# Patient Record
Sex: FEMALE | Race: WHITE | NOT HISPANIC OR LATINO | Employment: OTHER | URBAN - METROPOLITAN AREA
[De-identification: names, ages, dates, MRNs, and addresses within clinical notes are randomized per-mention and may not be internally consistent; named-entity substitution may affect disease eponyms.]

---

## 2017-06-13 RX ORDER — AMLODIPINE BESYLATE 10 MG/1
10 TABLET ORAL DAILY
Qty: 10 TABLET | Refills: 0 | Status: SHIPPED | OUTPATIENT
Start: 2017-06-13 | End: 2017-06-19 | Stop reason: SDUPTHER

## 2017-06-13 NOTE — TELEPHONE ENCOUNTER
----- Message from Evie Gibbons sent at 6/13/2017 10:54 AM CDT -----  Contact: self [phone] ext [phone]  States that she needs refill on amlodipine. Pt uses     [facility] [address]  [address] [place] [address]  Phone: [phone] Fax: [phone]    Please call back at [phone] ext [phone]//thank you acc

## 2017-06-15 ENCOUNTER — TELEPHONE (OUTPATIENT)
Dept: INTERNAL MEDICINE | Facility: CLINIC | Age: 67
End: 2017-06-15

## 2017-06-15 NOTE — TELEPHONE ENCOUNTER
----- Message from Marlena Freeman sent at 6/15/2017  2:05 PM CDT -----  Contact: pt  States she needs to schedule lab work on tomorrow. State she has an appt on Monday with Dr Han. Please call pt at [phone]. Thank you

## 2017-06-15 NOTE — TELEPHONE ENCOUNTER
----- Message from Shweta Saleem sent at 6/15/2017  8:51 AM CDT -----  Contact: nieves Leon - pt is returning your missed call - please call again at [phone] ext [phone]

## 2017-06-19 ENCOUNTER — OFFICE VISIT (OUTPATIENT)
Dept: INTERNAL MEDICINE | Facility: CLINIC | Age: 67
End: 2017-06-19
Payer: MEDICARE

## 2017-06-19 VITALS
SYSTOLIC BLOOD PRESSURE: 110 MMHG | DIASTOLIC BLOOD PRESSURE: 67 MMHG | WEIGHT: 194.75 LBS | TEMPERATURE: 99 F | BODY MASS INDEX: 33.43 KG/M2 | OXYGEN SATURATION: 97 % | HEART RATE: 79 BPM

## 2017-06-19 DIAGNOSIS — L60.3 DYSTROPHIC NAIL: ICD-10-CM

## 2017-06-19 DIAGNOSIS — E11.9 CONTROLLED TYPE 2 DIABETES MELLITUS WITHOUT COMPLICATION, WITHOUT LONG-TERM CURRENT USE OF INSULIN: Primary | ICD-10-CM

## 2017-06-19 DIAGNOSIS — Z78.0 ASYMPTOMATIC MENOPAUSAL STATE: ICD-10-CM

## 2017-06-19 DIAGNOSIS — Z12.31 ENCOUNTER FOR SCREENING MAMMOGRAM FOR BREAST CANCER: ICD-10-CM

## 2017-06-19 DIAGNOSIS — I10 HYPERTENSION, ESSENTIAL: ICD-10-CM

## 2017-06-19 PROCEDURE — 99999 PR PBB SHADOW E&M-EST. PATIENT-LVL III: CPT | Mod: PBBFAC,,, | Performed by: FAMILY MEDICINE

## 2017-06-19 PROCEDURE — 4010F ACE/ARB THERAPY RXD/TAKEN: CPT | Mod: S$GLB,,, | Performed by: FAMILY MEDICINE

## 2017-06-19 PROCEDURE — 99214 OFFICE O/P EST MOD 30 MIN: CPT | Mod: S$GLB,,, | Performed by: FAMILY MEDICINE

## 2017-06-19 RX ORDER — NAPROXEN 500 MG/1
500 TABLET ORAL
COMMUNITY
End: 2020-08-05 | Stop reason: SDUPTHER

## 2017-06-19 RX ORDER — BENAZEPRIL HYDROCHLORIDE 40 MG/1
40 TABLET ORAL DAILY
Qty: 90 TABLET | Refills: 1 | Status: SHIPPED | OUTPATIENT
Start: 2017-06-19 | End: 2017-12-29 | Stop reason: SDUPTHER

## 2017-06-19 RX ORDER — AMLODIPINE BESYLATE 10 MG/1
10 TABLET ORAL DAILY
Qty: 10 TABLET | Refills: 1 | Status: SHIPPED | OUTPATIENT
Start: 2017-06-19 | End: 2017-06-27 | Stop reason: SDUPTHER

## 2017-06-19 RX ORDER — CYCLOBENZAPRINE HCL 10 MG
10 TABLET ORAL
COMMUNITY
Start: 2016-06-08 | End: 2020-08-05 | Stop reason: DRUGHIGH

## 2017-06-19 RX ORDER — DEXTROSE 4 G
TABLET,CHEWABLE ORAL
COMMUNITY
Start: 2015-01-19 | End: 2019-03-12 | Stop reason: SDUPTHER

## 2017-06-19 NOTE — PROGRESS NOTES
Subjective:       Patient ID: Mita Max is a 67 y.o. female.    Chief Complaint: b/p medication refill and Medicare AWV    Last seen in 2014.  She has been seeing Dr. Victor M Aragon since then and labs performed at [facility] are reviewed.  She has a history of diet-controlled diabetes, essential hypertension, osteoarthritis.  Saw Willis for right great toenail earlier this year.  It was removed.  There is a  Claude - bone and JOint - splint not help right wrist pain norm with lifting items.  Knee pain left one time pain       Review of Systems   Constitutional: Positive for fatigue.   HENT: Positive for congestion, dental problem and postnasal drip.    Eyes: Positive for itching.   Respiratory: Positive for cough and shortness of breath.    Cardiovascular: Positive for leg swelling.   Gastrointestinal: Negative.    Endocrine: Negative.    Genitourinary: Negative.    Musculoskeletal: Positive for arthralgias and back pain.   Skin: Negative.    Allergic/Immunologic: Positive for environmental allergies.   Neurological: Negative.    Hematological: Negative.    Psychiatric/Behavioral: Positive for dysphoric mood.       Objective:      Physical Exam   Constitutional: She is oriented to person, place, and time. She appears well-developed and well-nourished.   HENT:   Head: Normocephalic and atraumatic.   Right Ear: External ear normal.   Left Ear: External ear normal.   Mouth/Throat: Oropharynx is clear and moist. No oropharyngeal exudate.   Neck: Normal range of motion. Neck supple.   Cardiovascular: Normal rate, regular rhythm and normal heart sounds.    Pulses:       Dorsalis pedis pulses are 2+ on the right side, and 2+ on the left side.        Posterior tibial pulses are 1+ on the right side, and 1+ on the left side.   Pulmonary/Chest: Effort normal and breath sounds normal.   Abdominal: Soft. Bowel sounds are normal. She exhibits no distension. There is no tenderness.   Musculoskeletal:         Right foot: There is normal range of motion and no deformity.        Left foot: There is normal range of motion and no deformity.   Feet:   Right Foot:   Protective Sensation: 10 sites tested. 10 sites sensed.   Skin Integrity: Negative for ulcer or skin breakdown.   Left Foot:   Protective Sensation: 10 sites tested. 10 sites sensed.   Skin Integrity: Negative for ulcer or skin breakdown.   Neurological: She is alert and oriented to person, place, and time.   Skin: Skin is warm and dry.   Disfigured right great toenail.   Psychiatric: She has a normal mood and affect. Her behavior is normal.         Assessment/Plan:     1. Controlled type 2 diabetes mellitus without complication, without long-term current use of insulin  Hemoglobin A1c    Comprehensive metabolic panel    CBC auto differential    Lipid panel    Microalbumin/creatinine urine ratio   2. Encounter for screening mammogram for breast cancer  Mammo Digital Screening Bilat with CAD   3. Asymptomatic menopausal state  DXA Bone Density Spine And Hip   4. Hypertension, essential  benazepril (LOTENSIN) 40 MG tablet    amlodipine (NORVASC) 10 MG tablet   5. Dystrophic nail       Recheck 6 months if labs all good.

## 2017-06-20 ENCOUNTER — TELEPHONE (OUTPATIENT)
Dept: INTERNAL MEDICINE | Facility: CLINIC | Age: 67
End: 2017-06-20

## 2017-06-20 NOTE — TELEPHONE ENCOUNTER
----- Message from Cassy Amaro sent at 6/20/2017  9:11 AM CDT -----  Contact: Pt  Pt called and stated she needed to speak to the nurse. She stated she needs to reschedule her nurse visit. She can be reached at [phone] ext [phone].  Thanks,  TF

## 2017-06-20 NOTE — TELEPHONE ENCOUNTER
Pt was calling in regards to rescheduling her lab appt. Rescheduled pt lab appt. Pt verbalized understanding of information given.

## 2017-06-22 ENCOUNTER — TELEPHONE (OUTPATIENT)
Dept: INTERNAL MEDICINE | Facility: CLINIC | Age: 67
End: 2017-06-22

## 2017-06-22 NOTE — TELEPHONE ENCOUNTER
Tried calling pt to inform that medication was sent into her pharmacy on yesterday. Pt phone just rang, voicemail was left for pt too return the call.

## 2017-06-22 NOTE — TELEPHONE ENCOUNTER
----- Message from Tamara Vallecillo sent at 6/22/2017 11:42 AM CDT -----  Contact: [phone]  1. What is the name of the medication you are requesting? amlodipine  2. What is the dose? n/a  3. How do you take the medication? Orally, topically, etc? orally  4. How often do you take this medication? daily  5. Do you need a 30 day or 90 day supply? 90 day supply for mail order  6. How many refills are you requesting? n/a  7. What is your preferred pharmacy and location of the pharmacy? [facility]  8. Who can we contact with further questions? [phone]    Patient would like to consult with nurse regarding RX medication sent to the pharmacy. Please call back at [phone]    Thanks,  Tamara Vallecillo

## 2017-06-23 ENCOUNTER — TELEPHONE (OUTPATIENT)
Dept: INTERNAL MEDICINE | Facility: CLINIC | Age: 67
End: 2017-06-23

## 2017-06-23 NOTE — TELEPHONE ENCOUNTER
----- Message from Tanna Mata sent at 6/23/2017  8:40 AM CDT -----  Pt is returning a call to nurse.        Please call pt back at [phone] ext [phone]

## 2017-06-23 NOTE — TELEPHONE ENCOUNTER
Pt was returning a call from yesterday. A voice message was left for the pt that her medication was called in to her pharmacy.

## 2017-06-27 ENCOUNTER — CLINICAL SUPPORT (OUTPATIENT)
Dept: INTERNAL MEDICINE | Facility: CLINIC | Age: 67
End: 2017-06-27
Payer: MEDICARE

## 2017-06-27 DIAGNOSIS — E11.21 TYPE 2 DIABETES MELLITUS WITH DIABETIC NEPHROPATHY, WITH LONG-TERM CURRENT USE OF INSULIN: ICD-10-CM

## 2017-06-27 DIAGNOSIS — Z79.4 TYPE 2 DIABETES MELLITUS WITH DIABETIC NEPHROPATHY, WITH LONG-TERM CURRENT USE OF INSULIN: ICD-10-CM

## 2017-06-27 DIAGNOSIS — E11.9 CONTROLLED TYPE 2 DIABETES MELLITUS WITHOUT COMPLICATION, WITHOUT LONG-TERM CURRENT USE OF INSULIN: ICD-10-CM

## 2017-06-27 DIAGNOSIS — I10 HYPERTENSION, ESSENTIAL: ICD-10-CM

## 2017-06-27 LAB
ALBUMIN SERPL BCP-MCNC: 3.9 G/DL
ALP SERPL-CCNC: 52 U/L
ALT SERPL W/O P-5'-P-CCNC: 21 U/L
ANION GAP SERPL CALC-SCNC: 8 MMOL/L
AST SERPL-CCNC: 22 U/L
BASOPHILS # BLD AUTO: 0.06 K/UL
BASOPHILS NFR BLD: 1.3 %
BILIRUB SERPL-MCNC: 0.8 MG/DL
BUN SERPL-MCNC: 13 MG/DL
CALCIUM SERPL-MCNC: 9.9 MG/DL
CHLORIDE SERPL-SCNC: 107 MMOL/L
CHOLEST/HDLC SERPL: 2.9 {RATIO}
CO2 SERPL-SCNC: 27 MMOL/L
CREAT SERPL-MCNC: 0.8 MG/DL
CREAT UR-MCNC: 98 MG/DL
DIFFERENTIAL METHOD: ABNORMAL
EOSINOPHIL # BLD AUTO: 0.4 K/UL
EOSINOPHIL NFR BLD: 7.4 %
ERYTHROCYTE [DISTWIDTH] IN BLOOD BY AUTOMATED COUNT: 12.8 %
EST. GFR  (AFRICAN AMERICAN): >60 ML/MIN/1.73 M^2
EST. GFR  (NON AFRICAN AMERICAN): >60 ML/MIN/1.73 M^2
GLUCOSE SERPL-MCNC: 152 MG/DL
HCT VFR BLD AUTO: 41.5 %
HDL/CHOLESTEROL RATIO: 34.3 %
HDLC SERPL-MCNC: 178 MG/DL
HDLC SERPL-MCNC: 61 MG/DL
HGB BLD-MCNC: 14 G/DL
LDLC SERPL CALC-MCNC: 105.6 MG/DL
LYMPHOCYTES # BLD AUTO: 1.9 K/UL
LYMPHOCYTES NFR BLD: 40.8 %
MCH RBC QN AUTO: 31.5 PG
MCHC RBC AUTO-ENTMCNC: 33.7 %
MCV RBC AUTO: 93 FL
MICROALBUMIN UR DL<=1MG/L-MCNC: 6 UG/ML
MICROALBUMIN/CREATININE RATIO: 6.1 UG/MG
MONOCYTES # BLD AUTO: 0.5 K/UL
MONOCYTES NFR BLD: 9.9 %
NEUTROPHILS # BLD AUTO: 1.9 K/UL
NEUTROPHILS NFR BLD: 40.4 %
NONHDLC SERPL-MCNC: 117 MG/DL
PLATELET # BLD AUTO: 299 K/UL
PMV BLD AUTO: 9.1 FL
POTASSIUM SERPL-SCNC: 5.1 MMOL/L
PROT SERPL-MCNC: 7.1 G/DL
RBC # BLD AUTO: 4.45 M/UL
SODIUM SERPL-SCNC: 142 MMOL/L
TRIGL SERPL-MCNC: 57 MG/DL
WBC # BLD AUTO: 4.76 K/UL

## 2017-06-27 PROCEDURE — 36415 COLL VENOUS BLD VENIPUNCTURE: CPT | Mod: S$GLB,,, | Performed by: FAMILY MEDICINE

## 2017-06-27 PROCEDURE — 83036 HEMOGLOBIN GLYCOSYLATED A1C: CPT

## 2017-06-27 PROCEDURE — 85025 COMPLETE CBC W/AUTO DIFF WBC: CPT

## 2017-06-27 PROCEDURE — 82570 ASSAY OF URINE CREATININE: CPT

## 2017-06-27 PROCEDURE — 80053 COMPREHEN METABOLIC PANEL: CPT

## 2017-06-27 PROCEDURE — 80061 LIPID PANEL: CPT

## 2017-06-27 PROCEDURE — 99999 PR PBB SHADOW E&M-EST. PATIENT-LVL II: CPT | Mod: PBBFAC,,,

## 2017-06-27 RX ORDER — AMLODIPINE BESYLATE 10 MG/1
10 TABLET ORAL DAILY
Qty: 90 TABLET | Refills: 1 | Status: SHIPPED | OUTPATIENT
Start: 2017-06-27 | End: 2017-07-13 | Stop reason: SDUPTHER

## 2017-06-28 LAB
ESTIMATED AVG GLUCOSE: 126 MG/DL
HBA1C MFR BLD HPLC: 6 %

## 2017-06-29 DIAGNOSIS — E11.9 CONTROLLED TYPE 2 DIABETES MELLITUS WITHOUT COMPLICATION, WITHOUT LONG-TERM CURRENT USE OF INSULIN: Primary | ICD-10-CM

## 2017-06-29 DIAGNOSIS — I10 HYPERTENSION, ESSENTIAL: ICD-10-CM

## 2017-07-05 ENCOUNTER — PATIENT OUTREACH (OUTPATIENT)
Dept: ADMINISTRATIVE | Facility: HOSPITAL | Age: 67
End: 2017-07-05

## 2017-07-13 DIAGNOSIS — I10 HYPERTENSION, ESSENTIAL: ICD-10-CM

## 2017-07-13 RX ORDER — AMLODIPINE BESYLATE 10 MG/1
10 TABLET ORAL DAILY
Qty: 90 TABLET | Refills: 1 | Status: SHIPPED | OUTPATIENT
Start: 2017-07-13 | End: 2018-02-15 | Stop reason: SDUPTHER

## 2017-07-13 NOTE — TELEPHONE ENCOUNTER
Pt called and states that she would like her script for Amlodipine to go to mail pharmacy instead of local pharmacy because it is cheaper, please advise.

## 2017-09-21 ENCOUNTER — OFFICE VISIT (OUTPATIENT)
Dept: INTERNAL MEDICINE | Facility: CLINIC | Age: 67
End: 2017-09-21
Payer: MEDICARE

## 2017-09-21 VITALS
HEART RATE: 74 BPM | OXYGEN SATURATION: 95 % | DIASTOLIC BLOOD PRESSURE: 62 MMHG | SYSTOLIC BLOOD PRESSURE: 117 MMHG | HEIGHT: 63 IN | TEMPERATURE: 98 F | WEIGHT: 196.44 LBS | BODY MASS INDEX: 34.8 KG/M2

## 2017-09-21 DIAGNOSIS — Z79.4 TYPE 2 DIABETES MELLITUS WITH DIABETIC NEPHROPATHY, WITH LONG-TERM CURRENT USE OF INSULIN: ICD-10-CM

## 2017-09-21 DIAGNOSIS — N39.3 STRESS INCONTINENCE, FEMALE: ICD-10-CM

## 2017-09-21 DIAGNOSIS — E11.21 TYPE 2 DIABETES MELLITUS WITH DIABETIC NEPHROPATHY, WITH LONG-TERM CURRENT USE OF INSULIN: ICD-10-CM

## 2017-09-21 DIAGNOSIS — Z23 NEED FOR IMMUNIZATION AGAINST INFLUENZA: ICD-10-CM

## 2017-09-21 DIAGNOSIS — Z00.00 ENCOUNTER FOR PREVENTATIVE ADULT HEALTH CARE EXAMINATION: Primary | ICD-10-CM

## 2017-09-21 DIAGNOSIS — F10.21 ALCOHOL DEPENDENCE IN REMISSION: ICD-10-CM

## 2017-09-21 DIAGNOSIS — I10 ESSENTIAL HYPERTENSION: ICD-10-CM

## 2017-09-21 DIAGNOSIS — F17.210 CIGARETTE NICOTINE DEPENDENCE WITHOUT COMPLICATION: ICD-10-CM

## 2017-09-21 PROCEDURE — G0438 PPPS, INITIAL VISIT: HCPCS | Mod: S$GLB,,, | Performed by: NURSE PRACTITIONER

## 2017-09-21 PROCEDURE — 90662 IIV NO PRSV INCREASED AG IM: CPT | Mod: S$GLB,,, | Performed by: NURSE PRACTITIONER

## 2017-09-21 PROCEDURE — 99999 PR PBB SHADOW E&M-EST. PATIENT-LVL IV: CPT | Mod: PBBFAC,,, | Performed by: NURSE PRACTITIONER

## 2017-09-21 PROCEDURE — G0008 ADMIN INFLUENZA VIRUS VAC: HCPCS | Mod: S$GLB,,, | Performed by: NURSE PRACTITIONER

## 2017-09-21 NOTE — PATIENT INSTRUCTIONS
Counseling and Referral of Other Preventative  (Italic type indicates deductible and co-insurance are waived)    Patient Name: Mita Max  Today's Date: 9/21/2017      SERVICE LIMITATIONS RECOMMENDATION    Vaccines    · Pneumococcal (once after 65)    · Influenza (annually)    · Hepatitis B (if medium/high risk)    · Prevnar 13      Hepatitis B medium/high risk factors:       - End-stage renal disease       - Hemophiliacs who received Factor VII or         IX concentrates       - Clients of institutions for the mentally             retarded       - Persons who live in the same house as          a HepB carrier       - Homosexual men       - Illicit injectable drug abusers     Pneumococcal: Done, no repeat necessary     Influenza: Done, repeat in one year     Hepatitis B: N/A     Prevnar 13: unsure of status, will update when records from Dr Mahesh retana    Mammogram (biennial age 50-74)  Annually (age 40 or over)  Last done 6/23/2016, recommend to repeat every 1  years    Pap (up to age 70 and after 70 if unknown history or abnormal study last 10 years)    Last done 6/23/2016, recommend to repeat every 5  years     The USPSTF recommends against screening for cervical cancer in women older than age 65 years who have had adequate prior screening and are not otherwise at high risk for cervical cancer.      Colorectal cancer screening (to age 75)    · Fecal occult blood test (annual)  · Flexible sigmoidoscopy (5y)  · Screening colonoscopy (10y)  · Barium enema   Last done 8/15/2012, recommend to repeat every 5  years    Diabetes self-management training (no USPSTF recommendations)  Requires referral by treating physician for patient with diabetes or renal disease. 10 hours of initial DSMT sessions of no less than 30 minutes each in a continuous 12-month period. 2 hours of follow-up DSMT in subsequent years.  Recommended to patient, declined    Bone mass measurements (age 65 & older, biennial)  Requires diagnosis  related to osteoporosis or estrogen deficiency. Biennial benefit unless patient has history of long-term glucocorticoid Ordered, call to schedule     Glaucoma screening (no USPSTF recommendation)  Diabetes mellitus, family history   , age 50 or over    American, age 65 or over  Recommend follow up with eye care professional regularly    Medical nutrition therapy for diabetes or renal disease (no recommended schedule)  Requires referral by treating physician for patient with diabetes or renal disease or kidney transplant within the past 3 years.  Can be provided in same year as diabetes self-management training (DSMT), and CMS recommends medical nutrition therapy take place after DSMT. Up to 3 hours for initial year and 2 hours in subsequent years.  N/A    Cardiovascular screening blood tests (every 5 years)  · Fasting lipid panel  Order as a panel if possible  Done this year, repeat every year    Diabetes screening tests (at least every 3 years, Medicare covers annually or at 6-month intervals for prediabetic patients)  · Fasting blood sugar (FBS) or glucose tolerance test (GTT)  Patient must be diagnosed with one of the following:       - Hypertension       - Dyslipidemia       - Obesity (BMI 30kg/m2)       - Previous elevated impaired FBS or GTT       ... or any two of the following:       - Overweight (BMI 25 but <30)       - Family history of diabetes       - Age 65 or older       - History of gestational diabetes or birth of baby weighing more than 9 pounds  Done this year, repeat every year    HIV screening (annually for increased risk patients)  · HIV-1 and HIV-2 by EIA, or ROM, rapid antibody test or oral mucosa transudate  Patients must be at increased risk for HIV infection per USPSTF guidelines or pregnant. Tests covered annually for patient at increased risk or as requested by the patient. Pregnant patients may receive up to 3 tests during pregnancy.  Risks discussed, screening  is not recommended    Smoking cessation counseling (up to 8 sessions per year)  Patients must be asymptomatic of tobacco-related conditions to receive as a preventative service.  Counseled for 3-10 minutes.    Subsequent annual wellness visit  At least 12 months since last AWV  Return in one year     The following information is provided to all patients.  This information is to help you find resources for any of the problems found today that may be affecting your health:                Living healthy guide: www.[facility].[place].[facility]      Understanding Diabetes: www.diabetes.org      Eating healthy: www.cdc.gov/healthyweight      CDC home safety checklist: www.cdc.gov/steadi/patient.html      Agency on Aging: www.goea.[place].[facility]      Alcoholics anonymous (AA): www.aa.org      Physical Activity: www.allan.nih.gov/cp5uijr      Tobacco use: www.quitwithusla.org

## 2017-09-22 NOTE — PROGRESS NOTES
"Mita Max presented for a  Medicare AWV and comprehensive Health Risk Assessment today. The following components were reviewed and updated:    · Medical history  · Family History  · Social history  · Allergies and Current Medications  · Health Risk Assessment  · Health Maintenance  · Care Team     ** See Completed Assessments for Annual Wellness Visit within the encounter summary.**       The following assessments were completed:  · Living Situation  · CAGE  · Depression Screening  · Timed Get Up and Go  · Whisper Test  · Cognitive Function Screening  · Nutrition Screening  · ADL Screening  · PAQ Screening    Vitals:    09/21/17 1508   BP: 117/62   BP Location: Right arm   Patient Position: Sitting   BP Method: Medium (Automatic)   Pulse: 74   Temp: 97.7 °F (36.5 °C)   TempSrc: Oral   SpO2: 95%   Weight: 89.1 kg (196 lb 6.9 oz)   Height: 5' 3" (1.6 m)     Body mass index is 34.8 kg/m².  Physical Exam   Constitutional: She is oriented to person, place, and time. Vital signs are normal. She appears well-developed. No distress.   obese   HENT:   Head: Normocephalic and atraumatic.   Right Ear: Hearing, external ear and ear canal normal. No middle ear effusion.   Left Ear: Hearing, external ear and ear canal normal.  No middle ear effusion.   Nose: Mucosal edema and rhinorrhea present. Right sinus exhibits no maxillary sinus tenderness and no frontal sinus tenderness. Left sinus exhibits no maxillary sinus tenderness and no frontal sinus tenderness.   Mouth/Throat: Oropharynx is clear and moist. No oropharyngeal exudate. No tonsillar exudate.   Eyes: Conjunctivae are normal. Pupils are equal, round, and reactive to light. Right eye exhibits no discharge. Left eye exhibits no discharge.   Neck: Normal range of motion. Neck supple.   Cardiovascular: Normal rate, regular rhythm and normal heart sounds.    Pulses:       Dorsalis pedis pulses are 2+ on the right side, and 2+ on the left side.        Posterior tibial " pulses are 1+ on the right side, and 1+ on the left side.   Pulmonary/Chest: Effort normal and breath sounds normal. No respiratory distress. She has no wheezes.   Abdominal: Soft. Bowel sounds are normal. She exhibits no distension. There is no tenderness.   Musculoskeletal:        Right foot: There is normal range of motion and no deformity.        Left foot: There is normal range of motion and no deformity.   Feet:   Right Foot:   Protective Sensation: 10 sites tested. 10 sites sensed.   Skin Integrity: Negative for ulcer or skin breakdown.   Left Foot:   Protective Sensation: 10 sites tested. 10 sites sensed.   Skin Integrity: Negative for ulcer or skin breakdown.   Lymphadenopathy:     She has no cervical adenopathy.   Neurological: She is alert and oriented to person, place, and time.   Skin: Skin is warm and dry. No rash noted. She is not diaphoretic.   Disfigured right great toenail.   Psychiatric: She has a normal mood and affect. Her behavior is normal.         Diagnoses and health risks identified today and associated recommendations/orders:    1. Encounter for preventative adult health care examination  Completed today    2. Essential hypertension  Controlled on amlodipine and benazepril. No CP, palpitations, SOB and HA. Follow with PCP as scheduled.     3. Type 2 diabetes mellitus with diabetic nephropathy, with long-term current use of insulin  Diet controlled. Denies need for further education. Continue current plan and follow with PCP    4. Alcohol dependence in remission  Sober since 1986. Still attending AA regularly. No desire to drink. Continue to abstain.     5. Stress incontinence, female  Does not wear and pad or brief. Does not wish to see urology or any other intervention as this is a minor issue. Follow with PCP for any worsening.     6. Cigarette nicotine dependence without complication  Has tried multiple things to try to quit. Unable to quit at this time. Desires to quit. Defers referral  to cessation program. Follow with PCP.     7. Need for immunization against influenza  Completed today    Pt has mammo and dexa ordered. Will schedule on her own as well as eye exam.   Requesting records from Dr Aragon to determine need to pneumovax.     Provided Mita with a 5-10 year written screening schedule and personal prevention plan. Recommendations were developed using the USPSTF age appropriate recommendations. Education, counseling, and referrals were provided as needed. After Visit Summary printed and given to patient which includes a list of additional screenings\tests needed.    Return in about 3 months (around 12/27/2017).    PRINCE Reese,FNP-C

## 2017-12-29 DIAGNOSIS — I10 HYPERTENSION, ESSENTIAL: ICD-10-CM

## 2017-12-29 RX ORDER — BENAZEPRIL HYDROCHLORIDE 40 MG/1
TABLET ORAL
Qty: 90 TABLET | Refills: 0 | Status: SHIPPED | OUTPATIENT
Start: 2017-12-29 | End: 2018-03-06 | Stop reason: SDUPTHER

## 2018-01-29 ENCOUNTER — TELEPHONE (OUTPATIENT)
Dept: INTERNAL MEDICINE | Facility: CLINIC | Age: 68
End: 2018-01-29

## 2018-01-31 ENCOUNTER — TELEPHONE (OUTPATIENT)
Dept: INTERNAL MEDICINE | Facility: CLINIC | Age: 68
End: 2018-01-31

## 2018-01-31 NOTE — TELEPHONE ENCOUNTER
Patient was returning the phone call from earlier in regards to her scheduling her appt for labs and a follow up visit. Pt scheduled both of her appointment. Pt verbalized understanding of the information given.

## 2018-01-31 NOTE — TELEPHONE ENCOUNTER
----- Message from Jacqui Palma sent at 1/31/2018 10:13 AM CST -----  Contact: [phone]   Pt Returning call, please call back at [phone].   Thx-AH

## 2018-02-01 ENCOUNTER — CLINICAL SUPPORT (OUTPATIENT)
Dept: INTERNAL MEDICINE | Facility: CLINIC | Age: 68
End: 2018-02-01
Payer: MEDICARE

## 2018-02-01 DIAGNOSIS — E11.9 CONTROLLED TYPE 2 DIABETES MELLITUS WITHOUT COMPLICATION, WITHOUT LONG-TERM CURRENT USE OF INSULIN: ICD-10-CM

## 2018-02-01 DIAGNOSIS — I10 HYPERTENSION, ESSENTIAL: ICD-10-CM

## 2018-02-01 LAB
ANION GAP SERPL CALC-SCNC: 8 MMOL/L
BUN SERPL-MCNC: 10 MG/DL
CALCIUM SERPL-MCNC: 9.8 MG/DL
CHLORIDE SERPL-SCNC: 106 MMOL/L
CO2 SERPL-SCNC: 27 MMOL/L
CREAT SERPL-MCNC: 0.9 MG/DL
EST. GFR  (AFRICAN AMERICAN): >60 ML/MIN/1.73 M^2
EST. GFR  (NON AFRICAN AMERICAN): >60 ML/MIN/1.73 M^2
ESTIMATED AVG GLUCOSE: 126 MG/DL
GLUCOSE SERPL-MCNC: 174 MG/DL
HBA1C MFR BLD HPLC: 6 %
POTASSIUM SERPL-SCNC: 4.3 MMOL/L
SODIUM SERPL-SCNC: 141 MMOL/L

## 2018-02-01 PROCEDURE — 80048 BASIC METABOLIC PNL TOTAL CA: CPT

## 2018-02-01 PROCEDURE — 83036 HEMOGLOBIN GLYCOSYLATED A1C: CPT

## 2018-02-01 NOTE — LETTER
February 1, 2018      [address] [place] [address]  Phone: [phone]  Fax: [phone]       Patient: Mita Max   YOB: 1950  Date of Visit: 02/01/2018    To Whom It May Concern:    Rick Max  was at [facility] on 02/01/2018. She may return to work today. If you have any questions or concerns,  please do not hesitate to contact the office.    Sincerely,        CURT Rubio

## 2018-02-15 ENCOUNTER — OFFICE VISIT (OUTPATIENT)
Dept: INTERNAL MEDICINE | Facility: CLINIC | Age: 68
End: 2018-02-15
Payer: MEDICARE

## 2018-02-15 VITALS
HEART RATE: 76 BPM | DIASTOLIC BLOOD PRESSURE: 67 MMHG | SYSTOLIC BLOOD PRESSURE: 111 MMHG | OXYGEN SATURATION: 98 % | WEIGHT: 193.56 LBS | BODY MASS INDEX: 34.29 KG/M2 | TEMPERATURE: 98 F

## 2018-02-15 DIAGNOSIS — I10 HYPERTENSION, ESSENTIAL: ICD-10-CM

## 2018-02-15 DIAGNOSIS — Z11.59 NEED FOR HEPATITIS C SCREENING TEST: ICD-10-CM

## 2018-02-15 DIAGNOSIS — M67.472 GANGLION CYST OF LEFT FOOT: ICD-10-CM

## 2018-02-15 DIAGNOSIS — E11.69 HYPERLIPIDEMIA ASSOCIATED WITH TYPE 2 DIABETES MELLITUS: ICD-10-CM

## 2018-02-15 DIAGNOSIS — Z12.11 SCREENING FOR COLON CANCER: ICD-10-CM

## 2018-02-15 DIAGNOSIS — E11.9 DIET-CONTROLLED DIABETES MELLITUS: Primary | ICD-10-CM

## 2018-02-15 DIAGNOSIS — E78.5 HYPERLIPIDEMIA ASSOCIATED WITH TYPE 2 DIABETES MELLITUS: ICD-10-CM

## 2018-02-15 DIAGNOSIS — Z12.31 ENCOUNTER FOR SCREENING MAMMOGRAM FOR BREAST CANCER: ICD-10-CM

## 2018-02-15 DIAGNOSIS — Z78.0 ASYMPTOMATIC MENOPAUSAL STATE: ICD-10-CM

## 2018-02-15 DIAGNOSIS — Z23 IMMUNIZATION DUE: ICD-10-CM

## 2018-02-15 PROCEDURE — 3008F BODY MASS INDEX DOCD: CPT | Mod: S$GLB,,, | Performed by: FAMILY MEDICINE

## 2018-02-15 PROCEDURE — 99999 PR PBB SHADOW E&M-EST. PATIENT-LVL IV: CPT | Mod: PBBFAC,,, | Performed by: FAMILY MEDICINE

## 2018-02-15 PROCEDURE — 99214 OFFICE O/P EST MOD 30 MIN: CPT | Mod: S$GLB,,, | Performed by: FAMILY MEDICINE

## 2018-02-15 RX ORDER — AMLODIPINE BESYLATE 10 MG/1
10 TABLET ORAL DAILY
Qty: 90 TABLET | Refills: 3 | Status: SHIPPED | OUTPATIENT
Start: 2018-02-15 | End: 2019-02-19 | Stop reason: SDUPTHER

## 2018-02-15 RX ORDER — SIMVASTATIN 40 MG/1
40 TABLET, FILM COATED ORAL NIGHTLY
Qty: 90 TABLET | Refills: 1 | Status: SHIPPED | OUTPATIENT
Start: 2018-02-15 | End: 2019-03-02

## 2018-02-15 NOTE — PROGRESS NOTES
Subjective:       Patient ID: Mita Max is a 67 y.o. female.    Chief Complaint: Follow-up (on lab work); Rx refill (b/p); and left foot pain    Here for recheck for DM2 -  See labs. Lab Results       Component                Value               Date                       WBC                      4.76                06/27/2017                 HGB                      14.0                06/27/2017                 HCT                      41.5                06/27/2017                 PLT                      299                 06/27/2017                 CHOL                     178                 06/27/2017                 TRIG                     57                  06/27/2017                 HDL                      61                  06/27/2017                 LDLCALC                  105.6               06/27/2017                 ALT                      21                  06/27/2017                 AST                      22                  06/27/2017                 NA                       141                 02/01/2018                 K                        4.3                 02/01/2018                 CL                       106                 02/01/2018                 CALCIUM                  9.8                 02/01/2018                 CREATININE               0.9                 02/01/2018                 BUN                      10                  02/01/2018                 CO2                      27                  02/01/2018                 GLU                      174 (H)             02/01/2018                 ESTGFRAFRICA             >60.0               02/01/2018                 EGFRNONAA                >60.0               02/01/2018                 HGBA1C                   6.0 (H)             02/01/2018                 MICALBCREAT              6.1                 06/27/2017            Reviewed stable A1C but had  - she thinks - but she has been using cough drops  - has one on board now.      Diabetes   She presents for her follow-up diabetic visit. She has type 2 diabetes mellitus. There are no hypoglycemic associated symptoms. Pertinent negatives for diabetes include no foot paresthesias and no weakness. There are no hypoglycemic complications. Pertinent negatives for diabetic complications include no peripheral neuropathy or retinopathy.     Review of Systems   Constitutional: Negative for fever.   Neurological: Negative for weakness.   Psychiatric/Behavioral: Negative for sleep disturbance.       Objective:      Physical Exam   Constitutional: She is oriented to person, place, and time. She appears well-developed.   HENT:   Head: Normocephalic and atraumatic.   Cardiovascular: Normal rate, regular rhythm and normal heart sounds.    Pulmonary/Chest: Effort normal and breath sounds normal.   Musculoskeletal:   Left dorsal foot with 1 - 1.5 cm cyst to proximal foot. No erythema/ no warmth   Neurological: She is alert and oriented to person, place, and time.   Skin: Skin is warm and dry.   Psychiatric: She has a normal mood and affect. Her behavior is normal.         Assessment/Plan:     1. Diet-controlled diabetes mellitus  Hemoglobin A1c    Lipid panel    Microalbumin/creatinine urine ratio    Ambulatory referral to Optometry   2. Hypertension, essential  Comprehensive metabolic panel    amLODIPine (NORVASC) 10 MG tablet   3. Immunization due  Pneumococcal Conjugate Vaccine (13 Valent) (IM)   4. Asymptomatic menopausal state  DXA Bone Density Spine And Hip   5. Encounter for screening mammogram for breast cancer  Mammo Digital Screening Bilat with CAD   6. Screening for colon cancer     7. Need for hepatitis C screening test  Hepatitis C antibody   8. Hyperlipidemia associated with type 2 diabetes mellitus  Lipid panel    simvastatin (ZOCOR) 40 MG tablet   9. Ganglion cyst of left foot  Ambulatory referral to Podiatry   new start statin with recheck in 6 months - will get  FBS this next time -   Schedule her prevnar, mammo, DEXA  BP well controlled - continue current meds  continue diet control DM2 - discussed metformin if A1C were to rise to 6.4 or above.

## 2018-02-15 NOTE — LETTER
February 15, 2018    Mita Ricknlsummer Max  [address] [place] [address]             [facility]  [address] [place] [address]  Phone: [phone]  Fax: [phone] Ms Max was seen at my office today for scheduled recheck.    Please excuse her absence from work.                  Lexis Han MD

## 2018-02-23 ENCOUNTER — TELEPHONE (OUTPATIENT)
Dept: INTERNAL MEDICINE | Facility: CLINIC | Age: 68
End: 2018-02-23

## 2018-02-23 NOTE — TELEPHONE ENCOUNTER
Returned the patient phone call. Scheduled her nurse visit appt . Pt verbalized understanding of the information given.

## 2018-02-23 NOTE — TELEPHONE ENCOUNTER
----- Message from Ilana Perez sent at 2/23/2018 11:01 AM CST -----  Contact: pt  The pt request a call to schedule a nurse visit, the pt can be reached at [phone]///[phone]

## 2018-02-26 ENCOUNTER — CLINICAL SUPPORT (OUTPATIENT)
Dept: INTERNAL MEDICINE | Facility: CLINIC | Age: 68
End: 2018-02-26
Payer: MEDICARE

## 2018-02-26 ENCOUNTER — TELEPHONE (OUTPATIENT)
Dept: INTERNAL MEDICINE | Facility: CLINIC | Age: 68
End: 2018-02-26

## 2018-02-26 DIAGNOSIS — Z11.59 NEED FOR HEPATITIS C SCREENING TEST: ICD-10-CM

## 2018-02-26 DIAGNOSIS — I10 HYPERTENSION, ESSENTIAL: ICD-10-CM

## 2018-02-26 DIAGNOSIS — E11.9 DIET-CONTROLLED DIABETES MELLITUS: ICD-10-CM

## 2018-02-26 DIAGNOSIS — Z23 IMMUNIZATION DUE: Primary | ICD-10-CM

## 2018-02-26 DIAGNOSIS — E78.5 HYPERLIPIDEMIA ASSOCIATED WITH TYPE 2 DIABETES MELLITUS: ICD-10-CM

## 2018-02-26 DIAGNOSIS — E11.69 HYPERLIPIDEMIA ASSOCIATED WITH TYPE 2 DIABETES MELLITUS: ICD-10-CM

## 2018-02-26 NOTE — TELEPHONE ENCOUNTER
Please place orders from last visit including injection, tried to chart on it and can't get it to pull up for it to be given, blood work that is for future was accidentally added instead. pt is coming in tomorrow afternoon to have this done. Thanks.

## 2018-02-27 ENCOUNTER — TELEPHONE (OUTPATIENT)
Dept: INTERNAL MEDICINE | Facility: CLINIC | Age: 68
End: 2018-02-27

## 2018-02-27 ENCOUNTER — CLINICAL SUPPORT (OUTPATIENT)
Dept: INTERNAL MEDICINE | Facility: CLINIC | Age: 68
End: 2018-02-27
Payer: MEDICARE

## 2018-02-27 DIAGNOSIS — Z23 IMMUNIZATION DUE: ICD-10-CM

## 2018-02-27 PROCEDURE — 90670 PCV13 VACCINE IM: CPT | Mod: S$GLB,,, | Performed by: FAMILY MEDICINE

## 2018-02-27 PROCEDURE — 99999 PR PBB SHADOW E&M-EST. PATIENT-LVL I: CPT | Mod: PBBFAC,,,

## 2018-02-27 PROCEDURE — G0009 ADMIN PNEUMOCOCCAL VACCINE: HCPCS | Mod: S$GLB,,, | Performed by: FAMILY MEDICINE

## 2018-02-27 NOTE — TELEPHONE ENCOUNTER
The Zocor medication advises extra monitoring for people who also takes Norvasc.  Please advise, the patient has concerns about her liver

## 2018-02-27 NOTE — TELEPHONE ENCOUNTER
Called and spoke with the patient's  and advise of the message.   verbalized understanding and will call if there is any questions

## 2018-02-27 NOTE — TELEPHONE ENCOUNTER
Recommended maximum dose simvastatin 20 mg when amlodipine is also being prescribed.  I prescribed 40 mg at her last visit.  Please ask the patient to cut her simvastatin in half and take 20 mg nightly.    We will recheck in 6 months as discussed. If she wishes to check the lipids and ASTY/ALT in 3 months instead of 6 we can do that.     (Alternatively, she could take the 40 mg every other night and this would attain the same goal)

## 2018-03-06 DIAGNOSIS — I10 HYPERTENSION, ESSENTIAL: ICD-10-CM

## 2018-03-06 RX ORDER — BENAZEPRIL HYDROCHLORIDE 40 MG/1
TABLET ORAL
Qty: 90 TABLET | Refills: 3 | Status: SHIPPED | OUTPATIENT
Start: 2018-03-06 | End: 2019-02-28 | Stop reason: ALTCHOICE

## 2018-05-02 ENCOUNTER — TELEPHONE (OUTPATIENT)
Dept: INTERNAL MEDICINE | Facility: CLINIC | Age: 68
End: 2018-05-02

## 2018-05-02 NOTE — TELEPHONE ENCOUNTER
----- Message from Branden Whitmore sent at 5/2/2018  9:46 AM CDT -----  Contact: self [phone]  Would like to have prescription for diamox(generic) called to pharmacy, pt will be travelling to [place] on 05-10. Please call back at [phone].  Md Qamar          .  [facility] [place]  [address] [facility]  [place] [address]  Phone: [phone] Fax: [phone]

## 2018-05-02 NOTE — TELEPHONE ENCOUNTER
Patient called in stating that she will be traveling to [place] and wants to have Diamox(generic) called to the [facility] pharmacy on [address] in [place]. Pt states that she will be there for 5 days. She states that she need this medication to cover her for the 5 days she will be there.    Please Advise

## 2018-05-02 NOTE — TELEPHONE ENCOUNTER
Tried both numbers - L/M that I will call back - 250mg BID of acetazolamide can be used starting the day before travel and continuing for 48 hrs after arrival. It can be extended if she will be moving to much higher elevations during the course of her stay.    I will try to contact her after hours tomorrow or Friday.

## 2018-05-07 ENCOUNTER — TELEPHONE (OUTPATIENT)
Dept: INTERNAL MEDICINE | Facility: CLINIC | Age: 68
End: 2018-05-07

## 2018-05-07 RX ORDER — ACETAZOLAMIDE 250 MG/1
250 TABLET ORAL 2 TIMES DAILY
Qty: 12 TABLET | Refills: 0 | Status: SHIPPED | OUTPATIENT
Start: 2018-05-07 | End: 2019-02-28 | Stop reason: ALTCHOICE

## 2018-05-07 NOTE — TELEPHONE ENCOUNTER
I spoke with the patient.  She did have difficulty previously and has used this medication the last time she was there.  Reviewed that she would need to start on Wednesday continue Thursday and Friday and then because she is going to [place] at a higher elevation she would take it Saturday and Sunday as well.  Giving her enough meds to go through till Monday but she does not have to take it Monday.  Patient states understanding.

## 2018-05-07 NOTE — TELEPHONE ENCOUNTER
----- Message from Jacinta Chavira sent at 5/7/2018  8:28 AM CDT -----  Contact: pt   States she's rtn Maynor / nurses calling rg answering the pt's questions for the high altitude medicine and can be reached at [phone]//thanks/dbw

## 2018-05-07 NOTE — TELEPHONE ENCOUNTER
Will be in [place] for 2 days then going to a [place] feet.  And she will be there for 2 days also.  And yes she does have issue with the high attitudes

## 2018-05-07 NOTE — TELEPHONE ENCOUNTER
She will be going to [place] for 2 days ( getting there on Thursday)  then higher up to [place]  ( 10[place]000)  Going back to [place] on Monday to fly home.   Please send the script to [facility]

## 2019-02-19 DIAGNOSIS — I10 HYPERTENSION, ESSENTIAL: ICD-10-CM

## 2019-02-19 RX ORDER — AMLODIPINE BESYLATE 10 MG/1
10 TABLET ORAL DAILY
Qty: 90 TABLET | Refills: 0 | Status: SHIPPED | OUTPATIENT
Start: 2019-02-19 | End: 2019-06-21 | Stop reason: SDUPTHER

## 2019-02-19 NOTE — TELEPHONE ENCOUNTER
----- Message from Mita Dudley sent at 2/19/2019 12:47 PM CST -----  Type:  RX Refill Request    Who Called:  Pt Mita  Refill or New Rx: refill  RX Name and Strength:Amlodipine 10mg  How is the patient currently taking it? (ex. 1XDay):Takes once daily  Is this a 30 day or 90 day RX: 30 day  Preferred Pharmacy with phone number:[facility]  Local or Mail Order: Local  Ordering Provider: Dr Peterson  Would the patient rather a call back or a response via [facility]? Call back  Best Call Back Number: [phone]  Additional Information: States she is needing a refill of med Amlodipine until her appt with Dr Han on 2-28-19//////she would also like to have her lab done before her appt//need orders//please call//beth/lh

## 2019-02-19 NOTE — TELEPHONE ENCOUNTER
Last OV 02/15/18    Pt request to have labs done before visit. I informed pt there are orders that were due in August of 2018 but no orders for the upcoming annual visit. Please advise if you would like those labs done before visit scheduled for later this month.

## 2019-02-20 ENCOUNTER — TELEPHONE (OUTPATIENT)
Dept: INTERNAL MEDICINE | Facility: CLINIC | Age: 69
End: 2019-02-20

## 2019-02-20 DIAGNOSIS — I10 HYPERTENSION, ESSENTIAL: ICD-10-CM

## 2019-02-20 RX ORDER — AMLODIPINE BESYLATE 10 MG/1
10 TABLET ORAL DAILY
Qty: 30 TABLET | Refills: 0 | Status: SHIPPED | OUTPATIENT
Start: 2019-02-20 | End: 2019-06-21 | Stop reason: SDUPTHER

## 2019-02-20 NOTE — TELEPHONE ENCOUNTER
----- Message from Radha Lebron sent at 2/20/2019  9:45 AM CST -----  Contact: self  Pt states medication refill was sent to the wrong pharmacy.    Type:  RX Refill Request    Who Called: Patient   Refill or New Rx: refill  RX Name and Strength: Amlodipine, 10 mg   How is the patient currently taking it? (ex. 1XDay): 1xday  Is this a 30 day or 90 day RX: 30  Preferred Pharmacy with phone number:  Pt uses:    [id number] - [place] - [address] AT [facility] [place] [address]  [address]  [place] [address]  Phone: [phone] Fax: [phone]    Local or Mail Order: Local  Ordering Provider: Dr. Han  Would the patient rather a call back or a response via [facility]? Call back  Best Call Back Number: [phone]  Additional Information: n/a    Thanks,   Radha Lebron

## 2019-02-20 NOTE — TELEPHONE ENCOUNTER
I will send 30 days to her local [facility] per her request.    Will discuss further at her visit. She has been getting this same dose since prior to 2014.  She has been getting it through mail order from [facility] for the last two years. I do not anticipate a change.

## 2019-02-20 NOTE — TELEPHONE ENCOUNTER
Spoke with the patient. She states that her medication was sent to the wrong pharmacy ([facility]). She states that she does not want to be charged for a 90 day supply of medication from [facility] when this medication may or not be changed. Patient wants this medication to be sent to [facility] [address]. Patient last seen on 2/15/18.    Please Advise

## 2019-02-28 ENCOUNTER — OFFICE VISIT (OUTPATIENT)
Dept: INTERNAL MEDICINE | Facility: CLINIC | Age: 69
End: 2019-02-28
Payer: MEDICARE

## 2019-02-28 VITALS
OXYGEN SATURATION: 94 % | SYSTOLIC BLOOD PRESSURE: 120 MMHG | DIASTOLIC BLOOD PRESSURE: 66 MMHG | HEART RATE: 67 BPM | HEIGHT: 63 IN | TEMPERATURE: 97 F | BODY MASS INDEX: 34.09 KG/M2 | WEIGHT: 192.38 LBS

## 2019-02-28 DIAGNOSIS — Z78.0 ASYMPTOMATIC MENOPAUSAL STATE: ICD-10-CM

## 2019-02-28 DIAGNOSIS — F17.210 CIGARETTE NICOTINE DEPENDENCE WITHOUT COMPLICATION: ICD-10-CM

## 2019-02-28 DIAGNOSIS — Z12.31 ENCOUNTER FOR SCREENING MAMMOGRAM FOR BREAST CANCER: ICD-10-CM

## 2019-02-28 DIAGNOSIS — Z86.010 PERSONAL HISTORY OF COLONIC POLYPS: ICD-10-CM

## 2019-02-28 DIAGNOSIS — J44.1 COPD EXACERBATION: Primary | ICD-10-CM

## 2019-02-28 DIAGNOSIS — E11.69 HYPERLIPIDEMIA ASSOCIATED WITH TYPE 2 DIABETES MELLITUS: ICD-10-CM

## 2019-02-28 DIAGNOSIS — F10.21 ALCOHOL DEPENDENCE IN REMISSION: ICD-10-CM

## 2019-02-28 DIAGNOSIS — E78.5 HYPERLIPIDEMIA ASSOCIATED WITH TYPE 2 DIABETES MELLITUS: ICD-10-CM

## 2019-02-28 DIAGNOSIS — I10 HYPERTENSION, ESSENTIAL: ICD-10-CM

## 2019-02-28 DIAGNOSIS — Z11.59 NEED FOR HEPATITIS C SCREENING TEST: ICD-10-CM

## 2019-02-28 DIAGNOSIS — Z80.0 FAMILY HISTORY OF COLON CANCER IN MOTHER: ICD-10-CM

## 2019-02-28 DIAGNOSIS — E11.9 DIET-CONTROLLED DIABETES MELLITUS: ICD-10-CM

## 2019-02-28 LAB
ALBUMIN SERPL BCP-MCNC: 3.2 G/DL
ALBUMIN/CREAT UR: NORMAL UG/MG
ALP SERPL-CCNC: 74 U/L
ALT SERPL W/O P-5'-P-CCNC: 39 U/L
ANION GAP SERPL CALC-SCNC: 6 MMOL/L
AST SERPL-CCNC: 25 U/L
BASOPHILS # BLD AUTO: 0.06 K/UL
BASOPHILS NFR BLD: 1 %
BILIRUB SERPL-MCNC: 0.4 MG/DL
BUN SERPL-MCNC: 11 MG/DL
CALCIUM SERPL-MCNC: 9.8 MG/DL
CHLORIDE SERPL-SCNC: 103 MMOL/L
CHOLEST SERPL-MCNC: 141 MG/DL
CHOLEST/HDLC SERPL: 3.5 {RATIO}
CO2 SERPL-SCNC: 28 MMOL/L
CREAT SERPL-MCNC: 0.7 MG/DL
CREAT UR-MCNC: 36 MG/DL
DIFFERENTIAL METHOD: ABNORMAL
EOSINOPHIL # BLD AUTO: 0.2 K/UL
EOSINOPHIL NFR BLD: 3.9 %
ERYTHROCYTE [DISTWIDTH] IN BLOOD BY AUTOMATED COUNT: 12.1 %
EST. GFR  (AFRICAN AMERICAN): >60 ML/MIN/1.73 M^2
EST. GFR  (NON AFRICAN AMERICAN): >60 ML/MIN/1.73 M^2
ESTIMATED AVG GLUCOSE: 154 MG/DL
GLUCOSE SERPL-MCNC: 129 MG/DL
HBA1C MFR BLD HPLC: 7 %
HCT VFR BLD AUTO: 37.6 %
HDLC SERPL-MCNC: 40 MG/DL
HDLC SERPL: 28.4 %
HGB BLD-MCNC: 12.4 G/DL
IMM GRANULOCYTES # BLD AUTO: 0.05 K/UL
IMM GRANULOCYTES NFR BLD AUTO: 0.8 %
LDLC SERPL CALC-MCNC: 83.8 MG/DL
LYMPHOCYTES # BLD AUTO: 1.7 K/UL
LYMPHOCYTES NFR BLD: 27.2 %
MCH RBC QN AUTO: 30.5 PG
MCHC RBC AUTO-ENTMCNC: 33 G/DL
MCV RBC AUTO: 93 FL
MICROALBUMIN UR DL<=1MG/L-MCNC: <2.5 UG/ML
MONOCYTES # BLD AUTO: 0.5 K/UL
MONOCYTES NFR BLD: 7.6 %
NEUTROPHILS # BLD AUTO: 3.7 K/UL
NEUTROPHILS NFR BLD: 59.5 %
NONHDLC SERPL-MCNC: 101 MG/DL
NRBC BLD-RTO: 0 /100 WBC
PLATELET # BLD AUTO: 484 K/UL
PMV BLD AUTO: 8.8 FL
POTASSIUM SERPL-SCNC: 3.9 MMOL/L
PROT SERPL-MCNC: 7.5 G/DL
RBC # BLD AUTO: 4.06 M/UL
SODIUM SERPL-SCNC: 137 MMOL/L
TRIGL SERPL-MCNC: 86 MG/DL
WBC # BLD AUTO: 6.17 K/UL

## 2019-02-28 PROCEDURE — 3078F DIAST BP <80 MM HG: CPT | Mod: HCNC,CPTII,S$GLB, | Performed by: FAMILY MEDICINE

## 2019-02-28 PROCEDURE — 80061 LIPID PANEL: CPT | Mod: HCNC

## 2019-02-28 PROCEDURE — 1101F PT FALLS ASSESS-DOCD LE1/YR: CPT | Mod: HCNC,CPTII,S$GLB, | Performed by: FAMILY MEDICINE

## 2019-02-28 PROCEDURE — 1101F PR PT FALLS ASSESS DOC 0-1 FALLS W/OUT INJ PAST YR: ICD-10-PCS | Mod: HCNC,CPTII,S$GLB, | Performed by: FAMILY MEDICINE

## 2019-02-28 PROCEDURE — 3074F SYST BP LT 130 MM HG: CPT | Mod: HCNC,CPTII,S$GLB, | Performed by: FAMILY MEDICINE

## 2019-02-28 PROCEDURE — 83036 HEMOGLOBIN GLYCOSYLATED A1C: CPT | Mod: HCNC

## 2019-02-28 PROCEDURE — 3045F PR MOST RECENT HEMOGLOBIN A1C LEVEL 7.0-9.0%: CPT | Mod: HCNC,CPTII,S$GLB, | Performed by: FAMILY MEDICINE

## 2019-02-28 PROCEDURE — 99999 PR PBB SHADOW E&M-EST. PATIENT-LVL IV: ICD-10-PCS | Mod: PBBFAC,HCNC,, | Performed by: FAMILY MEDICINE

## 2019-02-28 PROCEDURE — 80053 COMPREHEN METABOLIC PANEL: CPT | Mod: HCNC

## 2019-02-28 PROCEDURE — 99214 OFFICE O/P EST MOD 30 MIN: CPT | Mod: HCNC,S$GLB,, | Performed by: FAMILY MEDICINE

## 2019-02-28 PROCEDURE — 99999 PR PBB SHADOW E&M-EST. PATIENT-LVL IV: CPT | Mod: PBBFAC,HCNC,, | Performed by: FAMILY MEDICINE

## 2019-02-28 PROCEDURE — 99214 PR OFFICE/OUTPT VISIT, EST, LEVL IV, 30-39 MIN: ICD-10-PCS | Mod: HCNC,S$GLB,, | Performed by: FAMILY MEDICINE

## 2019-02-28 PROCEDURE — 86803 HEPATITIS C AB TEST: CPT | Mod: HCNC

## 2019-02-28 PROCEDURE — 3078F PR MOST RECENT DIASTOLIC BLOOD PRESSURE < 80 MM HG: ICD-10-PCS | Mod: HCNC,CPTII,S$GLB, | Performed by: FAMILY MEDICINE

## 2019-02-28 PROCEDURE — 82043 UR ALBUMIN QUANTITATIVE: CPT | Mod: HCNC

## 2019-02-28 PROCEDURE — 3074F PR MOST RECENT SYSTOLIC BLOOD PRESSURE < 130 MM HG: ICD-10-PCS | Mod: HCNC,CPTII,S$GLB, | Performed by: FAMILY MEDICINE

## 2019-02-28 PROCEDURE — 3045F PR MOST RECENT HEMOGLOBIN A1C LEVEL 7.0-9.0%: ICD-10-PCS | Mod: HCNC,CPTII,S$GLB, | Performed by: FAMILY MEDICINE

## 2019-02-28 PROCEDURE — 85025 COMPLETE CBC W/AUTO DIFF WBC: CPT | Mod: HCNC

## 2019-02-28 RX ORDER — IRBESARTAN 300 MG/1
300 TABLET ORAL NIGHTLY
Qty: 90 TABLET | Refills: 0 | Status: SHIPPED | OUTPATIENT
Start: 2019-02-28 | End: 2019-06-21 | Stop reason: SDUPTHER

## 2019-02-28 RX ORDER — METHYLPREDNISOLONE 4 MG/1
TABLET ORAL
Qty: 1 PACKAGE | Refills: 0 | Status: SHIPPED | OUTPATIENT
Start: 2019-02-28 | End: 2019-04-26

## 2019-02-28 RX ORDER — ALBUTEROL SULFATE 90 UG/1
2 AEROSOL, METERED RESPIRATORY (INHALATION) EVERY 6 HOURS PRN
Qty: 18 G | Refills: 0 | Status: SHIPPED | OUTPATIENT
Start: 2019-02-28 | End: 2020-08-05 | Stop reason: SDUPTHER

## 2019-02-28 NOTE — PATIENT INSTRUCTIONS
Step-by-Step  Using an Inhaler Without a Spacer       Date Last Reviewed: 2/1/2017  © 6702-8170 [employer]. [address] [place] [address]. All rights reserved. This information is not intended as a substitute for professional medical care. Always follow your healthcare professional's instructions.        Using Dry-Powder Inhalers (DPIs)  Some asthma medications are inhaled using inhalers. Dry-powder inhalers (DPIs) dispense measured doses of powdered medicine into your lungs. DPIs often have counters to track the number of doses used. Keep in mind that DPIs dont all work the same way. So be sure you know how to use yours properly.  Using a DPI  1. Load the prescribed dose of medicine by following the instructions that came with the inhaler.  2. Breathe out normally, holding the inhaler away from your mouth. Hold your chin up.  3. Put the mouthpiece between your lips. Breathe in deeply through the inhaler--not through your nose. You may not feel or taste the medicine as you breathe in. This is normal.  4. Take the mouthpiece out of your mouth. Hold your breath for a count of 10, or as long as you can comfortably.  5. Breathe out slowly--but do not breathe out through the inhaler. Moisture from your breath can make the powder stick inside the inhaler.  6. Be sure to close the inhaler and store it in a dry place.  7. Rinse your mouth with water and spit it out, don't swallow it.  8. Do not wash your DPI with soap and water. To clean the mouthpiece, wipe with a dry cloth as needed.    Date Last Reviewed: 10/1/2016  © 9016-7842 [employer]. [address] [place] [address]. All rights reserved. This information is not intended as a substitute for professional medical care. Always follow your healthcare professional's instructions.

## 2019-02-28 NOTE — PROGRESS NOTES
Subjective:       Patient ID: Mita Max is a 68 y.o. female.    Chief Complaint: Annual Exam    Patient with type 2 diabetes, hypertension, hyperlipidemia here for scheduled recheck with no recent labs. Lab Results       Component                Value               Date                       WBC                      4.76                06/27/2017                 HGB                      14.0                06/27/2017                 HCT                      41.5                06/27/2017                 PLT                      299                 06/27/2017                 CHOL                     178                 06/27/2017                 TRIG                     57                  06/27/2017                 HDL                      61                  06/27/2017                 LDLCALC                  105.6               06/27/2017                 ALT                      21                  06/27/2017                 AST                      22                  06/27/2017                 NA                       141                 02/01/2018                 K                        4.3                 02/01/2018                 CL                       106                 02/01/2018                 CALCIUM                  9.8                 02/01/2018                 CREATININE               0.9                 02/01/2018                 BUN                      10                  02/01/2018                 CO2                      27                  02/01/2018                 GLU                      174 (H)             02/01/2018                 ESTGFRAFRICA             >60.0               02/01/2018                 EGFRNONAA                >60.0               02/01/2018                 HGBA1C                   6.0 (H)             02/01/2018                 MICALBCREAT              6.1                 06/27/2017        She had dermatitis 2 weeks ago - it is doing better on dove soap,  cortaid occas.  Had bad URI 10 days ago - it is much better - still with bad cough. Temps to 99s. Using OTC mucinex, mucinex DM, cough drops.  Now since yesterday cough is worse and she feels right lungs are congested since yesterday. Feels a little more short of breath with exertion but not much more than usual.      Review of Systems   Constitutional: Negative.    HENT: Negative.    Eyes: Negative.    Respiratory: Positive for cough.    Cardiovascular: Negative.    Gastrointestinal: Negative.    Endocrine: Negative.    Genitourinary: Negative.    Musculoskeletal: Negative.    Skin: Negative.    Allergic/Immunologic: Negative.    Neurological: Negative.    Hematological: Negative.    Psychiatric/Behavioral: Negative.        Objective:      Physical Exam   Constitutional: She is oriented to person, place, and time. She appears well-developed and well-nourished.   HENT:   Head: Normocephalic and atraumatic.   Right Ear: Tympanic membrane, external ear and ear canal normal.   Left Ear: Tympanic membrane, external ear and ear canal normal.   Nose: Nose normal.   Mouth/Throat: Oropharynx is clear and moist. No oropharyngeal exudate.   Eyes: Conjunctivae and EOM are normal.   Neck: Neck supple. No thyromegaly present.   Cardiovascular: Normal rate, regular rhythm and normal heart sounds.   Pulmonary/Chest: Effort normal. She has wheezes (inspiratory).   Lymphadenopathy:     She has no cervical adenopathy.   Neurological: She is alert and oriented to person, place, and time.   Skin: Skin is warm and dry.   Psychiatric: She has a normal mood and affect. Her behavior is normal.         Assessment/Plan:     1. COPD exacerbation  umeclidinium-vilanterol (ANORO ELLIPTA) 62.5-25 mcg/actuation DsDv    albuterol (PROVENTIL/VENTOLIN HFA) 90 mcg/actuation inhaler    methylPREDNISolone (MEDROL DOSEPACK) 4 mg tablet   2. Hypertension, essential  Comprehensive metabolic panel    CBC auto differential    irbesartan (AVAPRO) 300 MG  tablet   3. Hyperlipidemia associated with type 2 diabetes mellitus  Lipid panel   4. Diet-controlled diabetes mellitus  Hemoglobin A1c    Microalbumin/creatinine urine ratio   5. Need for hepatitis C screening test  Hepatitis C antibody   6. Cigarette nicotine dependence without complication     7. Encounter for screening mammogram for breast cancer  Mammo Digital Screening Bilat   8. Asymptomatic menopausal state  DXA Bone Density Spine And Hip   9. Family history of colon cancer in mother  Case request GI: COLONOSCOPY   10. Personal history of colonic polyps  Case request GI: COLONOSCOPY   11. Alcohol dependence in remission     taking 1/2 simvasatatin 40 2/2 amlodipine tx. Will rx different med - await labs. Nonfasting. Last food ham and cheese/potter/mustard,chips, coke zero coffee/sweetener/ oatmeal intant with water no milk..  SARANYA completed for colonoscopy results from Dr Holder.

## 2019-03-01 ENCOUNTER — TELEPHONE (OUTPATIENT)
Dept: INTERNAL MEDICINE | Facility: CLINIC | Age: 69
End: 2019-03-01

## 2019-03-01 LAB — HCV AB SERPL QL IA: NEGATIVE

## 2019-03-01 NOTE — TELEPHONE ENCOUNTER
----- Message from Radha Lebron sent at 3/1/2019  8:48 AM CST -----  Contact: self  Pt is calling to let nurse know she's on the way to  silver Yetti cup. Pt was on yesterday by Dr. Han and left cup in office. Please call pt back at [phone].      Thanks,   Radha Lebron

## 2019-03-01 NOTE — TELEPHONE ENCOUNTER
Tried contacting the patient to inform that her yeti cup is here. Patient left a previous message stating that she was on her way to pick it up.

## 2019-03-02 DIAGNOSIS — E78.5 HYPERLIPIDEMIA ASSOCIATED WITH TYPE 2 DIABETES MELLITUS: ICD-10-CM

## 2019-03-02 DIAGNOSIS — E11.9 DIET-CONTROLLED DIABETES MELLITUS: Primary | ICD-10-CM

## 2019-03-02 DIAGNOSIS — E11.69 HYPERLIPIDEMIA ASSOCIATED WITH TYPE 2 DIABETES MELLITUS: ICD-10-CM

## 2019-03-02 DIAGNOSIS — R79.89 ABNORMAL CBC MEASUREMENT: ICD-10-CM

## 2019-03-02 RX ORDER — SIMVASTATIN 20 MG/1
20 TABLET, FILM COATED ORAL NIGHTLY
Qty: 90 TABLET | Refills: 3 | Status: SHIPPED | OUTPATIENT
Start: 2019-03-02 | End: 2020-08-05 | Stop reason: ALTCHOICE

## 2019-03-08 DIAGNOSIS — E11.9 DIET-CONTROLLED DIABETES MELLITUS: Primary | ICD-10-CM

## 2019-03-08 RX ORDER — LANCETS 28 GAUGE
1 EACH MISCELLANEOUS DAILY
Qty: 100 EACH | Refills: 4 | Status: SHIPPED | OUTPATIENT
Start: 2019-03-08 | End: 2019-03-12 | Stop reason: SDUPTHER

## 2019-03-08 RX ORDER — DEXTROSE 4 G
TABLET,CHEWABLE ORAL
Status: CANCELLED | OUTPATIENT
Start: 2019-03-08

## 2019-03-08 NOTE — TELEPHONE ENCOUNTER
Pt request refill of:   true metrix level 1 ctrl soln   trueplus super thin 28g lancets    Last OV 02/28/19

## 2019-03-12 ENCOUNTER — TELEPHONE (OUTPATIENT)
Dept: ENDOSCOPY | Facility: HOSPITAL | Age: 69
End: 2019-03-12

## 2019-03-12 DIAGNOSIS — E11.9 DIET-CONTROLLED DIABETES MELLITUS: ICD-10-CM

## 2019-03-12 RX ORDER — LANCETS 28 GAUGE
1 EACH MISCELLANEOUS DAILY
Qty: 100 EACH | Refills: 4 | Status: SHIPPED | OUTPATIENT
Start: 2019-03-12 | End: 2021-06-03 | Stop reason: SDUPTHER

## 2019-03-12 RX ORDER — DEXTROSE 4 G
TABLET,CHEWABLE ORAL
Qty: 1 EACH | Refills: 0 | Status: SHIPPED | OUTPATIENT
Start: 2019-03-12 | End: 2021-06-03

## 2019-03-18 ENCOUNTER — TELEPHONE (OUTPATIENT)
Dept: INTERNAL MEDICINE | Facility: CLINIC | Age: 69
End: 2019-03-18

## 2019-03-18 NOTE — TELEPHONE ENCOUNTER
Spoke to patient and was advised that she has a rash in her face and she thinks it is shingles.  Patient then stated that she was advised by  if she starts to cough up any mucus to come back in.  Offered an appointment with  tomorrow at 3.  Patient accepted and appointment has been scheduled.

## 2019-03-18 NOTE — TELEPHONE ENCOUNTER
----- Message from Mita Dudley sent at 3/18/2019  9:07 AM CDT -----  Type:  Same Day Appointment Request    Caller is requesting a same day appointment.  Caller declined first available appointment listed below.    Name of Caller: Pt  Mita  When is the first available appointment? 4-3-19  Symptoms: Rash on face/near her eyes///also bringing up thick mucus  Best Call Back Number:  [phone]  Additional Information:  Please call asap//beth/jerrica

## 2019-03-19 ENCOUNTER — OFFICE VISIT (OUTPATIENT)
Dept: INTERNAL MEDICINE | Facility: CLINIC | Age: 69
End: 2019-03-19
Payer: MEDICARE

## 2019-03-19 VITALS
WEIGHT: 191.25 LBS | OXYGEN SATURATION: 96 % | HEIGHT: 63 IN | DIASTOLIC BLOOD PRESSURE: 68 MMHG | TEMPERATURE: 98 F | HEART RATE: 78 BPM | BODY MASS INDEX: 33.89 KG/M2 | SYSTOLIC BLOOD PRESSURE: 130 MMHG

## 2019-03-19 DIAGNOSIS — L23.89 ALLERGIC CONTACT DERMATITIS DUE TO OTHER AGENTS: Primary | ICD-10-CM

## 2019-03-19 DIAGNOSIS — J30.9 CHRONIC ALLERGIC RHINITIS: ICD-10-CM

## 2019-03-19 PROCEDURE — 3078F PR MOST RECENT DIASTOLIC BLOOD PRESSURE < 80 MM HG: ICD-10-PCS | Mod: HCNC,CPTII,S$GLB, | Performed by: FAMILY MEDICINE

## 2019-03-19 PROCEDURE — 1101F PR PT FALLS ASSESS DOC 0-1 FALLS W/OUT INJ PAST YR: ICD-10-PCS | Mod: HCNC,CPTII,S$GLB, | Performed by: FAMILY MEDICINE

## 2019-03-19 PROCEDURE — 3075F SYST BP GE 130 - 139MM HG: CPT | Mod: HCNC,CPTII,S$GLB, | Performed by: FAMILY MEDICINE

## 2019-03-19 PROCEDURE — 96372 PR INJECTION,THERAP/PROPH/DIAG2ST, IM OR SUBCUT: ICD-10-PCS | Mod: 59,HCNC,S$GLB, | Performed by: FAMILY MEDICINE

## 2019-03-19 PROCEDURE — 1101F PT FALLS ASSESS-DOCD LE1/YR: CPT | Mod: HCNC,CPTII,S$GLB, | Performed by: FAMILY MEDICINE

## 2019-03-19 PROCEDURE — 99213 OFFICE O/P EST LOW 20 MIN: CPT | Mod: 25,HCNC,S$GLB, | Performed by: FAMILY MEDICINE

## 2019-03-19 PROCEDURE — 99213 PR OFFICE/OUTPT VISIT, EST, LEVL III, 20-29 MIN: ICD-10-PCS | Mod: 25,HCNC,S$GLB, | Performed by: FAMILY MEDICINE

## 2019-03-19 PROCEDURE — 3078F DIAST BP <80 MM HG: CPT | Mod: HCNC,CPTII,S$GLB, | Performed by: FAMILY MEDICINE

## 2019-03-19 PROCEDURE — 99999 PR PBB SHADOW E&M-EST. PATIENT-LVL IV: ICD-10-PCS | Mod: PBBFAC,HCNC,, | Performed by: FAMILY MEDICINE

## 2019-03-19 PROCEDURE — 99999 PR PBB SHADOW E&M-EST. PATIENT-LVL IV: CPT | Mod: PBBFAC,HCNC,, | Performed by: FAMILY MEDICINE

## 2019-03-19 PROCEDURE — 96372 THER/PROPH/DIAG INJ SC/IM: CPT | Mod: 59,HCNC,S$GLB, | Performed by: FAMILY MEDICINE

## 2019-03-19 PROCEDURE — 3075F PR MOST RECENT SYSTOLIC BLOOD PRESS GE 130-139MM HG: ICD-10-PCS | Mod: HCNC,CPTII,S$GLB, | Performed by: FAMILY MEDICINE

## 2019-03-19 RX ORDER — TRIAMCINOLONE ACETONIDE 1 MG/G
CREAM TOPICAL 2 TIMES DAILY
Qty: 15 G | Refills: 0 | Status: SHIPPED | OUTPATIENT
Start: 2019-03-19 | End: 2020-08-05 | Stop reason: ALTCHOICE

## 2019-03-19 RX ORDER — TRIAMCINOLONE ACETONIDE 1 MG/G
CREAM TOPICAL 2 TIMES DAILY
Qty: 15 G | Refills: 0 | Status: SHIPPED | OUTPATIENT
Start: 2019-03-19 | End: 2019-03-19 | Stop reason: SDUPTHER

## 2019-03-19 RX ORDER — DEXAMETHASONE SODIUM PHOSPHATE 100 MG/10ML
10 INJECTION INTRAMUSCULAR; INTRAVENOUS ONCE
Status: COMPLETED | OUTPATIENT
Start: 2019-03-19 | End: 2019-03-19

## 2019-03-19 RX ADMIN — DEXAMETHASONE SODIUM PHOSPHATE 10 MG: 100 INJECTION INTRAMUSCULAR; INTRAVENOUS at 03:03

## 2019-03-19 NOTE — PROGRESS NOTES
Subjective:       Patient ID: Mita Max is a 68 y.o. female.    Chief Complaint: Rash (on face) and Cough    Medical history includes seasonal allergic rhinitis.  She has had 1 month duration redness and itchiness of face.  She currently is using Dove soap only.  She reports some nasal congestion and sneezing.  She has no fever chills.      Review of Systems   Constitutional: Negative for chills and fever.   HENT: Positive for congestion and sneezing.    Respiratory: Positive for cough. Negative for shortness of breath and wheezing.    Cardiovascular: Negative for chest pain and palpitations.       Objective:      Physical Exam   Constitutional: She appears well-developed and well-nourished. No distress.   Cardiovascular: Normal rate and regular rhythm.   Pulmonary/Chest: Effort normal. She has no wheezes. She has no rales.   Skin: Rash noted.   She has some redness about upper and lower eyelids as well as maxillary and nasal labial region.       Office Visit on 02/28/2019   Component Date Value Ref Range Status    Hemoglobin A1C 02/28/2019 7.0* 4.0 - 5.6 % Final    Estimated Avg Glucose 02/28/2019 154* 68 - 131 mg/dL Final    Sodium 02/28/2019 137  136 - 145 mmol/L Final    Potassium 02/28/2019 3.9  3.5 - 5.1 mmol/L Final    Chloride 02/28/2019 103  95 - 110 mmol/L Final    CO2 02/28/2019 28  23 - 29 mmol/L Final    Glucose 02/28/2019 129* 70 - 110 mg/dL Final    BUN, Bld 02/28/2019 11  8 - 23 mg/dL Final    Creatinine 02/28/2019 0.7  0.5 - 1.4 mg/dL Final    Calcium 02/28/2019 9.8  8.7 - 10.5 mg/dL Final    Total Protein 02/28/2019 7.5  6.0 - 8.4 g/dL Final    Albumin 02/28/2019 3.2* 3.5 - 5.2 g/dL Final    Total Bilirubin 02/28/2019 0.4  0.1 - 1.0 mg/dL Final    Alkaline Phosphatase 02/28/2019 74  55 - 135 U/L Final    AST 02/28/2019 25  10 - 40 U/L Final    ALT 02/28/2019 39  10 - 44 U/L Final    Anion Gap 02/28/2019 6* 8 - 16 mmol/L Final    eGFR if African American 02/28/2019 >60.0   >60 mL/min/1.73 m^2 Final    eGFR if non African American 02/28/2019 >60.0  >60 mL/min/1.73 m^2 Final    Cholesterol 02/28/2019 141  120 - 199 mg/dL Final    Triglycerides 02/28/2019 86  30 - 150 mg/dL Final    HDL 02/28/2019 40  40 - 75 mg/dL Final    LDL Cholesterol 02/28/2019 83.8  63.0 - 159.0 mg/dL Final    HDL/Chol Ratio 02/28/2019 28.4  20.0 - 50.0 % Final    Total Cholesterol/HDL Ratio 02/28/2019 3.5  2.0 - 5.0 Final    Non-HDL Cholesterol 02/28/2019 101  mg/dL Final    Microalbum.,U,Random 02/28/2019 <2.5  ug/mL Final    Creatinine, Random Ur 02/28/2019 36.0  15.0 - 325.0 mg/dL Final    Microalb Creat Ratio 02/28/2019 Unable to calculate  0.0 - 30.0 ug/mg Final    Hepatitis C Ab 02/28/2019 Negative   Final    WBC 02/28/2019 6.17  3.90 - 12.70 K/uL Final    RBC 02/28/2019 4.06  4.00 - 5.40 M/uL Final    Hemoglobin 02/28/2019 12.4  12.0 - 16.0 g/dL Final    Hematocrit 02/28/2019 37.6  37.0 - 48.5 % Final    MCV 02/28/2019 93  82 - 98 fL Final    MCH 02/28/2019 30.5  27.0 - 31.0 pg Final    MCHC 02/28/2019 33.0  32.0 - 36.0 g/dL Final    RDW 02/28/2019 12.1  11.5 - 14.5 % Final    Platelets 02/28/2019 484* 150 - 350 K/uL Final    MPV 02/28/2019 8.8* 9.2 - 12.9 fL Final    Immature Granulocytes 02/28/2019 0.8* 0.0 - 0.5 % Final    Gran # (ANC) 02/28/2019 3.7  1.8 - 7.7 K/uL Final    Immature Grans (Abs) 02/28/2019 0.05* 0.00 - 0.04 K/uL Final    Lymph # 02/28/2019 1.7  1.0 - 4.8 K/uL Final    Mono # 02/28/2019 0.5  0.3 - 1.0 K/uL Final    Eos # 02/28/2019 0.2  0.0 - 0.5 K/uL Final    Baso # 02/28/2019 0.06  0.00 - 0.20 K/uL Final    nRBC 02/28/2019 0  0 /100 WBC Final    Gran% 02/28/2019 59.5  38.0 - 73.0 % Final    Lymph% 02/28/2019 27.2  18.0 - 48.0 % Final    Mono% 02/28/2019 7.6  4.0 - 15.0 % Final    Eosinophil% 02/28/2019 3.9  0.0 - 8.0 % Final    Basophil% 02/28/2019 1.0  0.0 - 1.9 % Final    Differential Method 02/28/2019 Automated   Final     Assessment:       No  diagnosis found.    Plan:     Probable allergic contact dermatitis airborne along with seasonal allergic rhinitis.  Decadron injection, cool compresses to her face for moisturizing followed by chief area user dry lids and Decadron cream to rest her face.  Call in 1 week if not better    There are no diagnoses linked to this encounter.

## 2019-04-26 ENCOUNTER — TELEPHONE (OUTPATIENT)
Dept: ENDOSCOPY | Facility: HOSPITAL | Age: 69
End: 2019-04-26

## 2019-04-26 RX ORDER — SODIUM, POTASSIUM,MAG SULFATES 17.5-3.13G
1 SOLUTION, RECONSTITUTED, ORAL ORAL DAILY
Qty: 1 KIT | Refills: 0 | Status: SHIPPED | OUTPATIENT
Start: 2019-04-26 | End: 2019-04-28

## 2019-04-26 NOTE — TELEPHONE ENCOUNTER
Endoscopy Scheduling Questionnaire:    1. Have you been admitted overnight to the hospital in the past 3 months? no  2. Do you get CP and SOB while walking up a flight of stairs? no  3. Have you had a stent placed in the past 12 months? no  4. Have you had a stroke or heart attack in the past 6 months? no  5. Have you had any chest pain in the past 3 months? no      If so, have you been evaluated by your PCP or Cardiologist? no  6. Do you take weight loss medications? no  7. Have you been diagnosed with Diverticulitis within the past 3 months? no  8. Are you having any GI symptoms that you feel need to be evaluated prior to your procedure? no  9. Are you on dialysis? no  10. Are you diabetic? Yes  11. Do you have any other health issues that you feel might limit your ability to safely have the procedure and/or sedation? no  12. Is the patient over 81 yo? no        If so, has the patient been seen by their PCP or GI in the last 3 months? N/A       -I have reviewed the last colonoscopy for recommendations regarding surveillance and bowel prep  Yes  -I have reviewed the patient's medications and allergies. She is not on high risk medication, , and will require cardiac clearance. A clearance request NA  -I have verified the pharmacy information. The prep being used is Suprep. The patient's prep instructions were sent via mail..    Date Endoscopy Scheduled: 6/14/19

## 2019-06-13 RX ORDER — SODIUM CHLORIDE, SODIUM LACTATE, POTASSIUM CHLORIDE, CALCIUM CHLORIDE 600; 310; 30; 20 MG/100ML; MG/100ML; MG/100ML; MG/100ML
INJECTION, SOLUTION INTRAVENOUS CONTINUOUS
Status: CANCELLED | OUTPATIENT
Start: 2019-06-13

## 2019-06-14 ENCOUNTER — HOSPITAL ENCOUNTER (OUTPATIENT)
Facility: HOSPITAL | Age: 69
Discharge: HOME | End: 2019-06-14
Attending: COLON & RECTAL SURGERY | Admitting: COLON & RECTAL SURGERY
Payer: MEDICARE

## 2019-06-14 ENCOUNTER — ANESTHESIA EVENT (OUTPATIENT)
Dept: ENDOSCOPY | Facility: HOSPITAL | Age: 69
End: 2019-06-14
Payer: MEDICARE

## 2019-06-14 ENCOUNTER — ANESTHESIA (OUTPATIENT)
Dept: ENDOSCOPY | Facility: HOSPITAL | Age: 69
End: 2019-06-14
Payer: MEDICARE

## 2019-06-14 VITALS
TEMPERATURE: 99 F | OXYGEN SATURATION: 96 % | BODY MASS INDEX: 34.42 KG/M2 | WEIGHT: 194.25 LBS | RESPIRATION RATE: 17 BRPM | SYSTOLIC BLOOD PRESSURE: 118 MMHG | DIASTOLIC BLOOD PRESSURE: 70 MMHG | HEART RATE: 70 BPM | HEIGHT: 63 IN

## 2019-06-14 DIAGNOSIS — Z12.11 SCREENING FOR COLON CANCER: ICD-10-CM

## 2019-06-14 DIAGNOSIS — Z86.010 HISTORY OF COLON POLYPS: Primary | ICD-10-CM

## 2019-06-14 LAB — POCT GLUCOSE: 178 MG/DL (ref 70–110)

## 2019-06-14 PROCEDURE — 45380 COLONOSCOPY AND BIOPSY: CPT | Mod: HCNC | Performed by: COLON & RECTAL SURGERY

## 2019-06-14 PROCEDURE — 88305 TISSUE EXAM BY PATHOLOGIST: CPT | Mod: 26,HCNC,, | Performed by: PATHOLOGY

## 2019-06-14 PROCEDURE — 27201089 HC SNARE, DISP (ANY): Mod: HCNC | Performed by: COLON & RECTAL SURGERY

## 2019-06-14 PROCEDURE — 37000009 HC ANESTHESIA EA ADD 15 MINS: Mod: HCNC | Performed by: COLON & RECTAL SURGERY

## 2019-06-14 PROCEDURE — 27201012 HC FORCEPS, HOT/COLD, DISP: Mod: HCNC | Performed by: COLON & RECTAL SURGERY

## 2019-06-14 PROCEDURE — 63600175 PHARM REV CODE 636 W HCPCS: Mod: HCNC | Performed by: NURSE ANESTHETIST, CERTIFIED REGISTERED

## 2019-06-14 PROCEDURE — 45385 COLONOSCOPY W/LESION REMOVAL: CPT | Mod: HCNC | Performed by: COLON & RECTAL SURGERY

## 2019-06-14 PROCEDURE — 37000008 HC ANESTHESIA 1ST 15 MINUTES: Mod: HCNC | Performed by: COLON & RECTAL SURGERY

## 2019-06-14 PROCEDURE — 45385 PR COLONOSCOPY,REMV LESN,SNARE: ICD-10-PCS | Mod: 22,PT,HCNC, | Performed by: COLON & RECTAL SURGERY

## 2019-06-14 PROCEDURE — 25000003 PHARM REV CODE 250: Mod: HCNC | Performed by: COLON & RECTAL SURGERY

## 2019-06-14 PROCEDURE — 88305 TISSUE SPECIMEN TO PATHOLOGY - SURGERY: ICD-10-PCS | Mod: 26,HCNC,, | Performed by: PATHOLOGY

## 2019-06-14 PROCEDURE — 45380 PR COLONOSCOPY,BIOPSY: ICD-10-PCS | Mod: 59,HCNC,, | Performed by: COLON & RECTAL SURGERY

## 2019-06-14 PROCEDURE — 45380 COLONOSCOPY AND BIOPSY: CPT | Mod: 59,HCNC,, | Performed by: COLON & RECTAL SURGERY

## 2019-06-14 PROCEDURE — 25000003 PHARM REV CODE 250: Mod: HCNC | Performed by: NURSE ANESTHETIST, CERTIFIED REGISTERED

## 2019-06-14 PROCEDURE — 45385 COLONOSCOPY W/LESION REMOVAL: CPT | Mod: 22,PT,HCNC, | Performed by: COLON & RECTAL SURGERY

## 2019-06-14 PROCEDURE — 88305 TISSUE EXAM BY PATHOLOGIST: CPT | Mod: HCNC | Performed by: PATHOLOGY

## 2019-06-14 RX ORDER — LIDOCAINE HYDROCHLORIDE 10 MG/ML
INJECTION, SOLUTION EPIDURAL; INFILTRATION; INTRACAUDAL; PERINEURAL
Status: DISCONTINUED | OUTPATIENT
Start: 2019-06-14 | End: 2019-06-14

## 2019-06-14 RX ORDER — PROPOFOL 10 MG/ML
VIAL (ML) INTRAVENOUS
Status: DISCONTINUED | OUTPATIENT
Start: 2019-06-14 | End: 2019-06-14

## 2019-06-14 RX ORDER — SODIUM CHLORIDE, SODIUM LACTATE, POTASSIUM CHLORIDE, CALCIUM CHLORIDE 600; 310; 30; 20 MG/100ML; MG/100ML; MG/100ML; MG/100ML
INJECTION, SOLUTION INTRAVENOUS CONTINUOUS
Status: DISCONTINUED | OUTPATIENT
Start: 2019-06-14 | End: 2019-06-14 | Stop reason: HOSPADM

## 2019-06-14 RX ADMIN — PROPOFOL 50 MG: 10 INJECTION, EMULSION INTRAVENOUS at 07:06

## 2019-06-14 RX ADMIN — SODIUM CHLORIDE, SODIUM LACTATE, POTASSIUM CHLORIDE, AND CALCIUM CHLORIDE: 600; 310; 30; 20 INJECTION, SOLUTION INTRAVENOUS at 07:06

## 2019-06-14 RX ADMIN — LIDOCAINE HYDROCHLORIDE 50 MG: 10 INJECTION, SOLUTION EPIDURAL; INFILTRATION; INTRACAUDAL; PERINEURAL at 07:06

## 2019-06-14 RX ADMIN — PROPOFOL 100 MG: 10 INJECTION, EMULSION INTRAVENOUS at 07:06

## 2019-06-14 NOTE — [employer] PATIENT INSTRUCTIONS
Discharge Summary/Instructions after an Endoscopic Procedure  Patient Name: Mita Max  Patient MRN: [id number]  Patient YOB: 1950 Friday, June 14, 2019 Dick Sena MD  RESTRICTIONS:  During your procedure today, you received medications for sedation.  These   medications may affect your judgment, balance and coordination.  Therefore,   for 24 hours, you have the following restrictions:   - DO NOT drive a car, operate machinery, make legal/financial decisions,   sign important papers or drink alcohol.    ACTIVITY:  Today: no heavy lifting, straining or running due to procedural   sedation/anesthesia.  The following day: return to full activity including work.  DIET:  Eat and drink normally unless instructed otherwise.     TREATMENT FOR COMMON SIDE EFFECTS:  - Mild abdominal pain, nausea, belching, bloating or excessive gas:  rest,   eat lightly and use a heating pad.  - Sore Throat: treat with throat lozenges and/or gargle with warm salt   water.  - Because air was used during the procedure, expelling large amounts of air   from your rectum or belching is normal.  - If a bowel prep was taken, you may not have a bowel movement for 1-3 days.    This is normal.  SYMPTOMS TO WATCH FOR AND REPORT TO YOUR PHYSICIAN:  1. Abdominal pain or bloating, other than gas cramps.  2. Chest pain.  3. Back pain.  4. Signs of infection such as: chills or fever occurring within 24 hours   after the procedure.  5. Rectal bleeding, which would show as bright red, maroon, or black stools.   (A tablespoon of blood from the rectum is not serious, especially if   hemorrhoids are present.)  6. Vomiting.  7. Weakness or dizziness.  GO DIRECTLY TO THE NEAREST EMERGENCY ROOM IF YOU HAVE ANY OF THE FOLLOWING:      Difficulty breathing              Chills and/or fever over 101 F   Persistent vomiting and/or vomiting blood   Severe abdominal pain   Severe chest pain   Black, tarry stools   Bleeding- more than one  tablespoon   Any other symptom or condition that you feel may need urgent attention  Your doctor recommends these additional instructions:  If any biopsies were taken, your doctors clinic will contact you in 1 to 2   weeks with any results.  - Discharge patient to home.   - High fiber diet for the rest of the patient's life.   - Continue present medications.   - Await pathology results.   - Repeat colonoscopy in 3 years for surveillance, for surveillance based on   pathology results and for surveillance of multiple polyps.   - Return to primary care physician PRN.  For questions, problems or results please call your physician Dick Sena MD at Work:  [phone]  If you have any questions about the above instructions, call the GI   department at [phone] or call the endoscopy unit at [phone]   from 7am until 3 pm.  [facility] [place], EMERGENCY ROOM PHONE NUMBER:   [phone]  IF A COMPLICATION OR EMERGENCY SITUATION ARISES AND YOU ARE UNABLE TO REACH   YOUR PHYSICIAN - GO DIRECTLY TO THE EMERGENCY ROOM.  I have read or have had read to me these discharge instructions for my   procedure and have received a written copy.  I understand these   instructions and will follow-up with my physician if I have any questions.     __________________________________       _____________________________________  Nurse Signature                                          Patient/Designated   Responsible Party Signature  MD Dick Ramos MD  6/14/2019 8:03:19 AM  This report has been verified and signed electronically.  [employer]

## 2019-06-14 NOTE — BRIEF OP NOTE
[facility] -   Brief Operative Note     SUMMARY     Surgery Date: 6/14/2019     Surgeon(s) and Role:     * Dick Sena MD - Primary    Assisting Surgeon: None    Pre-op Diagnosis:  Screening [Z13.9]    Post-op Diagnosis:  Post-Op Diagnosis Codes:     * Screening [Z13.9]    Procedure(s) (LRB):  COLONOSCOPY (N/A)    Anesthesia: Choice    Description of the findings of the procedure: See Op Note    Findings/Key Components: See Op Note    Estimated Blood Loss: * No values recorded between 6/14/2019 12:00 AM and 6/14/2019  7:57 AM *         Specimens:   Specimen (12h ago, onward)    Start     Ordered    06/14/19 0737  Specimen to Pathology - Surgery  Once     Comments:  #2 hepatic flexure polyps#3 Transverse polyps #4 Descending polyps#5 Sigmoid polyp(no jar #1, specimen not retrieved)     Start Status     06/14/19 0737 Collected (06/14/19 0800) Order ID: [id number]       06/14/19 0800          Discharge Note    SUMMARY     Admit Date: 6/14/2019    Discharge Date and Time: 6/14/2019 8:03 AM    Hospital Course Patient was seen in the preoperative area by both myself and anesthesia. All consents were verified and all questions appropriately answered. All risks, benefits and alternatives explained to patient. Patient proceeded to endoscopy suite for colonoscopy and was discharged home postoperative once cleared by anesthesia.    Final Diagnosis: Post-Op Diagnosis Codes:     * Screening [Z13.9]    Disposition: Home or Self Care    Follow Up/Patient Instructions: See Provation report    Medications:  Reconciled Home Medications:      Medication List      CONTINUE taking these medications    albuterol 90 mcg/actuation inhaler  Commonly known as:  PROVENTIL/VENTOLIN HFA  Inhale 2 puffs into the lungs every 6 (six) hours as needed for Shortness of Breath.     * amLODIPine 10 MG tablet  Commonly known as:  NORVASC  Take 1 tablet (10 mg total) by mouth once daily.     * amLODIPine 10 MG tablet  Commonly known  as:  NORVASC  Take 1 tablet (10 mg total) by mouth once daily.     blood glucose control, low Soln  Commonly known as:  TRUE METRIX LEVEL 1  Used per directions     blood sugar diagnostic Strp  Use 1 daily per directions. True Metrix Test Strip     blood-glucose meter Misc  Commonly known as:  TRUE METRIX AIR GLUCOSE METER  Use once daily to check BS     cyclobenzaprine 10 MG tablet  Commonly known as:  FLEXERIL  Take 10 mg by mouth.     irbesartan 300 MG tablet  Commonly known as:  AVAPRO  Take 1 tablet (300 mg total) by mouth every evening.     lancets 28 gauge Misc  Commonly known as:  TRUEPLUS LANCETS  1 lancet by Misc.(Non-Drug; Combo Route) route once daily.     naproxen 500 MG tablet  Commonly known as:  NAPROSYN  Take 500 mg by mouth as needed.     simvastatin 20 MG tablet  Commonly known as:  ZOCOR  Take 1 tablet (20 mg total) by mouth every evening.     triamcinolone acetonide 0.1% 0.1 % cream  Commonly known as:  KENALOG  Apply topically 2 (two) times daily.         * This list has 2 medication(s) that are the same as other medications prescribed for you. Read the directions carefully, and ask your doctor or other care provider to review them with you.              Discharge Procedure Orders   Diet general     Call MD for:  temperature >100.4     Call MD for:  persistent nausea and vomiting     Call MD for:  severe uncontrolled pain     Call MD for:  difficulty breathing, headache or visual disturbances     Call MD for:  redness, tenderness, or signs of infection (pain, swelling, redness, odor or green/yellow discharge around incision site)     Call MD for:  hives     Call MD for:  persistent dizziness or light-headedness     Call MD for:  extreme fatigue     Activity as tolerated     Follow-up Information     Lexis Han MD.    Specialty:  Family Medicine  Why:  As needed  Contact information:  [place] [id number] [phone]

## 2019-06-14 NOTE — H&P
[facility]  Colon and Rectal Surgery  History & Physical    Patient Name: Mita Max  MRN: [id number]  Admission Date: 6/14/2019  Attending Physician: Dick Sena MD  Primary Care Provider: Lexis Han MD    Patient information was obtained from patient and medical records.    Subjective:     Chief Complaint/Reason for Admission: Here for Colonoscopy    History of Present Illness:  Patient is a 68 y.o. female presents for colonoscopy. Last cscope 6-7yrs ago with +polyps. No current hematochezia, melena or change in bowel habits. +fam hx of CRC in mother in 70s. No personal or fam hx of IBD.    No current facility-administered medications on file prior to encounter.      Current Outpatient Medications on File Prior to Encounter   Medication Sig    albuterol (PROVENTIL/VENTOLIN HFA) 90 mcg/actuation inhaler Inhale 2 puffs into the lungs every 6 (six) hours as needed for Shortness of Breath.    amLODIPine (NORVASC) 10 MG tablet Take 1 tablet (10 mg total) by mouth once daily.    amLODIPine (NORVASC) 10 MG tablet Take 1 tablet (10 mg total) by mouth once daily.    irbesartan (AVAPRO) 300 MG tablet Take 1 tablet (300 mg total) by mouth every evening.    naproxen (NAPROSYN) 500 MG tablet Take 500 mg by mouth as needed.     cyclobenzaprine (FLEXERIL) 10 MG tablet Take 10 mg by mouth.    [DISCONTINUED] umeclidinium-vilanterol (ANORO ELLIPTA) 62.5-25 mcg/actuation DsDv Inhale 1 puff into the lungs once daily. Controller       Review of patient's allergies indicates:   Allergen Reactions    Corticosteroids (glucocorticoids)      Aggitation  Aggitation    Adhesive Rash     Band-aids  Band-aids  Band-aids       Past Medical History:   Diagnosis Date    Alcohol dependence     1986    Anxiety     Arthritis     Depression     DM2 (diabetes mellitus, type 2)     HTN (hypertension)     Obesity     Pneumonia due to other staphylococcus     Tobacco dependence     Type 2 diabetes  mellitus     Urinary incontinence      Past Surgical History:   Procedure Laterality Date     SECTION      SALIVARY GLAND SURGERY       Family History     Problem Relation (Age of Onset)    COPD Maternal Grandmother    Cancer Mother, Maternal Grandfather    Colon cancer Mother    Coronary artery disease Father    Diabetes Paternal Grandmother    Heart attack Father    Heart disease Father    Stroke Paternal Grandmother        Tobacco Use    Smoking status: Current Every Day Smoker     Packs/day: 1.00     Years: 40.00     Pack years: 40.00     Types: Cigarettes     Start date: 1969    Smokeless tobacco: Never Used   Substance and Sexual Activity    Alcohol use: No     Comment: previous ETOH abuse 33 years sober    Drug use: No    Sexual activity: Yes     Partners: Male     Review of Systems   Constitutional: Negative for activity change, appetite change, chills, fatigue, fever and unexpected weight change.   HENT: Negative for congestion, ear pain, sore throat and trouble swallowing.    Eyes: Negative for pain, redness and itching.   Respiratory: Negative for cough, shortness of breath and wheezing.    Cardiovascular: Negative for chest pain, palpitations and leg swelling.   Gastrointestinal: Negative for abdominal distention, abdominal pain, anal bleeding, blood in stool, constipation, diarrhea, nausea, rectal pain and vomiting.   Endocrine: Negative for cold intolerance, heat intolerance and polyuria.   Genitourinary: Negative for dysuria, flank pain, frequency and hematuria.   Musculoskeletal: Negative for gait problem, joint swelling and neck pain.   Skin: Negative for color change, rash and wound.   Allergic/Immunologic: Negative for environmental allergies and immunocompromised state.   Neurological: Negative for dizziness, speech difficulty, weakness and numbness.   Psychiatric/Behavioral: Negative for agitation, confusion and hallucinations.     Objective:     Vital Signs (Most  Recent):  Temp: 98.2 °F (36.8 °C) (06/14/19 0659)  Pulse: 73 (06/14/19 0659)  Resp: 18 (06/14/19 0659)  BP: 120/71 (06/14/19 0659)  SpO2: 96 % (06/14/19 0659) Vital Signs (24h Range):  Temp:  [98.2 °F (36.8 °C)] 98.2 °F (36.8 °C)  Pulse:  [73] 73  Resp:  [18] 18  SpO2:  [96 %] 96 %  BP: (120)/(71) 120/71     Weight: 88.1 kg (194 lb 3.6 oz)  Body mass index is 34.41 kg/m².    Physical Exam   Constitutional: She is oriented to person, place, and time. She appears well-developed.   HENT:   Head: Normocephalic and atraumatic.   Eyes: Conjunctivae and EOM are normal.   Neck: Normal range of motion. No thyromegaly present.   Cardiovascular: Normal rate and regular rhythm.   Pulmonary/Chest: Effort normal. No respiratory distress.   Abdominal: Soft. She exhibits no distension and no mass. There is no tenderness.   Musculoskeletal: Normal range of motion. She exhibits no edema or tenderness.   Neurological: She is alert and oriented to person, place, and time.   Skin: Skin is warm and dry. Capillary refill takes less than 2 seconds. No rash noted.   Psychiatric: She has a normal mood and affect.         Assessment/Plan:     Patient is a 68 y.o. female who presents for colonoscopy     - Ok to proceed to endoscopy suite for colonoscopy  - Consent obtained. All risks, benefits and alternatives fully explained to patient, including but not limited to bleeding, infection, perforation, and missed polyps. All questions appropriately answered to patient's satisfaction. Consent signed and placed on chart.    Active Diagnoses:    Diagnosis Date Noted POA    Screening for colon cancer [Z12.11] 06/14/2019 Not Applicable      Problems Resolved During this Admission:     VTE Risk Mitigation (From admission, onward)    None          Dick Sena MD  Colon and Rectal Surgery  [facility]

## 2019-06-14 NOTE — ANESTHESIA PREPROCEDURE EVALUATION
06/14/2019  Mita Max is a 68 y.o., female.    Anesthesia Evaluation    I have reviewed the Patient Summary Reports.    I have reviewed the Nursing Notes.   I have reviewed the Medications.     Review of Systems  Anesthesia Hx:  No problems with previous Anesthesia    Social:  Smoker, Alcohol Use    Cardiovascular:   Exercise tolerance: good Hypertension    Pulmonary:   Pneumonia COPD, moderate    Endocrine:   Diabetes, poorly controlled, type 2    Psych:   Psychiatric History          Physical Exam  General:  Well nourished, Obesity       Chest/Lungs:  Chest/Lungs Findings: Normal Respiratory Rate, Decreased Breathe Sounds Left, Decreased Breathe Sounds Right              Anesthesia Plan  Type of Anesthesia, risks & benefits discussed:  Anesthesia Type:  MAC  Patient's Preference:   Intra-op Monitoring Plan:   Intra-op Monitoring Plan Comments:   Post Op Pain Control Plan:   Post Op Pain Control Plan Comments:   Induction:   IV  Beta Blocker:  Patient is not currently on a Beta-Blocker (No further documentation required).       Informed Consent: Patient understands risks and agrees with Anesthesia plan.  Questions answered.   ASA Score: 3     Day of Surgery Review of History & Physical: I have interviewed and examined the patient. I have reviewed the patient's H&P dated:  There are no significant changes.          Ready For Surgery From Anesthesia Perspective.

## 2019-06-14 NOTE — TRANSFER OF CARE
"Anesthesia Transfer of Care Note    Patient: Mita Max    Procedure(s) Performed: Procedure(s) (LRB):  COLONOSCOPY (N/A)    Patient location: PACU    Anesthesia Type: MAC    Transport from OR: Transported from OR on room air with adequate spontaneous ventilation    Post pain: adequate analgesia    Post assessment: no apparent anesthetic complications    Post vital signs: stable    Level of consciousness: awake, alert and oriented    Nausea/Vomiting: no nausea/vomiting    Complications: none    Transfer of care protocol was followed      Last vitals:   Visit Vitals  /71 (BP Location: Left arm, Patient Position: Lying)   Pulse 73   Temp 36.8 °C (98.2 °F) (Temporal)   Resp 18   Ht 5' 3" (1.6 m)   Wt 88.1 kg (194 lb 3.6 oz)   SpO2 96%   Breastfeeding? No   BMI 34.41 kg/m²     "

## 2019-06-14 NOTE — ANESTHESIA RELEASE NOTE
"Anesthesia Release from PACU Note    Patient: Mita Max    Procedure(s) Performed: Procedure(s) (LRB):  COLONOSCOPY (N/A)    Anesthesia type: MAC    Post pain: Adequate analgesia    Post assessment: no apparent anesthetic complications, tolerated procedure well and no evidence of recall    Last Vitals:   Visit Vitals  /71 (BP Location: Left arm, Patient Position: Lying)   Pulse 73   Temp 36.8 °C (98.2 °F) (Temporal)   Resp 18   Ht 5' 3" (1.6 m)   Wt 88.1 kg (194 lb 3.6 oz)   SpO2 96%   Breastfeeding? No   BMI 34.41 kg/m²       Post vital signs: stable    Level of consciousness: awake, alert  and oriented    Nausea/Vomiting: no nausea/no vomiting    Complications: none    Airway Patency: patent    Respiratory: unassisted, spontaneous ventilation, room air    Cardiovascular: stable and blood pressure at baseline    Hydration: euvolemic  "

## 2019-06-14 NOTE — DISCHARGE INSTRUCTIONS
Understanding Colon and Rectal Polyps    The colon (also called the large intestine) is a muscular tube that forms the last part of the digestive tract. It absorbs water and stores food waste. The colon is about 4 to 6 feet long. The rectum is the last 6 inches of the colon. The colon and rectum have a smooth lining composed of millions of cells. Changes in these cells can lead to growths in the colon that can become cancerous and should be removed. Multiple tests are available to screen for colon cancer, but the colonoscopy is the most recommended test. During colonoscopy, these polyps can be removed. How often you need this test depends on many things including your condition, your family history, symptoms, and what the findings were at the previous colonoscopy.   When the colon lining changes  Changes that happen in the cells that line the colon or rectum can lead to growths called polyps. Over a period of years, polyps can turn cancerous. Removing polyps early may prevent cancer from ever forming.  Polyps  Polyps are fleshy clumps of tissue that form on the lining of the colon or rectum. Small polyps are usually benign (not cancerous). However, over time, cells in a polyp can change and become cancerous. Certain types of polyps known as adenomatous polyps are premalignant. The risk for invasive cancer increases with the size of the polyp and certain cell and gene features. This means that they can become cancerous if they're not removed. Hyperplastic polyps are benign. They can grow quite large and not turn cancerous.   Cancer  Almost all colorectal cancers start when polyp cells begin growing abnormally. As a cancerous tumor grows, it may involve more and more of the colon or rectum. In time, cancer can also grow beyond the colon or rectum and spread to nearby organs or to glands called lymph nodes. The cells can also travel to other parts of the body. This is known as metastasis. The earlier a cancerous  tumor is removed, the better the chance of preventing its spread.    Date Last Reviewed: 8/1/2016  © 1630-7388 [employer]. [address] [place] [address], [place] [address]. All rights reserved. This information is not intended as a substitute for professional medical care. Always follow your healthcare professional's instructions.        Diverticulosis    Diverticulosis means that small pouches have formed in the wall of your large intestine (colon). Most often, this problem causes no symptoms and is common as people age. But the pouches in the colon are at risk of becoming infected. When this happens, the condition is called diverticulitis. Although most people with diverticulosis never develop diverticulitis, it is still not uncommon. Rectal bleeding can also occur and in less common situations, a type of colon inflammation called colitis.  While most people do not have symptoms, some people with diverticulosis may have:  · Abdominal cramps and pain  · Bloating  · Constipation  · Change in bowel habits  Causes  The exact cause of diverticulosis (and diverticulitis) has not been proved, but a few things are associated with the condition:  · Low-fiber diet  · Constipation  · Lack of exercise  Your healthcare provider will talk with you about how to manage your condition. Diet changes may be all that are needed to help control diverticulosis and prevent progression to diverticulitis. If you develop diverticulitis, you will likely need other treatments.  Home care  You may be told to take fiber supplements daily. Fiber adds bulk to the stool so that it passes through the colon more easily. Stool softeners may be recommended. You may also be given medications for pain relief. Be sure to take all medications as directed.  In the past, people were told to avoid corn, nuts, and seeds. This is no longer necessary.  Follow these guidelines when caring for yourself at home:  · Eat unprocessed foods that are high in  fiber. Whole grains, fruits, and vegetables are good choices.  · Drink 6 to 8 glasses of water every day unless your healthcare provider has you limit how much fluid you should have.  · Watch for changes in your bowel movements. Tell your provider if you notice any changes.  · Begin an exercise program. Ask your provider how to get started. Generally, walking is the best.  · Get plenty of rest and sleep.  Follow-up care  Follow up with your healthcare provider, or as advised. Regular visits may be needed to check on your health. Sometimes special procedures such as colonoscopy, are needed after an episode of diverticulitis or blooding. Be sure to keep all your appointments.  If a stool sample was taken, or cultures were done, you should be told if they are positive, or if your treatment needs to be changed. You can call as directed for the results.  If X-rays were done, a radiologist will look at them. You will be told if there is a change in your treatment.  If antibiotics were prescribed, be sure to finish them all.  When to seek medical advice  Call your healthcare provider right away if any of these occur:  · Fever of 100.4°F (38°C) or higher, or as directed by your healthcare provider  · Severe cramps in the lower left side of the abdomen or pain that is getting worse  · Tenderness in the lower left side of the abdomen or worsening pain throughout the abdomen  · Diarrhea or constipation that doesn't get better within 24 hours  · Nausea and vomiting  · Bleeding from the rectum  Call 911  Call emergency services if any of the following occur:  · Trouble breathing  · Confusion  · Very drowsy or trouble awakening  · Fainting or loss of consciousness  · Rapid heart rate  · Chest pain  Date Last Reviewed: 12/30/2015  © 2369-8019 [employer]. [address] [place] [address], [place] [address]. All rights reserved. This information is not intended as a substitute for professional medical care. Always follow your  healthcare professional's instructions.

## 2019-06-14 NOTE — ANESTHESIA POSTPROCEDURE EVALUATION
Anesthesia Post Evaluation    Patient: Mita Max    Procedure(s) Performed: Procedure(s) (LRB):  COLONOSCOPY (N/A)    Final Anesthesia Type: MAC  Patient location during evaluation: PACU  Patient participation: Yes- Able to Participate  Level of consciousness: awake and alert and oriented  Post-procedure vital signs: reviewed and stable  Pain management: adequate  Airway patency: patent  PONV status at discharge: No PONV  Anesthetic complications: no      Cardiovascular status: blood pressure returned to baseline  Respiratory status: unassisted, room air and spontaneous ventilation  Hydration status: euvolemic  Follow-up not needed.          Vitals Value Taken Time   /71 6/14/2019  6:59 AM   Temp 36.8 °C (98.2 °F) 6/14/2019  6:59 AM   Pulse 73 6/14/2019  6:59 AM   Resp 18 6/14/2019  6:59 AM   SpO2 96 % 6/14/2019  6:59 AM         No case tracking events are documented in the log.      Pain/Robert Score: No data recorded

## 2019-06-14 NOTE — OR NURSING
Final timeout performed. Anesthesia at bedside. See anesthesia record for meds, vitals, fluid documentation. Pt adequately sedated. Family updated to status.

## 2019-06-14 NOTE — PLAN OF CARE
Dr Sena came to bedside and discussed findings. NO N/V,  no abdominal pain, no GI bleeding, and vitals stable.  Pt discharged from unit.

## 2019-06-20 ENCOUNTER — HOSPITAL ENCOUNTER (OUTPATIENT)
Dept: RADIOLOGY | Facility: HOSPITAL | Age: 69
Discharge: HOME | End: 2019-06-20
Attending: FAMILY MEDICINE
Payer: MEDICARE

## 2019-06-20 VITALS — WEIGHT: 194 LBS | BODY MASS INDEX: 34.38 KG/M2 | HEIGHT: 63 IN

## 2019-06-20 DIAGNOSIS — Z12.31 ENCOUNTER FOR SCREENING MAMMOGRAM FOR BREAST CANCER: ICD-10-CM

## 2019-06-20 PROCEDURE — 77063 BREAST TOMOSYNTHESIS BI: CPT | Mod: 26,HCNC,, | Performed by: RADIOLOGY

## 2019-06-20 PROCEDURE — 77067 SCR MAMMO BI INCL CAD: CPT | Mod: TC,HCNC

## 2019-06-20 PROCEDURE — 77067 MAMMO DIGITAL SCREENING BILAT WITH TOMOSYNTHESIS_CAD: ICD-10-PCS | Mod: 26,HCNC,, | Performed by: RADIOLOGY

## 2019-06-20 PROCEDURE — 77063 MAMMO DIGITAL SCREENING BILAT WITH TOMOSYNTHESIS_CAD: ICD-10-PCS | Mod: 26,HCNC,, | Performed by: RADIOLOGY

## 2019-06-20 PROCEDURE — 77067 SCR MAMMO BI INCL CAD: CPT | Mod: 26,HCNC,, | Performed by: RADIOLOGY

## 2019-06-21 ENCOUNTER — OFFICE VISIT (OUTPATIENT)
Dept: INTERNAL MEDICINE | Facility: CLINIC | Age: 69
End: 2019-06-21
Payer: MEDICARE

## 2019-06-21 VITALS
HEART RATE: 89 BPM | DIASTOLIC BLOOD PRESSURE: 69 MMHG | BODY MASS INDEX: 34.73 KG/M2 | OXYGEN SATURATION: 96 % | WEIGHT: 196 LBS | HEIGHT: 63 IN | TEMPERATURE: 97 F | SYSTOLIC BLOOD PRESSURE: 131 MMHG

## 2019-06-21 DIAGNOSIS — F17.210 CIGARETTE NICOTINE DEPENDENCE WITHOUT COMPLICATION: ICD-10-CM

## 2019-06-21 DIAGNOSIS — E11.9 DIET-CONTROLLED DIABETES MELLITUS: ICD-10-CM

## 2019-06-21 DIAGNOSIS — L30.9 DERMATITIS: ICD-10-CM

## 2019-06-21 DIAGNOSIS — I10 HYPERTENSION, ESSENTIAL: ICD-10-CM

## 2019-06-21 DIAGNOSIS — Z12.4 SCREENING FOR CERVICAL CANCER: ICD-10-CM

## 2019-06-21 DIAGNOSIS — Z01.419 WELL WOMAN EXAM WITH ROUTINE GYNECOLOGICAL EXAM: Primary | ICD-10-CM

## 2019-06-21 DIAGNOSIS — Z12.9 SCREENING FOR CANCER: ICD-10-CM

## 2019-06-21 DIAGNOSIS — E11.9 TYPE 2 DIABETES MELLITUS WITHOUT COMPLICATION, WITHOUT LONG-TERM CURRENT USE OF INSULIN: ICD-10-CM

## 2019-06-21 DIAGNOSIS — R79.89 ABNORMAL CBC MEASUREMENT: ICD-10-CM

## 2019-06-21 PROCEDURE — 99214 OFFICE O/P EST MOD 30 MIN: CPT | Mod: HCNC,S$GLB,, | Performed by: FAMILY MEDICINE

## 2019-06-21 PROCEDURE — 99999 PR PBB SHADOW E&M-EST. PATIENT-LVL IV: CPT | Mod: PBBFAC,HCNC,, | Performed by: FAMILY MEDICINE

## 2019-06-21 PROCEDURE — 80053 COMPREHEN METABOLIC PANEL: CPT | Mod: HCNC

## 2019-06-21 PROCEDURE — 99999 PR PBB SHADOW E&M-EST. PATIENT-LVL IV: ICD-10-PCS | Mod: PBBFAC,HCNC,, | Performed by: FAMILY MEDICINE

## 2019-06-21 PROCEDURE — 83036 HEMOGLOBIN GLYCOSYLATED A1C: CPT | Mod: HCNC

## 2019-06-21 PROCEDURE — 3045F PR MOST RECENT HEMOGLOBIN A1C LEVEL 7.0-9.0%: CPT | Mod: HCNC,CPTII,S$GLB, | Performed by: FAMILY MEDICINE

## 2019-06-21 PROCEDURE — 1101F PR PT FALLS ASSESS DOC 0-1 FALLS W/OUT INJ PAST YR: ICD-10-PCS | Mod: HCNC,CPTII,S$GLB, | Performed by: FAMILY MEDICINE

## 2019-06-21 PROCEDURE — 85025 COMPLETE CBC W/AUTO DIFF WBC: CPT | Mod: HCNC

## 2019-06-21 PROCEDURE — 88175 CYTOPATH C/V AUTO FLUID REDO: CPT | Mod: HCNC

## 2019-06-21 PROCEDURE — 3078F DIAST BP <80 MM HG: CPT | Mod: HCNC,CPTII,S$GLB, | Performed by: FAMILY MEDICINE

## 2019-06-21 PROCEDURE — 99214 PR OFFICE/OUTPT VISIT, EST, LEVL IV, 30-39 MIN: ICD-10-PCS | Mod: HCNC,S$GLB,, | Performed by: FAMILY MEDICINE

## 2019-06-21 PROCEDURE — 3045F PR MOST RECENT HEMOGLOBIN A1C LEVEL 7.0-9.0%: ICD-10-PCS | Mod: HCNC,CPTII,S$GLB, | Performed by: FAMILY MEDICINE

## 2019-06-21 PROCEDURE — 3078F PR MOST RECENT DIASTOLIC BLOOD PRESSURE < 80 MM HG: ICD-10-PCS | Mod: HCNC,CPTII,S$GLB, | Performed by: FAMILY MEDICINE

## 2019-06-21 PROCEDURE — 3075F SYST BP GE 130 - 139MM HG: CPT | Mod: HCNC,CPTII,S$GLB, | Performed by: FAMILY MEDICINE

## 2019-06-21 PROCEDURE — 1101F PT FALLS ASSESS-DOCD LE1/YR: CPT | Mod: HCNC,CPTII,S$GLB, | Performed by: FAMILY MEDICINE

## 2019-06-21 PROCEDURE — 3075F PR MOST RECENT SYSTOLIC BLOOD PRESS GE 130-139MM HG: ICD-10-PCS | Mod: HCNC,CPTII,S$GLB, | Performed by: FAMILY MEDICINE

## 2019-06-21 RX ORDER — AMLODIPINE BESYLATE 10 MG/1
10 TABLET ORAL DAILY
Qty: 90 TABLET | Refills: 3 | Status: SHIPPED | OUTPATIENT
Start: 2019-06-21 | End: 2020-05-16 | Stop reason: SDUPTHER

## 2019-06-21 RX ORDER — AMLODIPINE BESYLATE 10 MG/1
10 TABLET ORAL DAILY
Qty: 10 TABLET | Refills: 0 | Status: SHIPPED | OUTPATIENT
Start: 2019-06-21 | End: 2019-07-01 | Stop reason: SDUPTHER

## 2019-06-21 RX ORDER — IRBESARTAN 300 MG/1
300 TABLET ORAL NIGHTLY
Qty: 90 TABLET | Refills: 3 | Status: SHIPPED | OUTPATIENT
Start: 2019-06-21 | End: 2020-05-16 | Stop reason: SDUPTHER

## 2019-06-21 NOTE — PROGRESS NOTES
Subjective:       Patient ID: Mita Max is a 69 y.o. female.    Chief Complaint: Hypertension and Rash (on face)    Preventative Health Checklist 65+     Mita Max presents today for a Pap smear.  Just had her mammogram yesterday.  Reviewed her results for that as well as thorough review of her bone density results (normal), implications.    Eye Exam due on 06/17/1962 - schedule  LDCT Lung Screen due on 06/17/2007 - order  Mammogram due on 06/23/2017 - done  Pap Smear due on 06/23/2019 - today    She is also due next month for her A1c.  Reviewed prior levels and will obtain her labs today.  Lab Results       Component                Value               Date                       WBC                      6.17                02/28/2019                 HGB                      12.4                02/28/2019                 HCT                      37.6                02/28/2019                 PLT                      484 (H)             02/28/2019                 CHOL                     141                 02/28/2019                 TRIG                     86                  02/28/2019                 HDL                      40                  02/28/2019                 LDLCALC                  83.8                02/28/2019                 ALT                      39                  02/28/2019                 AST                      25                  02/28/2019                 NA                       137                 02/28/2019                 K                        3.9                 02/28/2019                 CL                       103                 02/28/2019                 CALCIUM                  9.8                 02/28/2019                 CREATININE               0.7                 02/28/2019                 BUN                      11                  02/28/2019                 CO2                      28                  02/28/2019                 GLU                       129 (H)             02/28/2019                 ESTGFRAFRICA             >60.0               02/28/2019                 EGFRNONAA                >60.0               02/28/2019                 HGBA1C                   7.0 (H)             02/28/2019                 MICALBCREAT                                  02/28/2019             Unable to calculate  Lab Results       Component                Value               Date                       HGBA1C                   7.0 (H)             02/28/2019                 HGBA1C                   6.0 (H)             02/01/2018                 HGBA1C                   6.0 (H)             06/27/2017            Diet controlled diabetes now at 7.0.  She predicts it will not be much better.    Health Maintenance Summary                Eye Exam Overdue 6/17/1962     LDCT Lung Screen Overdue 6/17/2007     Shingles Vaccine Overdue 4/26/2016      Done 3/1/2016 Imm Admin: Zoster    Mammogram Overdue 6/23/2017      Done 6/23/2016 DR. SUSAN HAHN/[facility]OM[facility]. REPEAT 1 YR     Done 6/23/2016 MAMMO PREVIOUS     Done 7/19/2012 MAMMO PREVIOUS     Done 7/5/2011     Pap Smear Next Due 6/23/2019      Done 6/23/2016 DR. TATIANA HAHN/[facility]     Done 9/19/2013     Influenza Vaccine Next Due 8/1/2019      Done 12/21/2018 Ext Imm: influenza, trivalent, adjuvanted     Done 9/21/2017      Done 9/21/2017 Imm Admin: Influenza - High Dose     Done 10/1/2015 Imm Admin: Influenza Whole     Done 10/1/2014 Imm Admin: Influenza Whole    Hemoglobin A1c Next Due 8/28/2019      Done 2/28/2019 HEMOGLOBIN A1C Hemoglobin A1C External           Done 2/1/2018 HEMOGLOBIN A1C Hemoglobin A1C External           Done 6/27/2017 HEMOGLOBIN A1C Hemoglobin A1C External           Done 9/17/2013     Pneumococcal Vaccine (65+ Low/Medium Risk) Next Due 1/19/2020      Done 2/27/2018 Imm Admin: Pneumococcal Conjugate - 13 Valent     Done 9/8/2016 Imm Admin: Pneumococcal Conjugate - 13 Valent     Done 1/19/2015 Imm Admin:  Pneumococcal Polysaccharide - 23 Valent     Done 11/4/2009 Imm Admin: Pneumococcal Polysaccharide - 23 Valent    Foot Exam Next Due 2/28/2020      Done 2/28/2019      Done 9/21/2017 [employer]: WORKFLOW - DIABETES - DIABETIC FOOT EXAM   PERFORMED     Done 6/19/2017 [employer]: WORKFLOW - DIABETES - DIABETIC FOOT EXAM   PERFORMED     Done 6/19/2017     Lipid Panel Next Due 2/28/2020      Done 2/28/2019 LIPID PANEL     Done 6/27/2017 LIPID PANEL     Done 9/17/2013     Low Dose Statin Next Due 6/21/2020      Done 6/21/2019 Registry Metric: Last Current Statin Reviewed Date     Done 3/2/2019 Registry Metric: Last Current Statin Order Date    DEXA SCAN Next Due 6/20/2022      Done 6/20/2019 DEXA BONE DENSITY SPINE HIP    Colonoscopy Next Due 6/14/2024      Done 6/14/2019 COLONOSCOPY     Done 8/15/2012 [facility] COLONOSCOPY    TETANUS VACCINE Next Due 1/19/2025      Done 1/19/2015 Imm Admin: Tdap     Done 5/17/2011      Done 5/17/2011 Imm Admin: Tdap    Hepatitis C Screening This plan is no longer active.      Done 2/28/2019 HEPATITIS C ANTIBODY        Counseling:  Nutrition: Encouraged to take 5-10 servings fruits and vegetables daily   Exercise:  Physical activity recommendations reviewed and given hand out  Avoid Tobacco Use: reviewed  Avoid ETOH/Drug Use:  reviewed  STD Prevention/Abstinence:   Avoid High Risk Behavior:   Unintended Pregnancy:    Lap-shoulder Belts:  Yes  No text/talk while driving: Reviewed  Bike/ATV Motorcycle Helmets:  N/A  Smoke Detector:  yes  Safe Storage/Removal of Firearms: reviewed    Calcium Intake (females):  Calcium recommendations given with handout  Regular Dental Visits:  yes  Floss, Brush and Fluoride: yes  Fall risks: Reviewed  HCPOA/LW: Reviewed  Hot Water Heater Temp: Reviewed  CPR Family Members: Reviewed    She has completed a full 14 system review. All items are negative except as indicated.    Review of Systems   Constitutional: Positive for activity change.   HENT: Positive for  congestion.    Eyes: Positive for itching.   Respiratory: Positive for cough.    Cardiovascular: Negative.    Gastrointestinal: Negative.    Endocrine: Negative.    Genitourinary: Negative.    Musculoskeletal: Negative.    Skin: Positive for rash (rash since Feb or so - thought 2/2 allergies - needs help with it).   Allergic/Immunologic: Positive for environmental allergies.   Neurological: Negative.    Hematological: Negative.    Psychiatric/Behavioral: Negative.        Objective:      Physical Exam   Constitutional: She is oriented to person, place, and time. She appears well-developed and well-nourished.   HENT:   Head: Normocephalic and atraumatic.   Right Ear: Tympanic membrane, external ear and ear canal normal.   Left Ear: Tympanic membrane, external ear and ear canal normal.   Nose: Nose normal.   Mouth/Throat: Oropharynx is clear and moist. No oropharyngeal exudate.   Eyes: Conjunctivae and EOM are normal.   Neck: Normal range of motion. Neck supple. No thyromegaly present.   Cardiovascular: Normal rate, regular rhythm and normal heart sounds. Exam reveals no gallop and no friction rub.   No murmur heard.  Pulmonary/Chest: Effort normal and breath sounds normal. She exhibits no tenderness.   Abdominal: Soft. She exhibits no distension. There is no tenderness.   Genitourinary: Vagina normal and uterus normal. No vaginal discharge found.   Genitourinary Comments: No adnexal enlargement/TTP bilaterally  No CMT, no cervical DC   Musculoskeletal: She exhibits no edema.   Lymphadenopathy:     She has no cervical adenopathy.   Neurological: She is alert and oriented to person, place, and time.   Skin: Skin is warm and dry.   Bilateral breasts nontender to palpation, no lesions or masses    Significant erythematous patches to face (forehead, cheeks), neck   Psychiatric: She has a normal mood and affect. Her behavior is normal.         Assessment/Plan:     1. Well woman exam with routine gynecological exam  CT Chest  Lung Screening Low Dose    Comprehensive metabolic panel    Liquid-based pap smear, screening   2. Screening for cancer  CT Chest Lung Screening Low Dose   3. Cigarette nicotine dependence without complication  CT Chest Lung Screening Low Dose    Ambulatory referral to Smoking Cessation Program   4. Screening for cervical cancer  Liquid-based pap smear, screening   5. Type 2 diabetes mellitus without complication, without long-term current use of insulin  Ambulatory referral to Optometry   6. Hypertension, essential  amLODIPine (NORVASC) 10 MG tablet    amLODIPine (NORVASC) 10 MG tablet    irbesartan (AVAPRO) 300 MG tablet   7. Abnormal CBC measurement  CBC auto differential   8. Diet-controlled diabetes mellitus  Hemoglobin A1c   9. Dermatitis  Ambulatory referral to Dermatology   recheck with repeat A1C to be based on A1C today.

## 2019-06-22 LAB
ALBUMIN SERPL BCP-MCNC: 4.1 G/DL (ref 3.5–5.2)
ALP SERPL-CCNC: 75 U/L (ref 55–135)
ALT SERPL W/O P-5'-P-CCNC: 20 U/L (ref 10–44)
ANION GAP SERPL CALC-SCNC: 9 MMOL/L (ref 8–16)
AST SERPL-CCNC: 21 U/L (ref 10–40)
BASOPHILS # BLD AUTO: 0.05 K/UL (ref 0–0.2)
BASOPHILS NFR BLD: 0.9 % (ref 0–1.9)
BILIRUB SERPL-MCNC: 0.7 MG/DL (ref 0.1–1)
BUN SERPL-MCNC: 14 MG/DL (ref 8–23)
CALCIUM SERPL-MCNC: 10 MG/DL (ref 8.7–10.5)
CHLORIDE SERPL-SCNC: 103 MMOL/L (ref 95–110)
CO2 SERPL-SCNC: 27 MMOL/L (ref 23–29)
CREAT SERPL-MCNC: 0.8 MG/DL (ref 0.5–1.4)
DIFFERENTIAL METHOD: ABNORMAL
EOSINOPHIL # BLD AUTO: 0.3 K/UL (ref 0–0.5)
EOSINOPHIL NFR BLD: 5.3 % (ref 0–8)
ERYTHROCYTE [DISTWIDTH] IN BLOOD BY AUTOMATED COUNT: 12.4 % (ref 11.5–14.5)
EST. GFR  (AFRICAN AMERICAN): >60 ML/MIN/1.73 M^2
EST. GFR  (NON AFRICAN AMERICAN): >60 ML/MIN/1.73 M^2
ESTIMATED AVG GLUCOSE: 163 MG/DL (ref 68–131)
GLUCOSE SERPL-MCNC: 182 MG/DL (ref 70–110)
HBA1C MFR BLD HPLC: 7.3 % (ref 4–5.6)
HCT VFR BLD AUTO: 45.2 % (ref 37–48.5)
HGB BLD-MCNC: 14.8 G/DL (ref 12–16)
IMM GRANULOCYTES # BLD AUTO: 0.01 K/UL (ref 0–0.04)
IMM GRANULOCYTES NFR BLD AUTO: 0.2 % (ref 0–0.5)
LYMPHOCYTES # BLD AUTO: 1.5 K/UL (ref 1–4.8)
LYMPHOCYTES NFR BLD: 26.9 % (ref 18–48)
MCH RBC QN AUTO: 31.6 PG (ref 27–31)
MCHC RBC AUTO-ENTMCNC: 32.7 G/DL (ref 32–36)
MCV RBC AUTO: 97 FL (ref 82–98)
MONOCYTES # BLD AUTO: 0.6 K/UL (ref 0.3–1)
MONOCYTES NFR BLD: 10.1 % (ref 4–15)
NEUTROPHILS # BLD AUTO: 3.2 K/UL (ref 1.8–7.7)
NEUTROPHILS NFR BLD: 56.6 % (ref 38–73)
NRBC BLD-RTO: 0 /100 WBC
PLATELET # BLD AUTO: 297 K/UL (ref 150–350)
PMV BLD AUTO: 9.4 FL (ref 9.2–12.9)
POTASSIUM SERPL-SCNC: 4.4 MMOL/L (ref 3.5–5.1)
PROT SERPL-MCNC: 7.4 G/DL (ref 6–8.4)
RBC # BLD AUTO: 4.68 M/UL (ref 4–5.4)
SODIUM SERPL-SCNC: 139 MMOL/L (ref 136–145)
WBC # BLD AUTO: 5.65 K/UL (ref 3.9–12.7)

## 2019-06-23 DIAGNOSIS — E11.9 TYPE 2 DIABETES MELLITUS WITHOUT COMPLICATION, WITHOUT LONG-TERM CURRENT USE OF INSULIN: Primary | ICD-10-CM

## 2019-06-23 DIAGNOSIS — I10 HYPERTENSION, ESSENTIAL: ICD-10-CM

## 2019-06-26 NOTE — PROGRESS NOTES
Called patient to discuss pathology results from recent colonoscopy, but there was no answer. Pathology showed that the majority of the polyps removed were benign sessile serrated polyps which can increase risk of cancer in the future and the remainder polyps were benign hyperplastic or lymphoid polyps.  Recommend keeping repeat surveillance colonoscopy in 3 years.  This was communicated to the patient via patient portal.

## 2019-07-01 DIAGNOSIS — I10 HYPERTENSION, ESSENTIAL: ICD-10-CM

## 2019-07-01 RX ORDER — IRBESARTAN 300 MG/1
300 TABLET ORAL NIGHTLY
Qty: 30 TABLET | Refills: 0 | Status: SHIPPED | OUTPATIENT
Start: 2019-07-01 | End: 2020-05-16 | Stop reason: SDUPTHER

## 2019-07-01 RX ORDER — AMLODIPINE BESYLATE 10 MG/1
10 TABLET ORAL DAILY
Qty: 30 TABLET | Refills: 0 | Status: SHIPPED | OUTPATIENT
Start: 2019-07-01 | End: 2020-05-16 | Stop reason: SDUPTHER

## 2019-07-01 RX ORDER — IRBESARTAN 300 MG/1
300 TABLET ORAL NIGHTLY
Qty: 90 TABLET | Refills: 3 | Status: CANCELLED | OUTPATIENT
Start: 2019-07-01

## 2019-07-01 NOTE — TELEPHONE ENCOUNTER
----- Message from Mita Dudley sent at 7/1/2019  4:13 PM CDT -----  Type:  RX Refill Request    Who Called:  Pt  Mita  Refill or New Rx:  Refill   RX Name and Strength:  Irbesartan 300mg  How is the patient currently taking it? (ex. 1XDay): Takes once daily  Is this a 30 day or 90 day RX:  30 day  Pref[facility] with phone number[facility]  Local or Mail Order:  Local  Ordering Provider: Dr Han  Would the patient rather a call back or a response via [facility]?   Call back  Best Call Back Number:  [phone]  Additional Information:  Please call//[email or web address]

## 2019-07-01 NOTE — TELEPHONE ENCOUNTER
----- Message from Mita Dudley sent at 7/1/2019  4:11 PM CDT -----  Type:  RX Refill Request    Who Called:  Pt  Mita  Refill or New Rx:   Refill   RX Name and Strength:  Amlodipine   How is the patient currently taking it? (ex. 1XDay):  Takes once daily  Is this a 30 day or 90 day RX:  30 day  Preferred Pharmacy with phone number: [facility]  Local or Mail Order:  Local  Ordering Provider: Dr Han  Would the patient rather a call back or a response via [facility]?  Call back  Best Call Back Number:   [phone]  Additional Information:  Please call when sent in//[email or web address]

## 2019-07-01 NOTE — TELEPHONE ENCOUNTER
----- Message from Radhika Boone sent at 7/1/2019  8:07 AM CDT -----  Contact: self  Patient requesting a call back regarding her prescriptions for amLODIPine (NORVASC) 10 MG tablet///irbesartan (AVAPRO) 300 MG tablet. She states that she is completely out and the mail order pharmacy has not delivered her medication just yet. She is requesting a prescription to be sent to the pharmacy listed below just until her full prescription is delivered. Please call patient back at [phone]        [id number] - [place] - [address] [facility] [address]  [address] [address]  Phone: [phone] Fax: [phone]

## 2019-07-02 ENCOUNTER — OFFICE VISIT (OUTPATIENT)
Dept: OPHTHALMOLOGY | Facility: CLINIC | Age: 69
End: 2019-07-02
Payer: MEDICARE

## 2019-07-02 ENCOUNTER — TELEPHONE (OUTPATIENT)
Dept: INTERNAL MEDICINE | Facility: CLINIC | Age: 69
End: 2019-07-02

## 2019-07-02 DIAGNOSIS — H25.013 CATARACT CORTICAL, SENILE, BILATERAL: ICD-10-CM

## 2019-07-02 DIAGNOSIS — Z13.5 GLAUCOMA SCREENING: ICD-10-CM

## 2019-07-02 DIAGNOSIS — H52.7 REFRACTIVE ERROR: ICD-10-CM

## 2019-07-02 DIAGNOSIS — H25.13 CATARACT, NUCLEAR SCLEROTIC SENILE, BILATERAL: ICD-10-CM

## 2019-07-02 DIAGNOSIS — E11.9 DIABETES MELLITUS TYPE 2 WITHOUT RETINOPATHY: Primary | ICD-10-CM

## 2019-07-02 PROCEDURE — 92015 PR REFRACTION: ICD-10-PCS | Mod: HCNC,S$GLB,, | Performed by: OPTOMETRIST

## 2019-07-02 PROCEDURE — 92004 PR EYE EXAM, NEW PATIENT,COMPREHESV: ICD-10-PCS | Mod: HCNC,S$GLB,, | Performed by: OPTOMETRIST

## 2019-07-02 PROCEDURE — 99999 PR PBB SHADOW E&M-EST. PATIENT-LVL I: ICD-10-PCS | Mod: PBBFAC,HCNC,, | Performed by: OPTOMETRIST

## 2019-07-02 PROCEDURE — 92015 DETERMINE REFRACTIVE STATE: CPT | Mod: HCNC,S$GLB,, | Performed by: OPTOMETRIST

## 2019-07-02 PROCEDURE — 99999 PR PBB SHADOW E&M-EST. PATIENT-LVL I: CPT | Mod: PBBFAC,HCNC,, | Performed by: OPTOMETRIST

## 2019-07-02 PROCEDURE — 92004 COMPRE OPH EXAM NEW PT 1/>: CPT | Mod: HCNC,S$GLB,, | Performed by: OPTOMETRIST

## 2019-07-02 NOTE — TELEPHONE ENCOUNTER
Patient called in regards to checking the status of her Rx. Patient was informed of her Rx being sent to the pharmacy and verbally understood the information given.

## 2019-07-02 NOTE — TELEPHONE ENCOUNTER
----- Message from Charity Nath sent at 7/2/2019  7:39 AM CDT -----  .Type:  Patient Returning Call    Who Called:patient  Who Left Message for Patient:mary  Does the patient know what this is regarding?:refill  Would the patient rather a call back or a response via [facility]? call  Best Call Back Number:.[phone] (home)   Additional Information:

## 2019-07-02 NOTE — TELEPHONE ENCOUNTER
----- Message from Dagmar Fabian sent at 7/2/2019  7:48 AM CDT -----  Contact: pt  Returning a call.

## 2019-07-02 NOTE — PROGRESS NOTES
HPI     New Patient  Diabetic/NIDDM  Screening for glaucoma  RE  Decreased distance vision   Using +2.50 OTC Readers     Last edited by Colin Chowdary MA on 7/2/2019  9:44 AM. (History)            Assessment /Plan     For exam results, see Encounter Report.    Diabetes mellitus type 2 without retinopathy    Glaucoma screening    Refractive error      No diabetic retinopathy OU.  OH OK OU.  Spec Rx given.  RTC one year or prn.

## 2019-07-02 NOTE — TELEPHONE ENCOUNTER
#90 x 4 of each was sent to mail order 6/21/19.  #10 was also sent to local pharmacy for the amlodipine that same date.    RX for #30 each sent to local pharmacy.

## 2019-07-03 ENCOUNTER — INITIAL CONSULT (OUTPATIENT)
Dept: DERMATOLOGY | Facility: CLINIC | Age: 69
End: 2019-07-03
Payer: MEDICARE

## 2019-07-03 DIAGNOSIS — L30.9 DERMATITIS: Primary | ICD-10-CM

## 2019-07-03 PROCEDURE — 1101F PT FALLS ASSESS-DOCD LE1/YR: CPT | Mod: HCNC,CPTII,S$GLB, | Performed by: STUDENT IN AN ORGANIZED HEALTH CARE EDUCATION/TRAINING PROGRAM

## 2019-07-03 PROCEDURE — 99202 PR OFFICE/OUTPT VISIT, NEW, LEVL II, 15-29 MIN: ICD-10-PCS | Mod: HCNC,S$GLB,, | Performed by: STUDENT IN AN ORGANIZED HEALTH CARE EDUCATION/TRAINING PROGRAM

## 2019-07-03 PROCEDURE — 99999 PR PBB SHADOW E&M-EST. PATIENT-LVL II: ICD-10-PCS | Mod: PBBFAC,HCNC,, | Performed by: STUDENT IN AN ORGANIZED HEALTH CARE EDUCATION/TRAINING PROGRAM

## 2019-07-03 PROCEDURE — 1101F PR PT FALLS ASSESS DOC 0-1 FALLS W/OUT INJ PAST YR: ICD-10-PCS | Mod: HCNC,CPTII,S$GLB, | Performed by: STUDENT IN AN ORGANIZED HEALTH CARE EDUCATION/TRAINING PROGRAM

## 2019-07-03 PROCEDURE — 99202 OFFICE O/P NEW SF 15 MIN: CPT | Mod: HCNC,S$GLB,, | Performed by: STUDENT IN AN ORGANIZED HEALTH CARE EDUCATION/TRAINING PROGRAM

## 2019-07-03 PROCEDURE — 99999 PR PBB SHADOW E&M-EST. PATIENT-LVL II: CPT | Mod: PBBFAC,HCNC,, | Performed by: STUDENT IN AN ORGANIZED HEALTH CARE EDUCATION/TRAINING PROGRAM

## 2019-07-03 RX ORDER — TRIAMCINOLONE ACETONIDE 40 MG/ML
40 INJECTION, SUSPENSION INTRA-ARTICULAR; INTRAMUSCULAR
Status: DISCONTINUED | OUTPATIENT
Start: 2019-07-03 | End: 2020-08-05

## 2019-07-03 RX ORDER — BETAMETHASONE SODIUM PHOSPHATE AND BETAMETHASONE ACETATE 3; 3 MG/ML; MG/ML
6 INJECTION, SUSPENSION INTRA-ARTICULAR; INTRALESIONAL; INTRAMUSCULAR; SOFT TISSUE
Status: DISCONTINUED | OUTPATIENT
Start: 2019-07-03 | End: 2020-08-05

## 2019-07-03 NOTE — LETTER
July 3, 2019      Lexis Han MD  [address] [place] [address] [address] [place] [address]           [facility]  [address] [facility]  [place] [address]  Phone: [phone]  Fax: [phone]          Patient: Mita Max   MR Number: [id number]   YOB: 1950   Date of Visit: 7/3/2019       Dear Dr. Lexis Han:    Thank you for referring Mita Max to me for evaluation. Attached you will find relevant portions of my assessment and plan of care.    If you have questions, please do not hesitate to call me. I look forward to following Mita Max along with you.    Sincerely,    Melvin Young MD    Enclosure  CC:  No Recipients    If you would like to receive this communication electronically, please contact [email or web address] or [phone] to request more information on [employer] Link access.    For providers and/or their staff who would like to refer a patient to [facility], please contact us through our one-stop-shop provider referral line, [facility], at [phone].    If you feel you have received this communication in error or would no longer like to receive these types of communications, please e-mail [email or web address]

## 2019-07-03 NOTE — PROGRESS NOTES
Subjective:       Patient ID:  Mita Max is a 69 y.o. female who presents for   Chief Complaint   Patient presents with    Dry Skin     to face x 1 week, skin flaking and tg, tx cetaphil cleanser, eucerin moisturizer      History of Present Illness: The patient presents with chief complaint of a rash  Location: face  Duration: several months, with a recent flare in symptoms 1 week ago  Signs/Symptoms: red, peeling skin, burning sensation  Prior treatments: cetaphil cleanser and Eucerin moisturizer     She reports having had a red rash on the face for months. She was evaluated and given steroid injections which helped her symptoms, however, over the past week, she changed facial products and now developed intense redness and burning of the face.         Review of Systems   Skin: Positive for rash.        Objective:    Physical Exam   Constitutional: She appears well-developed and well-nourished. No distress.   Neurological: She is alert and oriented to person, place, and time. She is not disoriented.   Psychiatric: She has a normal mood and affect.   Skin:   Areas Examined (abnormalities noted in diagram):   Scalp / Hair Palpated and Inspected  Head / Face Inspection Performed              Diagram Legend     Erythematous scaling macule/papule c/w actinic keratosis       Vascular papule c/w angioma      Pigmented verrucoid papule/plaque c/w seborrheic keratosis      Yellow umbilicated papule c/w sebaceous hyperplasia      Irregularly shaped tan macule c/w lentigo     1-2 mm smooth white papules consistent with Milia      Movable subcutaneous cyst with punctum c/w epidermal inclusion cyst      Subcutaneous movable cyst c/w pilar cyst      Firm pink to brown papule c/w dermatofibroma      Pedunculated fleshy papule(s) c/w skin tag(s)      Evenly pigmented macule c/w junctional nevus     Mildly variegated pigmented, slightly irregular-bordered macule c/w mildly atypical nevus      Flesh colored to evenly  pigmented papule c/w intradermal nevus       Pink pearly papule/plaque c/w basal cell carcinoma      Erythematous hyperkeratotic cursted plaque c/w SCC      Surgical scar with no sign of skin cancer recurrence      Open and closed comedones      Inflammatory papules and pustules      Verrucoid papule consistent consistent with wart     Erythematous eczematous patches and plaques     Dystrophic onycholytic nail with subungual debris c/w onychomycosis     Umbilicated papule    Erythematous-base heme-crusted tan verrucoid plaque consistent with inflamed seborrheic keratosis     Erythematous Silvery Scaling Plaque c/w Psoriasis     See annotation      Assessment / Plan:        Dermatitis - patient reports intense pain and itching and burning, likely contact dermatitis due to new facial product. Will give IM steroids to help quickly calm symptoms (pt reports listed allergy to steroids is an emotional response and not a true allergy). Will have patient return in 4 weeks for re-evaluation. Will likely need patch testing if rash returns.   -     betamethasone acetate-betamethasone sodium phosphate injection 6 mg  -     triamcinolone acetonide injection 40 mg  -     Counseled patient on gentle skin care regimen, including need for sensitive soaps/detergents, as well as need for frequent use of sensitize moisturizers.          Follow up in about 1 month (around 7/31/2019).

## 2019-07-31 ENCOUNTER — OFFICE VISIT (OUTPATIENT)
Dept: DERMATOLOGY | Facility: CLINIC | Age: 69
End: 2019-07-31
Payer: MEDICARE

## 2019-07-31 DIAGNOSIS — L30.9 DERMATITIS: Primary | ICD-10-CM

## 2019-07-31 PROCEDURE — 88305 TISSUE EXAM BY PATHOLOGIST: CPT | Mod: HCNC | Performed by: PATHOLOGY

## 2019-07-31 PROCEDURE — 99213 OFFICE O/P EST LOW 20 MIN: CPT | Mod: 25,HCNC,S$GLB, | Performed by: STUDENT IN AN ORGANIZED HEALTH CARE EDUCATION/TRAINING PROGRAM

## 2019-07-31 PROCEDURE — 1101F PT FALLS ASSESS-DOCD LE1/YR: CPT | Mod: HCNC,CPTII,S$GLB, | Performed by: STUDENT IN AN ORGANIZED HEALTH CARE EDUCATION/TRAINING PROGRAM

## 2019-07-31 PROCEDURE — 99999 PR PBB SHADOW E&M-EST. PATIENT-LVL II: ICD-10-PCS | Mod: PBBFAC,HCNC,, | Performed by: STUDENT IN AN ORGANIZED HEALTH CARE EDUCATION/TRAINING PROGRAM

## 2019-07-31 PROCEDURE — 88313 TISSUE SPECIMEN TO PATHOLOGY, DERMATOLOGY: ICD-10-PCS | Mod: 26,HCNC,, | Performed by: PATHOLOGY

## 2019-07-31 PROCEDURE — 88305 TISSUE EXAM BY PATHOLOGIST: CPT | Mod: 26,HCNC,, | Performed by: PATHOLOGY

## 2019-07-31 PROCEDURE — 11104 PR PUNCH BIOPSY, SKIN, SINGLE LESION: ICD-10-PCS | Mod: HCNC,S$GLB,, | Performed by: STUDENT IN AN ORGANIZED HEALTH CARE EDUCATION/TRAINING PROGRAM

## 2019-07-31 PROCEDURE — 99999 PR PBB SHADOW E&M-EST. PATIENT-LVL II: CPT | Mod: PBBFAC,HCNC,, | Performed by: STUDENT IN AN ORGANIZED HEALTH CARE EDUCATION/TRAINING PROGRAM

## 2019-07-31 PROCEDURE — 99213 PR OFFICE/OUTPT VISIT, EST, LEVL III, 20-29 MIN: ICD-10-PCS | Mod: 25,HCNC,S$GLB, | Performed by: STUDENT IN AN ORGANIZED HEALTH CARE EDUCATION/TRAINING PROGRAM

## 2019-07-31 PROCEDURE — 1101F PR PT FALLS ASSESS DOC 0-1 FALLS W/OUT INJ PAST YR: ICD-10-PCS | Mod: HCNC,CPTII,S$GLB, | Performed by: STUDENT IN AN ORGANIZED HEALTH CARE EDUCATION/TRAINING PROGRAM

## 2019-07-31 PROCEDURE — 11104 PUNCH BX SKIN SINGLE LESION: CPT | Mod: HCNC,S$GLB,, | Performed by: STUDENT IN AN ORGANIZED HEALTH CARE EDUCATION/TRAINING PROGRAM

## 2019-07-31 PROCEDURE — 88313 SPECIAL STAINS GROUP 2: CPT | Mod: 26,HCNC,, | Performed by: PATHOLOGY

## 2019-07-31 PROCEDURE — 88305 TISSUE SPECIMEN TO PATHOLOGY, DERMATOLOGY: ICD-10-PCS | Mod: 26,HCNC,, | Performed by: PATHOLOGY

## 2019-07-31 RX ORDER — TRIAMCINOLONE ACETONIDE 0.25 MG/G
CREAM TOPICAL
Qty: 454 G | Refills: 0 | Status: SHIPPED | OUTPATIENT
Start: 2019-07-31 | End: 2021-02-05 | Stop reason: ALTCHOICE

## 2019-07-31 RX ORDER — BETAMETHASONE VALERATE 1.2 MG/G
CREAM TOPICAL 2 TIMES DAILY
Qty: 45 G | Refills: 2 | Status: SHIPPED | OUTPATIENT
Start: 2019-07-31 | End: 2020-08-05

## 2019-07-31 NOTE — PATIENT INSTRUCTIONS
"Punch Biopsy Wound Care    Your doctor has performed a punch biopsy today.  A band aid and antibiotic ointment has been placed over the site.  This should remain in place for 24 hours.  It is recommended that you keep the area dry for the first 24 hours.  After 24 hours, you may remove the band aid and wash the area with warm soap and water and apply Vaseline jelly.  Many patients prefer to use Neosporin or Bacitracin ointment.  This is acceptable; however know that you can develop an allergy to this medication even if you have used it safely for years.  It is important to keep the area moist.  Letting it dry out and get air slows healing time, will worsen the scar, and make it more difficult to remove the stitches if they were placed.  Band aid is optional after first 24 hours.      If you notice increasing redness, tenderness, pain, or yellow drainage at the biopsy or surgical site, please notify your doctor.  These are signs of an infection.    If your biopsy/surgical site is bleeding, apply firm pressure for 15 minutes straight.  Repeat for another 15 minutes, if it is still bleeding.   If the surgical site continues to bleed, then please contact your doctor.      For [facility] users:   You will receive a [facility] notification after the pathologist has finished reviewing your biopsy specimen. Pathology results, however, will not be released online so you will see a "no content" message. Once your dermatologist reviews and clinically correlates your biopsy results, you will either receive a letter in the mail with the results of a phone call from your doctor's office if further explanation or treatment is warranted.        "

## 2019-07-31 NOTE — PROGRESS NOTES
Subjective:       Patient ID:  Mita Max is a 69 y.o. female who presents for   Chief Complaint   Patient presents with    Rash     f/u rash to face that is red and flaky x 3 weeks tx none      History of Present Illness: The patient presents for follow-up of a rash to the face. She was last seen on 7/3/19 where she was having a diffuse erythematous rash to the face and was given IM steroids. She reports that the steroid injected helped to initially clear the rash, however, the rash returned 1-2 weeks ago. She also now has a similar rash on the hands and the arms. Has been using gentle skin care products and moisturizers.       Review of Systems   Skin: Positive for itching, rash and dry skin.        Objective:    Physical Exam   Constitutional: She appears well-developed and well-nourished. No distress.   Neurological: She is alert and oriented to person, place, and time. She is not disoriented.   Psychiatric: She has a normal mood and affect.   Skin:   Areas Examined (abnormalities noted in diagram):   Scalp / Hair Palpated and Inspected  Head / Face Inspection Performed  Neck Inspection Performed  RUE Inspected  LUE Inspection Performed                   Diagram Legend     Erythematous scaling macule/papule c/w actinic keratosis       Vascular papule c/w angioma      Pigmented verrucoid papule/plaque c/w seborrheic keratosis      Yellow umbilicated papule c/w sebaceous hyperplasia      Irregularly shaped tan macule c/w lentigo     1-2 mm smooth white papules consistent with Milia      Movable subcutaneous cyst with punctum c/w epidermal inclusion cyst      Subcutaneous movable cyst c/w pilar cyst      Firm pink to brown papule c/w dermatofibroma      Pedunculated fleshy papule(s) c/w skin tag(s)      Evenly pigmented macule c/w junctional nevus     Mildly variegated pigmented, slightly irregular-bordered macule c/w mildly atypical nevus      Flesh colored to evenly pigmented papule c/w intradermal  nevus       Pink pearly papule/plaque c/w basal cell carcinoma      Erythematous hyperkeratotic cursted plaque c/w SCC      Surgical scar with no sign of skin cancer recurrence      Open and closed comedones      Inflammatory papules and pustules      Verrucoid papule consistent consistent with wart     Erythematous eczematous patches and plaques     Dystrophic onycholytic nail with subungual debris c/w onychomycosis     Umbilicated papule    Erythematous-base heme-crusted tan verrucoid plaque consistent with inflamed seborrheic keratosis     Erythematous Silvery Scaling Plaque c/w Psoriasis     See annotation      Assessment / Plan:      Pathology Orders:     Normal Orders This Visit    Tissue Specimen To Pathology, Dermatology     Questions:    Directional Terms:  Other(comment)    Clinical Information:  Diffuse rash on the face with sparing of the under surface of chin. Allergic contact vs other    Specific Site:  neck        Dermatitis  -     triamcinolone acetonide 0.025% (KENALOG) 0.025 % cream; AAA bid  Dispense: 454 g; Refill: 0  -     betamethasone valerate 0.1% (VALISONE) 0.1 % Crea; Apply topically 2 (two) times daily. Do not apply to the face.  Dispense: 45 g; Refill: 2  -     Tissue Specimen To Pathology, Dermatology    Punch biopsy procedure note:  Punch biopsy performed after verbal consent obtained. Area marked and prepped with alcohol. Approximately 1cc of 1% lidocaine with epinephrine injected. 4 mm disposable punch used to remove lesion. Hemostasis obtained and biopsy site closed with 1 - 2 Prolene sutures. Wound care instructions reviewed with patient and handout given.           Follow up in about 3 months (around 10/31/2019).

## 2019-08-08 ENCOUNTER — CLINICAL SUPPORT (OUTPATIENT)
Dept: DERMATOLOGY | Facility: CLINIC | Age: 69
End: 2019-08-08
Payer: MEDICARE

## 2019-08-08 DIAGNOSIS — Z48.02 VISIT FOR SUTURE REMOVAL: Primary | ICD-10-CM

## 2019-08-08 PROCEDURE — 99024 PR POST-OP FOLLOW-UP VISIT: ICD-10-PCS | Mod: HCNC,S$GLB,, | Performed by: STUDENT IN AN ORGANIZED HEALTH CARE EDUCATION/TRAINING PROGRAM

## 2019-08-08 PROCEDURE — 99024 POSTOP FOLLOW-UP VISIT: CPT | Mod: HCNC,S$GLB,, | Performed by: STUDENT IN AN ORGANIZED HEALTH CARE EDUCATION/TRAINING PROGRAM

## 2019-08-22 ENCOUNTER — PATIENT MESSAGE (OUTPATIENT)
Dept: DERMATOLOGY | Facility: CLINIC | Age: 69
End: 2019-08-22

## 2019-08-26 ENCOUNTER — CLINICAL SUPPORT (OUTPATIENT)
Dept: DERMATOLOGY | Facility: CLINIC | Age: 69
End: 2019-08-26
Payer: MEDICARE

## 2019-08-26 DIAGNOSIS — L23.9 ALLERGIC DERMATITIS: Primary | ICD-10-CM

## 2019-08-26 PROCEDURE — 99999 PR PBB SHADOW E&M-EST. PATIENT-LVL III: CPT | Mod: PBBFAC,HCNC,,

## 2019-08-26 PROCEDURE — 99999 PR PBB SHADOW E&M-EST. PATIENT-LVL III: ICD-10-PCS | Mod: PBBFAC,HCNC,,

## 2019-08-26 PROCEDURE — 99499 NO LOS: ICD-10-PCS | Mod: HCNC,S$GLB,, | Performed by: STUDENT IN AN ORGANIZED HEALTH CARE EDUCATION/TRAINING PROGRAM

## 2019-08-26 PROCEDURE — 99499 UNLISTED E&M SERVICE: CPT | Mod: HCNC,S$GLB,, | Performed by: STUDENT IN AN ORGANIZED HEALTH CARE EDUCATION/TRAINING PROGRAM

## 2019-08-26 NOTE — PROGRESS NOTES
History of possible allergic contact dermatitis.  Here today for patch test placement.  Instructions have been reviewed with the patient.      Denies recent oral corticosteroid intake.  No involvement of back.      T.R.U.E. Test patch testing placed today (36 allergens, 3 panels).  Patient given written and verbal instructions detailing to not have areas in direct contact with any water. Patient instructed to avoid hair washing until 72 hours.  Pt instructed to avoid indoor/outdoor activities that could lead to increased sweating.  Will remove patches and perform read at 48 hours with MD and then again at 72 hours as nurse's visit. Patient acknowledged understanding.

## 2019-08-28 ENCOUNTER — CLINICAL SUPPORT (OUTPATIENT)
Dept: DERMATOLOGY | Facility: CLINIC | Age: 69
End: 2019-08-28
Payer: MEDICARE

## 2019-08-28 DIAGNOSIS — L23.9 ALLERGIC DERMATITIS: Primary | ICD-10-CM

## 2019-08-28 PROCEDURE — 99024 POSTOP FOLLOW-UP VISIT: CPT | Mod: HCNC,S$GLB,, | Performed by: STUDENT IN AN ORGANIZED HEALTH CARE EDUCATION/TRAINING PROGRAM

## 2019-08-28 PROCEDURE — 99024 PR POST-OP FOLLOW-UP VISIT: ICD-10-PCS | Mod: HCNC,S$GLB,, | Performed by: STUDENT IN AN ORGANIZED HEALTH CARE EDUCATION/TRAINING PROGRAM

## 2019-08-28 NOTE — PROGRESS NOTES
Pt presents here for day 2 of patch testing. Patches removed. Pt instructed to not shower and or take any antihistamines . Pt aware of appt tomorrow. No significant findings at this time.

## 2019-08-29 ENCOUNTER — OFFICE VISIT (OUTPATIENT)
Dept: DERMATOLOGY | Facility: CLINIC | Age: 69
End: 2019-08-29
Payer: MEDICARE

## 2019-08-29 DIAGNOSIS — L30.9 DERMATITIS: Primary | ICD-10-CM

## 2019-08-29 DIAGNOSIS — L82.1 SEBORRHEIC KERATOSES: ICD-10-CM

## 2019-08-29 PROCEDURE — 1101F PT FALLS ASSESS-DOCD LE1/YR: CPT | Mod: HCNC,CPTII,S$GLB, | Performed by: STUDENT IN AN ORGANIZED HEALTH CARE EDUCATION/TRAINING PROGRAM

## 2019-08-29 PROCEDURE — 99212 OFFICE O/P EST SF 10 MIN: CPT | Mod: HCNC,S$GLB,, | Performed by: STUDENT IN AN ORGANIZED HEALTH CARE EDUCATION/TRAINING PROGRAM

## 2019-08-29 PROCEDURE — 99999 PR PBB SHADOW E&M-EST. PATIENT-LVL II: CPT | Mod: PBBFAC,HCNC,, | Performed by: STUDENT IN AN ORGANIZED HEALTH CARE EDUCATION/TRAINING PROGRAM

## 2019-08-29 PROCEDURE — 1101F PR PT FALLS ASSESS DOC 0-1 FALLS W/OUT INJ PAST YR: ICD-10-PCS | Mod: HCNC,CPTII,S$GLB, | Performed by: STUDENT IN AN ORGANIZED HEALTH CARE EDUCATION/TRAINING PROGRAM

## 2019-08-29 PROCEDURE — 99999 PR PBB SHADOW E&M-EST. PATIENT-LVL II: ICD-10-PCS | Mod: PBBFAC,HCNC,, | Performed by: STUDENT IN AN ORGANIZED HEALTH CARE EDUCATION/TRAINING PROGRAM

## 2019-08-29 PROCEDURE — 99212 PR OFFICE/OUTPT VISIT, EST, LEVL II, 10-19 MIN: ICD-10-PCS | Mod: HCNC,S$GLB,, | Performed by: STUDENT IN AN ORGANIZED HEALTH CARE EDUCATION/TRAINING PROGRAM

## 2019-08-29 NOTE — PROGRESS NOTES
Subjective:       Patient ID:  Mita Max is a 69 y.o. female who presents for   Chief Complaint   Patient presents with    Spot     c/o spot to face x 4-6 months tx cortisone cream     Patch Testing     final read      History of Present Illness: The patient presents for follow-up of final patch test reading. She has undergone a patch test. Denies any issues or complications with the procedure. Previous rash on the face has completely resolved. No new concerning rash. Pt does complain of a new skin lesion on the left temple, reports as present for months and very scaly. Has not tried any treatmetns.        Review of Systems   Skin: Negative for itching, rash and dry skin.        Objective:    Physical Exam   Constitutional: She appears well-developed and well-nourished. No distress.   Neurological: She is alert and oriented to person, place, and time. She is not disoriented.   Psychiatric: She has a normal mood and affect.   Skin:   Areas Examined (abnormalities noted in diagram):   Head / Face Inspection Performed  Neck Inspection Performed  Back Inspection Performed                   Diagram Legend     Erythematous scaling macule/papule c/w actinic keratosis       Vascular papule c/w angioma      Pigmented verrucoid papule/plaque c/w seborrheic keratosis      Yellow umbilicated papule c/w sebaceous hyperplasia      Irregularly shaped tan macule c/w lentigo     1-2 mm smooth white papules consistent with Milia      Movable subcutaneous cyst with punctum c/w epidermal inclusion cyst      Subcutaneous movable cyst c/w pilar cyst      Firm pink to brown papule c/w dermatofibroma      Pedunculated fleshy papule(s) c/w skin tag(s)      Evenly pigmented macule c/w junctional nevus     Mildly variegated pigmented, slightly irregular-bordered macule c/w mildly atypical nevus      Flesh colored to evenly pigmented papule c/w intradermal nevus       Pink pearly papule/plaque c/w basal cell carcinoma       Erythematous hyperkeratotic cursted plaque c/w SCC      Surgical scar with no sign of skin cancer recurrence      Open and closed comedones      Inflammatory papules and pustules      Verrucoid papule consistent consistent with wart     Erythematous eczematous patches and plaques     Dystrophic onycholytic nail with subungual debris c/w onychomycosis     Umbilicated papule    Erythematous-base heme-crusted tan verrucoid plaque consistent with inflamed seborrheic keratosis     Erythematous Silvery Scaling Plaque c/w Psoriasis     See annotation      Assessment / Plan:        Dermatitis -  TRUE Test contact allergen patch testing negative for allergens. Patient with rash on the face that has full y resolved. Suspect airborne contact allergen given clinic presentation, negative patch test, and improvement in fall/winter months. Will monitor closely.     Seborrheic keratoses  These are benign inherited growths without a malignant potential. Reassurance given to patient. No treatment is necessary.              Follow up if symptoms worsen or fail to improve.

## 2020-04-10 ENCOUNTER — PATIENT MESSAGE (OUTPATIENT)
Dept: INTERNAL MEDICINE | Facility: CLINIC | Age: 70
End: 2020-04-10

## 2020-05-16 RX ORDER — AMLODIPINE BESYLATE 10 MG/1
TABLET ORAL
Qty: 90 TABLET | Refills: 0 | Status: SHIPPED | OUTPATIENT
Start: 2020-05-16 | End: 2020-07-30 | Stop reason: SDUPTHER

## 2020-05-16 RX ORDER — IRBESARTAN 300 MG/1
TABLET ORAL
Qty: 90 TABLET | Refills: 0 | Status: SHIPPED | OUTPATIENT
Start: 2020-05-16 | End: 2020-07-30 | Stop reason: SDUPTHER

## 2020-06-25 ENCOUNTER — PATIENT OUTREACH (OUTPATIENT)
Dept: ADMINISTRATIVE | Facility: HOSPITAL | Age: 70
End: 2020-06-25

## 2020-06-25 DIAGNOSIS — I10 HYPERTENSION, ESSENTIAL: ICD-10-CM

## 2020-06-25 DIAGNOSIS — E11.21 TYPE 2 DIABETES MELLITUS WITH DIABETIC NEPHROPATHY, WITH LONG-TERM CURRENT USE OF INSULIN: Primary | ICD-10-CM

## 2020-06-25 DIAGNOSIS — Z79.4 TYPE 2 DIABETES MELLITUS WITH DIABETIC NEPHROPATHY, WITH LONG-TERM CURRENT USE OF INSULIN: Primary | ICD-10-CM

## 2020-06-25 NOTE — PROGRESS NOTES
I called pt to schedule over due HM A1c, Lipid, Mammogram and annual wellness. Pt states provider told her that 06- was last mammogram she had to complete. Pt agreed to schedule labs 07- @ [facility] and [facility] on 08-. I advised pt of the following:    FASTING LAB  1. Do not eat or drink anything for TEN HOURS (10) PRIOR TO TEST. Do not chew gum or eat candy mints, even those claiming to be sugar free. Water is allowed but do not drink any other fluids   2. Take your regular daily medicines as your doctor has ordered. If you are diabetic, do not take your insulin or other diabetic medication until your blood is drawn and you are ready to eat. Your physician may have special instructions for diabetics. Check with your doctor if you have any questions.  3. Alcoholic beverages are not allowed starting at 6:00pm the evening before your appointment.     Pt verbalized understanding of instructions & appts.

## 2020-07-07 ENCOUNTER — LAB VISIT (OUTPATIENT)
Dept: LAB | Facility: HOSPITAL | Age: 70
End: 2020-07-07
Attending: FAMILY MEDICINE
Payer: MEDICARE

## 2020-07-07 DIAGNOSIS — I10 HYPERTENSION, ESSENTIAL: ICD-10-CM

## 2020-07-07 DIAGNOSIS — E11.21 TYPE 2 DIABETES MELLITUS WITH DIABETIC NEPHROPATHY, WITH LONG-TERM CURRENT USE OF INSULIN: ICD-10-CM

## 2020-07-07 DIAGNOSIS — Z79.4 TYPE 2 DIABETES MELLITUS WITH DIABETIC NEPHROPATHY, WITH LONG-TERM CURRENT USE OF INSULIN: ICD-10-CM

## 2020-07-07 LAB
ALBUMIN SERPL BCP-MCNC: 3.9 G/DL (ref 3.5–5.2)
ALP SERPL-CCNC: 65 U/L (ref 55–135)
ALT SERPL W/O P-5'-P-CCNC: 21 U/L (ref 10–44)
ANION GAP SERPL CALC-SCNC: 9 MMOL/L (ref 8–16)
AST SERPL-CCNC: 18 U/L (ref 10–40)
BILIRUB SERPL-MCNC: 0.8 MG/DL (ref 0.1–1)
BUN SERPL-MCNC: 15 MG/DL (ref 8–23)
CALCIUM SERPL-MCNC: 9.9 MG/DL (ref 8.7–10.5)
CHLORIDE SERPL-SCNC: 103 MMOL/L (ref 95–110)
CHOLEST SERPL-MCNC: 178 MG/DL (ref 120–199)
CHOLEST/HDLC SERPL: 2.8 {RATIO} (ref 2–5)
CO2 SERPL-SCNC: 25 MMOL/L (ref 23–29)
CREAT SERPL-MCNC: 0.7 MG/DL (ref 0.5–1.4)
EST. GFR  (AFRICAN AMERICAN): >60 ML/MIN/1.73 M^2
EST. GFR  (NON AFRICAN AMERICAN): >60 ML/MIN/1.73 M^2
ESTIMATED AVG GLUCOSE: 131 MG/DL (ref 68–131)
GLUCOSE SERPL-MCNC: 169 MG/DL (ref 70–110)
HBA1C MFR BLD HPLC: 6.2 % (ref 4–5.6)
HDLC SERPL-MCNC: 64 MG/DL (ref 40–75)
HDLC SERPL: 36 % (ref 20–50)
LDLC SERPL CALC-MCNC: 104 MG/DL (ref 63–159)
NONHDLC SERPL-MCNC: 114 MG/DL
POTASSIUM SERPL-SCNC: 4 MMOL/L (ref 3.5–5.1)
PROT SERPL-MCNC: 7.1 G/DL (ref 6–8.4)
SODIUM SERPL-SCNC: 137 MMOL/L (ref 136–145)
TRIGL SERPL-MCNC: 50 MG/DL (ref 30–150)

## 2020-07-07 PROCEDURE — 83036 HEMOGLOBIN GLYCOSYLATED A1C: CPT | Mod: HCNC

## 2020-07-07 PROCEDURE — 80061 LIPID PANEL: CPT | Mod: HCNC

## 2020-07-07 PROCEDURE — 80053 COMPREHEN METABOLIC PANEL: CPT | Mod: HCNC

## 2020-07-07 PROCEDURE — 36415 COLL VENOUS BLD VENIPUNCTURE: CPT | Mod: HCNC

## 2020-07-22 ENCOUNTER — PATIENT MESSAGE (OUTPATIENT)
Dept: INTERNAL MEDICINE | Facility: CLINIC | Age: 70
End: 2020-07-22

## 2020-07-22 ENCOUNTER — PATIENT OUTREACH (OUTPATIENT)
Dept: ADMINISTRATIVE | Facility: HOSPITAL | Age: 70
End: 2020-07-22

## 2020-07-22 NOTE — PROGRESS NOTES
Called pt no answer, Portal Message Sent.      Concepcion BLACK LPN Care Coordinator  Care Coordination Department  [facility]  [phone]

## 2020-07-28 ENCOUNTER — TELEPHONE (OUTPATIENT)
Dept: INTERNAL MEDICINE | Facility: CLINIC | Age: 70
End: 2020-07-28

## 2020-07-28 DIAGNOSIS — E11.9 TYPE 2 DIABETES MELLITUS WITHOUT COMPLICATION, WITHOUT LONG-TERM CURRENT USE OF INSULIN: Primary | ICD-10-CM

## 2020-07-28 NOTE — TELEPHONE ENCOUNTER
----- Message from Cindy Salamanca sent at 7/28/2020 10:32 AM CDT -----  Regarding: Orders(urine specimen)  She is calling in regards to getting orders placed for the urine protein specimen, please advise ph#[phone](cell)

## 2020-07-29 ENCOUNTER — PATIENT MESSAGE (OUTPATIENT)
Dept: INTERNAL MEDICINE | Facility: CLINIC | Age: 70
End: 2020-07-29

## 2020-07-29 NOTE — TELEPHONE ENCOUNTER
The urine test has been re-ordered. It can be scheduled at pt convenience or we can get it at her visit.

## 2020-07-30 ENCOUNTER — LAB VISIT (OUTPATIENT)
Dept: LAB | Facility: HOSPITAL | Age: 70
End: 2020-07-30
Payer: MEDICARE

## 2020-07-30 DIAGNOSIS — E11.9 TYPE 2 DIABETES MELLITUS WITHOUT COMPLICATION, WITHOUT LONG-TERM CURRENT USE OF INSULIN: ICD-10-CM

## 2020-07-30 PROCEDURE — 82043 UR ALBUMIN QUANTITATIVE: CPT | Mod: HCNC

## 2020-07-30 NOTE — TELEPHONE ENCOUNTER
Spoke with Gail to clarify patient rx. gail state rx refill was cancelled.     Patient request a refilled till next visit which is on 08/05/20. Last OV 06/12/19. Please advise.

## 2020-07-31 LAB
ALBUMIN/CREAT UR: 10.2 UG/MG (ref 0–30)
CREAT UR-MCNC: 98 MG/DL (ref 15–325)
MICROALBUMIN UR DL<=1MG/L-MCNC: 10 UG/ML

## 2020-07-31 RX ORDER — AMLODIPINE BESYLATE 10 MG/1
10 TABLET ORAL DAILY
Qty: 30 TABLET | Refills: 0 | Status: SHIPPED | OUTPATIENT
Start: 2020-07-31 | End: 2020-08-05 | Stop reason: SDUPTHER

## 2020-07-31 RX ORDER — IRBESARTAN 300 MG/1
TABLET ORAL
Qty: 90 TABLET | OUTPATIENT
Start: 2020-07-31

## 2020-07-31 RX ORDER — IRBESARTAN 300 MG/1
300 TABLET ORAL NIGHTLY
Qty: 30 TABLET | Refills: 0 | Status: SHIPPED | OUTPATIENT
Start: 2020-07-31 | End: 2020-08-05 | Stop reason: SDUPTHER

## 2020-07-31 RX ORDER — AMLODIPINE BESYLATE 10 MG/1
TABLET ORAL
Qty: 90 TABLET | OUTPATIENT
Start: 2020-07-31

## 2020-07-31 NOTE — TELEPHONE ENCOUNTER
----- Message from Umu Martinez sent at 7/31/2020 10:34 AM CDT -----  Regarding: refill  Type:  RX Refill Request    Who Called: pt  Refill or New Rx:refill  RX Name and Strength:irbesartan (AVAPRO) 300 MG tablet  How is the patient currently taking it? (ex. 1XDay):daily  Is this a 30 day or 90 day RX:30  Preferred Pharmacy with phone number:  [id number] [place] [facility] [address]  [address]  [place] [address]  Phone: [phone] Fax: [phone]  Local or Mail Order:local  Ordering Provider:rosamaria  Would the patient rather a call back or a response via [facility]? Call back  Best Call Back Number:[phone] (home)   Additional Information: caller is out of the medication.  Caller also needs:amLODIPine (NORVASC) 10 MG tablet  Thanks.

## 2020-08-05 ENCOUNTER — OFFICE VISIT (OUTPATIENT)
Dept: INTERNAL MEDICINE | Facility: CLINIC | Age: 70
End: 2020-08-05
Payer: MEDICARE

## 2020-08-05 VITALS
TEMPERATURE: 98 F | WEIGHT: 195.31 LBS | DIASTOLIC BLOOD PRESSURE: 60 MMHG | BODY MASS INDEX: 34.6 KG/M2 | OXYGEN SATURATION: 97 % | SYSTOLIC BLOOD PRESSURE: 136 MMHG | HEART RATE: 82 BPM

## 2020-08-05 DIAGNOSIS — Z12.2 ENCOUNTER FOR SCREENING FOR MALIGNANT NEOPLASM OF RESPIRATORY ORGANS: ICD-10-CM

## 2020-08-05 DIAGNOSIS — F17.210 CIGARETTE NICOTINE DEPENDENCE WITHOUT COMPLICATION: ICD-10-CM

## 2020-08-05 DIAGNOSIS — I10 HYPERTENSION, ESSENTIAL: ICD-10-CM

## 2020-08-05 DIAGNOSIS — Z12.31 ENCOUNTER FOR SCREENING MAMMOGRAM FOR BREAST CANCER: ICD-10-CM

## 2020-08-05 DIAGNOSIS — J44.1 COPD EXACERBATION: ICD-10-CM

## 2020-08-05 DIAGNOSIS — M54.50 RECURRENT LOW BACK PAIN: ICD-10-CM

## 2020-08-05 DIAGNOSIS — E78.5 HYPERLIPIDEMIA ASSOCIATED WITH TYPE 2 DIABETES MELLITUS: ICD-10-CM

## 2020-08-05 DIAGNOSIS — M25.562 LEFT KNEE PAIN, UNSPECIFIED CHRONICITY: ICD-10-CM

## 2020-08-05 DIAGNOSIS — E11.9 TYPE 2 DIABETES MELLITUS WITHOUT COMPLICATION, WITHOUT LONG-TERM CURRENT USE OF INSULIN: Primary | ICD-10-CM

## 2020-08-05 DIAGNOSIS — E11.69 HYPERLIPIDEMIA ASSOCIATED WITH TYPE 2 DIABETES MELLITUS: ICD-10-CM

## 2020-08-05 DIAGNOSIS — F10.21 ALCOHOL DEPENDENCE IN REMISSION: ICD-10-CM

## 2020-08-05 PROCEDURE — 99215 OFFICE O/P EST HI 40 MIN: CPT | Mod: HCNC,S$GLB,, | Performed by: FAMILY MEDICINE

## 2020-08-05 PROCEDURE — 3075F SYST BP GE 130 - 139MM HG: CPT | Mod: HCNC,CPTII,S$GLB, | Performed by: FAMILY MEDICINE

## 2020-08-05 PROCEDURE — 3075F PR MOST RECENT SYSTOLIC BLOOD PRESS GE 130-139MM HG: ICD-10-PCS | Mod: HCNC,CPTII,S$GLB, | Performed by: FAMILY MEDICINE

## 2020-08-05 PROCEDURE — 99499 RISK ADDL DX/OHS AUDIT: ICD-10-PCS | Mod: HCNC,S$GLB,, | Performed by: FAMILY MEDICINE

## 2020-08-05 PROCEDURE — 99499 UNLISTED E&M SERVICE: CPT | Mod: HCNC,S$GLB,, | Performed by: FAMILY MEDICINE

## 2020-08-05 PROCEDURE — 99999 PR PBB SHADOW E&M-EST. PATIENT-LVL V: CPT | Mod: PBBFAC,HCNC,, | Performed by: FAMILY MEDICINE

## 2020-08-05 PROCEDURE — 3078F PR MOST RECENT DIASTOLIC BLOOD PRESSURE < 80 MM HG: ICD-10-PCS | Mod: HCNC,CPTII,S$GLB, | Performed by: FAMILY MEDICINE

## 2020-08-05 PROCEDURE — 1126F PR PAIN SEVERITY QUANTIFIED, NO PAIN PRESENT: ICD-10-PCS | Mod: HCNC,S$GLB,, | Performed by: FAMILY MEDICINE

## 2020-08-05 PROCEDURE — 99215 PR OFFICE/OUTPT VISIT, EST, LEVL V, 40-54 MIN: ICD-10-PCS | Mod: HCNC,S$GLB,, | Performed by: FAMILY MEDICINE

## 2020-08-05 PROCEDURE — 1159F PR MEDICATION LIST DOCUMENTED IN MEDICAL RECORD: ICD-10-PCS | Mod: HCNC,S$GLB,, | Performed by: FAMILY MEDICINE

## 2020-08-05 PROCEDURE — 1159F MED LIST DOCD IN RCRD: CPT | Mod: HCNC,S$GLB,, | Performed by: FAMILY MEDICINE

## 2020-08-05 PROCEDURE — 3078F DIAST BP <80 MM HG: CPT | Mod: HCNC,CPTII,S$GLB, | Performed by: FAMILY MEDICINE

## 2020-08-05 PROCEDURE — 1101F PT FALLS ASSESS-DOCD LE1/YR: CPT | Mod: HCNC,CPTII,S$GLB, | Performed by: FAMILY MEDICINE

## 2020-08-05 PROCEDURE — 1101F PR PT FALLS ASSESS DOC 0-1 FALLS W/OUT INJ PAST YR: ICD-10-PCS | Mod: HCNC,CPTII,S$GLB, | Performed by: FAMILY MEDICINE

## 2020-08-05 PROCEDURE — 3044F HG A1C LEVEL LT 7.0%: CPT | Mod: HCNC,CPTII,S$GLB, | Performed by: FAMILY MEDICINE

## 2020-08-05 PROCEDURE — 1126F AMNT PAIN NOTED NONE PRSNT: CPT | Mod: HCNC,S$GLB,, | Performed by: FAMILY MEDICINE

## 2020-08-05 PROCEDURE — 3044F PR MOST RECENT HEMOGLOBIN A1C LEVEL <7.0%: ICD-10-PCS | Mod: HCNC,CPTII,S$GLB, | Performed by: FAMILY MEDICINE

## 2020-08-05 PROCEDURE — 99999 PR PBB SHADOW E&M-EST. PATIENT-LVL V: ICD-10-PCS | Mod: PBBFAC,HCNC,, | Performed by: FAMILY MEDICINE

## 2020-08-05 RX ORDER — ASPIRIN 81 MG/1
81 TABLET ORAL DAILY
Refills: 0
Start: 2020-08-05

## 2020-08-05 RX ORDER — IRBESARTAN 300 MG/1
300 TABLET ORAL NIGHTLY
Qty: 90 TABLET | Refills: 3 | Status: SHIPPED | OUTPATIENT
Start: 2020-08-05 | End: 2021-05-20 | Stop reason: SDUPTHER

## 2020-08-05 RX ORDER — ALBUTEROL SULFATE 90 UG/1
2 AEROSOL, METERED RESPIRATORY (INHALATION) EVERY 6 HOURS PRN
Qty: 18 G | Refills: 0 | Status: SHIPPED | OUTPATIENT
Start: 2020-08-05 | End: 2022-11-29

## 2020-08-05 RX ORDER — TIZANIDINE 4 MG/1
4 TABLET ORAL EVERY 6 HOURS PRN
Qty: 20 TABLET | Refills: 0 | Status: SHIPPED | OUTPATIENT
Start: 2020-08-05 | End: 2021-05-05

## 2020-08-05 RX ORDER — UMECLIDINIUM BROMIDE AND VILANTEROL TRIFENATATE 62.5; 25 UG/1; UG/1
1 POWDER RESPIRATORY (INHALATION) DAILY
Qty: 1 EACH | Refills: 11 | Status: SHIPPED | OUTPATIENT
Start: 2020-08-05 | End: 2021-02-05

## 2020-08-05 RX ORDER — NAPROXEN 500 MG/1
500 TABLET ORAL 2 TIMES DAILY PRN
Qty: 30 TABLET | Refills: 0 | Status: SHIPPED | OUTPATIENT
Start: 2020-08-05 | End: 2021-05-05

## 2020-08-05 RX ORDER — CYCLOBENZAPRINE HCL 10 MG
10 TABLET ORAL 3 TIMES DAILY PRN
Status: CANCELLED | OUTPATIENT
Start: 2020-08-05 | End: 2020-08-15

## 2020-08-05 RX ORDER — AMLODIPINE BESYLATE 10 MG/1
10 TABLET ORAL DAILY
Qty: 90 TABLET | Refills: 3 | Status: SHIPPED | OUTPATIENT
Start: 2020-08-05 | End: 2021-05-20 | Stop reason: SDUPTHER

## 2020-08-05 RX ORDER — PRAVASTATIN SODIUM 40 MG/1
40 TABLET ORAL DAILY
Qty: 90 TABLET | Refills: 3 | Status: SHIPPED | OUTPATIENT
Start: 2020-08-05 | End: 2021-05-20 | Stop reason: SDUPTHER

## 2020-08-05 NOTE — PROGRESS NOTES
Subjective:       Patient ID: Mita Max is a 70 y.o. female.    Chief Complaint: Annual Exam    Patient with type 2 diabetes, hypertension, hyperlipidemia here for scheduled recheck with recent labs.   Note excellent change from 7.3 A1c a year ago to 6.2.  She is on diet alone.  Blood pressure controlled.  Decreased lipid control.  Was on simvastatin 20 mg.  LDL is 104 on her recent labs.    She had LE swelling. She had L knee pain - sig late March/april. It is better - still can get swollen and painful. Not every day. Wants to further eval.    Has occas LBP - may go into spasm. Has had naproxen in past, also flexeril. Wants refills on these if possible.    Lab Results       Component                Value               Date                       WBC                      5.65                06/21/2019                 HGB                      14.8                06/21/2019                 HCT                      45.2                06/21/2019                 PLT                      297                 06/21/2019                 CHOL                     178                 07/07/2020                 TRIG                     50                  07/07/2020                 HDL                      64                  07/07/2020                 LDLCALC                  104.0               07/07/2020                 ALT                      21                  07/07/2020                 AST                      18                  07/07/2020                 NA                       137                 07/07/2020                 K                        4.0                 07/07/2020                 CL                       103                 07/07/2020                 CALCIUM                  9.9                 07/07/2020                 CREATININE               0.7                 07/07/2020                 BUN                      15                  07/07/2020                 CO2                      25                   07/07/2020                 GLU                      169 (H)             07/07/2020                 ESTGFRAFRICA             >60.0               07/07/2020                 EGFRNONAA                >60.0               07/07/2020                 HGBA1C                   6.2 (H)             07/07/2020                 MICALBCREAT              10.2                07/31/2020            Lab Results       Component                Value               Date                       HGBA1C                   6.2 (H)             07/07/2020                 HGBA1C                   7.3 (H)             06/21/2019                 HGBA1C                   7.0 (H)             02/28/2019                 HGBA1C                   6.0 (H)             02/01/2018                 HGBA1C                   6.0 (H)             06/27/2017                BP Readings from Last 3 Encounters:  08/05/20 : 136/60  06/21/19 : 131/69  06/14/19 : 118/70    Hypertension Medications        amLODIPine (NORVASC) 10 MG tablet Take 1 tablet (10 mg total) by mouth once daily.    irbesartan (AVAPRO) 300 MG tablet Take 1 tablet (300 mg total) by mouth every evening.        Lab Results was on simvastatin 20 but ran out and her ankles got swollen.       Component                Value               Date                       CHOL                     178                 07/07/2020                 CHOL                     141                 02/28/2019                 CHOL                     178                 06/27/2017            Lab Results       Component                Value               Date                       HDL                      64                  07/07/2020                 HDL                      40                  02/28/2019                 HDL                      61                  06/27/2017            Lab Results       Component                Value               Date                       LDLCALC                  104.0                07/07/2020                 LDLCALC                  83.8                02/28/2019                 LDLCALC                  105.6               06/27/2017            Lab Results       Component                Value               Date                       TRIG                     50                  07/07/2020                 TRIG                     86                  02/28/2019                 TRIG                     57                  06/27/2017            HM due:  LDCT Lung Screen due on 06/17/2007  Shingles Vaccine(2 of 3) due on 04/26/2016  Pneumococcal Vaccine (65+ Low/Medium Risk)(2 of 2 - PPSV23) due on 01/19/2020  Foot Exam due on 02/28/2020 - today  Mammogram due on 06/20/2020 - order  Eye Exam due on 07/02/2020 - schedule      S/he has completed a full 14 system review. All items are negative except as indicated.      Review of Systems   Constitutional: Positive for activity change and fatigue. Negative for unexpected weight change.   HENT: Positive for congestion, dental problem, postnasal drip and rhinorrhea. Negative for hearing loss and trouble swallowing.    Eyes: Positive for itching and visual disturbance. Negative for discharge.   Respiratory: Positive for cough and wheezing. Negative for chest tightness.    Cardiovascular: Positive for leg swelling. Negative for chest pain and palpitations.   Gastrointestinal: Negative.  Negative for blood in stool, constipation, diarrhea and vomiting.   Endocrine: Negative.  Negative for polydipsia and polyuria.   Genitourinary: Negative.  Negative for difficulty urinating, dysuria, hematuria and menstrual problem.   Musculoskeletal: Positive for arthralgias, back pain and joint swelling. Negative for neck pain.   Skin: Negative.    Allergic/Immunologic: Positive for environmental allergies.   Neurological: Negative.  Negative for weakness and headaches.   Hematological: Negative.    Psychiatric/Behavioral: Positive for dysphoric mood. Negative for  confusion.       Objective:      Physical Exam  Constitutional:       Appearance: She is well-developed.   HENT:      Head: Normocephalic and atraumatic.   Cardiovascular:      Rate and Rhythm: Normal rate and regular rhythm.      Pulses:           Dorsalis pedis pulses are 2+ on the right side and 2+ on the left side.        Posterior tibial pulses are 1+ on the right side and 1+ on the left side.      Heart sounds: Normal heart sounds.   Pulmonary:      Effort: Pulmonary effort is normal.      Breath sounds: Normal breath sounds.   Musculoskeletal:      Right foot: Normal range of motion. No deformity.      Left foot: Normal range of motion. No deformity.      Comments: TTP B medial Knee joint L>R.TTP inferior to medial knee jt B. L knee TTP to lateral joint and inferior to that joint as well  L knee joint unable to flex past 100 degrees, R good flexion   Feet:      Right foot:      Protective Sensation: 10 sites tested. 10 sites sensed.      Skin integrity: No ulcer or skin breakdown.      Left foot:      Protective Sensation: 10 sites tested. 10 sites sensed.      Skin integrity: No ulcer or skin breakdown.   Skin:     General: Skin is warm and dry.   Neurological:      Mental Status: She is alert and oriented to person, place, and time.      Comments: MS 5/5 B knee F/E   Psychiatric:         Behavior: Behavior normal.           Assessment/Plan:     1. Type 2 diabetes mellitus without complication, without long-term current use of insulin  Hemoglobin A1C    aspirin (ECOTRIN) 81 MG EC tablet   2. Hypertension, essential  Comprehensive metabolic panel    amLODIPine (NORVASC) 10 MG tablet    irbesartan (AVAPRO) 300 MG tablet   3. Hyperlipidemia associated with type 2 diabetes mellitus  Lipid Panel    pravastatin (PRAVACHOL) 40 MG tablet   4. COPD exacerbation  albuterol (PROVENTIL/VENTOLIN HFA) 90 mcg/actuation inhaler    umeclidinium-vilanteroL (ANORO ELLIPTA) 62.5-25 mcg/actuation DsDv   5. Alcohol dependence in  remission     6. Left knee pain, unspecified chronicity  Ambulatory referral/consult to Orthopedics    X-ray Knee Ortho Bilateral   7. Recurrent low back pain  naproxen (NAPROSYN) 500 MG tablet    tiZANidine (ZANAFLEX) 4 MG tablet   8. Cigarette nicotine dependence without complication  CT Chest Lung Screening Low Dose    Ambulatory referral/consult to Smoking Cessation Program   9. Encounter for screening mammogram for breast cancer  Mammo Digital Screening Bilat w/ [facility]   10. Encounter for screening for malignant neoplasm of respiratory organs  CT Chest Lung Screening Low Dose    items addressed. recommendations reviewed, pt questtions answered, orders made.  Continue diet alone at this time for DM -recheck 6 omnths.  Try tizanidine instead of fflexeril. OK for naproxen. Refills BP meds, anoro.  More than 50% of visit was spent in counseling regarding options, recommendations, medication use, side effects, expectations, and precautions.  Total time spent face-to-face was 50 minutes.

## 2020-09-02 ENCOUNTER — HOSPITAL ENCOUNTER (OUTPATIENT)
Dept: RADIOLOGY | Facility: HOSPITAL | Age: 70
Discharge: HOME | End: 2020-09-02
Attending: FAMILY MEDICINE
Payer: MEDICARE

## 2020-09-02 DIAGNOSIS — M25.562 LEFT KNEE PAIN, UNSPECIFIED CHRONICITY: ICD-10-CM

## 2020-09-02 PROCEDURE — 73562 X-RAY EXAM OF KNEE 3: CPT | Mod: TC,50,HCNC

## 2020-09-02 PROCEDURE — 73562 X-RAY EXAM OF KNEE 3: CPT | Mod: 26,50,HCNC, | Performed by: RADIOLOGY

## 2020-09-02 PROCEDURE — 73562 XR KNEE ORTHO BILAT: ICD-10-PCS | Mod: 26,50,HCNC, | Performed by: RADIOLOGY

## 2020-09-06 ENCOUNTER — PATIENT OUTREACH (OUTPATIENT)
Dept: ADMINISTRATIVE | Facility: OTHER | Age: 70
End: 2020-09-06

## 2020-09-09 ENCOUNTER — OFFICE VISIT (OUTPATIENT)
Dept: ORTHOPEDICS | Facility: CLINIC | Age: 70
End: 2020-09-09
Payer: MEDICARE

## 2020-09-09 VITALS
HEIGHT: 63 IN | WEIGHT: 195 LBS | BODY MASS INDEX: 34.55 KG/M2 | DIASTOLIC BLOOD PRESSURE: 71 MMHG | HEART RATE: 81 BPM | SYSTOLIC BLOOD PRESSURE: 128 MMHG

## 2020-09-09 DIAGNOSIS — G89.29 CHRONIC PAIN OF LEFT KNEE: Primary | ICD-10-CM

## 2020-09-09 DIAGNOSIS — M25.562 CHRONIC PAIN OF LEFT KNEE: Primary | ICD-10-CM

## 2020-09-09 DIAGNOSIS — R26.9 GAIT ABNORMALITY: ICD-10-CM

## 2020-09-09 DIAGNOSIS — M17.12 PRIMARY OSTEOARTHRITIS OF LEFT KNEE: ICD-10-CM

## 2020-09-09 PROCEDURE — 1159F MED LIST DOCD IN RCRD: CPT | Mod: HCNC,S$GLB,, | Performed by: PHYSICAL MEDICINE & REHABILITATION

## 2020-09-09 PROCEDURE — 3074F SYST BP LT 130 MM HG: CPT | Mod: HCNC,CPTII,S$GLB, | Performed by: PHYSICAL MEDICINE & REHABILITATION

## 2020-09-09 PROCEDURE — 3078F PR MOST RECENT DIASTOLIC BLOOD PRESSURE < 80 MM HG: ICD-10-PCS | Mod: HCNC,CPTII,S$GLB, | Performed by: PHYSICAL MEDICINE & REHABILITATION

## 2020-09-09 PROCEDURE — 99204 PR OFFICE/OUTPT VISIT, NEW, LEVL IV, 45-59 MIN: ICD-10-PCS | Mod: HCNC,S$GLB,, | Performed by: PHYSICAL MEDICINE & REHABILITATION

## 2020-09-09 PROCEDURE — 99999 PR PBB SHADOW E&M-EST. PATIENT-LVL V: ICD-10-PCS | Mod: PBBFAC,HCNC,, | Performed by: PHYSICAL MEDICINE & REHABILITATION

## 2020-09-09 PROCEDURE — 99999 PR PBB SHADOW E&M-EST. PATIENT-LVL V: CPT | Mod: PBBFAC,HCNC,, | Performed by: PHYSICAL MEDICINE & REHABILITATION

## 2020-09-09 PROCEDURE — 99204 OFFICE O/P NEW MOD 45 MIN: CPT | Mod: HCNC,S$GLB,, | Performed by: PHYSICAL MEDICINE & REHABILITATION

## 2020-09-09 PROCEDURE — 3008F BODY MASS INDEX DOCD: CPT | Mod: HCNC,CPTII,S$GLB, | Performed by: PHYSICAL MEDICINE & REHABILITATION

## 2020-09-09 PROCEDURE — 1101F PR PT FALLS ASSESS DOC 0-1 FALLS W/OUT INJ PAST YR: ICD-10-PCS | Mod: HCNC,CPTII,S$GLB, | Performed by: PHYSICAL MEDICINE & REHABILITATION

## 2020-09-09 PROCEDURE — 3074F PR MOST RECENT SYSTOLIC BLOOD PRESSURE < 130 MM HG: ICD-10-PCS | Mod: HCNC,CPTII,S$GLB, | Performed by: PHYSICAL MEDICINE & REHABILITATION

## 2020-09-09 PROCEDURE — 1159F PR MEDICATION LIST DOCUMENTED IN MEDICAL RECORD: ICD-10-PCS | Mod: HCNC,S$GLB,, | Performed by: PHYSICAL MEDICINE & REHABILITATION

## 2020-09-09 PROCEDURE — 3008F PR BODY MASS INDEX (BMI) DOCUMENTED: ICD-10-PCS | Mod: HCNC,CPTII,S$GLB, | Performed by: PHYSICAL MEDICINE & REHABILITATION

## 2020-09-09 PROCEDURE — 1125F PR PAIN SEVERITY QUANTIFIED, PAIN PRESENT: ICD-10-PCS | Mod: HCNC,S$GLB,, | Performed by: PHYSICAL MEDICINE & REHABILITATION

## 2020-09-09 PROCEDURE — 1101F PT FALLS ASSESS-DOCD LE1/YR: CPT | Mod: HCNC,CPTII,S$GLB, | Performed by: PHYSICAL MEDICINE & REHABILITATION

## 2020-09-09 PROCEDURE — 1125F AMNT PAIN NOTED PAIN PRSNT: CPT | Mod: HCNC,S$GLB,, | Performed by: PHYSICAL MEDICINE & REHABILITATION

## 2020-09-09 PROCEDURE — 3078F DIAST BP <80 MM HG: CPT | Mod: HCNC,CPTII,S$GLB, | Performed by: PHYSICAL MEDICINE & REHABILITATION

## 2020-09-09 NOTE — LETTER
September 9, 2020      Lexis Han MD  [address] [place] [address] [address] [place] [id number]           [facility]  [address]  [place] [address]  Phone: [phone]  Fax: [phone]          Patient: Mita Max   MR Number: [id number]   YOB: 1950   Date of Visit: 9/9/2020       Dear Dr. Lexis Han:    Thank you for referring Mita Max to me for evaluation. Attached you will find relevant portions of my assessment and plan of care.    If you have questions, please do not hesitate to call me. I look forward to following Mita Max along with you.    Sincerely,    Joycelyn Carter MD    Enclosure  CC:  No Recipients    If you would like to receive this communication electronically, please contact [email or web address] or [phone] to request more information on [facility] Link access.    For providers and/or their staff who would like to refer a patient to [facility], please contact us through our one-stop-shop provider referral line, [facility], at [phone].    If you feel you have received this communication in error or would no longer like to receive these types of communications, please e-mail [email or web address]

## 2020-09-09 NOTE — PATIENT INSTRUCTIONS
Recommend trying Voltaren Gel over the counter, and following the directions on the package.   Remember that you may need to use it consistently for several days before seeing results.

## 2020-09-09 NOTE — PROGRESS NOTES
SPORTS MEDICINE / PM&R New Patient Visit :    Referring Physician: Lexis Han MD    Chief Complaint   Patient presents with    Left Knee - Pain       HPI: This is a 70 y.o.  female being seen in clinic today for evaluation of Pain of the Left Knee   The problem first began early-mid April 2020. She feels aching, burning and intermittent pain on her left knee .The symptoms are worsening. She has tried NSAID and rest without improvement. She has not tried therapy. Symptoms can fluctuate up and down. Lives near here. Walks with an elderly friend 30 minutes daily. Has not found a pattern for what worsens pain. Also notes that the area of pain can move around day to day.    History obtained from patient.    Past family, medical, social, surgical history, and vital signs reviewed in chart.    Review of Systems   Constitutional: Negative for chills, fever and weight loss.   HENT: Negative for hearing loss and sore throat.    Eyes: Negative for blurred vision, photophobia and pain.   Respiratory: Negative for shortness of breath.    Cardiovascular: Negative for chest pain.   Gastrointestinal: Negative for abdominal pain.   Genitourinary: Negative for dysuria.   Skin: Negative for rash.   Neurological: Negative for tingling and headaches.   Endo/Heme/Allergies: Does not bruise/bleed easily.   Psychiatric/Behavioral: Negative for depression.       General    Nursing note and vitals reviewed.  Constitutional: She is oriented to person, place, and time. She appears well-developed and well-nourished.   HENT:   Head: Normocephalic and atraumatic.   Eyes: Conjunctivae and EOM are normal. Pupils are equal, round, and reactive to light.   Neck: Neck supple.   Cardiovascular: Intact distal pulses.    Pulmonary/Chest: Effort normal. No respiratory distress.   Abdominal: She exhibits no distension.   Neurological: She is alert and oriented to person, place, and time. She has normal reflexes.   Psychiatric: She has a normal  mood and affect.     General Musculoskeletal Exam   Gait: normal       Right Knee Exam   Right knee exam is normal.    Inspection   Erythema: absent  Swelling: absent  Effusion: absent    Range of Motion   The patient has normal right knee ROM.    Tests   Meniscus   Emma:  Medial - negative Lateral - negative  Ligament Examination Lachman: normal (-1 to 2mm) PCL-Posterior Drawer: normal (0 to 2mm)     MCL - Valgus: normal (0 to 2mm)  LCL - Varus: normal  Patella   Patellar apprehension: negative  Patellar Tracking: normal    Other   Popliteal (Baker's) Cyst: absent  Sensation: normal    Left Knee Exam     Inspection   Erythema: absent  Swelling: absent  Effusion: absent  Deformity: absent  Bruising: absent    Tenderness   The patient tender to palpation of the medial joint line and patella.    Range of Motion   The patient has normal left knee ROM.    Tests   Meniscus   Emma:  Medial - negative Lateral - negative  Stability Lachman: normal (-1 to 2mm) PCL-Posterior Drawer: normal (0 to 2mm)  MCL - Valgus: normal (0 to 2mm)  LCL - Varus: normal (0 to 2mm)  Patella   Patellar apprehension: negative  Patellar Tracking: normal    Other   Popliteal (Baker's) Cyst: absent  Sensation: normal    Right Hip Exam   Right hip exam is normal.     Tests   Pain w/ forced internal rotation (LUCAS): absent  Left Hip Exam   Left hip exam is normal.    Tests   Pain w/ forced internal rotation (LUCAS): absent          Muscle Strength   Right Lower Extremity   Hip Abduction: 5/5   Hip Adduction: 5/5   Hip Flexion: 5/5   Quadriceps:  5/5   Hamstrin/5   Left Lower Extremity   Hip Abduction: 5/5   Hip Adduction: 5/5   Hip Flexion: 5/5   Quadriceps:  5/5   Hamstrin/5     Reflexes     Left Side  Achilles:  2+  Quadriceps:  2+    Right Side   Achilles:  2+  Quadriceps:  2+    Vascular Exam     Right Pulses  Dorsalis Pedis:      2+          Left Pulses  Dorsalis Pedis:      2+            Narrative & Impression      EXAMINATION:  XR KNEE ORTHO BILAT     CLINICAL HISTORY:  Pain in left knee     TECHNIQUE:  AP standing, lateral and Merchant views of both knees were performed.     COMPARISON:  None     FINDINGS:  No acute fracture or dislocation.  Bilateral tricompartmental degenerative changes are seen most severe in the medial compartments with severe narrowing in the medial compartment on the left and at least moderate narrowing in the medial compartment on the right with adjacent marginal spurring.  Joint effusions are noted.  Soft tissues appear normal.     Impression:     As above        Electronically signed by: Luisito Robertson MD  Date:                                            09/02/2020  Time:                                           17:07         IMPRESSION/PLAN: This is a 70 y.o.  female with:    Chronic pain of left knee    Primary osteoarthritis of left knee    Gait abnormality        The findings were discussed with Mita in detail. We personally reviewed her x-rays with notable joint space collapse in medial left knee. This likely is a big source of her pain. We discussed treatment options and she would like to be very conservative. She will try voltaren gel OTC per instructions on package, and she will come here for PT. I encouraged her to continue with her walking and activity. We discussed supplements like fish oil and turmeric that may help. If no improvement, she may be a good candidate for visco supplementation as she has previously had bad reactions to steroids.  She was provided with this plan in writing. All of her questions were answered. She will follow up with me in 6 weeks.     Joycelyn Carter M.D.  Sports Medicine

## 2020-09-10 ENCOUNTER — PATIENT MESSAGE (OUTPATIENT)
Dept: INTERNAL MEDICINE | Facility: CLINIC | Age: 70
End: 2020-09-10

## 2020-09-22 ENCOUNTER — CLINICAL SUPPORT (OUTPATIENT)
Dept: REHABILITATION | Facility: HOSPITAL | Age: 70
End: 2020-09-22
Payer: MEDICARE

## 2020-09-22 ENCOUNTER — PATIENT MESSAGE (OUTPATIENT)
Dept: REHABILITATION | Facility: HOSPITAL | Age: 70
End: 2020-09-22

## 2020-09-22 DIAGNOSIS — G89.29 CHRONIC PAIN OF LEFT KNEE: ICD-10-CM

## 2020-09-22 DIAGNOSIS — R26.9 GAIT ABNORMALITY: ICD-10-CM

## 2020-09-22 DIAGNOSIS — M25.662 DECREASED RANGE OF MOTION (ROM) OF LEFT KNEE: ICD-10-CM

## 2020-09-22 DIAGNOSIS — M17.12 PRIMARY OSTEOARTHRITIS OF LEFT KNEE: ICD-10-CM

## 2020-09-22 DIAGNOSIS — M25.562 CHRONIC PAIN OF LEFT KNEE: ICD-10-CM

## 2020-09-22 PROCEDURE — 97162 PT EVAL MOD COMPLEX 30 MIN: CPT | Mod: HCNC | Performed by: PHYSICAL THERAPIST

## 2020-09-22 NOTE — PLAN OF CARE
[facility]  Physical Therapy Initial Evaluation    Name: Mita Max  Clinic Number: [id number]    Therapy Diagnosis:   Encounter Diagnoses   Name Primary?    Chronic pain of left knee     Primary osteoarthritis of left knee     Gait abnormality     Decreased range of motion (ROM) of left knee      Physician: Joycelyn Carter MD    Physician Orders: PT Eval and Treat   Medical Diagnosis from Referral:   M25.562,G89.29 (ICD-10-CM) - Chronic pain of left knee   M17.12 (ICD-10-CM) - Primary osteoarthritis of left knee   R26.9 (ICD-10-CM) - Gait abnormality     Evaluation Date: 2020  Authorization Period Expiration: 10/22/2020  Plan of Care Expiration: 11/3/2020  Visit # / Visits authorized:   FOTO: 1/10    Visit:  82.88  Total: 82.88    Time In: 1045  Time Out: 1130  Total Billable Time: 45 minutes     Precautions: Standard, Diabetic    Subjective   Date of onset: Acute on chronic onset  History of current condition - Mita reports: Patient reports insidious onset of left knee pain which came on acutely a few months back. Pt reports chronic knee pain for years but has no idea why it flared up. Pt has been using voltaren cream which has taken edge off of pain. Pt reports swelling and decreased ROM as well to go along with pain. Pt has most trouble stooping and squatting to  objects from ground.       Medical History:   Past Medical History:   Diagnosis Date    Alcohol dependence         Anxiety     Arthritis     Depression     DM2 (diabetes mellitus, type 2)     HTN (hypertension)     Obesity     Pneumonia due to other staphylococcus     Tobacco dependence     Type 2 diabetes mellitus     Urinary incontinence        Surgical History:   Mita Max  has a past surgical history that includes  section; Salivary gland surgery (); Colonoscopy w/ polypectomy (2019); and Colonoscopy (N/A, 2019).    Medications:   Mita has a  current medication list which includes the following prescription(s): albuterol, amlodipine, aspirin, blood glucose control, low, blood sugar diagnostic, blood-glucose meter, irbesartan, lancets, naproxen, pravastatin, tizanidine, triamcinolone acetonide 0.025%, and anoro ellipta.    Allergies:   Review of patient's allergies indicates:   Allergen Reactions    Corticosteroids (glucocorticoids)      Aggitation  Aggitation    Adhesive Rash     Band-aids  Band-aids  Band-aids    Pollen extracts Rash        Imaging,  FINDINGS:  No acute fracture or dislocation.  Bilateral tricompartmental degenerative changes are seen most severe in the medial compartments with severe narrowing in the medial compartment on the left and at least moderate narrowing in the medial compartment on the right with adjacent marginal spurring.  Joint effusions are noted.  Soft tissues appear normal.    Prior Therapy: Yes  Social History:  lives with their spouse  Occupation: None  Prior Level of Function: able to walk with no pain  Current Level of Function: unable to walk or squat without pain    Pain:   Current 4/10, worst 8/10, best 2/10   Location: left knee   Description: Aching, Dull, Burning, Throbbing and Tight  Aggravating Factors: Standing, Bending, Walking, Flexing, Lifting and Getting out of bed/chair  Easing Factors: pain medication and ice    Pts goals: to decrease pain during functional activity such as squatting.     Objective     Posture: decreased knee extension in standing, knee valgus bilaterally  Palpation: Pain along medial joint line, posterior hamstring tendons  Sensation: no deficits noted.    Range of Motion/Strength:     Hip Right Left Pain/Dysfunction with Movement   AROM/PROM WNL WNL      Knee Right Left Pain/Dysfunction with Movement   AROM      flexion 128 108 Pain in left   extension 0 4 Pain in left     Ankle Right Left Pain/Dysfunction with Movement   AROM/PROM WNL WNL      L/E MMT Right Left Pain/Dysfunction  with Movement   Hip Flexion 4/5 4/5    Hip Extension 4/5 4/5    Hip Abduction 4/5 4-/5    Hip Adduction 5/5 5/5    Knee Flexion 4/5 3/5    Knee Extension 4/5 3-/5    Ankle DF 5/5 5/5    Ankle PF 5/5 5/5        Special Tests: Good motion in PF joint, Pain in joint line with palpation, Pain along distal hamstrings as well as significant myofascial tightness noted.     Gait Analysis: Decreased left knee extension in ambulation, decreased push off with decreased knee flexion      CMS Impairment/Limitation/Restriction for FOTO Knee Survey    Therapist reviewed FOTO scores for Mita Max on 9/22/2020.   FOTO documents entered into [employer] - see Media section.    Limitation Score: 49%         TREATMENT   Evaluation only.  Pt will benefit from posterior knee soft tissue management, ROM program, quadriceps and lateral hip strengthening program      Home Exercises Provided and Patient Education Provided     Education provided:   - HEP given  - Home modalities prn    Written Home Exercises Provided: yes.  Exercises were reviewed and Mita was able to demonstrate them prior to the end of the session.  Mita demonstrated good  understanding of the education provided.     See EMR under Patient Instructions for exercises provided on evaluation.      Assessment   Mita is a 70 y.o. female referred to outpatient Physical Therapy with a medical diagnosis of left knee pain. Pt presents with significant ROM deficits and strength deficits in left knee and LLE as a whole. Pt will benefit from further soft tissue management, especially in posterior musculature, as well as progressive quadriceps and lateral hip strengthening program.    Pt prognosis is Excellent.   Pt will benefit from skilled outpatient Physical Therapy to address the deficits stated above and in the chart below, provide pt/family education, and to maximize pt's level of independence.     Plan of care discussed with patient: Yes  Pt's spiritual, cultural and  educational needs considered and patient is agreeable to the plan of care and goals as stated below:     Anticipated Barriers for therapy: chronicity of knee pain, diabetes.     Medical Necessity is demonstrated by the following  History  Co-morbidities and personal factors that may impact the plan of care Co-morbidities:   advanced age and diabetes    Personal Factors:   age     high   Examination  Body Structures and Functions, activity limitations and participation restrictions that may impact the plan of care Body Regions:   lower extremities    Body Systems:    gross symmetry  ROM  strength  gross coordinated movement  balance  gait  transfers    Participation Restrictions:   walking    Activity limitations:   Learning and applying knowledge  no deficits    General Tasks and Commands  undertaking a single task    Communication  no deficits    Mobility  lifting and carrying objects  walking    Self care  no deficits    Domestic Life  doing house work (cleaning house, washing dishes, laundry)    Interactions/Relationships  no deficits    Life Areas  no deficits    Community and Social Life  community life  recreation and leisure         moderate   Clinical Presentation evolving clinical presentation with changing clinical characteristics moderate   Decision Making/ Complexity Score: moderate       Goals:  Long Term Goals (6 Weeks):  1. Pt will be independent with HEP to supplement PT in improving functional mobility  2. Pt will demonstrate proper VMO activation during quad set B to improve patellar tracking during plyometric tasks.   3. Pt will ascend 1 flight of stairs with no pain to promote functional mobility.   4. Pt will improve FOTO score to </= 38% limited to decrease perceived limitation with mobility  5. Pt will improve hip MMTs to >/= 5/5 B to decrease load on knee joint during plyometric tasks.   6. Pt will improve left knee ROM to 0-115 to improve functional pain-free activity    Plan   Plan of care  Certification: 9/22/2020 to 11/3/2020.    Outpatient Physical Therapy 2 times weekly for 6 weeks to include the following interventions: Electrical Stimulation IFC, Gait Training, Manual Therapy, Moist Heat/ Ice, Neuromuscular Re-ed, Orthotic Management and Training, Patient Education, Self Care, Therapeutic Activites and Therapeutic Exercise, ASTYM, Kinesiotaping PRN, Functional Dry Needling    Arnulfo Foley, PT, DPT

## 2020-09-23 ENCOUNTER — PATIENT MESSAGE (OUTPATIENT)
Dept: INTERNAL MEDICINE | Facility: CLINIC | Age: 70
End: 2020-09-23

## 2020-09-24 PROBLEM — M25.562 CHRONIC PAIN OF LEFT KNEE: Status: ACTIVE | Noted: 2020-09-24

## 2020-09-24 PROBLEM — M25.662 DECREASED RANGE OF MOTION (ROM) OF LEFT KNEE: Status: ACTIVE | Noted: 2020-09-24

## 2020-09-24 PROBLEM — G89.29 CHRONIC PAIN OF LEFT KNEE: Status: ACTIVE | Noted: 2020-09-24

## 2020-09-24 NOTE — PROGRESS NOTES
"  Physical Therapy Treatment Note     [facility]  Clinic Number: [id number]    Therapy Diagnosis:   Encounter Diagnoses   Name Primary?    Chronic pain of left knee     Decreased range of motion (ROM) of left knee      Physician: Joycelyn Carter MD    Visit Date: 9/28/2020    Physician Orders: PT Eval and Treat   Medical Diagnosis from Referral:   M25.562,G89.29 (ICD-10-CM) - Chronic pain of left knee   M17.12 (ICD-10-CM) - Primary osteoarthritis of left knee   R26.9 (ICD-10-CM) - Gait abnormality     Evaluation Date: 9/22/2020  Authorization Period Expiration: 9/22/21  Plan of Care Expiration: 11/3/2020  Visit # / Visits authorized:  3/ 20      Time In: 9:15 am   Time Out: 10:00 am   Total Billable Time: 45 minutes    Precautions: Standard    Subjective     Pt reports: that she was a little sore after he last session. She states that she has very minimal pain today. She states that she did not get her HEP emailed to her from her last session and requests a new one today.   She was compliant with home exercise program.  Response to previous treatment: mild discomfort   Functional change: none reported at this time     Pain: 2/10  Location:  L knee      Objective     Mita received therapeutic exercises (exercises performed today are bolded)   to develop strength, endurance, ROM, flexibility and posture for 30 minutes including:    Bike to increase ROM, LE strength, and cardiovascular endurance for 5 minutes   QS x 3 min  SLR 3 x 8   SL SLR 3 x 8   hooklying hip adduction with ball 3" hold x 2 min   Prone hip extension 3 x 8   +Bridges 3 x 5   HS stretch with strap 4 x 30"       Mita received the following manual therapy techniques for 15 minutes, including:    STM to L HS  Manual HS stretching  L knee distraction  STM to L knee with efflurage     Home Exercises Provided and Patient Education Provided     Education provided:   - continue with previously issued HEP  - educated pt that mild soreness is " an expected outcome to PT sessions    Written Home Exercises Provided: yes.  Exercises were reviewed and Mita was able to demonstrate them prior to the end of the session.  Mita demonstrated good  understanding of the education provided.     See EMR under Patient Instructions for exercises provided 9/28/2020.    Assessment     Pt tolerated session well with minimal complaints. She demonstrates significant fatigue with hip strengthening, especially SLR and bridges. Pt with good response to manual therapy and reports a significant reduction in pain and discomfort at the conclusion of session.   Mita is progressing well towards her goals.   Pt prognosis is Excellent.     Pt will continue to benefit from skilled outpatient physical therapy to address the deficits listed in the problem list box on initial evaluation, provide pt/family education and to maximize pt's level of independence in the home and community environment.     Pt's spiritual, cultural and educational needs considered and pt agreeable to plan of care and goals.     Anticipated barriers to physical therapy: none    Goals:  Long Term Goals (6 Weeks):  1. Pt will be independent with HEP to supplement PT in improving functional mobility -progressing, not met   2. Pt will demonstrate proper VMO activation during quad set B to improve patellar tracking during plyometric tasks. -progressing, not met   3. Pt will ascend 1 flight of stairs with no pain to promote functional mobility. -progressing, not met   4. Pt will improve FOTO score to </= 38% limited to decrease perceived limitation with mobility -progressing, not met   5. Pt will improve hip MMTs to >/= 5/5 B to decrease load on knee joint during plyometric tasks. -progressing, not met   6. Pt will improve left knee ROM to 0-115 to improve functional pain-free activity -progressing, not met          Plan     Continue PT per POC, assess tolerance to today's session,  progress exercise as tolerated and  appropriate      Plan of care Certification: 9/22/2020 to 11/3/2020.     Outpatient Physical Therapy 2 times weekly for 6 weeks to include the following interventions: Electrical Stimulation IFC, Gait Training, Manual Therapy, Moist Heat/ Ice, Neuromuscular Re-ed, Orthotic Management and Training, Patient Education, Self Care, Therapeutic Activites and Therapeutic Exercise, ASTYM, Kinesiotaping PRN, Functional Dry Needling      Vianey Mata, PT, DPT  9/28/2020

## 2020-09-25 ENCOUNTER — CLINICAL SUPPORT (OUTPATIENT)
Dept: REHABILITATION | Facility: HOSPITAL | Age: 70
End: 2020-09-25
Payer: MEDICARE

## 2020-09-25 DIAGNOSIS — M25.562 CHRONIC PAIN OF LEFT KNEE: ICD-10-CM

## 2020-09-25 DIAGNOSIS — M25.662 DECREASED RANGE OF MOTION (ROM) OF LEFT KNEE: ICD-10-CM

## 2020-09-25 DIAGNOSIS — G89.29 CHRONIC PAIN OF LEFT KNEE: ICD-10-CM

## 2020-09-25 PROCEDURE — 97110 THERAPEUTIC EXERCISES: CPT | Mod: HCNC,CQ

## 2020-09-25 NOTE — PROGRESS NOTES
Physical Therapy Daily Treatment Note     Name: Mita Bell [facility]  Clinic Number: [id number]    Therapy Diagnosis:   Encounter Diagnoses   Name Primary?    Chronic pain of left knee     Decreased range of motion (ROM) of left knee      Physician: Joycelyn Carter MD    Visit Date: 9/25/2020        Physician Orders: PT Eval and Treat   Medical Diagnosis from Referral:   M25.562,G89.29 (ICD-10-CM) - Chronic pain of left knee   M17.12 (ICD-10-CM) - Primary osteoarthritis of left knee   R26.9 (ICD-10-CM) - Gait abnormality       Evaluation Date: 9/22/2020  Authorization Period Expiration: 10/22/2020  Plan of Care Expiration: 11/3/2020  Visit # / Visits authorized: 1/ 1 (2)  FOTO: 2/10    Time In: 8:30 AM  Time Out: 9:20  Total Billable Time: 45 minutes    Precautions: StanddDard, Diabetic    SUBJECTIVE     Today, pt reports: woke up feeling okay but pain with getting out of car to come to therapy. Vulturine cream 4x/day with relief. Using heating pad on hamstring muscles due to not being to use heating pad with the application of Vultuine.   She was not compliant with home exercise program.  Response to previous treatment: Felt okay after evaluation.   Functional change: Decreased sitting and decreased standing and walking tolerance. Unable to perform sit<-->stand without pain. Noticed increased walking glo since started using Vulturine     Pre-Treatment Pain: 5/10  Post-Treatment Pain: 3/10  Location: Left knee  TREATMENT     Mita received therapeutic exercises to develop strength, endurance, ROM and core stabilization for 45 minutes including:    Exercise 9/25/2020   Exercise bike 5 minutes   Quad sets 3 minutes   Straight leg raise 3x8   Side lying hip abduction 3x8    Side lying hip adduction 3x8   Prone hip extension 3x8   bridge    Hamstring stretch 4x30 seconds   Prone knee flexion stretch              Home Exercises Provided and Patient Education Provided     Education/Self-Care provided: (0  minutes)   Patient educated on biomechanical justification for therapeutic exercise and importance of compliance with HEP in order to improve overall impairments and QOL     Written Home Exercises Provided: yes.  Exercises were reviewed and Mita was able to demonstrate them prior to the end of the session.  Mita demonstrated good  understanding of the education provided.     See EMR under Patient Instructions for exercises provided 9/25/2020 on my chart angel.    ASSESSMENT   Pt tolerated exercise well with reports of fatigue and decreased pain. Pt demonstrated good understanding of exercises and required minimal cueing to maintain proper form. Towel roll was used under left knee with quad sets for comfort. Noted left knee unable to fully extend with heel strike.       Mita is progressing well towards her goals.   Pt prognosis is Excellent.     Pt will continue to benefit from skilled outpatient physical therapy to address the deficits listed in the problem list box on initial evaluation, provide pt/family education and to maximize pt's level of independence in the home and community environment.     Pt's spiritual, cultural and educational needs considered and pt agreeable to plan of care and goals.     Anticipated barriers to physical therapy: chronicity of knee pain, diabetes.     Goals:     Long Term Goals (6 Weeks):  1. Pt will be independent with HEP to supplement PT in improving functional mobility  2. Pt will demonstrate proper VMO activation during quad set B to improve patellar tracking during plyometric tasks.   3. Pt will ascend 1 flight of stairs with no pain to promote functional mobility.   4. Pt will improve FOTO score to </= 38% limited to decrease perceived limitation with mobility  5. Pt will improve hip MMTs to >/= 5/5 B to decrease load on knee joint during plyometric tasks.   6. Pt will improve left knee ROM to 0-115 to improve functional pain-free activity    PLAN   Continue Plan of Care  (POC) and progress per patient tolerance.    Vishal Blanco, PTA

## 2020-09-28 ENCOUNTER — CLINICAL SUPPORT (OUTPATIENT)
Dept: REHABILITATION | Facility: HOSPITAL | Age: 70
End: 2020-09-28
Payer: MEDICARE

## 2020-09-28 DIAGNOSIS — M25.562 CHRONIC PAIN OF LEFT KNEE: ICD-10-CM

## 2020-09-28 DIAGNOSIS — G89.29 CHRONIC PAIN OF LEFT KNEE: ICD-10-CM

## 2020-09-28 DIAGNOSIS — M25.662 DECREASED RANGE OF MOTION (ROM) OF LEFT KNEE: ICD-10-CM

## 2020-09-28 PROCEDURE — 97140 MANUAL THERAPY 1/> REGIONS: CPT | Mod: HCNC

## 2020-09-28 PROCEDURE — 97110 THERAPEUTIC EXERCISES: CPT | Mod: HCNC

## 2020-09-29 ENCOUNTER — PATIENT MESSAGE (OUTPATIENT)
Dept: OTHER | Facility: OTHER | Age: 70
End: 2020-09-29

## 2020-10-01 ENCOUNTER — CLINICAL SUPPORT (OUTPATIENT)
Dept: REHABILITATION | Facility: HOSPITAL | Age: 70
End: 2020-10-01
Payer: MEDICARE

## 2020-10-01 DIAGNOSIS — M25.562 CHRONIC PAIN OF LEFT KNEE: ICD-10-CM

## 2020-10-01 DIAGNOSIS — M25.662 DECREASED RANGE OF MOTION (ROM) OF LEFT KNEE: ICD-10-CM

## 2020-10-01 DIAGNOSIS — G89.29 CHRONIC PAIN OF LEFT KNEE: ICD-10-CM

## 2020-10-01 PROCEDURE — 97110 THERAPEUTIC EXERCISES: CPT | Mod: HCNC,CQ

## 2020-10-01 NOTE — PROGRESS NOTES
"  Physical Therapy Treatment Note     [facility]  Clinic Number: [id number]    Therapy Diagnosis:   Encounter Diagnoses   Name Primary?    Chronic pain of left knee     Decreased range of motion (ROM) of left knee      Physician: Joycelyn Carter MD    Visit Date: 10/1/2020    Physician Orders: PT Eval and Treat   Medical Diagnosis from Referral:   M25.562,G89.29 (ICD-10-CM) - Chronic pain of left knee   M17.12 (ICD-10-CM) - Primary osteoarthritis of left knee   R26.9 (ICD-10-CM) - Gait abnormality     Evaluation Date: 9/22/2020  Authorization Period Expiration: 9/22/21  Plan of Care Expiration: 11/3/2020  Visit # / Visits authorized:  4/ 20      Time In: 10:30 am   Time Out: 11:15 am   Total Billable Time: 45 minutes    Precautions: Standard    Subjective     Pt reports: he states that she has multiple question regarding exercise program. Pain has gotten much better since she has been exercising at home.  She was compliant with home exercise program.  Response to previous treatment: tired   Functional change: Getting in/out of vehicle is difficult due to stiffness in knee, long distance walking is difficult    Pain before: 3/10  Pain after: 0/10  Location:  L knee      Objective     Mita received therapeutic exercises (exercises performed today are bolded)   to develop strength, endurance, ROM, flexibility and posture for 45 minutes including:    Bike to increase ROM, LE strength, and cardiovascular endurance for 5 minutes   QS x 3 min without towel but leaning on elbows  SLR 3 x 8   Side lying hip abduction 2x8  hooklying hip adduction with ball 3" hold x 2 min deferred today  Prone hip extension 3 x 8 deferred today  Bridges 3 x 5 deferred today  HS stretch with strap 4 x 30 seconds  Heel slides 2x10         Mita received the following manual therapy techniques for 0 minutes, including:    STM to L HS  Manual HS stretching  L knee distraction  STM to L knee with efflurage     Home Exercises " Provided and Patient Education Provided     Education provided:   - continue with previously issued HEP  - educated pt that mild soreness is an expected outcome to PT sessions  -Differentiation of pain compared to stretch sensation  -exercises being performed with gradual increase in reps to tolerance and to pay attention of response to exercises post exercise    Written Home Exercises Provided: yes.  Exercises were reviewed and Mita was able to demonstrate them prior to the end of the session.  Mita demonstrated good  understanding of the education provided.     See EMR under Patient Instructions for exercises provided 9/28/2020.    Assessment     Pt tolerated session well with minimal complaints and with instructions on how to modify exercises to make more tolerable or make more challenging (depending on which exercise). Questions about home exercises were answered with patient verbalizing a better understanding of home exercise program after session.      Mita is progressing well towards her goals.   Pt prognosis is Excellent.     Pt will continue to benefit from skilled outpatient physical therapy to address the deficits listed in the problem list box on initial evaluation, provide pt/family education and to maximize pt's level of independence in the home and community environment.     Pt's spiritual, cultural and educational needs considered and pt agreeable to plan of care and goals.     Anticipated barriers to physical therapy: none    Goals:  Long Term Goals (6 Weeks):  1. Pt will be independent with HEP to supplement PT in improving functional mobility -progressing, not met   2. Pt will demonstrate proper VMO activation during quad set B to improve patellar tracking during plyometric tasks. -progressing, not met   3. Pt will ascend 1 flight of stairs with no pain to promote functional mobility. -progressing, not met   4. Pt will improve FOTO score to </= 38% limited to decrease perceived limitation with  mobility -progressing, not met   5. Pt will improve hip MMTs to >/= 5/5 B to decrease load on knee joint during plyometric tasks. -progressing, not met   6. Pt will improve left knee ROM to 0-115 to improve functional pain-free activity -progressing, not met          Plan     Continue PT per POC, assess tolerance to today's session,  progress exercise as tolerated and appropriate      Plan of care Certification: 9/22/2020 to 11/3/2020.     Outpatient Physical Therapy 2 times weekly for 6 weeks to include the following interventions: Electrical Stimulation IFC, Gait Training, Manual Therapy, Moist Heat/ Ice, Neuromuscular Re-ed, Orthotic Management and Training, Patient Education, Self Care, Therapeutic Activites and Therapeutic Exercise, ASTYM, Kinesiotaping PRN, Functional Dry Needling      Vishal Blanco, PTA  10/1/2020

## 2020-10-06 ENCOUNTER — CLINICAL SUPPORT (OUTPATIENT)
Dept: REHABILITATION | Facility: HOSPITAL | Age: 70
End: 2020-10-06
Payer: MEDICARE

## 2020-10-06 DIAGNOSIS — M25.662 DECREASED RANGE OF MOTION (ROM) OF LEFT KNEE: ICD-10-CM

## 2020-10-06 DIAGNOSIS — M25.562 CHRONIC PAIN OF LEFT KNEE: ICD-10-CM

## 2020-10-06 DIAGNOSIS — G89.29 CHRONIC PAIN OF LEFT KNEE: ICD-10-CM

## 2020-10-06 PROCEDURE — 97110 THERAPEUTIC EXERCISES: CPT | Mod: HCNC | Performed by: PHYSICAL THERAPIST

## 2020-10-06 PROCEDURE — 97140 MANUAL THERAPY 1/> REGIONS: CPT | Mod: HCNC | Performed by: PHYSICAL THERAPIST

## 2020-10-06 NOTE — PROGRESS NOTES
"  Physical Therapy Treatment Note     Name: Mita Bell [facility] [place]  Clinic Number: [id number]    Therapy Diagnosis:   Encounter Diagnoses   Name Primary?    Chronic pain of left knee     Decreased range of motion (ROM) of left knee      Physician: Joycelyn Carter MD    Visit Date: 10/6/2020    Physician Orders: PT Eval and Treat   Medical Diagnosis from Referral:   M25.562,G89.29 (ICD-10-CM) - Chronic pain of left knee   M17.12 (ICD-10-CM) - Primary osteoarthritis of left knee   R26.9 (ICD-10-CM) - Gait abnormality     Evaluation Date: 9/22/2020  Authorization Period Expiration: 9/22/21  Plan of Care Expiration: 11/3/2020  Visit # / Visits authorized:  4/ 20      Time In: 12:15 pm   Time Out: 108 pm  Total Billable Time: 53 minutes    Precautions: Standard    Subjective     Pt reports: that she is feeling pretty good today and reports pain at 1/10. She reports the past few days have been about the same, only getting to ~2/10 a few moments. She reports doing her exercises this weekend.    She was compliant with home exercise program.  Response to previous treatment: tired   Functional change: Getting in/out of vehicle is difficult due to stiffness in knee, long distance walking is difficult    Pain before: 1/10  Pain after: 2/10  Location:  L knee      Objective     Mita received therapeutic exercises (exercises performed today are bolded)   to develop strength, endurance, ROM, flexibility and posture for 45 minutes including:    Bike to increase ROM, LE strength, and cardiovascular endurance for 5 minutes   QS x 3 min without towel   SLR 3 x 8   Side lying hip abduction 2x8 deferred today  hooklying hip adduction with ball 3" hold x 2 min   Prone hip extension 3 x 8 deferred today  Bridges 3 x 8  HS stretch with strap 4 x 30 seconds deferred today  Heel slides 2x10   Prone hang- knee ext 3 min      Mita received the following manual therapy techniques for 8 minutes, including:    Left knee extension mobilizations in " supine - grade II/III to tolerance    Knee Right Left Pain/Dysfunction with Movement   AROM         flexion -- 114 Pain in left   extension 0 Lack 4 Denies pain         Home Exercises Provided and Patient Education Provided     Education provided:   - continue with previously issued HEP  - educated pt that mild soreness is an expected outcome to PT sessions  -Differentiation of pain compared to stretch sensation  -exercises being performed with gradual increase in reps to tolerance and to pay attention of response to exercises post exercise    Written Home Exercises Provided: yes.  Exercises were reviewed and Mita was able to demonstrate them prior to the end of the session.  Mita demonstrated good  understanding of the education provided.     See EMR under Patient Instructions for exercises provided 9/28/2020.    Assessment     Pt tolerates increase in therex and manual treatments with increase in posterior left knee pain but during therex her ROM improved by 5 degrees with each exercise. Pt continues to perform HEP with voltaren application as well. Pt ROM improving each week as well as strength to squat down for daily activity.       Mita is progressing well towards her goals.   Pt prognosis is Excellent.     Pt will continue to benefit from skilled outpatient physical therapy to address the deficits listed in the problem list box on initial evaluation, provide pt/family education and to maximize pt's level of independence in the home and community environment.     Pt's spiritual, cultural and educational needs considered and pt agreeable to plan of care and goals.     Anticipated barriers to physical therapy: none    Goals:  Long Term Goals (6 Weeks):  1. Pt will be independent with HEP to supplement PT in improving functional mobility -progressing, not met   2. Pt will demonstrate proper VMO activation during quad set B to improve patellar tracking during plyometric tasks. -progressing, not met   3. Pt will  ascend 1 flight of stairs with no pain to promote functional mobility. -progressing, not met   4. Pt will improve FOTO score to </= 38% limited to decrease perceived limitation with mobility -progressing, not met   5. Pt will improve hip MMTs to >/= 5/5 B to decrease load on knee joint during plyometric tasks. -progressing, not met   6. Pt will improve left knee ROM to 0-115 to improve functional pain-free activity -progressing, not met          Plan     Continue PT per POC, assess tolerance to today's session,  progress exercise as tolerated and appropriate      Plan of care Certification: 9/22/2020 to 11/3/2020.     Outpatient Physical Therapy 2 times weekly for 6 weeks to include the following interventions: Electrical Stimulation IFC, Gait Training, Manual Therapy, Moist Heat/ Ice, Neuromuscular Re-ed, Orthotic Management and Training, Patient Education, Self Care, Therapeutic Activites and Therapeutic Exercise, ASTYM, Kinesiotaping PRN, Functional Dry Needling      Arnulfo Foley, PT  10/7/2020

## 2020-10-08 ENCOUNTER — CLINICAL SUPPORT (OUTPATIENT)
Dept: REHABILITATION | Facility: HOSPITAL | Age: 70
End: 2020-10-08
Payer: MEDICARE

## 2020-10-08 DIAGNOSIS — G89.29 CHRONIC PAIN OF LEFT KNEE: ICD-10-CM

## 2020-10-08 DIAGNOSIS — M25.562 CHRONIC PAIN OF LEFT KNEE: ICD-10-CM

## 2020-10-08 DIAGNOSIS — M25.662 DECREASED RANGE OF MOTION (ROM) OF LEFT KNEE: ICD-10-CM

## 2020-10-08 PROCEDURE — 97110 THERAPEUTIC EXERCISES: CPT | Mod: HCNC,CQ
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
## 2020-10-13 ENCOUNTER — CLINICAL SUPPORT (OUTPATIENT)
Dept: REHABILITATION | Facility: HOSPITAL | Age: 70
End: 2020-10-13
Payer: MEDICARE

## 2020-10-13 DIAGNOSIS — G89.29 CHRONIC PAIN OF LEFT KNEE: ICD-10-CM

## 2020-10-13 DIAGNOSIS — M25.662 DECREASED RANGE OF MOTION (ROM) OF LEFT KNEE: ICD-10-CM

## 2020-10-13 DIAGNOSIS — M25.562 CHRONIC PAIN OF LEFT KNEE: ICD-10-CM

## 2020-10-13 PROCEDURE — 97110 THERAPEUTIC EXERCISES: CPT | Mod: HCNC | Performed by: PHYSICAL THERAPIST

## 2020-10-13 PROCEDURE — 97140 MANUAL THERAPY 1/> REGIONS: CPT | Mod: HCNC | Performed by: PHYSICAL THERAPIST

## 2020-10-13 NOTE — PROGRESS NOTES
"  Physical Therapy Treatment Note     Name: Mita Bell [facility]  Clinic Number: [id number]    Therapy Diagnosis:   Encounter Diagnoses   Name Primary?    Chronic pain of left knee     Decreased range of motion (ROM) of left knee      Physician: Joycelyn Carter MD    Visit Date: 10/13/2020    Physician Orders: PT Eval and Treat   Medical Diagnosis from Referral:   M25.562,G89.29 (ICD-10-CM) - Chronic pain of left knee   M17.12 (ICD-10-CM) - Primary osteoarthritis of left knee   R26.9 (ICD-10-CM) - Gait abnormality     Evaluation Date: 9/22/2020  Authorization Period Expiration: 9/22/2021  Plan of Care Expiration: 11/3/2020  Visit # / Visits authorized:  7/20      Time In: 10:45 am   Time Out: 11:30 am  Total Billable Time: 45 minutes    Precautions: Standard    Subjective     Pt reports: knee is feeling much better lately, did stop use of voltaren gel previous week. Pt continues to perform HEP but needs more clarification on exercise.     She was compliant with home exercise program.  Response to previous treatment: tired   Functional change: improved stooping / transfers    Pain before: 1/10  Pain after: 0/10  Location:  L knee      Objective     Mita received therapeutic exercises (exercises performed today are bolded)   to develop strength, endurance, ROM, flexibility and posture for 35 minutes including:    Bike to increase ROM, LE strength, and cardiovascular endurance for 5 minutes   QS x 3 min with towel under ankle  Straight leg Leg Raise 3 x 8   Prone hip extension 3 x 10 pillow under stomach  Bridges 3 x 8 with RTB at knees  Heel slides 2x10 - straps 5" hold.   Standing hip abduction 2x8  Standing heel cord stretch 30" x 3      Mita received the following manual therapy techniques for 10 minutes, including:    Left knee extension mobilizations in supine - grade II/III to tolerance  ASTYM to posterior left knee      Home Exercises Provided and Patient Education Provided     Education provided:   - continue " with previously issued HEP  - educated pt that mild soreness is an expected outcome to PT sessions  -Differentiation of pain compared to stretch sensation  -exercises being performed with gradual increase in reps to tolerance and to pay attention of response to exercises post exercise    Written Home Exercises Provided: yes.  Exercises were reviewed and Mita was able to demonstrate them prior to the end of the session.  Mita demonstrated good  understanding of the education provided.     See EMR under Patient Instructions for exercises provided 9/28/2020.      Assessment     Pt tolerates therex with only soreness and discomfort along knee joint with SLR and at end range flexion activity. Pt ROM and strength continues to improve with each week and increasing activity at home. Pt will continue with HEP and work on end range ROM tolerance and quadriceps strengthening to improve home activity.     Mita is progressing well towards her goals.   Pt prognosis is Excellent.     Pt will continue to benefit from skilled outpatient physical therapy to address the deficits listed in the problem list box on initial evaluation, provide pt/family education and to maximize pt's level of independence in the home and community environment.     Pt's spiritual, cultural and educational needs considered and pt agreeable to plan of care and goals.     Anticipated barriers to physical therapy: none    Goals:  Long Term Goals (6 Weeks):  1. Pt will be independent with HEP to supplement PT in improving functional mobility -progressing, not met   2. Pt will demonstrate proper VMO activation during quad set B to improve patellar tracking during plyometric tasks. -progressing, not met   3. Pt will ascend 1 flight of stairs with no pain to promote functional mobility. -progressing, not met   4. Pt will improve FOTO score to </= 38% limited to decrease perceived limitation with mobility -progressing, not met   5. Pt will improve hip MMTs to  >/= 5/5 B to decrease load on knee joint during plyometric tasks. -progressing, not met   6. Pt will improve left knee ROM to 0-115 to improve functional pain-free activity -progressing, not met          Plan     Continue PT per POC, assess tolerance to today's session,  progress exercise as tolerated and appropriate      Plan of care Certification: 9/22/2020 to 11/3/2020.     Outpatient Physical Therapy 2 times weekly for 6 weeks to include the following interventions: Electrical Stimulation IFC, Gait Training, Manual Therapy, Moist Heat/ Ice, Neuromuscular Re-ed, Orthotic Management and Training, Patient Education, Self Care, Therapeutic Activites and Therapeutic Exercise, ASTYM, Kinesiotaping PRN, Functional Dry Needling      Arnulfo Foley, PT  10/13/2020

## 2020-10-13 NOTE — PATIENT INSTRUCTIONS
Strengthening: Quadriceps Set    Tighten muscles on top of thighs by pushing knees down into surface. Hold 5 seconds.  Repeat 20 times right leg per set. Do 1 sets per session. Do 1 sessions per day.      Heel Slides    With towel around left heel, gently pull knee up with towel until stretch is felt. Hold 5 seconds.  Repeat 20 times left leg per set. Do 1 sets per session. Do 1 sessions per day.      Straight Leg Raise     With left leg straight, other leg bent, raise straight leg until knees are even. Slowly lower. Roll on your side and repeat lifting top leg up, on other side, lifting bottom leg up, and on stomach kicking back (squeezing your rear end before you kick back).  Repeat 8 times left leg per set. Do 2 sets per session. Do 1 sessions per day.           Strengthening: Hip Abductor - Resisted    Lie flat with band looped around both legs above knees, and push thighs apart, ensuring right and left move together.  Repeat 30 times per set. Do 1 sets per session. Do 1 sessions per day.      Slow Stand to Sit    Stand up, using hands if needed. Keep chest and head upright, bend your knees, don't use hands, and slowly lower back to the chair. Move as slowly as you can.  Repeat 5 times per set. Do 3 sets per session. Do 1 sessions per day.

## 2020-10-15 ENCOUNTER — CLINICAL SUPPORT (OUTPATIENT)
Dept: REHABILITATION | Facility: HOSPITAL | Age: 70
End: 2020-10-15
Payer: MEDICARE

## 2020-10-15 DIAGNOSIS — G89.29 CHRONIC PAIN OF LEFT KNEE: ICD-10-CM

## 2020-10-15 DIAGNOSIS — M25.662 DECREASED RANGE OF MOTION (ROM) OF LEFT KNEE: ICD-10-CM

## 2020-10-15 DIAGNOSIS — M25.562 CHRONIC PAIN OF LEFT KNEE: ICD-10-CM

## 2020-10-15 PROCEDURE — 97140 MANUAL THERAPY 1/> REGIONS: CPT | Mod: HCNC,CQ

## 2020-10-15 PROCEDURE — 97110 THERAPEUTIC EXERCISES: CPT | Mod: HCNC,CQ

## 2020-10-15 NOTE — PROGRESS NOTES
"  Physical Therapy Daily Treatment Note     Name: Mita Bell [facility]  Clinic Number: [id number]    Therapy Diagnosis:   Encounter Diagnoses   Name Primary?    Chronic pain of left knee     Decreased range of motion (ROM) of left knee      Physician: Joycelyn Carter MD    Visit Date: 10/15/2020    Physician Orders: PT Eval and Treat   Medical Diagnosis from Referral:   M25.562,G89.29 (ICD-10-CM) - Chronic pain of left knee   M17.12 (ICD-10-CM) - Primary osteoarthritis of left knee   R26.9 (ICD-10-CM) - Gait abnormality     Evaluation Date: 9/22/2020  Authorization Period Expiration: 9/22/2021  Plan of Care Expiration: 11/3/2020  Visit # / Visits authorized:  8/20      Time In: 11:15 am   Time Out: 12:00 pm  Total Billable Time: 40 minutes    Precautions: Standard    Subjective     Pt reports: feeling tired yesterday due to walking Tuesday before therapy session, then therapy, then walking yesterday for another .75 miles. Pt continues to perform HEP but needs more clarification on exercise.     She was compliant with home exercise program.  Response to previous treatment: tired after last session  Functional change: decreased walking tolerance.     Pain before: 1/10  Pain after: 0/10  Location:  L knee      Objective     Mita received therapeutic exercises (exercises performed today are bolded)   to develop strength, endurance, ROM, flexibility and posture for 32 minutes including:    Bike to increase ROM, LE strength, and cardiovascular endurance for 5 minutes   QS x 3 min with towel under ankle  Straight leg Leg Raise 3 x 8   Prone hip extension 3 x 10 pillow under stomach  Bridges 3 x 8 with RTB at knees  Heel slides 2x10 - straps 5" hold.   Standing hip abduction 2x8  Standing heel cord stretch 30" x 3     Active range of motion after manual therapy = 118 degrees      Mita received the following manual therapy techniques for 8 minutes, including:    Left knee extension mobilizations in supine - grade II   " posterior joint mobs and STM to posterior knee joint    Home Exercises Provided and Patient Education Provided     Education provided:   - continue with previously issued HEP  - educated pt that mild soreness is an expected outcome to PT sessions  -Differentiation of pain compared to stretch sensation  -exercises being performed with gradual increase in reps to tolerance and to pay attention of response to exercises post exercise    Written Home Exercises Provided: yes.  Exercises were reviewed and Mita was able to demonstrate them prior to the end of the session.  Mita demonstrated good  understanding of the education provided.     See EMR under Patient Instructions for exercises provided 9/28/2020.      Assessment     Pt tolerates therex with muscle soreness  along quads with SLR. Pt ROM and strength continues to improve with each week and increasing activity at home.      Mita is progressing well towards her goals.   Pt prognosis is Excellent.     Pt will continue to benefit from skilled outpatient physical therapy to address the deficits listed in the problem list box on initial evaluation, provide pt/family education and to maximize pt's level of independence in the home and community environment.     Pt's spiritual, cultural and educational needs considered and pt agreeable to plan of care and goals.     Anticipated barriers to physical therapy: none    Goals:  Long Term Goals (6 Weeks):  1. Pt will be independent with HEP to supplement PT in improving functional mobility -progressing, not met   2. Pt will demonstrate proper VMO activation during quad set B to improve patellar tracking during plyometric tasks. -progressing, not met   3. Pt will ascend 1 flight of stairs with no pain to promote functional mobility. -progressing, not met   4. Pt will improve FOTO score to </= 38% limited to decrease perceived limitation with mobility -progressing, not met   5. Pt will improve hip MMTs to >/= 5/5 B to decrease  load on knee joint during plyometric tasks. -progressing, not met   6. Pt will improve left knee ROM to 0-115 to improve functional pain-free activity -progressing, not met          Plan     Continue PT per POC, assess tolerance to today's session,  progress exercise as tolerated and appropriate    Progress Report due: 10/22      Plan of care Certification: 9/22/2020 to 11/3/2020.     Outpatient Physical Therapy 2 times weekly for 6 weeks to include the following interventions: Electrical Stimulation IFC, Gait Training, Manual Therapy, Moist Heat/ Ice, Neuromuscular Re-ed, Orthotic Management and Training, Patient Education, Self Care, Therapeutic Activites and Therapeutic Exercise, ASTYM, Kinesiotaping PRN, Functional Dry Needling      Vishal Blanco, PTA  10/15/2020

## 2020-10-20 ENCOUNTER — CLINICAL SUPPORT (OUTPATIENT)
Dept: REHABILITATION | Facility: HOSPITAL | Age: 70
End: 2020-10-20
Payer: MEDICARE

## 2020-10-20 ENCOUNTER — PATIENT OUTREACH (OUTPATIENT)
Dept: ADMINISTRATIVE | Facility: OTHER | Age: 70
End: 2020-10-20

## 2020-10-20 DIAGNOSIS — M25.662 DECREASED RANGE OF MOTION (ROM) OF LEFT KNEE: ICD-10-CM

## 2020-10-20 DIAGNOSIS — G89.29 CHRONIC PAIN OF LEFT KNEE: ICD-10-CM

## 2020-10-20 DIAGNOSIS — M25.562 CHRONIC PAIN OF LEFT KNEE: ICD-10-CM

## 2020-10-20 PROCEDURE — 97110 THERAPEUTIC EXERCISES: CPT | Mod: HCNC | Performed by: PHYSICAL THERAPIST

## 2020-10-20 PROCEDURE — 97140 MANUAL THERAPY 1/> REGIONS: CPT | Mod: HCNC | Performed by: PHYSICAL THERAPIST

## 2020-10-20 NOTE — PATIENT INSTRUCTIONS
Strengthening: Quadriceps Set    Tighten muscles on top of thighs by pushing knees down into surface. Hold 5 seconds.  Repeat 20 times left leg per set. Do 1 sets per session. Do 1 sessions per day.      Heel Slides    With towel around left heel, gently pull knee up with towel until stretch is felt. Hold 3 seconds.  Repeat 30 times left leg per set. Do 1 sets per session. Do 1 sessions per day.      Straight Leg Raise     With left leg straight, other leg bent, raise straight leg until knees are even. Slowly lower. Roll on your side and repeat lifting top leg up, on other side, lifting bottom leg up, and on stomach kicking back (squeezing your rear end before you kick back).  Repeat 8 times left leg per set. Do 3 sets per session. Do 1 sessions per day.      Clam Shells    Lie on side with knees bent. Raise top leg, keeping knee bent and ankles together. Do not let your hips roll back as your raise your leg.  Repeat 12 times left leg per set. Do 3 sets per session. Do 1 sessions per day.      Slow Stand to Sit    Stand up, using hands if needed. Keep chest and head upright, bend your knees, don't use hands, and slowly lower back to the chair. Move as slowly as you can.  Repeat 5 times per set. Do 4 sets per session. Do 1 sessions per day.      Medial Step-Down     Stand with both feet on 4 inch step. Step down to the side with left foot facing forward, lightly touching heel to the floor and return. Maintain all body weight on the step the entire time.  Repeat 6 times left leg per set. Do 3 sets per session. Do 1 sessions per day    .  ABDUCTION: Standing (Active)        Stand, feet flat. Lift right leg out to side. Use red band  Complete 2 sets of 10 repetitions. Perform 1 sessions per day.

## 2020-10-20 NOTE — PROGRESS NOTES
"  Physical Therapy Daily Treatment Note     Name: Mita Bell [facility]  Clinic Number: [id number]    Therapy Diagnosis:   Encounter Diagnoses   Name Primary?    Chronic pain of left knee     Decreased range of motion (ROM) of left knee      Physician: Joycelyn Carter MD    Visit Date: 10/20/2020    Physician Orders: PT Eval and Treat   Medical Diagnosis from Referral:   M25.562,G89.29 (ICD-10-CM) - Chronic pain of left knee   M17.12 (ICD-10-CM) - Primary osteoarthritis of left knee   R26.9 (ICD-10-CM) - Gait abnormality     Evaluation Date: 9/22/2020  Authorization Period Expiration: 9/22/2021  Plan of Care Expiration: 11/3/2020  Visit # / Visits authorized:  9/20      Time In: 11:30 am   Time Out: 12:15 pm  Total Billable Time: 45 minutes    Precautions: Standard    Subjective     Pt reports: doing a lot of yard work recently and re-potting of plants. Pt continues to have left knee stiffness/discomfort only when stooping down or getting into full squat position. Pt feels therapy has helped a lot and would like to keep going at this time.     She was compliant with home exercise program.  Response to previous treatment: tired after last session  Functional change: increased ROM, improved strength     Pain before: 1/10  Pain after: 0/10  Location:  L knee      Objective     Mita received therapeutic exercises (exercises performed today are bolded)   to develop strength, endurance, ROM, flexibility and posture for 35 minutes including:    Bike to increase ROM, LE strength, and cardiovascular endurance for 5 minutes   QS x 3 min with towel under ankle  Straight leg Leg Raise 3 x 8 1#  Prone hip extension 3 x 10 pillow under stomach  Bridges 3 x 8 with RTB at knees  Heel slides 3x10 - straps 5" hold.   Standing hip abduction 3x8 YTB  Standing heel cord stretch 30" x 3  Prone quad stretch 10" x 10   Clamshells 3x12 LLE      Mita received the following manual therapy techniques for 10 minutes, including:    ASTYM to " hamstring, calf on posterior knee  Extension mobilizations    Knee Right Left Pain/Dysfunction with Movement   AROM         flexion 128 121 Pain at end range   extension 0 2 Decreased pain            Home Exercises Provided and Patient Education Provided     Education provided:   - continue with previously issued HEP  - educated pt that mild soreness is an expected outcome to PT sessions  -Differentiation of pain compared to stretch sensation  -exercises being performed with gradual increase in reps to tolerance and to pay attention of response to exercises post exercise    Written Home Exercises Provided: yes.  Exercises were reviewed and Mita was able to demonstrate them prior to the end of the session.  Mita demonstrated good  understanding of the education provided.     See EMR under Patient Instructions for exercises provided 9/28/2020.      Assessment     Pt progressing well through flexibility and progressive strengthening program. Pt increases ROM with each session and presents with over 120 degrees of flexion with last 3 heel slides. Pt is improving in pain tolerance during activity at home. New HEP printed and explained to patient.     Mita is progressing well towards her goals.   Pt prognosis is Excellent.     Pt will continue to benefit from skilled outpatient physical therapy to address the deficits listed in the problem list box on initial evaluation, provide pt/family education and to maximize pt's level of independence in the home and community environment.     Pt's spiritual, cultural and educational needs considered and pt agreeable to plan of care and goals.     Anticipated barriers to physical therapy: none    Goals:  Long Term Goals (6 Weeks):  1. Pt will be independent with HEP to supplement PT in improving functional mobility -MET  2. Pt will demonstrate proper VMO activation during quad set B to improve patellar tracking during plyometric tasks. -MET  3. Pt will ascend 1 flight of stairs  with no pain to promote functional mobility. -MET  4. Pt will improve FOTO score to </= 38% limited to decrease perceived limitation with mobility -progressing, not met   5. Pt will improve hip MMTs to >/= 5/5 B to decrease load on knee joint during plyometric tasks. -progressing, not met   6. Pt will improve left knee ROM to 0-115 to improve functional pain-free activity -progressing, not met          Plan     Continue PT per POC, assess tolerance to today's session,  progress exercise as tolerated and appropriate    Progress Report due: 10/22      Plan of care Certification: 9/22/2020 to 11/3/2020.     Outpatient Physical Therapy 2 times weekly for 6 weeks to include the following interventions: Electrical Stimulation IFC, Gait Training, Manual Therapy, Moist Heat/ Ice, Neuromuscular Re-ed, Orthotic Management and Training, Patient Education, Self Care, Therapeutic Activites and Therapeutic Exercise, ASTYM, Kinesiotaping PRN, Functional Dry Needling      Arnulfo Foley, PT  10/20/2020

## 2020-10-21 ENCOUNTER — OFFICE VISIT (OUTPATIENT)
Dept: ORTHOPEDICS | Facility: CLINIC | Age: 70
End: 2020-10-21
Payer: MEDICARE

## 2020-10-21 VITALS
SYSTOLIC BLOOD PRESSURE: 117 MMHG | DIASTOLIC BLOOD PRESSURE: 67 MMHG | BODY MASS INDEX: 34.55 KG/M2 | WEIGHT: 195 LBS | HEART RATE: 80 BPM | HEIGHT: 63 IN

## 2020-10-21 DIAGNOSIS — M25.562 CHRONIC PAIN OF LEFT KNEE: Primary | ICD-10-CM

## 2020-10-21 DIAGNOSIS — M17.12 PRIMARY OSTEOARTHRITIS OF LEFT KNEE: ICD-10-CM

## 2020-10-21 DIAGNOSIS — G89.29 CHRONIC PAIN OF LEFT KNEE: Primary | ICD-10-CM

## 2020-10-21 DIAGNOSIS — R26.9 GAIT ABNORMALITY: ICD-10-CM

## 2020-10-21 PROCEDURE — 1159F MED LIST DOCD IN RCRD: CPT | Mod: HCNC,S$GLB,, | Performed by: PHYSICAL MEDICINE & REHABILITATION

## 2020-10-21 PROCEDURE — 99213 PR OFFICE/OUTPT VISIT, EST, LEVL III, 20-29 MIN: ICD-10-PCS | Mod: HCNC,S$GLB,, | Performed by: PHYSICAL MEDICINE & REHABILITATION

## 2020-10-21 PROCEDURE — 1101F PT FALLS ASSESS-DOCD LE1/YR: CPT | Mod: HCNC,CPTII,S$GLB, | Performed by: PHYSICAL MEDICINE & REHABILITATION

## 2020-10-21 PROCEDURE — 99999 PR PBB SHADOW E&M-EST. PATIENT-LVL III: ICD-10-PCS | Mod: PBBFAC,HCNC,, | Performed by: PHYSICAL MEDICINE & REHABILITATION

## 2020-10-21 PROCEDURE — 1125F PR PAIN SEVERITY QUANTIFIED, PAIN PRESENT: ICD-10-PCS | Mod: HCNC,S$GLB,, | Performed by: PHYSICAL MEDICINE & REHABILITATION

## 2020-10-21 PROCEDURE — 3008F BODY MASS INDEX DOCD: CPT | Mod: HCNC,CPTII,S$GLB, | Performed by: PHYSICAL MEDICINE & REHABILITATION

## 2020-10-21 PROCEDURE — 1159F PR MEDICATION LIST DOCUMENTED IN MEDICAL RECORD: ICD-10-PCS | Mod: HCNC,S$GLB,, | Performed by: PHYSICAL MEDICINE & REHABILITATION

## 2020-10-21 PROCEDURE — 3074F SYST BP LT 130 MM HG: CPT | Mod: HCNC,CPTII,S$GLB, | Performed by: PHYSICAL MEDICINE & REHABILITATION

## 2020-10-21 PROCEDURE — 3078F PR MOST RECENT DIASTOLIC BLOOD PRESSURE < 80 MM HG: ICD-10-PCS | Mod: HCNC,CPTII,S$GLB, | Performed by: PHYSICAL MEDICINE & REHABILITATION

## 2020-10-21 PROCEDURE — 3078F DIAST BP <80 MM HG: CPT | Mod: HCNC,CPTII,S$GLB, | Performed by: PHYSICAL MEDICINE & REHABILITATION

## 2020-10-21 PROCEDURE — 1125F AMNT PAIN NOTED PAIN PRSNT: CPT | Mod: HCNC,S$GLB,, | Performed by: PHYSICAL MEDICINE & REHABILITATION

## 2020-10-21 PROCEDURE — 99213 OFFICE O/P EST LOW 20 MIN: CPT | Mod: HCNC,S$GLB,, | Performed by: PHYSICAL MEDICINE & REHABILITATION

## 2020-10-21 PROCEDURE — 3008F PR BODY MASS INDEX (BMI) DOCUMENTED: ICD-10-PCS | Mod: HCNC,CPTII,S$GLB, | Performed by: PHYSICAL MEDICINE & REHABILITATION

## 2020-10-21 PROCEDURE — 3074F PR MOST RECENT SYSTOLIC BLOOD PRESSURE < 130 MM HG: ICD-10-PCS | Mod: HCNC,CPTII,S$GLB, | Performed by: PHYSICAL MEDICINE & REHABILITATION

## 2020-10-21 PROCEDURE — 99999 PR PBB SHADOW E&M-EST. PATIENT-LVL III: CPT | Mod: PBBFAC,HCNC,, | Performed by: PHYSICAL MEDICINE & REHABILITATION

## 2020-10-21 PROCEDURE — 1101F PR PT FALLS ASSESS DOC 0-1 FALLS W/OUT INJ PAST YR: ICD-10-PCS | Mod: HCNC,CPTII,S$GLB, | Performed by: PHYSICAL MEDICINE & REHABILITATION

## 2020-10-21 NOTE — PROGRESS NOTES
"SPORTS MEDICINE / PM&R Follow Up Visit :    Referring Physician: No ref. provider found    Chief Complaint   Patient presents with    Left Knee - Pain       HPI: This is a 70 y.o.  female being seen in clinic today for evaluation of Pain of the Left Knee   Since last visit, the symptoms are improving.  She has been to therapy here at the [facility] with Joni and finds it very beneficial. She notes she's improved from severe shape to very bad shape, and wants to keep doing therapy until she improves to just "bad" shape. She notes voltaren gel helped although she didn't think it would.  She avoids deep squatting or bending her knee outside of therapy, but continues to walk almost daily and perform other tasks around the house.      History obtained from patient.    Past family, medical, social, surgical history, and vital signs reviewed in chart.    Review of Systems   Constitutional: Negative.  Negative for chills, fever and weight loss.   HENT: Negative.  Negative for hearing loss and sore throat.    Eyes: Negative.  Negative for blurred vision, photophobia and pain.   Respiratory: Negative.  Negative for shortness of breath.    Cardiovascular: Negative.  Negative for chest pain.   Gastrointestinal: Negative.  Negative for abdominal pain.   Genitourinary: Negative.  Negative for dysuria.   Musculoskeletal: Positive for joint pain.   Skin: Negative.  Negative for rash.   Neurological: Negative.  Negative for tingling and headaches.   Endo/Heme/Allergies: Negative.  Does not bruise/bleed easily.   Psychiatric/Behavioral: Negative.  Negative for depression.       General    Nursing note and vitals reviewed.  Constitutional: She is oriented to person, place, and time. She appears well-developed and well-nourished.   HENT:   Head: Normocephalic and atraumatic.   Eyes: Conjunctivae and EOM are normal. Pupils are equal, round, and reactive to light.   Neck: Neck supple.   Cardiovascular: Intact distal pulses.  "   Pulmonary/Chest: Effort normal. No respiratory distress.   Abdominal: She exhibits no distension.   Neurological: She is alert and oriented to person, place, and time. She has normal reflexes.   Psychiatric: She has a normal mood and affect.     General Musculoskeletal Exam   Gait: normal       Right Knee Exam   Right knee exam is normal.    Inspection   Erythema: absent  Swelling: absent  Effusion: absent    Range of Motion   The patient has normal right knee ROM.    Tests   Meniscus   Emma:  Medial - negative Lateral - negative  Ligament Examination Lachman: normal (-1 to 2mm) PCL-Posterior Drawer: normal (0 to 2mm)     MCL - Valgus: normal (0 to 2mm)  LCL - Varus: normal  Patella   Patellar apprehension: negative  Patellar Tracking: normal    Other   Popliteal (Baker's) Cyst: absent  Sensation: normal    Left Knee Exam     Inspection   Erythema: absent  Swelling: absent  Effusion: absent  Deformity: absent  Bruising: absent    Tenderness   The patient tender to palpation of the medial joint line and patella.    Range of Motion   The patient has normal left knee ROM.    Tests   Meniscus   Emma:  Medial - negative Lateral - negative  Stability Lachman: normal (-1 to 2mm) PCL-Posterior Drawer: normal (0 to 2mm)  MCL - Valgus: normal (0 to 2mm)  LCL - Varus: normal (0 to 2mm)  Patella   Patellar apprehension: negative  Patellar Tracking: normal    Other   Popliteal (Baker's) Cyst: absent  Sensation: normal    Right Hip Exam   Right hip exam is normal.     Tests   Pain w/ forced internal rotation (LUCAS): absent  Left Hip Exam   Left hip exam is normal.    Tests   Pain w/ forced internal rotation (LUCAS): absent          Muscle Strength   Right Lower Extremity   Hip Abduction: 5/5   Hip Adduction: 5/5   Hip Flexion: 5/5   Quadriceps:  5/5   Hamstrin/5   Left Lower Extremity   Hip Abduction: 5/5   Hip Adduction: 5/5   Hip Flexion: 5/5   Quadriceps:  5/5   Hamstrin/5     Reflexes     Left  Side  Achilles:  2+  Quadriceps:  2+    Right Side   Achilles:  2+  Quadriceps:  2+    Vascular Exam     Right Pulses  Dorsalis Pedis:      2+          Left Pulses  Dorsalis Pedis:      2+                IMPRESSION/PLAN: This is a 70 y.o.  female with:    Chronic pain of left knee    Primary osteoarthritis of left knee    Gait abnormality        The findings were discussed with Mita in detail.  I am glad she has noted help from the Voltaren gel, as well as physical therapy.  She realizes now that she needs more strengthening work for both of her legs, and is happy to keep working with physical therapy on that.  Since she has made good progress I am happy to renew her therapy order and they will continue to advance her exercises as tolerated.  We reviewed her x-rays again which show moderate to severe arthritis in the left knee.  But since she is having improvement with conservative measures we will continue those for now.  Future interventions could include injection versus surgical consultation.    She was provided with this plan in writing. All of her questions were answered. She will follow up with me in 2 months.     Disclaimer: This note was prepared using a voice recognition system and is likely to have sound alike errors within the text.     Joycelyn Carter M.D.  Sports Medicine

## 2020-10-22 ENCOUNTER — CLINICAL SUPPORT (OUTPATIENT)
Dept: REHABILITATION | Facility: HOSPITAL | Age: 70
End: 2020-10-22
Payer: MEDICARE

## 2020-10-22 DIAGNOSIS — M25.662 DECREASED RANGE OF MOTION (ROM) OF LEFT KNEE: ICD-10-CM

## 2020-10-22 DIAGNOSIS — G89.29 CHRONIC PAIN OF LEFT KNEE: ICD-10-CM

## 2020-10-22 DIAGNOSIS — M25.562 CHRONIC PAIN OF LEFT KNEE: ICD-10-CM

## 2020-10-22 PROCEDURE — 97110 THERAPEUTIC EXERCISES: CPT | Mod: HCNC,CQ

## 2020-10-22 NOTE — PROGRESS NOTES
Physical Therapy Progress Note     Name: Mita Max  [facility] Number: [id number]    Therapy Diagnosis:   Encounter Diagnoses   Name Primary?    Chronic pain of left knee     Decreased range of motion (ROM) of left knee      Physician: Joycelyn Carter MD    Visit Date: 10/22/2020    Physician Orders: PT Eval and Treat   Medical Diagnosis from Referral:   M25.562,G89.29 (ICD-10-CM) - Chronic pain of left knee   M17.12 (ICD-10-CM) - Primary osteoarthritis of left knee   R26.9 (ICD-10-CM) - Gait abnormality     Evaluation Date: 9/22/2020  Authorization Period Expiration: 9/22/2021  Plan of Care Expiration: 11/3/2020  Visit # / Visits authorized:  9/20      Time In: 11:15 am   Time Out: 12:05 pm  Total Billable Time: 45 minutes    Precautions: Standard    Subjective     Pt reports: Pt continues to have left knee stiffness/discomfort only when stooping down or getting into full squat position and unsteady with stepping onto step stool. States he thinks that her doctor is pleased with her progress.     She was compliant with home exercise program.  Response to previous treatment: tired after last session  Functional change: increased ROM, improved strength     Pain before: 1/10  Pain after: 0/10  Location:  L knee      Objective       Range of Motion/Strength:      Right Left Pain/Dysfunction with Movement Right 10/22/202 Left 10/22/2020   WNL WNL   WNL WNL      Knee Right Left Pain/Dysfunction with Movement Right 10/22/2020 Left 10/22/2020   AROM          flexion 128 108 Pain in left 132 125   extension 0 4 Pain in left 5 8      Ankle Right Left Pain/Dysfunction with Movement Right/Left 10/22/2020   AROM/PROM WNL WNL   WFL      L/E MMT Right Left Pain/Dysfunction with Movement Right 10/22/2020 Left 10/22/2020    Hip Flexion 4/5 4/5   4/5 4/5   Hip Extension 4/5 4/5   4/5 4/5   Hip Abduction 4/5 4-/5   3+/5 4/5   Hip Adduction 5/5 5/5   4/5 4/5   Knee Flexion 4/5 3/5   4/5 4-/5   Knee Extension 4/5 3-/5   4+/5 4/5  "  Ankle DF 5/5 5/5 5/5 5/5   Ankle PF 5/5 5/5 5/5 5/5     Treatment       Mita received therapeutic exercises (exercises performed today are bolded)   to develop strength, endurance, ROM, flexibility and posture for 45 minutes including:    Bike to increase ROM, LE strength, and cardiovascular endurance for 5 minutes   QS x 3 min with towel under ankle  Straight leg Leg Raise 3 x 8 1#  Prone hip extension 3 x 10 pillow under stomach  Side lying hip abduction 3x10   Bridges 3 x 8 with RTB at knees  Heel slides 3x10 - straps 5" hold.   Standing hip abduction 3x8 YTB  Standing heel cord stretch 30" x 3  Prone quad stretch 10" x 10   Clamshells 3x12 LLE      Mita received the following manual therapy techniques for 0 minutes, including:    ASTYM to hamstring, calf on posterior knee  Extension mobilizations    Home Exercises Provided and Patient Education Provided     Education provided:   - continue with previously issued HEP  - educated pt that mild soreness is an expected outcome to PT sessions  -Differentiation of pain compared to stretch sensation  -exercises being performed with gradual increase in reps to tolerance and to pay attention of response to exercises post exercise  -Answered questions regarding exercise program    Written Home Exercises Provided: yes.  Exercises were reviewed and Mita was able to demonstrate them prior to the end of the session.  Mita demonstrated good  understanding of the education provided.     See EMR under Patient Instructions for exercises provided 9/28/2020.      Assessment     Mrs. Fan states she feels like she has made a 50% improvement in function since onset of physical therapy. Patient states she is able to ambulate farther and with better glo but is fees unsteady with getting on/off floor/ground preventing her from gardening and playing with her great grandchildern.  She has demonstrated improvement in bilateral hip and knee strength and improvement in " bilateral knee flexion but with decreased bilateral knee extension. She is progressing well through strengthening program. Patient may benefit from continued skilled physical therapy for additional strengthening.    Mita is progressing well towards her goals.   Pt prognosis is Excellent.     Pt will continue to benefit from skilled outpatient physical therapy to address the deficits listed in the problem list box on initial evaluation, provide pt/family education and to maximize pt's level of independence in the home and community environment.     Pt's spiritual, cultural and educational needs considered and pt agreeable to plan of care and goals.     Anticipated barriers to physical therapy: none    Goals:  Long Term Goals (6 Weeks):  1. Pt will be independent with HEP to supplement PT in improving functional mobility -MET  2. Pt will demonstrate proper VMO activation during quad set B to improve patellar tracking during plyometric tasks. -MET  3. Pt will ascend 1 flight of stairs with no pain to promote functional mobility. -MET  4. Pt will improve FOTO score to </= 38% limited to decrease perceived limitation with mobility -progressing, not met   5. Pt will improve hip MMTs to >/= 5/5 B to decrease load on knee joint during plyometric tasks. -progressing, not met   6. Pt will improve left knee ROM to 0-115 to improve functional pain-free activity -progressing, not met          Plan     Continue PT per POC, assess tolerance to today's session,  progress exercise as tolerated and appropriate    Plan of care Certification: 9/22/2020 to 11/3/2020.     Outpatient Physical Therapy 2 times weekly for 6 weeks to include the following interventions: Electrical Stimulation IFC, Gait Training, Manual Therapy, Moist Heat/ Ice, Neuromuscular Re-ed, Orthotic Management and Training, Patient Education, Self Care, Therapeutic Activites and Therapeutic Exercise, ASTYM, Kinesiotaping PRN, Functional Dry Needling      Vishal  Bella, PTA  10/22/2020

## 2020-10-24 ENCOUNTER — PATIENT MESSAGE (OUTPATIENT)
Dept: INTERNAL MEDICINE | Facility: CLINIC | Age: 70
End: 2020-10-24

## 2020-10-26 RX ORDER — INFLUENZA A VIRUS A/MICHIGAN/45/2015 X-275 (H1N1) ANTIGEN (FORMALDEHYDE INACTIVATED), INFLUENZA A VIRUS A/SINGAPORE/INFIMH-16-0019/2016 IVR-186 (H3N2) ANTIGEN (FORMALDEHYDE INACTIVATED), INFLUENZA B VIRUS B/PHUKET/3073/2013 ANTIGEN (FORMALDEHYDE INACTIVATED), AND INFLUENZA B VIRUS B/MARYLAND/15/2016 BX-69A ANTIGEN (FORMALDEHYDE INACTIVATED) 60; 60; 60; 60 UG/.7ML; UG/.7ML; UG/.7ML; UG/.7ML
INJECTION, SUSPENSION INTRAMUSCULAR
COMMUNITY
Start: 2020-10-24 | End: 2021-02-05 | Stop reason: ALTCHOICE

## 2020-10-27 ENCOUNTER — CLINICAL SUPPORT (OUTPATIENT)
Dept: REHABILITATION | Facility: HOSPITAL | Age: 70
End: 2020-10-27
Payer: MEDICARE

## 2020-10-27 DIAGNOSIS — M25.662 DECREASED RANGE OF MOTION (ROM) OF LEFT KNEE: ICD-10-CM

## 2020-10-27 DIAGNOSIS — G89.29 CHRONIC PAIN OF LEFT KNEE: ICD-10-CM

## 2020-10-27 DIAGNOSIS — M25.562 CHRONIC PAIN OF LEFT KNEE: ICD-10-CM

## 2020-10-27 PROCEDURE — 97110 THERAPEUTIC EXERCISES: CPT | Mod: HCNC | Performed by: PHYSICAL THERAPIST

## 2020-10-27 PROCEDURE — 97140 MANUAL THERAPY 1/> REGIONS: CPT | Mod: HCNC | Performed by: PHYSICAL THERAPIST

## 2020-10-27 NOTE — PROGRESS NOTES
"  Physical Therapy Progress Note     Name: Mita Bell [facility]  Clinic Number: [id number]    Therapy Diagnosis:   Encounter Diagnoses   Name Primary?    Chronic pain of left knee     Decreased range of motion (ROM) of left knee      Physician: Joycelyn Carter MD    Visit Date: 10/27/2020    Physician Orders: PT Eval and Treat   Medical Diagnosis from Referral:   M25.562,G89.29 (ICD-10-CM) - Chronic pain of left knee   M17.12 (ICD-10-CM) - Primary osteoarthritis of left knee   R26.9 (ICD-10-CM) - Gait abnormality     Evaluation Date: 9/22/2020  Authorization Period Expiration: 9/22/2021  Plan of Care Expiration: 11/3/2020  Visit # / Visits authorized:  10/20      Time In: 1045 am   Time Out: 1145 am  Total Billable Time: 60 minutes    Precautions: Standard    Subjective     Pt reports: having more trouble with medial step downs but no further pain in knee. Some difficulty stooping.     She was compliant with home exercise program.  Response to previous treatment: tired after last session  Functional change: increased ROM, improved strength     Pain before: 1/10  Pain after: 0/10  Location:  L knee        Treatment       Mita received therapeutic exercises (exercises performed today are bolded)   to develop strength, endurance, ROM, flexibility and posture for 50 minutes including:    Bike to increase ROM, LE strength, and cardiovascular endurance for 5 minutes   QS x 3 min with towel under ankle  Straight leg Leg Raise 3 x 8 1#  Prone hip extension 3 x 10 pillow under stomach  Side lying hip abduction 3x10   Bridges 3 x 8 with RTB at knees  Heel slides 3x10 - straps 5" hold.   Standing hip abduction 3x8 YTB  Standing heel cord stretch 30" x 3  Prone quad stretch 10" x 10   Clamshells 3x12 LLE  Leg press - 4 resistance bands - 3x12    Mita received the following manual therapy techniques for 10 minutes, including:    ASTYM to hamstring, calf on posterior knee  Extension mobilizations    Home Exercises Provided and " Patient Education Provided     Education provided:   - continue with previously issued HEP  - educated pt that mild soreness is an expected outcome to PT sessions  -Differentiation of pain compared to stretch sensation  -exercises being performed with gradual increase in reps to tolerance and to pay attention of response to exercises post exercise  -Answered questions regarding exercise program    Written Home Exercises Provided: yes.  Exercises were reviewed and Mita was able to demonstrate them prior to the end of the session.  Mita demonstrated good  understanding of the education provided.     See EMR under Patient Instructions for exercises provided 9/28/2020.      Assessment     Pt feels knee has improved in stiffness and strength overall. Pt still has difficulty squatting, kneeling, and stooping. Pt will have measures taken again upcoming visit with determination on extending plan following measures. Pt continues to perform HEP each day.    Mita is progressing well towards her goals.   Pt prognosis is Excellent.     Pt will continue to benefit from skilled outpatient physical therapy to address the deficits listed in the problem list box on initial evaluation, provide pt/family education and to maximize pt's level of independence in the home and community environment.     Pt's spiritual, cultural and educational needs considered and pt agreeable to plan of care and goals.     Anticipated barriers to physical therapy: none    Goals:  Long Term Goals (6 Weeks):  1. Pt will be independent with HEP to supplement PT in improving functional mobility -MET  2. Pt will demonstrate proper VMO activation during quad set B to improve patellar tracking during plyometric tasks. -MET  3. Pt will ascend 1 flight of stairs with no pain to promote functional mobility. -MET  4. Pt will improve FOTO score to </= 38% limited to decrease perceived limitation with mobility -progressing, not met   5. Pt will improve hip MMTs to  >/= 5/5 B to decrease load on knee joint during plyometric tasks. -progressing, not met   6. Pt will improve left knee ROM to 0-115 to improve functional pain-free activity -progressing, not met          Plan     Continue PT per POC, assess tolerance to today's session,  progress exercise as tolerated and appropriate    Plan of care Certification: 9/22/2020 to 11/3/2020.     Outpatient Physical Therapy 2 times weekly for 6 weeks to include the following interventions: Electrical Stimulation IFC, Gait Training, Manual Therapy, Moist Heat/ Ice, Neuromuscular Re-ed, Orthotic Management and Training, Patient Education, Self Care, Therapeutic Activites and Therapeutic Exercise, ASTYM, Kinesiotaping PRN, Functional Dry Needling      Arnulfo Foley, PT  10/27/2020

## 2020-10-29 ENCOUNTER — CLINICAL SUPPORT (OUTPATIENT)
Dept: REHABILITATION | Facility: HOSPITAL | Age: 70
End: 2020-10-29
Payer: MEDICARE

## 2020-10-29 DIAGNOSIS — G89.29 CHRONIC PAIN OF LEFT KNEE: ICD-10-CM

## 2020-10-29 DIAGNOSIS — M25.562 CHRONIC PAIN OF LEFT KNEE: ICD-10-CM

## 2020-10-29 DIAGNOSIS — M25.662 DECREASED RANGE OF MOTION (ROM) OF LEFT KNEE: ICD-10-CM

## 2020-10-29 PROCEDURE — 97140 MANUAL THERAPY 1/> REGIONS: CPT | Mod: HCNC | Performed by: PHYSICAL THERAPIST

## 2020-10-29 PROCEDURE — 97110 THERAPEUTIC EXERCISES: CPT | Mod: HCNC | Performed by: PHYSICAL THERAPIST

## 2020-10-29 NOTE — PROGRESS NOTES
"  Physical Therapy Progress Note     Name: Mita Bell [facility]  Clinic Number: [id number]    Therapy Diagnosis:   Encounter Diagnoses   Name Primary?    Chronic pain of left knee     Decreased range of motion (ROM) of left knee      Physician: Joycelyn Carter MD    Visit Date: 10/29/2020    Physician Orders: PT Eval and Treat   Medical Diagnosis from Referral:   M25.562,G89.29 (ICD-10-CM) - Chronic pain of left knee   M17.12 (ICD-10-CM) - Primary osteoarthritis of left knee   R26.9 (ICD-10-CM) - Gait abnormality     Evaluation Date: 9/22/2020  Authorization Period Expiration: 9/22/2021  Plan of Care Expiration: 11/3/2020 - updated today  Visit # / Visits authorized:  11/20      Time In: 1045 am   Time Out: 1130 am  Total Billable Time: 45 minutes    Precautions: Standard    Subjective     Pt reports: improved ROM, strength, and function in left knee. Pt continues to have end range pain with stooping to ground but usually avoids movements at this time..     She was compliant with home exercise program.  Response to previous treatment: fatigue, soreness  Functional change: increased ROM, improved strength     Pain before: 1/10  Pain after: 0/10  Location:  L knee        Treatment       Mita received therapeutic exercises (exercises performed today are bolded)   to develop strength, endurance, ROM, flexibility and posture for 35 minutes including:    Bike to increase ROM, LE strength, and cardiovascular endurance for 5 minutes   QS x 3 min with towel under ankle  Straight leg Leg Raise 3 x 10 1#  Side lying hip abduction 3x10   Bridges 3 x 8 with RTB at knees  Heel slides 3x10 - straps 5" hold.   Standing hip abduction 3x8 YTB  Standing heel cord stretch 30" x 3  Prone quad stretch 10" x 10   Clamshells 3x12 LLE YTB  Leg press - 5 resistance bands - 3x12  Step ups - stairs - 2x10    Mita received the following manual therapy techniques for 10 minutes, including:    ASTYM to hamstring, calf on posterior " knee  Extension mobilizations    Home Exercises Provided and Patient Education Provided     Education provided:   - continue with previously issued HEP  -exercises being performed with gradual increase in reps to tolerance and to pay attention of response to exercises post exercise  -Answered questions regarding exercise program    Written Home Exercises Provided: yes.  Exercises were reviewed and Mita was able to demonstrate them prior to the end of the session.  Mita demonstrated good  understanding of the education provided.     See EMR under Patient Instructions for exercises provided 9/28/2020.      Assessment     Pt presents with improved motion and strength in LLE with performance of therex. Pt feels pressure along left knee with step down activity but has decreased crepitus and weakness. Pt main complaint is stooping down to ground with end range knee flexion and will work on eccentric quadriceps progression to improve motion..    Mita is progressing well towards her goals.   Pt prognosis is Excellent.     Pt will continue to benefit from skilled outpatient physical therapy to address the deficits listed in the problem list box on initial evaluation, provide pt/family education and to maximize pt's level of independence in the home and community environment.     Pt's spiritual, cultural and educational needs considered and pt agreeable to plan of care and goals.     Anticipated barriers to physical therapy: none    Goals:  Long Term Goals (6 Weeks):  1. Pt will be independent with HEP to supplement PT in improving functional mobility -MET  2. Pt will demonstrate proper VMO activation during quad set B to improve patellar tracking during plyometric tasks. -MET  3. Pt will ascend 1 flight of stairs with no pain to promote functional mobility. -MET  4. Pt will improve FOTO score to </= 38% limited to decrease perceived limitation with mobility -progressing, not met   5. Pt will improve hip MMTs to >/= 5/5 B  to decrease load on knee joint during plyometric tasks. -MET  6. Pt will improve left knee ROM to 0-115 to improve functional pain-free activity -progressing, not met          Plan     Updated POC today.      Arnulfo Foley, PT  10/29/2020

## 2020-10-30 NOTE — PLAN OF CARE
Outpatient Therapy Updated Plan of Care     Visit Date: 10/29/2020  Name: Mita Max  Clinic Number: [id number]    Therapy Diagnosis:   Encounter Diagnoses   Name Primary?    Chronic pain of left knee     Decreased range of motion (ROM) of left knee      Physician: Joycelyn Carter MD    Physician Orders: PT Eval and Treat   Medical Diagnosis from Referral:   M25.562,G89.29 (ICD-10-CM) - Chronic pain of left knee   M17.12 (ICD-10-CM) - Primary osteoarthritis of left knee   R26.9 (ICD-10-CM) - Gait abnormality      Evaluation Date: 9/22/2020    Total Visits Received: 11  Cancelled Visits: 0  No Show Visits: 0    Current Certification Period:  9/22/2020 to 11/3/2020  Precautions:  Standard  Visits from Evaluation Date:  11  Functional Level Prior to Evaluation:  Unable to complete full left knee ROM without pain    Subjective     Update:   Pt reports: improved ROM, strength, and function in left knee. Pt continues to have end range pain with stooping to ground but usually avoids movements at this time..      She was compliant with home exercise program.  Response to previous treatment: fatigue, soreness  Functional change: increased ROM, improved strength      Pain before: 1/10  Pain after: 0/10  Location:  L knee      Objective     Update:   Range of Motion/Strength:      Hip Right Left Pain/Dysfunction with Movement   AROM/PROM WNL WNL        Knee Right Left Pain/Dysfunction with Movement   AROM         flexion 128 126    extension 0 3 Discomfort      Ankle Right Left Pain/Dysfunction with Movement   AROM/PROM WNL WNL        L/E MMT Right Left Pain/Dysfunction with Movement   Hip Flexion 4+/5 4+/5     Hip Extension 4+/5 4+/5     Hip Abduction 4+/5 4+/5     Hip Adduction 5/5 5/5     Knee Flexion 4+/5 4+/5     Knee Extension 4+/5 4/5     Ankle DF 5/5 5/5     Ankle PF 5/5 5/5              Assessment     Update:   Pt presents with improved motion and strength in LLE with performance of therex. Pt feels  pressure along left knee with step down activity but has decreased crepitus and weakness. Pt main complaint is stooping down to ground with end range knee flexion and will work on eccentric quadriceps progression to improve motion..     Mita is progressing well towards her goals.   Pt prognosis is Excellent.      Pt will continue to benefit from skilled outpatient physical therapy to address the deficits listed in the problem list box on initial evaluation, provide pt/family education and to maximize pt's level of independence in the home and community environment.      Pt's spiritual, cultural and educational needs considered and pt agreeable to plan of care and goals.     Anticipated barriers to physical therapy: none    Long Term Goal Status:   continue per initial plan of care.  Reasons for Recertification of Therapy:   Further improve eccentric strength of LLE, complete full pain-free ROM.    Plan     Updated Certification Period: 10/29/2020 to 11/26/2020  Recommended Treatment Plan: 2 times per week for 4 weeks: Gait Training, Manual Therapy, Moist Heat/ Ice, Neuromuscular Re-ed, Patient Education, Self Care, Therapeutic Activites and Therapeutic Exercise  Other Recommendations: LALIT Foley, PT  10/29/2020      I CERTIFY THE NEED FOR THESE SERVICES FURNISHED UNDER THIS PLAN OF TREATMENT AND WHILE UNDER MY CARE    Physician's comments:        Physician's Signature: ___________________________________________________

## 2020-11-03 ENCOUNTER — CLINICAL SUPPORT (OUTPATIENT)
Dept: REHABILITATION | Facility: HOSPITAL | Age: 70
End: 2020-11-03
Payer: MEDICARE

## 2020-11-03 DIAGNOSIS — G89.29 CHRONIC PAIN OF LEFT KNEE: ICD-10-CM

## 2020-11-03 DIAGNOSIS — M25.562 CHRONIC PAIN OF LEFT KNEE: ICD-10-CM

## 2020-11-03 DIAGNOSIS — M25.662 DECREASED RANGE OF MOTION (ROM) OF LEFT KNEE: ICD-10-CM

## 2020-11-03 PROCEDURE — 97140 MANUAL THERAPY 1/> REGIONS: CPT | Mod: HCNC,CQ

## 2020-11-03 PROCEDURE — 97110 THERAPEUTIC EXERCISES: CPT | Mod: HCNC,CQ

## 2020-11-03 NOTE — PROGRESS NOTES
"  Physical Therapy Daily Treatment Note     [facility]  Clinic Number: [id number]    Therapy Diagnosis:   Encounter Diagnoses   Name Primary?    Chronic pain of left knee     Decreased range of motion (ROM) of left knee      Physician: Joycelyn Carter MD    Visit Date: 11/3/2020    Physician Orders: PT Eval and Treat   Medical Diagnosis from Referral:   M25.562,G89.29 (ICD-10-CM) - Chronic pain of left knee   M17.12 (ICD-10-CM) - Primary osteoarthritis of left knee   R26.9 (ICD-10-CM) - Gait abnormality     Evaluation Date: 9/22/2020  Authorization Period Expiration: 9/22/2021  Plan of Care Expiration: 11/3/2020   Visit # / Visits authorized:  12/20      Time In: 1045 am   Time Out: 1130 am  Total Billable Time: 40 minutes    Precautions: Standard    Subjective     Pt reports: Minimal pain in left knee and noticed she is moving more naturally by bending over to retrieve items off floor.      She was compliant with home exercise program.  Response to previous treatment: fatigue, soreness  Functional change: increased ROM, improved strength and able to get items off floor    Pain before: 0/10  Pain after: 2/10  Location:  None        Treatment       Mita received therapeutic exercises (exercises performed today are bolded)   to develop strength, endurance, ROM, flexibility and posture for 30 minutes including:    Bike to increase ROM, LE strength, and cardiovascular endurance for 5 minutes   QS x 3 min with towel under ankle (home exercise)  Straight leg Leg Raise 3 x 10 1# (home exercise)  Side lying hip abduction 3x10 (home exercise)  Bridges 3 x 8 with RTB at knees  Heel slides 3x10 - straps 5" hold (home exercise)  Standing hip abduction 3x8 YTB (home exercise)  Standing heel cord stretch 30" x 3 (home exercise)  Prone quad stretch 10" x 10   Clamshells 3x12 LLE YTB (home exercise)  Leg press - 5 resistance bands - 3x12  Step ups - stairs - 3x10   Step up/down 3x10 each leg 6 inch box 2 finger " support  Sit<-->stand low mat sitting on airex pad ball under right foot 3x8 no hands  Heel/toe raises 3x10 each 2 finger support  March in place 3x10 2 finger support       Mita received the following manual therapy techniques for 10 minutes, including:    ASTYM to hamstring, calf on posterior knee  Posterior knee joint mobilizations to assist with knee extension    Home Exercises Provided and Patient Education Provided     Education provided:   - continue with previously issued HEP  -exercises being performed with gradual increase in reps to tolerance and to pay attention of response to exercises post exercise  -Answered questions regarding exercise program    Written Home Exercises Provided: yes.  Exercises were reviewed and Mita was able to demonstrate them prior to the end of the session.  Mita demonstrated good  understanding of the education provided.     See EMR under Patient Instructions for exercises provided 9/28/2020.      Assessment     Pt progressed exercises with more standing exercises being performed and with more effort from left leg with sit<-->stand with ball under right foot and with patient not using arms. Fatigued after session. Noted minimal improvement in left knee extension after manual therapy.     Mita is progressing well towards her goals.   Pt prognosis is Excellent.     Pt will continue to benefit from skilled outpatient physical therapy to address the deficits listed in the problem list box on initial evaluation, provide pt/family education and to maximize pt's level of independence in the home and community environment.     Pt's spiritual, cultural and educational needs considered and pt agreeable to plan of care and goals.     Anticipated barriers to physical therapy: none    Goals:  Long Term Goals (6 Weeks):  1. Pt will be independent with HEP to supplement PT in improving functional mobility -MET  2. Pt will demonstrate proper VMO activation during quad set B to improve  patellar tracking during plyometric tasks. -MET  3. Pt will ascend 1 flight of stairs with no pain to promote functional mobility. -MET  4. Pt will improve FOTO score to </= 38% limited to decrease perceived limitation with mobility -progressing, not met   5. Pt will improve hip MMTs to >/= 5/5 B to decrease load on knee joint during plyometric tasks. -MET  6. Pt will improve left knee ROM to 0-115 to improve functional pain-free activity -progressing, not met          Plan     Continue POC to strengthen left leg allowing patient to be able to ambulate on all community surfaces safely and without difficulty.       Vishal Blanco, PTA  11/3/2020

## 2020-11-06 ENCOUNTER — CLINICAL SUPPORT (OUTPATIENT)
Dept: REHABILITATION | Facility: HOSPITAL | Age: 70
End: 2020-11-06
Payer: MEDICARE

## 2020-11-06 DIAGNOSIS — M25.562 CHRONIC PAIN OF LEFT KNEE: ICD-10-CM

## 2020-11-06 DIAGNOSIS — G89.29 CHRONIC PAIN OF LEFT KNEE: ICD-10-CM

## 2020-11-06 DIAGNOSIS — M25.662 DECREASED RANGE OF MOTION (ROM) OF LEFT KNEE: ICD-10-CM

## 2020-11-06 PROCEDURE — 97110 THERAPEUTIC EXERCISES: CPT | Mod: HCNC,CQ

## 2020-11-06 NOTE — PROGRESS NOTES
"  Physical Therapy Daily Treatment Note     [facility]  Clinic Number: [id number]    Therapy Diagnosis:   Encounter Diagnoses   Name Primary?    Chronic pain of left knee     Decreased range of motion (ROM) of left knee      Physician: Joycelyn Carter MD    Visit Date: 11/6/2020    Physician Orders: PT Eval and Treat   Medical Diagnosis from Referral:   M25.562,G89.29 (ICD-10-CM) - Chronic pain of left knee   M17.12 (ICD-10-CM) - Primary osteoarthritis of left knee   R26.9 (ICD-10-CM) - Gait abnormality     Evaluation Date: 9/22/2020  Authorization Period Expiration: 9/22/2021  Plan of Care Expiration: 11/3/2020   Visit # / Visits authorized:  13/20      Time In: 1040  am   Time Out: 1130 am  Total Billable Time: 45 minutes    Precautions: Standard    Subjective     Pt reports: States she purchased a 1 pound ankle weight and used the ankle weight in conjunction with red theraband and now has increased soreness. Had a busy day yesterday with home chores and walking in the neighborhood for 40 minutes before exercises.       She was compliant with home exercise program.  Response to previous treatment: fatigue, soreness  Functional change: increased ROM, improved strength and able to get items off floor    Pain before: 0/10  Pain after: 0/10  Location:  None        Treatment       Mita received therapeutic exercises (exercises performed today are bolded)   to develop strength, endurance, ROM, flexibility and posture for 45 minutes including:    Bike to increase ROM, LE strength, and cardiovascular endurance for 5 minutes   QS x 3 min with towel under ankle (home exercise)  Straight leg Leg Raise 3 x 10 1# (home exercise)  Side lying hip abduction 3x10 (home exercise)  Bridges 3 x 8 GTB at knees  Heel slides 3x10 - straps 5" hold (home exercise)  Standing hip abduction 3x8 YTB (home exercise)  Standing heel cord stretch 30" x 3 (home exercise)  Prone quad stretch 10" x 10   Clamshells 3x12 LLE YTB (home " exercise)  Leg press - 5 resistance bands - 3x12  Step ups - stairs - 3x10   Step up 3x10 each leg 6 inch box 2 finger support forward, laterally x10 each   Sit<-->stand low mat sitting on airex pad ball under right foot 3x8 no hands  Heel/toe raises 3x10 each 2 finger support  March in place 3x10 2 finger support       Mita received the following manual therapy techniques for 0 minutes, including:    ASTYM to hamstring, calf on posterior knee  Posterior knee joint mobilizations to assist with knee extension    Home Exercises Provided and Patient Education Provided     Education provided:   - continue with previously issued HEP  -exercises being performed with gradual increase in reps to tolerance and to pay attention of response to exercises post exercise  -Answered questions regarding exercise program    Written Home Exercises Provided: yes.  Exercises were reviewed and Mita was able to demonstrate them prior to the end of the session.  Mita demonstrated good  understanding of the education provided.     See EMR under Patient Instructions for exercises provided 9/28/2020.      Assessment     Pt progressed exercises with additional resistance and with step ups laterally. Right leg is weaker compared to left leg. Fatigued after session. Added resistance with bridges with good quality.     Mita is progressing well towards her goals.   Pt prognosis is Excellent.     Pt will continue to benefit from skilled outpatient physical therapy to address the deficits listed in the problem list box on initial evaluation, provide pt/family education and to maximize pt's level of independence in the home and community environment.     Pt's spiritual, cultural and educational needs considered and pt agreeable to plan of care and goals.     Anticipated barriers to physical therapy: none    Goals:  Long Term Goals (6 Weeks):  1. Pt will be independent with HEP to supplement PT in improving functional mobility -MET  2. Pt will  demonstrate proper VMO activation during quad set B to improve patellar tracking during plyometric tasks. -MET  3. Pt will ascend 1 flight of stairs with no pain to promote functional mobility. -MET  4. Pt will improve FOTO score to </= 38% limited to decrease perceived limitation with mobility -progressing, not met   5. Pt will improve hip MMTs to >/= 5/5 B to decrease load on knee joint during plyometric tasks. -MET  6. Pt will improve left knee ROM to 0-115 to improve functional pain-free activity -progressing, not met          Plan     Continue POC to strengthen left leg allowing patient to be able to ambulate on all community surfaces safely and without difficulty.       Vishal Blanco, PTA  11/6/2020

## 2020-11-10 ENCOUNTER — CLINICAL SUPPORT (OUTPATIENT)
Dept: REHABILITATION | Facility: HOSPITAL | Age: 70
End: 2020-11-10
Payer: MEDICARE

## 2020-11-10 DIAGNOSIS — M25.662 DECREASED RANGE OF MOTION (ROM) OF LEFT KNEE: ICD-10-CM

## 2020-11-10 DIAGNOSIS — G89.29 CHRONIC PAIN OF LEFT KNEE: ICD-10-CM

## 2020-11-10 DIAGNOSIS — M25.562 CHRONIC PAIN OF LEFT KNEE: ICD-10-CM

## 2020-11-10 PROCEDURE — 97140 MANUAL THERAPY 1/> REGIONS: CPT | Mod: HCNC | Performed by: PHYSICAL THERAPIST

## 2020-11-10 PROCEDURE — 97110 THERAPEUTIC EXERCISES: CPT | Mod: HCNC | Performed by: PHYSICAL THERAPIST

## 2020-11-10 NOTE — PROGRESS NOTES
"  Physical Therapy Daily Treatment Note     [facility]  Cl[facility] Number: [id number]    Therapy Diagnosis:   Encounter Diagnoses   Name Primary?    Chronic pain of left knee     Decreased range of motion (ROM) of left knee      Physician: Joycelyn Carter MD    Visit Date: 11/10/2020    Physician Orders: PT Eval and Treat   Medical Diagnosis from Referral:   M25.562,G89.29 (ICD-10-CM) - Chronic pain of left knee   M17.12 (ICD-10-CM) - Primary osteoarthritis of left knee   R26.9 (ICD-10-CM) - Gait abnormality     Evaluation Date: 9/22/2020  Authorization Period Expiration: 9/22/2021  Plan of Care Expiration: 11/26/2020   Visit # / Visits authorized:  14/20      Time In: 1130  am   Time Out: 1215 pm  Total Billable Time: 45 minutes    Precautions: Standard    Subjective     Pt reports: doing a lot of bending and lifting and complains of knee soreness but improving ROM and strength along LLE. Also performing exercises with RLE at this time to improve strength with stooping down to ground      She was compliant with home exercise program.  Response to previous treatment: fatigue, soreness  Functional change: increased ROM, improved strength and able to get items off floor    Pain before: 0/10  Pain after: 0/10  Location:  None        Treatment       Mita received therapeutic exercises (exercises performed today are bolded)   to develop strength, endurance, ROM, flexibility and posture for 35 minutes including:    Bike to increase ROM, LE strength, and cardiovascular endurance for 6 minutes   Bridges 3 x 8 GTB at knees  Heel slides 3x10 - straps 5" hold  Standing hip abduction 3x8 YTB   Standing heel cord stretch 30" x 3   Prone quad stretch 10" x 10   Clamshells 3x12 LLE YTB  Leg press - 5 resistance bands - 3x12  Step ups - stairs - 3x10   Step downs - 4" - 3x6 on LLE      Mita received the following manual therapy techniques for 10 minutes, including:    ASTYM to hamstring, calf on posterior " knee  Posterior knee joint mobilizations to assist with knee extension    Home Exercises Provided and Patient Education Provided     Education provided:   - continue with previously issued HEP  -exercises being performed with gradual increase in reps to tolerance and to pay attention of response to exercises post exercise  -Answered questions regarding exercise program    Written Home Exercises Provided: yes.  Exercises were reviewed and Mita was able to demonstrate them prior to the end of the session.  Mita demonstrated good  understanding of the education provided.     See EMR under Patient Instructions for exercises provided 9/28/2020.      Assessment     Pt presents with stiffness along left knee but improving ROM and strength in general. Pt able to stoop down with minimal difficulty and pulling along patella. Pt will continue to benefit from quadriceps and lateral hip strengthening components. .     Mita is progressing well towards her goals.   Pt prognosis is Excellent.     Pt will continue to benefit from skilled outpatient physical therapy to address the deficits listed in the problem list box on initial evaluation, provide pt/family education and to maximize pt's level of independence in the home and community environment.     Pt's spiritual, cultural and educational needs considered and pt agreeable to plan of care and goals.     Anticipated barriers to physical therapy: none    Goals:  Long Term Goals (6 Weeks):  1. Pt will be independent with HEP to supplement PT in improving functional mobility -MET  2. Pt will demonstrate proper VMO activation during quad set B to improve patellar tracking during plyometric tasks. -MET  3. Pt will ascend 1 flight of stairs with no pain to promote functional mobility. -MET  4. Pt will improve FOTO score to </= 38% limited to decrease perceived limitation with mobility -progressing, not met   5. Pt will improve hip MMTs to >/= 5/5 B to decrease load on knee joint  during plyometric tasks. -MET  6. Pt will improve left knee ROM to 0-115 to improve functional pain-free activity -progressing, not met          Plan     Continue POC to strengthen left leg allowing patient to be able to ambulate on all community surfaces safely and without difficulty.       Arnulfo Foley, PT  11/17/2020

## 2020-11-12 ENCOUNTER — CLINICAL SUPPORT (OUTPATIENT)
Dept: REHABILITATION | Facility: HOSPITAL | Age: 70
End: 2020-11-12
Payer: MEDICARE

## 2020-11-12 DIAGNOSIS — G89.29 CHRONIC PAIN OF LEFT KNEE: ICD-10-CM

## 2020-11-12 DIAGNOSIS — M25.562 CHRONIC PAIN OF LEFT KNEE: ICD-10-CM

## 2020-11-12 DIAGNOSIS — M25.662 DECREASED RANGE OF MOTION (ROM) OF LEFT KNEE: ICD-10-CM

## 2020-11-12 PROCEDURE — 97110 THERAPEUTIC EXERCISES: CPT | Mod: HCNC | Performed by: PHYSICAL THERAPIST

## 2020-11-12 PROCEDURE — 97140 MANUAL THERAPY 1/> REGIONS: CPT | Mod: HCNC | Performed by: PHYSICAL THERAPIST

## 2020-11-12 NOTE — PROGRESS NOTES
"  Physical Therapy Daily Treatment Note     [facility]: [facility]  Clinic Number: [id number]    Therapy Diagnosis:   Encounter Diagnoses   Name Primary?    Chronic pain of left knee     Decreased range of motion (ROM) of left knee      Physician: Joycelyn Carter MD    Visit Date: 11/12/2020    Physician Orders: PT Eval and Treat   Medical Diagnosis from Referral:   M25.562,G89.29 (ICD-10-CM) - Chronic pain of left knee   M17.12 (ICD-10-CM) - Primary osteoarthritis of left knee   R26.9 (ICD-10-CM) - Gait abnormality     Evaluation Date: 9/22/2020  Authorization Period Expiration: 9/22/2021  Plan of Care Expiration: 11/26/2020   Visit # / Visits authorized:  14/20      Time In: 1040  am   Time Out: 1130 am  Total Billable Time: 45 minutes    Precautions: Standard    Subjective     Pt reports: States she purchased a 1 pound ankle weight and used the ankle weight in conjunction with red theraband and now has increased soreness. Had a busy day yesterday with home chores and walking in the neighborhood for 40 minutes before exercises.       She was compliant with home exercise program.  Response to previous treatment: fatigue, soreness  Functional change: increased ROM, improved strength and able to get items off floor    Pain before: 0/10  Pain after: 0/10  Location:  None        Treatment       Mita received therapeutic exercises (exercises performed today are bolded)   to develop strength, endurance, ROM, flexibility and posture for 45 minutes including:    Bike to increase ROM, LE strength, and cardiovascular endurance for 5 minutes   QS x 3 min with towel under ankle (home exercise)  Straight leg Leg Raise 3 x 10 1# (home exercise)  Side lying hip abduction 3x10 (home exercise)  Bridges 3 x 8 GTB at knees  Heel slides 3x10 - straps 5" hold  Standing hip abduction 3x8 YTB (home exercise)  Standing heel cord stretch 30" x 3 (home exercise)  Prone quad stretch 10" x 10   Clamshells 3x12 LLE YTB (home exercise)  Leg " press - 5 resistance bands - 3x12  Step ups - stairs - 3x10   Step up 3x10 each leg 6 inch box 2 finger support forward, laterally x10 each   Sit<-->stand low mat sitting on airex pad ball under right foot 3x8 no hands    Mita received the following manual therapy techniques for 0 minutes, including:    ASTYM to hamstring, calf on posterior knee  Posterior knee joint mobilizations to assist with knee extension    Home Exercises Provided and Patient Education Provided     Education provided:   - continue with previously issued HEP  -exercises being performed with gradual increase in reps to tolerance and to pay attention of response to exercises post exercise  -Answered questions regarding exercise program    Written Home Exercises Provided: yes.  Exercises were reviewed and Mita was able to demonstrate them prior to the end of the session.  Mita demonstrated good  understanding of the education provided.     See EMR under Patient Instructions for exercises provided 9/28/2020.      Assessment     Pt progressed exercises with additional resistance and with step ups laterally. Right leg is weaker compared to left leg. Fatigued after session. Added resistance with bridges with good quality.     Mita is progressing well towards her goals.   Pt prognosis is Excellent.     Pt will continue to benefit from skilled outpatient physical therapy to address the deficits listed in the problem list box on initial evaluation, provide pt/family education and to maximize pt's level of independence in the home and community environment.     Pt's spiritual, cultural and educational needs considered and pt agreeable to plan of care and goals.     Anticipated barriers to physical therapy: none    Goals:  Long Term Goals (6 Weeks):  1. Pt will be independent with HEP to supplement PT in improving functional mobility -MET  2. Pt will demonstrate proper VMO activation during quad set B to improve patellar tracking during plyometric  tasks. -MET  3. Pt will ascend 1 flight of stairs with no pain to promote functional mobility. -MET  4. Pt will improve FOTO score to </= 38% limited to decrease perceived limitation with mobility -progressing, not met   5. Pt will improve hip MMTs to >/= 5/5 B to decrease load on knee joint during plyometric tasks. -MET  6. Pt will improve left knee ROM to 0-115 to improve functional pain-free activity -progressing, not met          Plan     Continue POC to strengthen left leg allowing patient to be able to ambulate on all community surfaces safely and without difficulty.       Arnulfo Foley, PT  11/12/2020

## 2020-11-17 ENCOUNTER — CLINICAL SUPPORT (OUTPATIENT)
Dept: REHABILITATION | Facility: HOSPITAL | Age: 70
End: 2020-11-17
Payer: MEDICARE

## 2020-11-17 DIAGNOSIS — M25.562 CHRONIC PAIN OF LEFT KNEE: ICD-10-CM

## 2020-11-17 DIAGNOSIS — G89.29 CHRONIC PAIN OF LEFT KNEE: ICD-10-CM

## 2020-11-17 DIAGNOSIS — M25.662 DECREASED RANGE OF MOTION (ROM) OF LEFT KNEE: ICD-10-CM

## 2020-11-17 PROCEDURE — 97110 THERAPEUTIC EXERCISES: CPT | Mod: HCNC | Performed by: PHYSICAL THERAPIST

## 2020-11-17 NOTE — PROGRESS NOTES
"  Physical Therapy Daily Treatment Note     Name: Mita Bell [facility]  Clinic Number: [id number]    Therapy Diagnosis:   Encounter Diagnoses   Name Primary?    Chronic pain of left knee     Decreased range of motion (ROM) of left knee      Physician: Joycelyn Carter MD    Visit Date: 11/12/2020    Physician Orders: PT Eval and Treat   Medical Diagnosis from Referral:   M25.562,G89.29 (ICD-10-CM) - Chronic pain of left knee   M17.12 (ICD-10-CM) - Primary osteoarthritis of left knee   R26.9 (ICD-10-CM) - Gait abnormality     Evaluation Date: 9/22/2020  Authorization Period Expiration: 9/22/2021  Plan of Care Expiration: 11/26/2020   Visit # / Visits authorized:  15/20      Time In: 145 pm   Time Out: 230 pm  Total Billable Time: 45 minutes    Precautions: Standard    Subjective     Pt reports: continues to have problems with medial step down on right knee only. Left knee feels OK.    She was compliant with home exercise program.  Response to previous treatment: fatigue, soreness  Functional change: increased ROM, improved strength and able to get items off floor    Pain before: 0/10  Pain after: 0/10  Location:  None        Treatment       Mita received therapeutic exercises (exercises performed today are bolded)   to develop strength, endurance, ROM, flexibility and posture for 35 minutes including:    Bike to increase ROM, LE strength, and cardiovascular endurance for 6 minutes   Bridges 3 x 8 GTB at knees  Heel slides 3x10 - straps 5" hold  Standing hip abduction 3x8 YTB   Standing hip flexion 3x8 YTB  Standing heel cord stretch 30" x 3   Prone quad stretch 10" x 10   Clamshells 3x12 LLE YTB  Leg press - 5 resistance bands - 3x12  Step ups - stairs - 3x10   Step downs - 4" - 3x6 on LLE      Mita received the following manual therapy techniques for 10 minutes, including:    ASTYM to hamstring, calf on posterior knee  Posterior knee joint mobilizations to assist with knee extension    Home Exercises Provided and " Patient Education Provided     Education provided:   - continue with previously issued HEP  -exercises being performed with gradual increase in reps to tolerance and to pay attention of response to exercises post exercise  -Answered questions regarding exercise program    Written Home Exercises Provided: yes.  Exercises were reviewed and Mita was able to demonstrate them prior to the end of the session.  Mita demonstrated good  understanding of the education provided.     See EMR under Patient Instructions for exercises provided 9/28/2020.      Assessment   Pt continues to improve in ROM and strength with each exercise. Pt has minimal crepitus along left knee with step downs but no pain. Pt able to complete 5 reps with increased leg press resistance but needed to go back down. Pt will continue benefit from eccentric quadriceps strengthening progression.     Mita is progressing well towards her goals.   Pt prognosis is Excellent.     Pt will continue to benefit from skilled outpatient physical therapy to address the deficits listed in the problem list box on initial evaluation, provide pt/family education and to maximize pt's level of independence in the home and community environment.     Pt's spiritual, cultural and educational needs considered and pt agreeable to plan of care and goals.     Anticipated barriers to physical therapy: none    Goals:  Long Term Goals (6 Weeks):  1. Pt will be independent with HEP to supplement PT in improving functional mobility -MET  2. Pt will demonstrate proper VMO activation during quad set B to improve patellar tracking during plyometric tasks. -MET  3. Pt will ascend 1 flight of stairs with no pain to promote functional mobility. -MET  4. Pt will improve FOTO score to </= 38% limited to decrease perceived limitation with mobility -progressing, not met   5. Pt will improve hip MMTs to >/= 5/5 B to decrease load on knee joint during plyometric tasks. -MET  6. Pt will improve  left knee ROM to 0-115 to improve functional pain-free activity -progressing, not met          Plan     Continue POC to strengthen left leg allowing patient to be able to ambulate on all community surfaces safely and without difficulty.       Arnulfo Foley, PT  11/17/2020

## 2020-11-17 NOTE — PROGRESS NOTES
"  Physical Therapy Daily Treatment Note     Name: Mita Bell [facility]  Clinic Number: [id number]    Therapy Diagnosis:   Encounter Diagnoses   Name Primary?    Chronic pain of left knee     Decreased range of motion (ROM) of left knee      Physician: Joycelyn Carter MD    Visit Date: 11/17/2020    Physician Orders: PT Eval and Treat   Medical Diagnosis from Referral:   M25.562,G89.29 (ICD-10-CM) - Chronic pain of left knee   M17.12 (ICD-10-CM) - Primary osteoarthritis of left knee   R26.9 (ICD-10-CM) - Gait abnormality     Evaluation Date: 9/22/2020  Authorization Period Expiration: 9/22/2021  Plan of Care Expiration: 11/26/2020   Visit # / Visits authorized:  16/20      Time In: 1130 am   Time Out: 1215 pm  Total Billable Time: 45 minutes    Precautions: Standard    Subjective     Pt reports: was able to complete 4" medial step downs at home bilaterally without increase in pain    She was compliant with home exercise program.  Response to previous treatment: fatigue, soreness  Functional change: increased ROM, improved strength and able to get items off floor    Pain before: 0/10  Pain after: 0/10  Location:  None        Treatment       Mita received therapeutic exercises (exercises performed today are bolded)   to develop strength, endurance, ROM, flexibility and posture for 45 minutes including:    Bike to increase ROM, LE strength, and cardiovascular endurance for 6 minutes   Standing hip abduction 3x8 GTB   Standing hip flexion 3x8 GTB  Standing heel cord stretch 30" x 3   Leg press - 6 resistance bands - 3x12  Step ups - stairs - 3x10   Step downs - 4" - 3x6 on BLE  Lateral squat walks 2 laps YTB  Monster walks YTB 2 laps      Home Exercises Provided and Patient Education Provided     Education provided:   - continue with previously issued HEP  -exercises being performed with gradual increase in reps to tolerance and to pay attention of response to exercises post exercise  -Answered questions regarding " exercise program    Written Home Exercises Provided: yes.  Exercises were reviewed and Mita was able to demonstrate them prior to the end of the session.  Mita demonstrated good  understanding of the education provided.     See EMR under Patient Instructions for exercises provided 9/28/2020.      Assessment   Pt tolerated full standing therex treatment well without significant soreness or discomfort. Pt performed step downs bilaterally without significant pains or crepitus. Pt to continue eccentric quadriceps strengthening to improve gardening and stooping to ground.   Mita is progressing well towards her goals.   Pt prognosis is Excellent.     Pt will continue to benefit from skilled outpatient physical therapy to address the deficits listed in the problem list box on initial evaluation, provide pt/family education and to maximize pt's level of independence in the home and community environment.     Pt's spiritual, cultural and educational needs considered and pt agreeable to plan of care and goals.     Anticipated barriers to physical therapy: none    Goals:  Long Term Goals (6 Weeks):  1. Pt will be independent with HEP to supplement PT in improving functional mobility -MET  2. Pt will demonstrate proper VMO activation during quad set B to improve patellar tracking during plyometric tasks. -MET  3. Pt will ascend 1 flight of stairs with no pain to promote functional mobility. -MET  4. Pt will improve FOTO score to </= 38% limited to decrease perceived limitation with mobility -progressing, not met   5. Pt will improve hip MMTs to >/= 5/5 B to decrease load on knee joint during plyometric tasks. -MET  6. Pt will improve left knee ROM to 0-115 to improve functional pain-free activity -progressing, not met          Plan     Continue POC to strengthen left leg allowing patient to be able to ambulate on all community surfaces safely and without difficulty.       Arnulfo Foley, PT  11/17/2020

## 2020-11-18 ENCOUNTER — HOSPITAL ENCOUNTER (OUTPATIENT)
Dept: RADIOLOGY | Facility: HOSPITAL | Age: 70
Discharge: HOME | End: 2020-11-18
Attending: FAMILY MEDICINE
Payer: MEDICARE

## 2020-11-18 DIAGNOSIS — Z12.31 ENCOUNTER FOR SCREENING MAMMOGRAM FOR BREAST CANCER: ICD-10-CM

## 2020-11-18 PROCEDURE — 77067 SCR MAMMO BI INCL CAD: CPT | Mod: TC,HCNC

## 2020-11-18 PROCEDURE — 77063 BREAST TOMOSYNTHESIS BI: CPT | Mod: 26,HCNC,, | Performed by: RADIOLOGY

## 2020-11-18 PROCEDURE — 77067 MAMMO DIGITAL SCREENING BILAT WITH TOMOSYNTHESIS_CAD: ICD-10-PCS | Mod: 26,HCNC,, | Performed by: RADIOLOGY

## 2020-11-18 PROCEDURE — 77067 SCR MAMMO BI INCL CAD: CPT | Mod: 26,HCNC,, | Performed by: RADIOLOGY

## 2020-11-18 PROCEDURE — 77063 MAMMO DIGITAL SCREENING BILAT WITH TOMOSYNTHESIS_CAD: ICD-10-PCS | Mod: 26,HCNC,, | Performed by: RADIOLOGY

## 2020-11-19 ENCOUNTER — CLINICAL SUPPORT (OUTPATIENT)
Dept: REHABILITATION | Facility: HOSPITAL | Age: 70
End: 2020-11-19
Payer: MEDICARE

## 2020-11-19 DIAGNOSIS — M25.662 DECREASED RANGE OF MOTION (ROM) OF LEFT KNEE: ICD-10-CM

## 2020-11-19 DIAGNOSIS — G89.29 CHRONIC PAIN OF LEFT KNEE: ICD-10-CM

## 2020-11-19 DIAGNOSIS — M25.562 CHRONIC PAIN OF LEFT KNEE: ICD-10-CM

## 2020-11-19 PROCEDURE — 97110 THERAPEUTIC EXERCISES: CPT | Mod: HCNC | Performed by: PHYSICAL THERAPIST

## 2020-11-19 NOTE — PROGRESS NOTES
"  Physical Therapy Daily Treatment Note     Name: Mita Bell [facility]  Clinic Number: [id number]    Therapy Diagnosis:   Encounter Diagnoses   Name Primary?    Chronic pain of left knee     Decreased range of motion (ROM) of left knee      Physician: Joycelyn Carter MD    Visit Date: 11/19/2020    Physician Orders: PT Eval and Treat   Medical Diagnosis from Referral:   M25.562,G89.29 (ICD-10-CM) - Chronic pain of left knee   M17.12 (ICD-10-CM) - Primary osteoarthritis of left knee   R26.9 (ICD-10-CM) - Gait abnormality     Evaluation Date: 9/22/2020  Authorization Period Expiration: 9/22/2021  Plan of Care Expiration: 11/26/2020   Visit # / Visits authorized:  17/20      Time In: 1045 am   Time Out: 1130 am  Total Billable Time: 45 minutes    Precautions: Standard    Subjective     Pt reports: right knee has fallen behind left knee due to improvement in left knee ROM, strength    She was compliant with home exercise program.  Response to previous treatment: fatigue, soreness  Functional change: increased ROM, improved strength and able to get items off floor    Pain before: 0/10  Pain after: 0/10  Location:  None        Treatment       Mita received therapeutic exercises (exercises performed today are bolded)   to develop strength, endurance, ROM, flexibility and posture for 45 minutes including:    Bike to increase ROM, LE strength, and cardiovascular endurance for 6 minutes   Standing hip abduction 3x8 GTB   Standing hip flexion 3x8 GTB  Standing heel cord stretch 30" x 3   Leg press - 6 resistance bands - 3x12  Step ups - stairs - 3x10   Step downs - 4" - 3x8 on BLE  Lateral squat walks 2 laps YTB  Monster walks YTB 2 laps  Bridges 3x10  SLR 3# 3x8 LLE  Clamshells 3x12 LLE      Home Exercises Provided and Patient Education Provided     Education provided:   - continue with previously issued HEP  -exercises being performed with gradual increase in reps to tolerance and to pay attention of response to exercises " post exercise  -Answered questions regarding exercise program    Written Home Exercises Provided: yes.  Exercises were reviewed and Mita was able to demonstrate them prior to the end of the session.  Mita demonstrated good  understanding of the education provided.     See EMR under Patient Instructions for exercises provided 9/28/2020.      Assessment     Pt continues to improve in left knee pain, ROM, and strength. Pt cleaning out house right now and having to stoop down multiple times in given day to lift objects. Pt complains of soreness but no significant pain at this time. Pt feels right knee is worse than left knee and educated on performing HEP with right knee as well.     Mita is progressing well towards her goals.   Pt prognosis is Excellent.     Pt will continue to benefit from skilled outpatient physical therapy to address the deficits listed in the problem list box on initial evaluation, provide pt/family education and to maximize pt's level of independence in the home and community environment.     Pt's spiritual, cultural and educational needs considered and pt agreeable to plan of care and goals.     Anticipated barriers to physical therapy: none    Goals:  Long Term Goals (6 Weeks):  1. Pt will be independent with HEP to supplement PT in improving functional mobility -MET  2. Pt will demonstrate proper VMO activation during quad set B to improve patellar tracking during plyometric tasks. -MET  3. Pt will ascend 1 flight of stairs with no pain to promote functional mobility. -MET  4. Pt will improve FOTO score to </= 38% limited to decrease perceived limitation with mobility -progressing, not met   5. Pt will improve hip MMTs to >/= 5/5 B to decrease load on knee joint during plyometric tasks. -MET  6. Pt will improve left knee ROM to 0-115 to improve functional pain-free activity -progressing, not met          Plan     Continue POC to strengthen left leg allowing patient to be able to ambulate on  all community surfaces safely and without difficulty.       Arnulfo Foley, PT  11/19/2020

## 2020-11-24 ENCOUNTER — CLINICAL SUPPORT (OUTPATIENT)
Dept: REHABILITATION | Facility: HOSPITAL | Age: 70
End: 2020-11-24
Payer: MEDICARE

## 2020-11-24 ENCOUNTER — PATIENT MESSAGE (OUTPATIENT)
Dept: REHABILITATION | Facility: HOSPITAL | Age: 70
End: 2020-11-24

## 2020-11-24 DIAGNOSIS — M25.662 DECREASED RANGE OF MOTION (ROM) OF LEFT KNEE: ICD-10-CM

## 2020-11-24 DIAGNOSIS — M25.562 CHRONIC PAIN OF LEFT KNEE: ICD-10-CM

## 2020-11-24 DIAGNOSIS — G89.29 CHRONIC PAIN OF LEFT KNEE: ICD-10-CM

## 2020-11-24 PROCEDURE — 97110 THERAPEUTIC EXERCISES: CPT | Mod: HCNC | Performed by: PHYSICAL THERAPIST

## 2020-11-24 NOTE — PROGRESS NOTES
"  Physical Therapy Daily Treatment Note     Name: Mita Bell [facility]  Clinic Number: [id number]    Therapy Diagnosis:   Encounter Diagnoses   Name Primary?    Chronic pain of left knee     Decreased range of motion (ROM) of left knee      Physician: Joycelyn Carter MD    Visit Date: 11/24/2020    Physician Orders: PT Eval and Treat   Medical Diagnosis from Referral:   M25.562,G89.29 (ICD-10-CM) - Chronic pain of left knee   M17.12 (ICD-10-CM) - Primary osteoarthritis of left knee   R26.9 (ICD-10-CM) - Gait abnormality     Evaluation Date: 9/22/2020  Authorization Period Expiration: 9/22/2021  Plan of Care Expiration: 11/26/2020   Visit # / Visits authorized:  18/20      Time In: 1045 am   Time Out: 1130 am  Total Billable Time: 45 minutes    Precautions: Standard    Subjective     Pt reports: left knee continues to improve in ROM and strengthening components. Performing more activity at home which will increase swelling when doing "too much" but goes back down next day.     She was compliant with home exercise program.  Response to previous treatment: fatigue, soreness  Functional change: increased ROM, improved strength and able to get items off floor    Pain before: 0/10  Pain after: 0/10  Location:  None        Treatment       Mita received therapeutic exercises (exercises performed today are bolded)   to develop strength, endurance, ROM, flexibility and posture for 45 minutes including:    Bike to increase ROM, LE strength, and cardiovascular endurance for 6 minutes   Standing hip abduction 3x10 GTB   Standing hip flexion 3x10 GTB  Standing heel cord stretch 30" x 3   Leg press - 6 resistance bands - 3x12  Step ups - stairs - 3x10   Step downs - 6" - 3x8 on BLE  Lateral squat walks 2 laps RTB  Monster walks RTB 2 laps  Bridges 3x10 YTB  SLR 3# 3x8 LLE  Clamshells 3x12 LLE      Home Exercises Provided and Patient Education Provided     Education provided:   - continue with previously issued HEP  -exercises " being performed with gradual increase in reps to tolerance and to pay attention of response to exercises post exercise  -Answered questions regarding exercise program    Written Home Exercises Provided: yes.  Exercises were reviewed and Mita was able to demonstrate them prior to the end of the session.  Mita demonstrated good  understanding of the education provided.     See EMR under Patient Instructions for exercises provided 9/28/2020.      Assessment     Pt tolerates all strengthening and ROM components. Pt able to perform step downs at higher step without crepitus and pain. Pt will continue increasing activity at home and monitor pain. Pt will have 1x/week appt's for next two weeks and continue with HEP.      Mita is progressing well towards her goals.   Pt prognosis is Excellent.     Pt will continue to benefit from skilled outpatient physical therapy to address the deficits listed in the problem list box on initial evaluation, provide pt/family education and to maximize pt's level of independence in the home and community environment.     Pt's spiritual, cultural and educational needs considered and pt agreeable to plan of care and goals.     Anticipated barriers to physical therapy: none    Goals:  Long Term Goals (6 Weeks):  1. Pt will be independent with HEP to supplement PT in improving functional mobility -MET  2. Pt will demonstrate proper VMO activation during quad set B to improve patellar tracking during plyometric tasks. -MET  3. Pt will ascend 1 flight of stairs with no pain to promote functional mobility. -MET  4. Pt will improve FOTO score to </= 38% limited to decrease perceived limitation with mobility -progressing, not met   5. Pt will improve hip MMTs to >/= 5/5 B to decrease load on knee joint during plyometric tasks. -MET  6. Pt will improve left knee ROM to 0-115 to improve functional pain-free activity -progressing, not met          Plan     Update POC next visit.       Arnulfo Foley,  PT  11/24/2020

## 2020-12-01 ENCOUNTER — CLINICAL SUPPORT (OUTPATIENT)
Dept: REHABILITATION | Facility: HOSPITAL | Age: 70
End: 2020-12-01
Payer: MEDICARE

## 2020-12-01 DIAGNOSIS — G89.29 CHRONIC PAIN OF LEFT KNEE: ICD-10-CM

## 2020-12-01 DIAGNOSIS — M25.662 DECREASED RANGE OF MOTION (ROM) OF LEFT KNEE: ICD-10-CM

## 2020-12-01 DIAGNOSIS — M25.562 CHRONIC PAIN OF LEFT KNEE: ICD-10-CM

## 2020-12-01 PROCEDURE — 97110 THERAPEUTIC EXERCISES: CPT | Mod: HCNC | Performed by: PHYSICAL THERAPIST

## 2020-12-01 NOTE — PLAN OF CARE
Outpatient Therapy Updated Plan of Care     Visit Date: 12/1/2020  Name: Mita Max  Clinic Number: [id number]    Therapy Diagnosis:   Encounter Diagnoses   Name Primary?    Chronic pain of left knee     Decreased range of motion (ROM) of left knee      Physician: Joycelyn Carter MD    Physician Orders: PT Eval and Treat   Medical Diagnosis from Referral:   M25.562,G89.29 (ICD-10-CM) - Chronic pain of left knee   M17.12 (ICD-10-CM) - Primary osteoarthritis of left knee   R26.9 (ICD-10-CM) - Gait abnormality      Evaluation Date: 9/22/2020      Total Visits Received: 18  Cancelled Visits: 0  No Show Visits: 0    Current Certification Period:  09/22/2020 to 11/26/2020  Precautions:  Standard  Visits from Evaluation Date:  19  Functional Level Prior to Evaluation: unable to perform sit to stand or deep squat without significant pain    Subjective     Update:   Pt reports:  Feeling good, minimal discomfort. Is having more crepitus today for unknown reason.     She was compliant with home exercise program.  Response to previous treatment: fatigue, soreness  Functional change: increased ROM, improved strength and able to get items off floor     Pain before: 0/10  Pain after: 0/10  Location:  None         Objective     Update:   Update:   Range of Motion/Strength:      Hip Right Left Pain/Dysfunction with Movement   AROM/PROM WNL WNL        Knee Right Left Pain/Dysfunction with Movement   AROM         flexion 128 128     extension 0 0 pulling      Ankle Right Left Pain/Dysfunction with Movement   AROM/PROM WNL WNL        L/E MMT Right Left Pain/Dysfunction with Movement   Hip Flexion 4+/5 4+/5     Hip Extension 5/5 5/5     Hip Abduction 4+/5 4+/5     Hip Adduction 5/5 5/5     Knee Flexion 5/5 5/5     Knee Extension 4+/5 4+/5     Ankle DF 5/5 5/5     Ankle PF 5/5 5/5            Assessment     Update:   Pt presents with improved quad strength and control along with improved balance and proprioception. Pt able to  perform squat walks and monster walks with no balance difficulty on turf. Pt continues to perform HEP and will continue to check in when needed.      Mita is progressing well towards her goals.   Pt prognosis is Excellent.      Pt will continue to benefit from skilled outpatient physical therapy to address the deficits listed in the problem list box on initial evaluation, provide pt/family education and to maximize pt's level of independence in the home and community environment.      Pt's spiritual, cultural and educational needs considered and pt agreeable to plan of care and goals.     Anticipated barriers to physical therapy: none    Long Term Goal Status:   continue per initial plan of care.  Reasons for Recertification of Therapy:   Increase progression for quadriceps and lateral hips strengthening    Plan     Updated Certification Period: 11/27/2020 to 12/11/2020  Recommended Treatment Plan: 1 times per week for 2 weeks: Gait Training, Manual Therapy, Moist Heat/ Ice, Neuromuscular Re-ed, Patient Education, Self Care, Therapeutic Activites and Therapeutic Exercise  Other Recommendations: LALIT Foley, PT  12/1/2020      I CERTIFY THE NEED FOR THESE SERVICES FURNISHED UNDER THIS PLAN OF TREATMENT AND WHILE UNDER MY CARE    Physician's comments:        Physician's Signature: ___________________________________________________

## 2020-12-01 NOTE — PROGRESS NOTES
"  Physical Therapy Daily Treatment Note     Name: Mita Bell [facility]  Clinic Number: [id number]    Therapy Diagnosis:   Encounter Diagnoses   Name Primary?    Chronic pain of left knee     Decreased range of motion (ROM) of left knee      Physician: Joycelyn Carter MD    Visit Date: 12/1/2020    Physician Orders: PT Eval and Treat   Medical Diagnosis from Referral:   M25.562,G89.29 (ICD-10-CM) - Chronic pain of left knee   M17.12 (ICD-10-CM) - Primary osteoarthritis of left knee   R26.9 (ICD-10-CM) - Gait abnormality     Evaluation Date: 9/22/2020  Authorization Period Expiration: 9/22/2021  Plan of Care Expiration: 11/26/2020   Visit # / Visits authorized:  19/20      Time In: 1000 am   Time Out: 1045 am  Total Billable Time: 45 minutes    Precautions: Standard    Subjective     Pt reports:  Feeling good, minimal discomfort. Is having more crepitus today for unknown reason.    She was compliant with home exercise program.  Response to previous treatment: fatigue, soreness  Functional change: increased ROM, improved strength and able to get items off floor    Pain before: 0/10  Pain after: 0/10  Location:  None        Treatment       Mita received therapeutic exercises (exercises performed today are bolded)   to develop strength, endurance, ROM, flexibility and posture for 45 minutes including:    Bike to increase ROM, LE strength, and cardiovascular endurance for 6 minutes   Standing hip abduction 3x10 GTB   Standing hip flexion 3x10 GTB  Standing heel cord stretch 30" x 3   Leg press - 6 resistance bands - 3x12  Step ups - stairs - 3x10   Step downs - 6" - 3x8 on BLE - able to perform RLE for first time today  Lateral squat walks 2 laps RTB  Monster walks RTB 2 laps  Bridges 3x10 YTB  SLR 3# 3x8 LLE  Clamshells 3x12 LLE      Home Exercises Provided and Patient Education Provided     Education provided:   - continue with previously issued HEP  -exercises being performed with gradual increase in reps to tolerance " and to pay attention of response to exercises post exercise  -Answered questions regarding exercise program    Written Home Exercises Provided: yes.  Exercises were reviewed and Mita was able to demonstrate them prior to the end of the session.  Mita demonstrated good  understanding of the education provided.     See EMR under Patient Instructions for exercises provided 9/28/2020.      Assessment     Pt presents with improved quad strength and control along with improved balance and proprioception. Pt able to perform squat walks and monster walks with no balance difficulty on turf. Pt continues to perform HEP and will continue to check in when needed.     Mita is progressing well towards her goals.   Pt prognosis is Excellent.     Pt will continue to benefit from skilled outpatient physical therapy to address the deficits listed in the problem list box on initial evaluation, provide pt/family education and to maximize pt's level of independence in the home and community environment.     Pt's spiritual, cultural and educational needs considered and pt agreeable to plan of care and goals.     Anticipated barriers to physical therapy: none    Goals:  Long Term Goals (6 Weeks):  1. Pt will be independent with HEP to supplement PT in improving functional mobility -MET  2. Pt will demonstrate proper VMO activation during quad set B to improve patellar tracking during plyometric tasks. -MET  3. Pt will ascend 1 flight of stairs with no pain to promote functional mobility. -MET  4. Pt will improve FOTO score to </= 38% limited to decrease perceived limitation with mobility -progressing, not met   5. Pt will improve hip MMTs to >/= 5/5 B to decrease load on knee joint during plyometric tasks. -MET  6. Pt will improve left knee ROM to 0-115 to improve functional pain-free activity -progressing, not met          Plan     Update plan of care today..       Arnulfo Foley, PT  12/1/2020

## 2020-12-08 ENCOUNTER — CLINICAL SUPPORT (OUTPATIENT)
Dept: REHABILITATION | Facility: HOSPITAL | Age: 70
End: 2020-12-08
Payer: MEDICARE

## 2020-12-08 DIAGNOSIS — G89.29 CHRONIC PAIN OF LEFT KNEE: ICD-10-CM

## 2020-12-08 DIAGNOSIS — M25.562 CHRONIC PAIN OF LEFT KNEE: ICD-10-CM

## 2020-12-08 DIAGNOSIS — M25.662 DECREASED RANGE OF MOTION (ROM) OF LEFT KNEE: ICD-10-CM

## 2020-12-08 PROCEDURE — 97110 THERAPEUTIC EXERCISES: CPT | Mod: HCNC | Performed by: PHYSICAL THERAPIST

## 2020-12-08 NOTE — PROGRESS NOTES
"  Physical Therapy Daily Treatment Note / Discharge note     Name: Mita Bell [facility]  Clinic Number: [id number]    Therapy Diagnosis:   Encounter Diagnoses   Name Primary?    Chronic pain of left knee     Decreased range of motion (ROM) of left knee      Physician: Joycelyn Carter MD    Visit Date: 12/8/2020    Physician Orders: PT Eval and Treat   Medical Diagnosis from Referral:   M25.562,G89.29 (ICD-10-CM) - Chronic pain of left knee   M17.12 (ICD-10-CM) - Primary osteoarthritis of left knee   R26.9 (ICD-10-CM) - Gait abnormality     Evaluation Date: 9/22/2020  Authorization Period Expiration: 9/22/2021  Plan of Care Expiration: 11/26/2020   Visit # / Visits authorized:  20/20      Time In: 1045 am   Time Out: 1130 am  Total Billable Time: 45 minutes    Precautions: Standard    Subjective     Pt reports:  Knees feel much better following therapy. Will continue HEP in coming days, weeks in order to sustain gains made and not go back to where she was    She was compliant with home exercise program.  Response to previous treatment: fatigue, soreness  Functional change: increased ROM, improved strength and able to get items off floor    Pain before: 0/10  Pain after: 0/10  Location:  None        Treatment       Mita received therapeutic exercises (exercises performed today are bolded)   to develop strength, endurance, ROM, flexibility and posture for 45 minutes including:    Bike to increase ROM, LE strength, and cardiovascular endurance for 6 minutes   Standing heel cord stretch 30" x 3   Leg press - 6 resistance bands - 3x12  Single leg press - 4 bands - 20x  Step ups - stairs - 3x10   Step downs - 6" - 3x8 on BLE - able to perform RLE for first time today  Lateral squat walks 2 laps RTB  Monster walks RTB 2 laps  Bridges 3x10 YTB  SLR 3# 3x8 LLE  Clamshells 3x12 LLE    Range of Motion/Strength:      Hip Right Left Pain/Dysfunction with Movement   AROM/PROM WNL WNL        Knee Right Left Pain/Dysfunction with " Movement   AROM         flexion 128 128     extension 0 0       Ankle Right Left Pain/Dysfunction with Movement   AROM/PROM WNL WNL        L/E MMT Right Left Pain/Dysfunction with Movement   Hip Flexion 4+/5 4+/5     Hip Extension 5/5 5/5     Hip Abduction 4+/5 4+/5     Hip Adduction 5/5 5/5     Knee Flexion 5/5 5/5     Knee Extension 4+/5 4+/5     Ankle DF 5/5 5/5     Ankle PF 5/5 5/5        Home Exercises Provided and Patient Education Provided     Education provided:   - continue with previously issued HEP  -exercises being performed with gradual increase in reps to tolerance and to pay attention of response to exercises post exercise  -Answered questions regarding exercise program    Written Home Exercises Provided: yes.  Exercises were reviewed and Mita was able to demonstrate them prior to the end of the session.  Mita demonstrated good  understanding of the education provided.     See EMR under Patient Instructions for exercises provided 9/28/2020.      Assessment     Pt continues to improve ROM and strength in LLE. Pt will continue working on HEP which was updated today in order to sustain and keep gains made through therapy. Pt presents with significant increase in ROM and strength. Pt demonstrated all HEP exercises and understood them. Will monitor pain and call back if needed.     Mita is progressing well towards her goals.   Pt prognosis is Excellent.     Pt will continue to benefit from skilled outpatient physical therapy to address the deficits listed in the problem list box on initial evaluation, provide pt/family education and to maximize pt's level of independence in the home and community environment.     Pt's spiritual, cultural and educational needs considered and pt agreeable to plan of care and goals.     Anticipated barriers to physical therapy: none    Goals:  Long Term Goals (6 Weeks):  1. Pt will be independent with HEP to supplement PT in improving functional mobility -MET  2. Pt will  demonstrate proper VMO activation during quad set B to improve patellar tracking during plyometric tasks. -MET  3. Pt will ascend 1 flight of stairs with no pain to promote functional mobility. -MET  4. Pt will improve FOTO score to </= 38% limited to decrease perceived limitation with mobility -Not met   5. Pt will improve hip MMTs to >/= 5/5 B to decrease load on knee joint during plyometric tasks. -MET  6. Pt will improve left knee ROM to 0-115 to improve functional pain-free activity -MET         Plan     Discharge today.    Arnulfo Foley, PT  12/8/2020

## 2020-12-08 NOTE — PATIENT INSTRUCTIONS
Resisted Lateral Walking    Place a band around your ankles. Step to the side with one foot then bring the other foot in, keeping slight tension on the bend. Keep knees bent and toes pointed straight forward. Return to the other side.  Do 10 feet each way. Repeat 3 times a day.      Resisted Monster Walking    Start with feet shoulder width apart on a diagonal with Red band around ankle. Move back foot toward front foot then forward and out again. Repeat with other leg, walking in a zig zag motion. Do not step both feet down when right next to each other. Return by repeating backwards.  Do 10 feet. Repeat 3 times. Do 1 sessions a day.      Bridging        Lie on back with feet shoulder width apart. Lift hips toward the ceiling. Hold 2 seconds.  Repeat 25 times. Do 1 sessions per day.

## 2020-12-11 ENCOUNTER — PATIENT MESSAGE (OUTPATIENT)
Dept: OTHER | Facility: OTHER | Age: 70
End: 2020-12-11

## 2020-12-23 ENCOUNTER — PATIENT OUTREACH (OUTPATIENT)
Dept: ADMINISTRATIVE | Facility: OTHER | Age: 70
End: 2020-12-23

## 2020-12-23 ENCOUNTER — OFFICE VISIT (OUTPATIENT)
Dept: ORTHOPEDICS | Facility: CLINIC | Age: 70
End: 2020-12-23
Payer: MEDICARE

## 2020-12-23 VITALS
HEIGHT: 63 IN | DIASTOLIC BLOOD PRESSURE: 71 MMHG | SYSTOLIC BLOOD PRESSURE: 133 MMHG | HEART RATE: 81 BPM | BODY MASS INDEX: 34.55 KG/M2 | WEIGHT: 195 LBS

## 2020-12-23 DIAGNOSIS — G89.29 CHRONIC PAIN OF LEFT KNEE: Primary | ICD-10-CM

## 2020-12-23 DIAGNOSIS — R26.9 GAIT ABNORMALITY: ICD-10-CM

## 2020-12-23 DIAGNOSIS — M17.12 PRIMARY OSTEOARTHRITIS OF LEFT KNEE: ICD-10-CM

## 2020-12-23 DIAGNOSIS — M25.562 CHRONIC PAIN OF LEFT KNEE: Primary | ICD-10-CM

## 2020-12-23 PROCEDURE — 1126F PR PAIN SEVERITY QUANTIFIED, NO PAIN PRESENT: ICD-10-PCS | Mod: HCNC,S$GLB,, | Performed by: PHYSICAL MEDICINE & REHABILITATION

## 2020-12-23 PROCEDURE — 3008F PR BODY MASS INDEX (BMI) DOCUMENTED: ICD-10-PCS | Mod: HCNC,CPTII,S$GLB, | Performed by: PHYSICAL MEDICINE & REHABILITATION

## 2020-12-23 PROCEDURE — 1159F MED LIST DOCD IN RCRD: CPT | Mod: HCNC,S$GLB,, | Performed by: PHYSICAL MEDICINE & REHABILITATION

## 2020-12-23 PROCEDURE — 3078F PR MOST RECENT DIASTOLIC BLOOD PRESSURE < 80 MM HG: ICD-10-PCS | Mod: HCNC,CPTII,S$GLB, | Performed by: PHYSICAL MEDICINE & REHABILITATION

## 2020-12-23 PROCEDURE — 3072F PR LOW RISK FOR RETINOPATHY: ICD-10-PCS | Mod: HCNC,S$GLB,, | Performed by: PHYSICAL MEDICINE & REHABILITATION

## 2020-12-23 PROCEDURE — 3075F SYST BP GE 130 - 139MM HG: CPT | Mod: HCNC,CPTII,S$GLB, | Performed by: PHYSICAL MEDICINE & REHABILITATION

## 2020-12-23 PROCEDURE — 1101F PT FALLS ASSESS-DOCD LE1/YR: CPT | Mod: HCNC,CPTII,S$GLB, | Performed by: PHYSICAL MEDICINE & REHABILITATION

## 2020-12-23 PROCEDURE — 3072F LOW RISK FOR RETINOPATHY: CPT | Mod: HCNC,S$GLB,, | Performed by: PHYSICAL MEDICINE & REHABILITATION

## 2020-12-23 PROCEDURE — 3075F PR MOST RECENT SYSTOLIC BLOOD PRESS GE 130-139MM HG: ICD-10-PCS | Mod: HCNC,CPTII,S$GLB, | Performed by: PHYSICAL MEDICINE & REHABILITATION

## 2020-12-23 PROCEDURE — 3288F FALL RISK ASSESSMENT DOCD: CPT | Mod: HCNC,CPTII,S$GLB, | Performed by: PHYSICAL MEDICINE & REHABILITATION

## 2020-12-23 PROCEDURE — 3008F BODY MASS INDEX DOCD: CPT | Mod: HCNC,CPTII,S$GLB, | Performed by: PHYSICAL MEDICINE & REHABILITATION

## 2020-12-23 PROCEDURE — 99999 PR PBB SHADOW E&M-EST. PATIENT-LVL III: CPT | Mod: PBBFAC,HCNC,, | Performed by: PHYSICAL MEDICINE & REHABILITATION

## 2020-12-23 PROCEDURE — 3078F DIAST BP <80 MM HG: CPT | Mod: HCNC,CPTII,S$GLB, | Performed by: PHYSICAL MEDICINE & REHABILITATION

## 2020-12-23 PROCEDURE — 99213 OFFICE O/P EST LOW 20 MIN: CPT | Mod: HCNC,S$GLB,, | Performed by: PHYSICAL MEDICINE & REHABILITATION

## 2020-12-23 PROCEDURE — 99213 PR OFFICE/OUTPT VISIT, EST, LEVL III, 20-29 MIN: ICD-10-PCS | Mod: HCNC,S$GLB,, | Performed by: PHYSICAL MEDICINE & REHABILITATION

## 2020-12-23 PROCEDURE — 99999 PR PBB SHADOW E&M-EST. PATIENT-LVL III: ICD-10-PCS | Mod: PBBFAC,HCNC,, | Performed by: PHYSICAL MEDICINE & REHABILITATION

## 2020-12-23 PROCEDURE — 1126F AMNT PAIN NOTED NONE PRSNT: CPT | Mod: HCNC,S$GLB,, | Performed by: PHYSICAL MEDICINE & REHABILITATION

## 2020-12-23 PROCEDURE — 1159F PR MEDICATION LIST DOCUMENTED IN MEDICAL RECORD: ICD-10-PCS | Mod: HCNC,S$GLB,, | Performed by: PHYSICAL MEDICINE & REHABILITATION

## 2020-12-23 PROCEDURE — 1101F PR PT FALLS ASSESS DOC 0-1 FALLS W/OUT INJ PAST YR: ICD-10-PCS | Mod: HCNC,CPTII,S$GLB, | Performed by: PHYSICAL MEDICINE & REHABILITATION

## 2020-12-23 PROCEDURE — 3288F PR FALLS RISK ASSESSMENT DOCUMENTED: ICD-10-PCS | Mod: HCNC,CPTII,S$GLB, | Performed by: PHYSICAL MEDICINE & REHABILITATION

## 2020-12-23 NOTE — PROGRESS NOTES
SPORTS MEDICINE / PM&R Follow Up Visit :    Referring Physician: No ref. provider found    Chief Complaint   Patient presents with    Left Knee - Follow-up       HPI: This is a 70 y.o.  female being seen in clinic today for evaluation of Follow-up of the Left Knee       Since last visit, the symptoms are improving.  She has been to therapy at [facility] with Joni and Pedro.  Continues to do her HEP. Very happy with her control. Hasn't had to use voltaren recently. Occasional aleve, rare naproxen. Some occasional left shoulder.    History obtained from patient.    Past family, medical, social, surgical history, and vital signs reviewed in chart.    Review of Systems   Constitutional: Negative for chills, fever and weight loss.   HENT: Negative for hearing loss and sore throat.    Eyes: Negative for blurred vision, photophobia and pain.   Respiratory: Negative for shortness of breath.    Cardiovascular: Negative for chest pain.   Gastrointestinal: Negative for abdominal pain.   Genitourinary: Negative for dysuria.   Skin: Negative for rash.   Neurological: Negative for tingling and headaches.   Endo/Heme/Allergies: Does not bruise/bleed easily.   Psychiatric/Behavioral: Negative for depression.       General    Nursing note and vitals reviewed.  Constitutional: She is oriented to person, place, and time. She appears well-developed and well-nourished.   HENT:   Head: Normocephalic and atraumatic.   Eyes: Conjunctivae and EOM are normal. Pupils are equal, round, and reactive to light.   Neck: Neck supple.   Cardiovascular: Intact distal pulses.    Pulmonary/Chest: Effort normal. No respiratory distress.   Abdominal: She exhibits no distension.   Neurological: She is alert and oriented to person, place, and time. She has normal reflexes.   Psychiatric: She has a normal mood and affect.     General Musculoskeletal Exam   Gait: normal       Right Knee Exam   Right knee exam is normal.    Inspection   Erythema:  absent  Swelling: absent  Effusion: absent    Range of Motion   The patient has normal right knee ROM.    Tests   Meniscus   Emma:  Medial - negative Lateral - negative  Ligament Examination Lachman: normal (-1 to 2mm) PCL-Posterior Drawer: normal (0 to 2mm)     MCL - Valgus: normal (0 to 2mm)  LCL - Varus: normal  Patella   Patellar apprehension: negative  Patellar Tracking: normal    Other   Popliteal (Baker's) Cyst: absent  Sensation: normal    Left Knee Exam     Inspection   Erythema: absent  Swelling: absent  Effusion: absent  Deformity: absent  Bruising: absent    Tenderness   The patient tender to palpation of the medial joint line.    Range of Motion   The patient has normal left knee ROM.    Tests   Meniscus   Emma:  Medial - negative Lateral - negative  Stability Lachman: normal (-1 to 2mm) PCL-Posterior Drawer: normal (0 to 2mm)  MCL - Valgus: normal (0 to 2mm)  LCL - Varus: normal (0 to 2mm)  Patella   Patellar apprehension: negative  Patellar Tracking: normal    Other   Popliteal (Baker's) Cyst: absent  Sensation: normal    Right Hip Exam   Right hip exam is normal.     Tests   Pain w/ forced internal rotation (LUCAS): absent  Left Hip Exam   Left hip exam is normal.    Tests   Pain w/ forced internal rotation (LUCAS): absent          Muscle Strength   Right Lower Extremity   Hip Abduction: 5/5   Hip Adduction: 5/5   Hip Flexion: 5/5   Quadriceps:  5/5   Hamstrin/5   Left Lower Extremity   Hip Abduction: 5/5   Hip Adduction: 5/5   Hip Flexion: 5/5   Quadriceps:  5/5   Hamstrin/5     Reflexes     Left Side  Achilles:  2+  Quadriceps:  2+    Right Side   Achilles:  2+  Quadriceps:  2+    Vascular Exam     Right Pulses  Dorsalis Pedis:      2+          Left Pulses  Dorsalis Pedis:      2+                IMPRESSION/PLAN: This is a 70 y.o.  female with:    Chronic pain of left knee    Primary osteoarthritis of left knee    Gait abnormality        The findings were discussed with Mita in  detail.  I am so glad that she has had so much improvement with physical therapy.  I strongly recommend she continues therapy exercises at home.  She can take Voltaren as needed for flareups.  We had previously discussed injections but never had to perform them.  If she has a flare-up in the future that can be an option.  She was provided with this plan in writing. All of her questions were answered. She will follow up with me as needed.     Disclaimer: This note was prepared using a voice recognition system and is likely to have sound alike errors within the text.     Joycelyn Carter M.D.  Sports Medicine

## 2021-01-04 ENCOUNTER — PATIENT MESSAGE (OUTPATIENT)
Dept: INTERNAL MEDICINE | Facility: CLINIC | Age: 71
End: 2021-01-04

## 2021-01-29 ENCOUNTER — LAB VISIT (OUTPATIENT)
Dept: LAB | Facility: HOSPITAL | Age: 71
End: 2021-01-29
Attending: FAMILY MEDICINE
Payer: MEDICARE

## 2021-01-29 DIAGNOSIS — E11.69 HYPERLIPIDEMIA ASSOCIATED WITH TYPE 2 DIABETES MELLITUS: ICD-10-CM

## 2021-01-29 DIAGNOSIS — I10 HYPERTENSION, ESSENTIAL: ICD-10-CM

## 2021-01-29 DIAGNOSIS — E11.9 TYPE 2 DIABETES MELLITUS WITHOUT COMPLICATION, WITHOUT LONG-TERM CURRENT USE OF INSULIN: ICD-10-CM

## 2021-01-29 DIAGNOSIS — E78.5 HYPERLIPIDEMIA ASSOCIATED WITH TYPE 2 DIABETES MELLITUS: ICD-10-CM

## 2021-01-29 LAB
ALBUMIN SERPL BCP-MCNC: 4.3 G/DL (ref 3.5–5.2)
ALP SERPL-CCNC: 73 U/L (ref 55–135)
ALT SERPL W/O P-5'-P-CCNC: 25 U/L (ref 10–44)
ANION GAP SERPL CALC-SCNC: 10 MMOL/L (ref 8–16)
AST SERPL-CCNC: 19 U/L (ref 10–40)
BILIRUB SERPL-MCNC: 1.2 MG/DL (ref 0.1–1)
BUN SERPL-MCNC: 11 MG/DL (ref 8–23)
CALCIUM SERPL-MCNC: 10.3 MG/DL (ref 8.7–10.5)
CHLORIDE SERPL-SCNC: 100 MMOL/L (ref 95–110)
CHOLEST SERPL-MCNC: 169 MG/DL (ref 120–199)
CHOLEST/HDLC SERPL: 2.3 {RATIO} (ref 2–5)
CO2 SERPL-SCNC: 28 MMOL/L (ref 23–29)
CREAT SERPL-MCNC: 0.8 MG/DL (ref 0.5–1.4)
EST. GFR  (AFRICAN AMERICAN): >60 ML/MIN/1.73 M^2
EST. GFR  (NON AFRICAN AMERICAN): >60 ML/MIN/1.73 M^2
ESTIMATED AVG GLUCOSE: 151 MG/DL (ref 68–131)
GLUCOSE SERPL-MCNC: 202 MG/DL (ref 70–110)
HBA1C MFR BLD: 6.9 % (ref 4–5.6)
HDLC SERPL-MCNC: 75 MG/DL (ref 40–75)
HDLC SERPL: 44.4 % (ref 20–50)
LDLC SERPL CALC-MCNC: 75 MG/DL (ref 63–159)
NONHDLC SERPL-MCNC: 94 MG/DL
POTASSIUM SERPL-SCNC: 3.6 MMOL/L (ref 3.5–5.1)
PROT SERPL-MCNC: 7.7 G/DL (ref 6–8.4)
SODIUM SERPL-SCNC: 138 MMOL/L (ref 136–145)
TRIGL SERPL-MCNC: 95 MG/DL (ref 30–150)

## 2021-01-29 PROCEDURE — 36415 COLL VENOUS BLD VENIPUNCTURE: CPT

## 2021-01-29 PROCEDURE — 80061 LIPID PANEL: CPT

## 2021-01-29 PROCEDURE — 80053 COMPREHEN METABOLIC PANEL: CPT

## 2021-01-29 PROCEDURE — 83036 HEMOGLOBIN GLYCOSYLATED A1C: CPT

## 2021-02-05 ENCOUNTER — OFFICE VISIT (OUTPATIENT)
Dept: INTERNAL MEDICINE | Facility: CLINIC | Age: 71
End: 2021-02-05
Payer: MEDICARE

## 2021-02-05 VITALS
WEIGHT: 189.81 LBS | BODY MASS INDEX: 33.63 KG/M2 | RESPIRATION RATE: 18 BRPM | SYSTOLIC BLOOD PRESSURE: 128 MMHG | OXYGEN SATURATION: 95 % | DIASTOLIC BLOOD PRESSURE: 78 MMHG | TEMPERATURE: 98 F | HEIGHT: 63 IN | HEART RATE: 83 BPM

## 2021-02-05 DIAGNOSIS — E11.9 TYPE 2 DIABETES MELLITUS WITHOUT COMPLICATION, WITHOUT LONG-TERM CURRENT USE OF INSULIN: Primary | ICD-10-CM

## 2021-02-05 DIAGNOSIS — F17.210 CIGARETTE NICOTINE DEPENDENCE WITHOUT COMPLICATION: ICD-10-CM

## 2021-02-05 DIAGNOSIS — E11.69 HYPERLIPIDEMIA ASSOCIATED WITH TYPE 2 DIABETES MELLITUS: ICD-10-CM

## 2021-02-05 DIAGNOSIS — I10 HYPERTENSION, ESSENTIAL: ICD-10-CM

## 2021-02-05 DIAGNOSIS — E78.5 HYPERLIPIDEMIA ASSOCIATED WITH TYPE 2 DIABETES MELLITUS: ICD-10-CM

## 2021-02-05 PROCEDURE — 1159F PR MEDICATION LIST DOCUMENTED IN MEDICAL RECORD: ICD-10-PCS | Mod: S$GLB,,, | Performed by: FAMILY MEDICINE

## 2021-02-05 PROCEDURE — 1126F PR PAIN SEVERITY QUANTIFIED, NO PAIN PRESENT: ICD-10-PCS | Mod: S$GLB,,, | Performed by: FAMILY MEDICINE

## 2021-02-05 PROCEDURE — 3078F PR MOST RECENT DIASTOLIC BLOOD PRESSURE < 80 MM HG: ICD-10-PCS | Mod: CPTII,S$GLB,, | Performed by: FAMILY MEDICINE

## 2021-02-05 PROCEDURE — 3074F PR MOST RECENT SYSTOLIC BLOOD PRESSURE < 130 MM HG: ICD-10-PCS | Mod: CPTII,S$GLB,, | Performed by: FAMILY MEDICINE

## 2021-02-05 PROCEDURE — 1126F AMNT PAIN NOTED NONE PRSNT: CPT | Mod: S$GLB,,, | Performed by: FAMILY MEDICINE

## 2021-02-05 PROCEDURE — 3044F PR MOST RECENT HEMOGLOBIN A1C LEVEL <7.0%: ICD-10-PCS | Mod: CPTII,S$GLB,, | Performed by: FAMILY MEDICINE

## 2021-02-05 PROCEDURE — 3288F PR FALLS RISK ASSESSMENT DOCUMENTED: ICD-10-PCS | Mod: CPTII,S$GLB,, | Performed by: FAMILY MEDICINE

## 2021-02-05 PROCEDURE — 3078F DIAST BP <80 MM HG: CPT | Mod: CPTII,S$GLB,, | Performed by: FAMILY MEDICINE

## 2021-02-05 PROCEDURE — 3074F SYST BP LT 130 MM HG: CPT | Mod: CPTII,S$GLB,, | Performed by: FAMILY MEDICINE

## 2021-02-05 PROCEDURE — 99999 PR PBB SHADOW E&M-EST. PATIENT-LVL V: ICD-10-PCS | Mod: PBBFAC,,, | Performed by: FAMILY MEDICINE

## 2021-02-05 PROCEDURE — 99999 PR PBB SHADOW E&M-EST. PATIENT-LVL V: CPT | Mod: PBBFAC,,, | Performed by: FAMILY MEDICINE

## 2021-02-05 PROCEDURE — 1159F MED LIST DOCD IN RCRD: CPT | Mod: S$GLB,,, | Performed by: FAMILY MEDICINE

## 2021-02-05 PROCEDURE — 1101F PT FALLS ASSESS-DOCD LE1/YR: CPT | Mod: CPTII,S$GLB,, | Performed by: FAMILY MEDICINE

## 2021-02-05 PROCEDURE — 1101F PR PT FALLS ASSESS DOC 0-1 FALLS W/OUT INJ PAST YR: ICD-10-PCS | Mod: CPTII,S$GLB,, | Performed by: FAMILY MEDICINE

## 2021-02-05 PROCEDURE — 99214 PR OFFICE/OUTPT VISIT, EST, LEVL IV, 30-39 MIN: ICD-10-PCS | Mod: S$GLB,,, | Performed by: FAMILY MEDICINE

## 2021-02-05 PROCEDURE — 99214 OFFICE O/P EST MOD 30 MIN: CPT | Mod: S$GLB,,, | Performed by: FAMILY MEDICINE

## 2021-02-05 PROCEDURE — 3288F FALL RISK ASSESSMENT DOCD: CPT | Mod: CPTII,S$GLB,, | Performed by: FAMILY MEDICINE

## 2021-02-05 PROCEDURE — 3044F HG A1C LEVEL LT 7.0%: CPT | Mod: CPTII,S$GLB,, | Performed by: FAMILY MEDICINE

## 2021-02-05 PROCEDURE — 3008F BODY MASS INDEX DOCD: CPT | Mod: CPTII,S$GLB,, | Performed by: FAMILY MEDICINE

## 2021-02-05 PROCEDURE — 3008F PR BODY MASS INDEX (BMI) DOCUMENTED: ICD-10-PCS | Mod: CPTII,S$GLB,, | Performed by: FAMILY MEDICINE

## 2021-02-05 RX ORDER — DICLOFENAC SODIUM 10 MG/G
2 GEL TOPICAL 4 TIMES DAILY PRN
COMMUNITY

## 2021-02-08 ENCOUNTER — PES CALL (OUTPATIENT)
Dept: ADMINISTRATIVE | Facility: CLINIC | Age: 71
End: 2021-02-08

## 2021-03-26 ENCOUNTER — TELEPHONE (OUTPATIENT)
Dept: ADMINISTRATIVE | Facility: HOSPITAL | Age: 71
End: 2021-03-26

## 2021-04-16 ENCOUNTER — OFFICE VISIT (OUTPATIENT)
Dept: INTERNAL MEDICINE | Facility: CLINIC | Age: 71
End: 2021-04-16
Payer: MEDICARE

## 2021-04-16 VITALS
TEMPERATURE: 97 F | BODY MASS INDEX: 32.7 KG/M2 | WEIGHT: 191.56 LBS | RESPIRATION RATE: 20 BRPM | HEART RATE: 94 BPM | HEIGHT: 64 IN | DIASTOLIC BLOOD PRESSURE: 67 MMHG | SYSTOLIC BLOOD PRESSURE: 130 MMHG | OXYGEN SATURATION: 97 %

## 2021-04-16 DIAGNOSIS — Z00.00 ENCOUNTER FOR PREVENTIVE HEALTH EXAMINATION: Primary | ICD-10-CM

## 2021-04-16 DIAGNOSIS — E78.5 HYPERLIPIDEMIA ASSOCIATED WITH TYPE 2 DIABETES MELLITUS: ICD-10-CM

## 2021-04-16 DIAGNOSIS — J30.9 CHRONIC ALLERGIC RHINITIS: ICD-10-CM

## 2021-04-16 DIAGNOSIS — L23.89 ALLERGIC CONTACT DERMATITIS DUE TO OTHER AGENTS: ICD-10-CM

## 2021-04-16 DIAGNOSIS — Z86.010 HISTORY OF COLON POLYPS: ICD-10-CM

## 2021-04-16 DIAGNOSIS — F17.210 CIGARETTE NICOTINE DEPENDENCE WITHOUT COMPLICATION: ICD-10-CM

## 2021-04-16 DIAGNOSIS — E11.9 TYPE 2 DIABETES MELLITUS WITHOUT COMPLICATION, WITHOUT LONG-TERM CURRENT USE OF INSULIN: ICD-10-CM

## 2021-04-16 DIAGNOSIS — G89.29 CHRONIC PAIN OF LEFT KNEE: ICD-10-CM

## 2021-04-16 DIAGNOSIS — I10 HYPERTENSION, ESSENTIAL: ICD-10-CM

## 2021-04-16 DIAGNOSIS — M25.562 CHRONIC PAIN OF LEFT KNEE: ICD-10-CM

## 2021-04-16 DIAGNOSIS — F10.21 ALCOHOL DEPENDENCE IN REMISSION: ICD-10-CM

## 2021-04-16 DIAGNOSIS — E11.69 HYPERLIPIDEMIA ASSOCIATED WITH TYPE 2 DIABETES MELLITUS: ICD-10-CM

## 2021-04-16 PROBLEM — Z12.11 SCREENING FOR COLON CANCER: Status: RESOLVED | Noted: 2019-06-14 | Resolved: 2021-04-16

## 2021-04-16 PROBLEM — N39.3 STRESS INCONTINENCE, FEMALE: Status: RESOLVED | Noted: 2017-09-21 | Resolved: 2021-04-16

## 2021-04-16 PROCEDURE — 99999 PR PBB SHADOW E&M-EST. PATIENT-LVL V: ICD-10-PCS | Mod: PBBFAC,,, | Performed by: PHYSICIAN ASSISTANT

## 2021-04-16 PROCEDURE — 99999 PR PBB SHADOW E&M-EST. PATIENT-LVL V: CPT | Mod: PBBFAC,,, | Performed by: PHYSICIAN ASSISTANT

## 2021-04-16 PROCEDURE — 99499 RISK ADDL DX/OHS AUDIT: ICD-10-PCS | Mod: HCNC,S$GLB,, | Performed by: PHYSICIAN ASSISTANT

## 2021-04-16 PROCEDURE — 3008F BODY MASS INDEX DOCD: CPT | Mod: CPTII,S$GLB,, | Performed by: PHYSICIAN ASSISTANT

## 2021-04-16 PROCEDURE — G0439 PR MEDICARE ANNUAL WELLNESS SUBSEQUENT VISIT: ICD-10-PCS | Mod: S$GLB,,, | Performed by: PHYSICIAN ASSISTANT

## 2021-04-16 PROCEDURE — 99499 UNLISTED E&M SERVICE: CPT | Mod: HCNC,S$GLB,, | Performed by: PHYSICIAN ASSISTANT

## 2021-04-16 PROCEDURE — 3008F PR BODY MASS INDEX (BMI) DOCUMENTED: ICD-10-PCS | Mod: CPTII,S$GLB,, | Performed by: PHYSICIAN ASSISTANT

## 2021-04-16 PROCEDURE — G0439 PPPS, SUBSEQ VISIT: HCPCS | Mod: S$GLB,,, | Performed by: PHYSICIAN ASSISTANT

## 2021-05-05 ENCOUNTER — OFFICE VISIT (OUTPATIENT)
Dept: INTERNAL MEDICINE | Facility: CLINIC | Age: 71
End: 2021-05-05
Payer: MEDICARE

## 2021-05-05 VITALS
OXYGEN SATURATION: 96 % | DIASTOLIC BLOOD PRESSURE: 70 MMHG | WEIGHT: 192.44 LBS | SYSTOLIC BLOOD PRESSURE: 100 MMHG | HEART RATE: 74 BPM | TEMPERATURE: 98 F | BODY MASS INDEX: 33.04 KG/M2

## 2021-05-05 DIAGNOSIS — E11.59 HYPERTENSION ASSOCIATED WITH DIABETES: ICD-10-CM

## 2021-05-05 DIAGNOSIS — I15.2 HYPERTENSION ASSOCIATED WITH DIABETES: ICD-10-CM

## 2021-05-05 DIAGNOSIS — J30.9 CHRONIC ALLERGIC RHINITIS: ICD-10-CM

## 2021-05-05 DIAGNOSIS — Z12.2 ENCOUNTER FOR SCREENING FOR MALIGNANT NEOPLASM OF RESPIRATORY ORGANS: ICD-10-CM

## 2021-05-05 DIAGNOSIS — E78.5 HYPERLIPIDEMIA ASSOCIATED WITH TYPE 2 DIABETES MELLITUS: ICD-10-CM

## 2021-05-05 DIAGNOSIS — Z00.00 ROUTINE GENERAL MEDICAL EXAMINATION AT A HEALTH CARE FACILITY: Primary | ICD-10-CM

## 2021-05-05 DIAGNOSIS — Z87.891 PERSONAL HISTORY OF NICOTINE DEPENDENCE: ICD-10-CM

## 2021-05-05 DIAGNOSIS — Z86.010 HISTORY OF COLON POLYPS: ICD-10-CM

## 2021-05-05 DIAGNOSIS — M17.0 PRIMARY OSTEOARTHRITIS OF BOTH KNEES: ICD-10-CM

## 2021-05-05 DIAGNOSIS — Z72.0 TOBACCO ABUSE: ICD-10-CM

## 2021-05-05 DIAGNOSIS — E11.69 HYPERLIPIDEMIA ASSOCIATED WITH TYPE 2 DIABETES MELLITUS: ICD-10-CM

## 2021-05-05 DIAGNOSIS — E11.65 TYPE 2 DIABETES MELLITUS WITH HYPERGLYCEMIA, WITHOUT LONG-TERM CURRENT USE OF INSULIN: ICD-10-CM

## 2021-05-05 PROBLEM — M25.562 CHRONIC PAIN OF LEFT KNEE: Status: RESOLVED | Noted: 2020-09-24 | Resolved: 2021-05-05

## 2021-05-05 PROBLEM — G89.29 CHRONIC PAIN OF LEFT KNEE: Status: RESOLVED | Noted: 2020-09-24 | Resolved: 2021-05-05

## 2021-05-05 PROBLEM — M25.662 DECREASED RANGE OF MOTION (ROM) OF LEFT KNEE: Status: RESOLVED | Noted: 2020-09-24 | Resolved: 2021-05-05

## 2021-05-05 PROCEDURE — 3008F BODY MASS INDEX DOCD: CPT | Mod: CPTII,S$GLB,, | Performed by: FAMILY MEDICINE

## 2021-05-05 PROCEDURE — 99397 PR PREVENTIVE VISIT,EST,65 & OVER: ICD-10-PCS | Mod: S$GLB,,, | Performed by: FAMILY MEDICINE

## 2021-05-05 PROCEDURE — 1101F PT FALLS ASSESS-DOCD LE1/YR: CPT | Mod: CPTII,S$GLB,, | Performed by: FAMILY MEDICINE

## 2021-05-05 PROCEDURE — 1126F PR PAIN SEVERITY QUANTIFIED, NO PAIN PRESENT: ICD-10-PCS | Mod: S$GLB,,, | Performed by: FAMILY MEDICINE

## 2021-05-05 PROCEDURE — 99999 PR PBB SHADOW E&M-EST. PATIENT-LVL V: ICD-10-PCS | Mod: PBBFAC,,, | Performed by: FAMILY MEDICINE

## 2021-05-05 PROCEDURE — 99397 PER PM REEVAL EST PAT 65+ YR: CPT | Mod: S$GLB,,, | Performed by: FAMILY MEDICINE

## 2021-05-05 PROCEDURE — 1126F AMNT PAIN NOTED NONE PRSNT: CPT | Mod: S$GLB,,, | Performed by: FAMILY MEDICINE

## 2021-05-05 PROCEDURE — 3008F PR BODY MASS INDEX (BMI) DOCUMENTED: ICD-10-PCS | Mod: CPTII,S$GLB,, | Performed by: FAMILY MEDICINE

## 2021-05-05 PROCEDURE — 1101F PR PT FALLS ASSESS DOC 0-1 FALLS W/OUT INJ PAST YR: ICD-10-PCS | Mod: CPTII,S$GLB,, | Performed by: FAMILY MEDICINE

## 2021-05-05 PROCEDURE — 99999 PR PBB SHADOW E&M-EST. PATIENT-LVL V: CPT | Mod: PBBFAC,,, | Performed by: FAMILY MEDICINE

## 2021-05-05 PROCEDURE — 3288F PR FALLS RISK ASSESSMENT DOCUMENTED: ICD-10-PCS | Mod: CPTII,S$GLB,, | Performed by: FAMILY MEDICINE

## 2021-05-05 PROCEDURE — 3288F FALL RISK ASSESSMENT DOCD: CPT | Mod: CPTII,S$GLB,, | Performed by: FAMILY MEDICINE

## 2021-05-20 ENCOUNTER — OFFICE VISIT (OUTPATIENT)
Dept: INTERNAL MEDICINE | Facility: CLINIC | Age: 71
End: 2021-05-20
Payer: MEDICARE

## 2021-05-20 VITALS
HEART RATE: 79 BPM | OXYGEN SATURATION: 97 % | SYSTOLIC BLOOD PRESSURE: 124 MMHG | DIASTOLIC BLOOD PRESSURE: 82 MMHG | TEMPERATURE: 98 F | WEIGHT: 191.81 LBS | BODY MASS INDEX: 32.92 KG/M2

## 2021-05-20 DIAGNOSIS — I15.2 HYPERTENSION ASSOCIATED WITH DIABETES: ICD-10-CM

## 2021-05-20 DIAGNOSIS — E11.59 HYPERTENSION ASSOCIATED WITH DIABETES: ICD-10-CM

## 2021-05-20 DIAGNOSIS — E11.69 HYPERLIPIDEMIA ASSOCIATED WITH TYPE 2 DIABETES MELLITUS: ICD-10-CM

## 2021-05-20 DIAGNOSIS — E78.5 HYPERLIPIDEMIA ASSOCIATED WITH TYPE 2 DIABETES MELLITUS: ICD-10-CM

## 2021-05-20 DIAGNOSIS — E11.65 TYPE 2 DIABETES MELLITUS WITH HYPERGLYCEMIA, WITHOUT LONG-TERM CURRENT USE OF INSULIN: ICD-10-CM

## 2021-05-20 DIAGNOSIS — Z72.0 TOBACCO ABUSE: ICD-10-CM

## 2021-05-20 DIAGNOSIS — E66.9 OBESITY (BMI 30.0-34.9): ICD-10-CM

## 2021-05-20 DIAGNOSIS — L24.9 IRRITANT CONTACT DERMATITIS, UNSPECIFIED TRIGGER: Primary | ICD-10-CM

## 2021-05-20 PROBLEM — E66.811 OBESITY (BMI 30.0-34.9): Status: ACTIVE | Noted: 2021-05-20

## 2021-05-20 PROBLEM — L23.89 ALLERGIC CONTACT DERMATITIS DUE TO OTHER AGENTS: Status: RESOLVED | Noted: 2019-03-19 | Resolved: 2021-05-20

## 2021-05-20 PROBLEM — F17.210 CIGARETTE NICOTINE DEPENDENCE WITHOUT COMPLICATION: Status: RESOLVED | Noted: 2017-09-21 | Resolved: 2021-05-20

## 2021-05-20 PROCEDURE — 1101F PR PT FALLS ASSESS DOC 0-1 FALLS W/OUT INJ PAST YR: ICD-10-PCS | Mod: CPTII,S$GLB,, | Performed by: FAMILY MEDICINE

## 2021-05-20 PROCEDURE — 99214 OFFICE O/P EST MOD 30 MIN: CPT | Mod: S$GLB,,, | Performed by: FAMILY MEDICINE

## 2021-05-20 PROCEDURE — 1126F PR PAIN SEVERITY QUANTIFIED, NO PAIN PRESENT: ICD-10-PCS | Mod: S$GLB,,, | Performed by: FAMILY MEDICINE

## 2021-05-20 PROCEDURE — 3288F PR FALLS RISK ASSESSMENT DOCUMENTED: ICD-10-PCS | Mod: CPTII,S$GLB,, | Performed by: FAMILY MEDICINE

## 2021-05-20 PROCEDURE — 3288F FALL RISK ASSESSMENT DOCD: CPT | Mod: CPTII,S$GLB,, | Performed by: FAMILY MEDICINE

## 2021-05-20 PROCEDURE — 3008F BODY MASS INDEX DOCD: CPT | Mod: CPTII,S$GLB,, | Performed by: FAMILY MEDICINE

## 2021-05-20 PROCEDURE — 99999 PR PBB SHADOW E&M-EST. PATIENT-LVL IV: ICD-10-PCS | Mod: PBBFAC,,, | Performed by: FAMILY MEDICINE

## 2021-05-20 PROCEDURE — 99214 PR OFFICE/OUTPT VISIT, EST, LEVL IV, 30-39 MIN: ICD-10-PCS | Mod: S$GLB,,, | Performed by: FAMILY MEDICINE

## 2021-05-20 PROCEDURE — 1126F AMNT PAIN NOTED NONE PRSNT: CPT | Mod: S$GLB,,, | Performed by: FAMILY MEDICINE

## 2021-05-20 PROCEDURE — 1101F PT FALLS ASSESS-DOCD LE1/YR: CPT | Mod: CPTII,S$GLB,, | Performed by: FAMILY MEDICINE

## 2021-05-20 PROCEDURE — 1159F MED LIST DOCD IN RCRD: CPT | Mod: S$GLB,,, | Performed by: FAMILY MEDICINE

## 2021-05-20 PROCEDURE — 3008F PR BODY MASS INDEX (BMI) DOCUMENTED: ICD-10-PCS | Mod: CPTII,S$GLB,, | Performed by: FAMILY MEDICINE

## 2021-05-20 PROCEDURE — 1159F PR MEDICATION LIST DOCUMENTED IN MEDICAL RECORD: ICD-10-PCS | Mod: S$GLB,,, | Performed by: FAMILY MEDICINE

## 2021-05-20 PROCEDURE — 99999 PR PBB SHADOW E&M-EST. PATIENT-LVL IV: CPT | Mod: PBBFAC,,, | Performed by: FAMILY MEDICINE

## 2021-05-20 RX ORDER — PRAVASTATIN SODIUM 40 MG/1
40 TABLET ORAL DAILY
Qty: 90 TABLET | Refills: 3 | Status: SHIPPED | OUTPATIENT
Start: 2021-05-20 | End: 2021-06-03

## 2021-05-20 RX ORDER — AMLODIPINE BESYLATE 10 MG/1
10 TABLET ORAL DAILY
Qty: 90 TABLET | Refills: 3 | Status: SHIPPED | OUTPATIENT
Start: 2021-05-20 | End: 2022-06-22

## 2021-05-20 RX ORDER — IRBESARTAN 300 MG/1
300 TABLET ORAL NIGHTLY
Qty: 90 TABLET | Refills: 3 | Status: SHIPPED | OUTPATIENT
Start: 2021-05-20 | End: 2022-06-22

## 2021-05-24 ENCOUNTER — TELEPHONE (OUTPATIENT)
Dept: OPHTHALMOLOGY | Facility: CLINIC | Age: 71
End: 2021-05-24

## 2021-05-25 ENCOUNTER — LAB VISIT (OUTPATIENT)
Dept: LAB | Facility: HOSPITAL | Age: 71
End: 2021-05-25
Attending: PHYSICIAN ASSISTANT
Payer: MEDICARE

## 2021-05-25 DIAGNOSIS — E11.65 TYPE 2 DIABETES MELLITUS WITH HYPERGLYCEMIA, WITHOUT LONG-TERM CURRENT USE OF INSULIN: ICD-10-CM

## 2021-05-25 DIAGNOSIS — E78.5 HYPERLIPIDEMIA ASSOCIATED WITH TYPE 2 DIABETES MELLITUS: ICD-10-CM

## 2021-05-25 DIAGNOSIS — I15.2 HYPERTENSION ASSOCIATED WITH DIABETES: ICD-10-CM

## 2021-05-25 DIAGNOSIS — J30.9 CHRONIC ALLERGIC RHINITIS: ICD-10-CM

## 2021-05-25 DIAGNOSIS — E11.59 HYPERTENSION ASSOCIATED WITH DIABETES: ICD-10-CM

## 2021-05-25 DIAGNOSIS — E11.69 HYPERLIPIDEMIA ASSOCIATED WITH TYPE 2 DIABETES MELLITUS: ICD-10-CM

## 2021-05-25 DIAGNOSIS — Z00.00 ROUTINE GENERAL MEDICAL EXAMINATION AT A HEALTH CARE FACILITY: ICD-10-CM

## 2021-05-25 LAB
ALBUMIN SERPL BCP-MCNC: 4 G/DL (ref 3.5–5.2)
ALP SERPL-CCNC: 73 U/L (ref 55–135)
ALT SERPL W/O P-5'-P-CCNC: 20 U/L (ref 10–44)
ANION GAP SERPL CALC-SCNC: 11 MMOL/L (ref 8–16)
AST SERPL-CCNC: 18 U/L (ref 10–40)
BASOPHILS # BLD AUTO: 0.07 K/UL (ref 0–0.2)
BASOPHILS NFR BLD: 1.3 % (ref 0–1.9)
BILIRUB SERPL-MCNC: 1.2 MG/DL (ref 0.1–1)
BUN SERPL-MCNC: 13 MG/DL (ref 8–23)
CALCIUM SERPL-MCNC: 10.6 MG/DL (ref 8.7–10.5)
CHLORIDE SERPL-SCNC: 103 MMOL/L (ref 95–110)
CHOLEST SERPL-MCNC: 169 MG/DL (ref 120–199)
CHOLEST/HDLC SERPL: 2.8 {RATIO} (ref 2–5)
CO2 SERPL-SCNC: 25 MMOL/L (ref 23–29)
CREAT SERPL-MCNC: 0.9 MG/DL (ref 0.5–1.4)
DIFFERENTIAL METHOD: ABNORMAL
EOSINOPHIL # BLD AUTO: 0.3 K/UL (ref 0–0.5)
EOSINOPHIL NFR BLD: 5.6 % (ref 0–8)
ERYTHROCYTE [DISTWIDTH] IN BLOOD BY AUTOMATED COUNT: 12.4 % (ref 11.5–14.5)
EST. GFR  (AFRICAN AMERICAN): >60 ML/MIN/1.73 M^2
EST. GFR  (NON AFRICAN AMERICAN): >60 ML/MIN/1.73 M^2
ESTIMATED AVG GLUCOSE: 163 MG/DL (ref 68–131)
GLUCOSE SERPL-MCNC: 228 MG/DL (ref 70–110)
HBA1C MFR BLD: 7.3 % (ref 4–5.6)
HCT VFR BLD AUTO: 43 % (ref 37–48.5)
HDLC SERPL-MCNC: 61 MG/DL (ref 40–75)
HDLC SERPL: 36.1 % (ref 20–50)
HGB BLD-MCNC: 14.1 G/DL (ref 12–16)
IMM GRANULOCYTES # BLD AUTO: 0 K/UL (ref 0–0.04)
IMM GRANULOCYTES NFR BLD AUTO: 0 % (ref 0–0.5)
LDLC SERPL CALC-MCNC: 89.2 MG/DL (ref 63–159)
LYMPHOCYTES # BLD AUTO: 1.7 K/UL (ref 1–4.8)
LYMPHOCYTES NFR BLD: 31.6 % (ref 18–48)
MCH RBC QN AUTO: 31.1 PG (ref 27–31)
MCHC RBC AUTO-ENTMCNC: 32.8 G/DL (ref 32–36)
MCV RBC AUTO: 95 FL (ref 82–98)
MONOCYTES # BLD AUTO: 0.6 K/UL (ref 0.3–1)
MONOCYTES NFR BLD: 10.2 % (ref 4–15)
NEUTROPHILS # BLD AUTO: 2.8 K/UL (ref 1.8–7.7)
NEUTROPHILS NFR BLD: 51.3 % (ref 38–73)
NONHDLC SERPL-MCNC: 108 MG/DL
NRBC BLD-RTO: 0 /100 WBC
PLATELET # BLD AUTO: 317 K/UL (ref 150–450)
PMV BLD AUTO: 9.5 FL (ref 9.2–12.9)
POTASSIUM SERPL-SCNC: 4.1 MMOL/L (ref 3.5–5.1)
PROT SERPL-MCNC: 7.4 G/DL (ref 6–8.4)
RBC # BLD AUTO: 4.54 M/UL (ref 4–5.4)
SODIUM SERPL-SCNC: 139 MMOL/L (ref 136–145)
TRIGL SERPL-MCNC: 94 MG/DL (ref 30–150)
TSH SERPL DL<=0.005 MIU/L-ACNC: 2.24 UIU/ML (ref 0.4–4)
WBC # BLD AUTO: 5.51 K/UL (ref 3.9–12.7)

## 2021-05-25 PROCEDURE — 80061 LIPID PANEL: CPT | Performed by: FAMILY MEDICINE

## 2021-05-25 PROCEDURE — 85025 COMPLETE CBC W/AUTO DIFF WBC: CPT | Performed by: FAMILY MEDICINE

## 2021-05-25 PROCEDURE — 36415 COLL VENOUS BLD VENIPUNCTURE: CPT | Performed by: FAMILY MEDICINE

## 2021-05-25 PROCEDURE — 80053 COMPREHEN METABOLIC PANEL: CPT | Performed by: FAMILY MEDICINE

## 2021-05-25 PROCEDURE — 84443 ASSAY THYROID STIM HORMONE: CPT | Performed by: FAMILY MEDICINE

## 2021-05-25 PROCEDURE — 83036 HEMOGLOBIN GLYCOSYLATED A1C: CPT | Performed by: FAMILY MEDICINE

## 2021-05-25 RX ORDER — METFORMIN HYDROCHLORIDE 500 MG/1
500 TABLET ORAL 2 TIMES DAILY WITH MEALS
Qty: 180 TABLET | Refills: 3 | Status: SHIPPED | OUTPATIENT
Start: 2021-05-25 | End: 2022-08-17

## 2021-05-28 ENCOUNTER — PATIENT OUTREACH (OUTPATIENT)
Dept: ADMINISTRATIVE | Facility: OTHER | Age: 71
End: 2021-05-28

## 2021-05-31 ENCOUNTER — HOSPITAL ENCOUNTER (OUTPATIENT)
Dept: RADIOLOGY | Facility: HOSPITAL | Age: 71
Discharge: HOME | End: 2021-05-31
Attending: FAMILY MEDICINE
Payer: MEDICARE

## 2021-05-31 ENCOUNTER — OFFICE VISIT (OUTPATIENT)
Dept: OPHTHALMOLOGY | Facility: CLINIC | Age: 71
End: 2021-05-31
Payer: COMMERCIAL

## 2021-05-31 ENCOUNTER — TELEPHONE (OUTPATIENT)
Dept: INTERNAL MEDICINE | Facility: CLINIC | Age: 71
End: 2021-05-31

## 2021-05-31 DIAGNOSIS — E11.65 TYPE 2 DIABETES MELLITUS WITH HYPERGLYCEMIA, WITHOUT LONG-TERM CURRENT USE OF INSULIN: ICD-10-CM

## 2021-05-31 DIAGNOSIS — R91.8 MULTIPLE LUNG NODULES ON CT: Primary | ICD-10-CM

## 2021-05-31 DIAGNOSIS — Z12.2 ENCOUNTER FOR SCREENING FOR MALIGNANT NEOPLASM OF RESPIRATORY ORGANS: ICD-10-CM

## 2021-05-31 DIAGNOSIS — Z87.891 PERSONAL HISTORY OF NICOTINE DEPENDENCE: ICD-10-CM

## 2021-05-31 DIAGNOSIS — E11.36 DIABETIC CATARACT: ICD-10-CM

## 2021-05-31 DIAGNOSIS — Z72.0 TOBACCO ABUSE: ICD-10-CM

## 2021-05-31 DIAGNOSIS — E11.9 DIABETES MELLITUS WITHOUT COMPLICATION: Primary | ICD-10-CM

## 2021-05-31 DIAGNOSIS — H52.03 HYPEROPIA OF BOTH EYES: ICD-10-CM

## 2021-05-31 PROCEDURE — 2023F PR DILATED RETINAL EXAM W/O EVID OF RETINOPATHY: ICD-10-PCS | Mod: S$GLB,,, | Performed by: OPTOMETRIST

## 2021-05-31 PROCEDURE — 92014 PR EYE EXAM, EST PATIENT,COMPREHESV: ICD-10-PCS | Mod: S$GLB,,, | Performed by: OPTOMETRIST

## 2021-05-31 PROCEDURE — 99999 PR PBB SHADOW E&M-EST. PATIENT-LVL III: CPT | Mod: PBBFAC,,, | Performed by: OPTOMETRIST

## 2021-05-31 PROCEDURE — 99999 PR PBB SHADOW E&M-EST. PATIENT-LVL III: ICD-10-PCS | Mod: PBBFAC,,, | Performed by: OPTOMETRIST

## 2021-05-31 PROCEDURE — 3051F HG A1C>EQUAL 7.0%<8.0%: CPT | Mod: CPTII,S$GLB,, | Performed by: OPTOMETRIST

## 2021-05-31 PROCEDURE — 92014 COMPRE OPH EXAM EST PT 1/>: CPT | Mod: S$GLB,,, | Performed by: OPTOMETRIST

## 2021-05-31 PROCEDURE — 71271 CT THORAX LUNG CANCER SCR C-: CPT | Mod: TC

## 2021-05-31 PROCEDURE — 3051F PR MOST RECENT HEMOGLOBIN A1C LEVEL 7.0 - < 8.0%: ICD-10-PCS | Mod: CPTII,S$GLB,, | Performed by: OPTOMETRIST

## 2021-05-31 PROCEDURE — 71271 CT CHEST LUNG SCREENING LOW DOSE: ICD-10-PCS | Mod: 26,,, | Performed by: RADIOLOGY

## 2021-05-31 PROCEDURE — 2023F DILAT RTA XM W/O RTNOPTHY: CPT | Mod: S$GLB,,, | Performed by: OPTOMETRIST

## 2021-05-31 PROCEDURE — 92015 DETERMINE REFRACTIVE STATE: CPT | Mod: S$GLB,,, | Performed by: OPTOMETRIST

## 2021-05-31 PROCEDURE — 92015 PR REFRACTION: ICD-10-PCS | Mod: S$GLB,,, | Performed by: OPTOMETRIST

## 2021-05-31 PROCEDURE — 71271 CT THORAX LUNG CANCER SCR C-: CPT | Mod: 26,,, | Performed by: RADIOLOGY

## 2022-02-23 ENCOUNTER — PATIENT MESSAGE (OUTPATIENT)
Dept: ADMINISTRATIVE | Facility: HOSPITAL | Age: 72
End: 2022-02-23
Payer: MEDICARE

## 2022-04-27 ENCOUNTER — PATIENT MESSAGE (OUTPATIENT)
Dept: ADMINISTRATIVE | Facility: HOSPITAL | Age: 72
End: 2022-04-27
Payer: MEDICARE

## 2022-06-01 ENCOUNTER — PATIENT OUTREACH (OUTPATIENT)
Dept: ADMINISTRATIVE | Facility: HOSPITAL | Age: 72
End: 2022-06-01
Payer: MEDICARE

## 2022-06-01 NOTE — PROGRESS NOTES
DM Report: Attempted to contact the patient to schedule overdue annual exam and labs, no answer, left voicemail.

## 2022-06-29 ENCOUNTER — PATIENT OUTREACH (OUTPATIENT)
Dept: ADMINISTRATIVE | Facility: HOSPITAL | Age: 72
End: 2022-06-29
Payer: MEDICARE

## 2022-06-29 DIAGNOSIS — E11.65 TYPE 2 DIABETES MELLITUS WITH HYPERGLYCEMIA, WITHOUT LONG-TERM CURRENT USE OF INSULIN: Primary | ICD-10-CM

## 2022-06-29 NOTE — PROGRESS NOTES
DM Report: Patient has returned call and has scheduled annual exam with PCP on 8/17/22 and diabetic labs on 8/8/22, declined to schedule labs sooner.

## 2022-06-29 NOTE — PROGRESS NOTES
DM REPORT: I spoke with pt's  that will have her to call me. Attempting to find out if pt has a new pcp. Needs Diabetic labs and f/u.

## 2022-07-13 ENCOUNTER — PATIENT OUTREACH (OUTPATIENT)
Dept: ADMINISTRATIVE | Facility: HOSPITAL | Age: 72
End: 2022-07-13
Payer: MEDICARE

## 2022-07-13 NOTE — PROGRESS NOTES
Attempted to contact patient by phone for PCP visit. Scheduled patient for PCP visit on 8/17/2022.

## 2022-07-25 ENCOUNTER — OFFICE VISIT (OUTPATIENT)
Dept: OPHTHALMOLOGY | Facility: CLINIC | Age: 72
End: 2022-07-25
Payer: MEDICARE

## 2022-07-25 DIAGNOSIS — H52.03 HYPEROPIA WITH PRESBYOPIA OF BOTH EYES: ICD-10-CM

## 2022-07-25 DIAGNOSIS — H52.4 HYPEROPIA WITH PRESBYOPIA OF BOTH EYES: ICD-10-CM

## 2022-07-25 DIAGNOSIS — E11.65 TYPE 2 DIABETES MELLITUS WITH HYPERGLYCEMIA, WITHOUT LONG-TERM CURRENT USE OF INSULIN: Primary | ICD-10-CM

## 2022-07-25 DIAGNOSIS — E11.9 DIABETES MELLITUS WITHOUT COMPLICATION: ICD-10-CM

## 2022-07-25 PROCEDURE — 92014 PR EYE EXAM, EST PATIENT,COMPREHESV: ICD-10-PCS | Mod: S$GLB,,, | Performed by: OPTOMETRIST

## 2022-07-25 PROCEDURE — 92015 DETERMINE REFRACTIVE STATE: CPT | Mod: S$GLB,,, | Performed by: OPTOMETRIST

## 2022-07-25 PROCEDURE — 92015 PR REFRACTION: ICD-10-PCS | Mod: S$GLB,,, | Performed by: OPTOMETRIST

## 2022-07-25 PROCEDURE — 92014 COMPRE OPH EXAM EST PT 1/>: CPT | Mod: S$GLB,,, | Performed by: OPTOMETRIST

## 2022-07-25 PROCEDURE — 99999 PR PBB SHADOW E&M-EST. PATIENT-LVL III: CPT | Mod: PBBFAC,,, | Performed by: OPTOMETRIST

## 2022-07-25 PROCEDURE — 99999 PR PBB SHADOW E&M-EST. PATIENT-LVL III: ICD-10-PCS | Mod: PBBFAC,,, | Performed by: OPTOMETRIST

## 2022-07-25 NOTE — PROGRESS NOTES
HPI     Last seen by DKT  PAtient here today for yearly DM eye exam  Diagnosed with diabetes in early 2000  Lab Results       Component                Value               Date                       HGBA1C                   7.3 (H)             05/25/2021            Vision changes since last eye exam?: None noticed  Wears OTC readers    Any eye pain today: No    Other ocular symptoms: Allergies    Interested in contact lens fitting today? No      1. DM  2. NSC OU        Last edited by Aleyda Bass, PCT on 7/25/2022 11:11 AM. (History)              Assessment /Plan     For exam results, see Encounter Report.    Type 2 diabetes mellitus with hyperglycemia, without long-term current use of insulin    Diabetes mellitus without complication  23 years, last A1c 7.3 Stressed importance of DM control to preserve vision. No diabetic retinopathy was seen in either eye today. Continue strict blood glucose control.  Reviewed importance of yearly dilated eye exams. Continue close care with PCP regarding diabetes.  Hyperopia with presbyopia of both eyes  Eyeglass Final Rx     Eyeglass Final Rx       Sphere Cylinder Axis Add    Right +4.25 +1.00 105 +2.50    Left +4.25 Sphere  +2.50    Type: PAL    Expiration Date: 7/25/2023                RTC 1 yr for dilated eye exam or sooner if any changes to vision.   Discussed above and answered questions.

## 2022-07-26 ENCOUNTER — IMMUNIZATION (OUTPATIENT)
Dept: PRIMARY CARE CLINIC | Facility: CLINIC | Age: 72
End: 2022-07-26
Payer: MEDICARE

## 2022-07-26 DIAGNOSIS — Z23 NEED FOR VACCINATION: Primary | ICD-10-CM

## 2022-07-26 PROCEDURE — 91305 COVID-19, MRNA, LNP-S, PF, 30 MCG/0.3 ML DOSE VACCINE (PFIZER): CPT | Mod: PBBFAC | Performed by: FAMILY MEDICINE

## 2022-08-08 ENCOUNTER — LAB VISIT (OUTPATIENT)
Dept: LAB | Facility: HOSPITAL | Age: 72
End: 2022-08-08
Attending: FAMILY MEDICINE
Payer: MEDICARE

## 2022-08-08 DIAGNOSIS — E11.65 TYPE 2 DIABETES MELLITUS WITH HYPERGLYCEMIA, WITHOUT LONG-TERM CURRENT USE OF INSULIN: ICD-10-CM

## 2022-08-08 LAB
ALBUMIN SERPL BCP-MCNC: 4.2 G/DL (ref 3.5–5.2)
ALP SERPL-CCNC: 68 U/L (ref 55–135)
ALT SERPL W/O P-5'-P-CCNC: 24 U/L (ref 10–44)
ANION GAP SERPL CALC-SCNC: 10 MMOL/L (ref 8–16)
AST SERPL-CCNC: 21 U/L (ref 10–40)
BILIRUB SERPL-MCNC: 1.3 MG/DL (ref 0.1–1)
BUN SERPL-MCNC: 12 MG/DL (ref 8–23)
CALCIUM SERPL-MCNC: 10.1 MG/DL (ref 8.7–10.5)
CHLORIDE SERPL-SCNC: 103 MMOL/L (ref 95–110)
CHOLEST SERPL-MCNC: 159 MG/DL (ref 120–199)
CHOLEST/HDLC SERPL: 2.3 {RATIO} (ref 2–5)
CO2 SERPL-SCNC: 26 MMOL/L (ref 23–29)
CREAT SERPL-MCNC: 0.7 MG/DL (ref 0.5–1.4)
EST. GFR  (NO RACE VARIABLE): >60 ML/MIN/1.73 M^2
ESTIMATED AVG GLUCOSE: 143 MG/DL (ref 68–131)
GLUCOSE SERPL-MCNC: 166 MG/DL (ref 70–110)
HBA1C MFR BLD: 6.6 % (ref 4–5.6)
HDLC SERPL-MCNC: 70 MG/DL (ref 40–75)
HDLC SERPL: 44 % (ref 20–50)
LDLC SERPL CALC-MCNC: 73 MG/DL (ref 63–159)
NONHDLC SERPL-MCNC: 89 MG/DL
POTASSIUM SERPL-SCNC: 3.9 MMOL/L (ref 3.5–5.1)
PROT SERPL-MCNC: 7.5 G/DL (ref 6–8.4)
SODIUM SERPL-SCNC: 139 MMOL/L (ref 136–145)
TRIGL SERPL-MCNC: 80 MG/DL (ref 30–150)

## 2022-08-08 PROCEDURE — 82570 ASSAY OF URINE CREATININE: CPT | Performed by: FAMILY MEDICINE

## 2022-08-08 PROCEDURE — 36415 COLL VENOUS BLD VENIPUNCTURE: CPT | Performed by: FAMILY MEDICINE

## 2022-08-08 PROCEDURE — 80053 COMPREHEN METABOLIC PANEL: CPT | Performed by: FAMILY MEDICINE

## 2022-08-08 PROCEDURE — 80061 LIPID PANEL: CPT | Performed by: FAMILY MEDICINE

## 2022-08-08 PROCEDURE — 83036 HEMOGLOBIN GLYCOSYLATED A1C: CPT | Performed by: FAMILY MEDICINE

## 2022-08-08 PROCEDURE — 82043 UR ALBUMIN QUANTITATIVE: CPT | Performed by: FAMILY MEDICINE

## 2022-08-09 LAB
ALBUMIN/CREAT UR: NORMAL UG/MG (ref 0–30)
CREAT UR-MCNC: 31 MG/DL (ref 15–325)
MICROALBUMIN UR DL<=1MG/L-MCNC: <5 UG/ML

## 2022-08-16 ENCOUNTER — PATIENT OUTREACH (OUTPATIENT)
Dept: ADMINISTRATIVE | Facility: HOSPITAL | Age: 72
End: 2022-08-16
Payer: MEDICARE

## 2022-08-17 ENCOUNTER — OFFICE VISIT (OUTPATIENT)
Dept: INTERNAL MEDICINE | Facility: CLINIC | Age: 72
End: 2022-08-17
Payer: MEDICARE

## 2022-08-17 VITALS
HEIGHT: 64 IN | OXYGEN SATURATION: 95 % | BODY MASS INDEX: 30.26 KG/M2 | DIASTOLIC BLOOD PRESSURE: 68 MMHG | HEART RATE: 80 BPM | SYSTOLIC BLOOD PRESSURE: 132 MMHG | RESPIRATION RATE: 16 BRPM | WEIGHT: 177.25 LBS

## 2022-08-17 DIAGNOSIS — M17.0 PRIMARY OSTEOARTHRITIS OF BOTH KNEES: ICD-10-CM

## 2022-08-17 DIAGNOSIS — E78.5 HYPERLIPIDEMIA ASSOCIATED WITH TYPE 2 DIABETES MELLITUS: ICD-10-CM

## 2022-08-17 DIAGNOSIS — Z00.00 ROUTINE GENERAL MEDICAL EXAMINATION AT A HEALTH CARE FACILITY: Primary | ICD-10-CM

## 2022-08-17 DIAGNOSIS — J30.9 CHRONIC ALLERGIC RHINITIS: ICD-10-CM

## 2022-08-17 DIAGNOSIS — Z12.31 ENCOUNTER FOR MAMMOGRAM TO ESTABLISH BASELINE MAMMOGRAM: ICD-10-CM

## 2022-08-17 DIAGNOSIS — Z12.11 COLON CANCER SCREENING: ICD-10-CM

## 2022-08-17 DIAGNOSIS — Z87.891 FORMER SMOKER: ICD-10-CM

## 2022-08-17 DIAGNOSIS — E11.65 TYPE 2 DIABETES MELLITUS WITH HYPERGLYCEMIA, WITHOUT LONG-TERM CURRENT USE OF INSULIN: ICD-10-CM

## 2022-08-17 DIAGNOSIS — Z78.0 ASYMPTOMATIC MENOPAUSE: ICD-10-CM

## 2022-08-17 DIAGNOSIS — E11.59 HYPERTENSION ASSOCIATED WITH DIABETES: ICD-10-CM

## 2022-08-17 DIAGNOSIS — E11.69 HYPERLIPIDEMIA ASSOCIATED WITH TYPE 2 DIABETES MELLITUS: ICD-10-CM

## 2022-08-17 DIAGNOSIS — E66.9 OBESITY (BMI 30.0-34.9): ICD-10-CM

## 2022-08-17 DIAGNOSIS — I15.2 HYPERTENSION ASSOCIATED WITH DIABETES: ICD-10-CM

## 2022-08-17 PROBLEM — Z72.0 TOBACCO ABUSE: Status: RESOLVED | Noted: 2021-05-20 | Resolved: 2022-08-17

## 2022-08-17 PROCEDURE — 3066F NEPHROPATHY DOC TX: CPT | Mod: CPTII,S$GLB,, | Performed by: FAMILY MEDICINE

## 2022-08-17 PROCEDURE — 99397 PER PM REEVAL EST PAT 65+ YR: CPT | Mod: S$GLB,,, | Performed by: FAMILY MEDICINE

## 2022-08-17 PROCEDURE — 3008F BODY MASS INDEX DOCD: CPT | Mod: CPTII,S$GLB,, | Performed by: FAMILY MEDICINE

## 2022-08-17 PROCEDURE — 3044F PR MOST RECENT HEMOGLOBIN A1C LEVEL <7.0%: ICD-10-PCS | Mod: CPTII,S$GLB,, | Performed by: FAMILY MEDICINE

## 2022-08-17 PROCEDURE — 4010F PR ACE/ARB THEARPY RXD/TAKEN: ICD-10-PCS | Mod: CPTII,S$GLB,, | Performed by: FAMILY MEDICINE

## 2022-08-17 PROCEDURE — 3078F PR MOST RECENT DIASTOLIC BLOOD PRESSURE < 80 MM HG: ICD-10-PCS | Mod: CPTII,S$GLB,, | Performed by: FAMILY MEDICINE

## 2022-08-17 PROCEDURE — 4010F ACE/ARB THERAPY RXD/TAKEN: CPT | Mod: CPTII,S$GLB,, | Performed by: FAMILY MEDICINE

## 2022-08-17 PROCEDURE — 3066F PR DOCUMENTATION OF TREATMENT FOR NEPHROPATHY: ICD-10-PCS | Mod: CPTII,S$GLB,, | Performed by: FAMILY MEDICINE

## 2022-08-17 PROCEDURE — 3078F DIAST BP <80 MM HG: CPT | Mod: CPTII,S$GLB,, | Performed by: FAMILY MEDICINE

## 2022-08-17 PROCEDURE — 3288F FALL RISK ASSESSMENT DOCD: CPT | Mod: CPTII,S$GLB,, | Performed by: FAMILY MEDICINE

## 2022-08-17 PROCEDURE — 1159F PR MEDICATION LIST DOCUMENTED IN MEDICAL RECORD: ICD-10-PCS | Mod: CPTII,S$GLB,, | Performed by: FAMILY MEDICINE

## 2022-08-17 PROCEDURE — 99397 PR PREVENTIVE VISIT,EST,65 & OVER: ICD-10-PCS | Mod: S$GLB,,, | Performed by: FAMILY MEDICINE

## 2022-08-17 PROCEDURE — 1101F PT FALLS ASSESS-DOCD LE1/YR: CPT | Mod: CPTII,S$GLB,, | Performed by: FAMILY MEDICINE

## 2022-08-17 PROCEDURE — 1160F RVW MEDS BY RX/DR IN RCRD: CPT | Mod: CPTII,S$GLB,, | Performed by: FAMILY MEDICINE

## 2022-08-17 PROCEDURE — 3072F LOW RISK FOR RETINOPATHY: CPT | Mod: CPTII,S$GLB,, | Performed by: FAMILY MEDICINE

## 2022-08-17 PROCEDURE — 3075F SYST BP GE 130 - 139MM HG: CPT | Mod: CPTII,S$GLB,, | Performed by: FAMILY MEDICINE

## 2022-08-17 PROCEDURE — 1101F PR PT FALLS ASSESS DOC 0-1 FALLS W/OUT INJ PAST YR: ICD-10-PCS | Mod: CPTII,S$GLB,, | Performed by: FAMILY MEDICINE

## 2022-08-17 PROCEDURE — 3008F PR BODY MASS INDEX (BMI) DOCUMENTED: ICD-10-PCS | Mod: CPTII,S$GLB,, | Performed by: FAMILY MEDICINE

## 2022-08-17 PROCEDURE — 3061F NEG MICROALBUMINURIA REV: CPT | Mod: CPTII,S$GLB,, | Performed by: FAMILY MEDICINE

## 2022-08-17 PROCEDURE — 99999 PR PBB SHADOW E&M-EST. PATIENT-LVL IV: ICD-10-PCS | Mod: PBBFAC,,, | Performed by: FAMILY MEDICINE

## 2022-08-17 PROCEDURE — 1160F PR REVIEW ALL MEDS BY PRESCRIBER/CLIN PHARMACIST DOCUMENTED: ICD-10-PCS | Mod: CPTII,S$GLB,, | Performed by: FAMILY MEDICINE

## 2022-08-17 PROCEDURE — 3288F PR FALLS RISK ASSESSMENT DOCUMENTED: ICD-10-PCS | Mod: CPTII,S$GLB,, | Performed by: FAMILY MEDICINE

## 2022-08-17 PROCEDURE — 99999 PR PBB SHADOW E&M-EST. PATIENT-LVL IV: CPT | Mod: PBBFAC,,, | Performed by: FAMILY MEDICINE

## 2022-08-17 PROCEDURE — 3075F PR MOST RECENT SYSTOLIC BLOOD PRESS GE 130-139MM HG: ICD-10-PCS | Mod: CPTII,S$GLB,, | Performed by: FAMILY MEDICINE

## 2022-08-17 PROCEDURE — 3072F PR LOW RISK FOR RETINOPATHY: ICD-10-PCS | Mod: CPTII,S$GLB,, | Performed by: FAMILY MEDICINE

## 2022-08-17 PROCEDURE — 3044F HG A1C LEVEL LT 7.0%: CPT | Mod: CPTII,S$GLB,, | Performed by: FAMILY MEDICINE

## 2022-08-17 PROCEDURE — 3061F PR NEG MICROALBUMINURIA RESULT DOCUMENTED/REVIEW: ICD-10-PCS | Mod: CPTII,S$GLB,, | Performed by: FAMILY MEDICINE

## 2022-08-17 PROCEDURE — 1159F MED LIST DOCD IN RCRD: CPT | Mod: CPTII,S$GLB,, | Performed by: FAMILY MEDICINE

## 2022-08-17 RX ORDER — AMLODIPINE BESYLATE 10 MG/1
10 TABLET ORAL DAILY
Qty: 90 TABLET | Refills: 1 | Status: SHIPPED | OUTPATIENT
Start: 2022-08-17 | End: 2023-02-20 | Stop reason: SDUPTHER

## 2022-08-17 RX ORDER — PRAVASTATIN SODIUM 40 MG/1
40 TABLET ORAL NIGHTLY
Qty: 90 TABLET | Refills: 1 | Status: SHIPPED | OUTPATIENT
Start: 2022-08-17 | End: 2023-02-20 | Stop reason: SDUPTHER

## 2022-08-17 RX ORDER — IRBESARTAN 300 MG/1
300 TABLET ORAL NIGHTLY
Qty: 90 TABLET | Refills: 1 | Status: SHIPPED | OUTPATIENT
Start: 2022-08-17 | End: 2023-02-20 | Stop reason: SDUPTHER

## 2022-08-17 NOTE — PROGRESS NOTES
"Subjective:       Patient ID: Mita Max is a 72 y.o. female.    Chief Complaint: Annual Exam    72-year-old female patient with Patient Active Problem List:     Type 2 diabetes mellitus with hyperglycemia, without long-term current use of insulin     Alcohol dependence in remission     Hypertension associated with diabetes     Chronic allergic rhinitis     History of colon polyps     Hyperlipidemia associated with type 2 diabetes mellitus     Primary osteoarthritis of both knees     Obesity (BMI 30.0-34.9)  Here for routine annual physical.  Patient reported that during summer patient has quit smoking  Has been watching her diet and never took metformin  Denies any fatigue, changes in appetite or weight or trouble with bowel movements.     Review of Systems   Constitutional: Negative for fatigue.   Eyes: Negative for visual disturbance.   Respiratory: Negative for shortness of breath.    Cardiovascular: Negative for chest pain and leg swelling.   Gastrointestinal: Negative for abdominal pain, nausea and vomiting.   Musculoskeletal: Negative for myalgias.   Skin: Negative for rash.   Neurological: Negative for light-headedness and headaches.   Psychiatric/Behavioral: Negative for sleep disturbance.         /68 (BP Location: Right arm, Patient Position: Sitting, BP Method: Large (Manual))   Pulse 80   Resp 16   Ht 5' 4" (1.626 m)   Wt 80.4 kg (177 lb 4 oz)   SpO2 95%   BMI 30.42 kg/m²   Objective:      Physical Exam  Constitutional:       Appearance: She is well-developed.   HENT:      Head: Normocephalic and atraumatic.   Cardiovascular:      Rate and Rhythm: Normal rate and regular rhythm.      Pulses:           Dorsalis pedis pulses are 2+ on the right side and 2+ on the left side.      Heart sounds: Normal heart sounds. No murmur heard.  Pulmonary:      Effort: Pulmonary effort is normal.      Breath sounds: Normal breath sounds. No wheezing.   Abdominal:      General: Bowel sounds are " normal.      Palpations: Abdomen is soft.      Tenderness: There is no abdominal tenderness.   Feet:      Right foot:      Protective Sensation: 10 sites tested. 10 sites sensed.      Left foot:      Protective Sensation: 10 sites tested. 10 sites sensed.   Skin:     General: Skin is warm and dry.      Findings: No rash.   Neurological:      Mental Status: She is alert and oriented to person, place, and time.   Psychiatric:         Mood and Affect: Mood normal.           Assessment/Plan:   1. Routine general medical examination at a health care facility  Vital signs stable today.  Clinical exam stable  Continue lifestyle modifications with low-fat and low-cholesterol diet and exercise 30 minutes daily  Due for colonoscopy    2. Hypertension associated with diabetes  - amLODIPine (NORVASC) 10 MG tablet; Take 1 tablet (10 mg total) by mouth once daily.  Dispense: 90 tablet; Refill: 1  - irbesartan (AVAPRO) 300 MG tablet; Take 1 tablet (300 mg total) by mouth every evening.  Dispense: 90 tablet; Refill: 1  Blood pressure stable currently on amlodipine 10 mg and Avapro 300 mg  Refills given today    3. Hyperlipidemia associated with type 2 diabetes mellitus  - pravastatin (PRAVACHOL) 40 MG tablet; Take 1 tablet (40 mg total) by mouth every evening.  Dispense: 90 tablet; Refill: 1  Currently taking pravastatin 40 mg    4. Type 2 diabetes mellitus with hyperglycemia, without long-term current use of insulin  Diet controlled and not interested in taking metformin  Diabetic foot exam stable   Strict lifestyle changes recommended with 1800 ADA low-fat and low-cholesterol diet and exercise 30 minutes daily      5. Primary osteoarthritis of both knees  Graded exercise regimen recommended    6. Chronic allergic rhinitis  Stable    7. Obesity (BMI 30.0-34.9)  Lifestyle modifications recommended to lose weight with BMI 30    8. Former smoker  - CT Chest Lung Screening Low Dose; Future    9. Colon cancer screening  - Ambulatory  referral/consult to Endo Procedure ; Future    10. Encounter for mammogram to establish baseline mammogram  - Mammo Digital Screening Bilat w/ [facility]; Future    11. Asymptomatic menopause  - DXA Bone Density Spine And Hip; Future

## 2022-10-04 ENCOUNTER — PATIENT MESSAGE (OUTPATIENT)
Dept: ADMINISTRATIVE | Facility: HOSPITAL | Age: 72
End: 2022-10-04
Payer: MEDICARE

## 2022-11-11 ENCOUNTER — TELEPHONE (OUTPATIENT)
Dept: ADMINISTRATIVE | Facility: HOSPITAL | Age: 72
End: 2022-11-11
Payer: MEDICARE

## 2022-11-29 ENCOUNTER — OFFICE VISIT (OUTPATIENT)
Dept: INTERNAL MEDICINE | Facility: CLINIC | Age: 72
End: 2022-11-29
Payer: MEDICARE

## 2022-11-29 VITALS
RESPIRATION RATE: 20 BRPM | SYSTOLIC BLOOD PRESSURE: 132 MMHG | BODY MASS INDEX: 27.63 KG/M2 | HEIGHT: 64 IN | HEART RATE: 80 BPM | WEIGHT: 161.81 LBS | DIASTOLIC BLOOD PRESSURE: 72 MMHG

## 2022-11-29 DIAGNOSIS — E11.9 DIABETES MELLITUS WITHOUT COMPLICATION: ICD-10-CM

## 2022-11-29 DIAGNOSIS — E11.69 HYPERLIPIDEMIA ASSOCIATED WITH TYPE 2 DIABETES MELLITUS: ICD-10-CM

## 2022-11-29 DIAGNOSIS — E66.9 OBESITY (BMI 30.0-34.9): ICD-10-CM

## 2022-11-29 DIAGNOSIS — F17.219 CIGARETTE NICOTINE DEPENDENCE WITH NICOTINE-INDUCED DISORDER: ICD-10-CM

## 2022-11-29 DIAGNOSIS — I15.2 HYPERTENSION ASSOCIATED WITH DIABETES: ICD-10-CM

## 2022-11-29 DIAGNOSIS — E78.5 HYPERLIPIDEMIA ASSOCIATED WITH TYPE 2 DIABETES MELLITUS: ICD-10-CM

## 2022-11-29 DIAGNOSIS — J30.9 CHRONIC ALLERGIC RHINITIS: ICD-10-CM

## 2022-11-29 DIAGNOSIS — F10.21 ALCOHOL DEPENDENCE IN REMISSION: ICD-10-CM

## 2022-11-29 DIAGNOSIS — Z86.010 HISTORY OF COLON POLYPS: ICD-10-CM

## 2022-11-29 DIAGNOSIS — E11.59 HYPERTENSION ASSOCIATED WITH DIABETES: ICD-10-CM

## 2022-11-29 DIAGNOSIS — E11.65 TYPE 2 DIABETES MELLITUS WITH HYPERGLYCEMIA, WITHOUT LONG-TERM CURRENT USE OF INSULIN: ICD-10-CM

## 2022-11-29 DIAGNOSIS — M17.0 PRIMARY OSTEOARTHRITIS OF BOTH KNEES: ICD-10-CM

## 2022-11-29 DIAGNOSIS — Z00.00 ENCOUNTER FOR PREVENTATIVE ADULT HEALTH CARE EXAMINATION: Primary | ICD-10-CM

## 2022-11-29 PROCEDURE — 3075F SYST BP GE 130 - 139MM HG: CPT | Mod: CPTII,S$GLB,, | Performed by: NURSE PRACTITIONER

## 2022-11-29 PROCEDURE — 99499 RISK ADDL DX/OHS AUDIT: ICD-10-PCS | Mod: HCNC,S$GLB,, | Performed by: NURSE PRACTITIONER

## 2022-11-29 PROCEDURE — 3288F PR FALLS RISK ASSESSMENT DOCUMENTED: ICD-10-PCS | Mod: CPTII,S$GLB,, | Performed by: NURSE PRACTITIONER

## 2022-11-29 PROCEDURE — 1160F RVW MEDS BY RX/DR IN RCRD: CPT | Mod: CPTII,S$GLB,, | Performed by: NURSE PRACTITIONER

## 2022-11-29 PROCEDURE — 3078F DIAST BP <80 MM HG: CPT | Mod: CPTII,S$GLB,, | Performed by: NURSE PRACTITIONER

## 2022-11-29 PROCEDURE — 3075F PR MOST RECENT SYSTOLIC BLOOD PRESS GE 130-139MM HG: ICD-10-PCS | Mod: CPTII,S$GLB,, | Performed by: NURSE PRACTITIONER

## 2022-11-29 PROCEDURE — 3061F PR NEG MICROALBUMINURIA RESULT DOCUMENTED/REVIEW: ICD-10-PCS | Mod: CPTII,S$GLB,, | Performed by: NURSE PRACTITIONER

## 2022-11-29 PROCEDURE — 3044F HG A1C LEVEL LT 7.0%: CPT | Mod: CPTII,S$GLB,, | Performed by: NURSE PRACTITIONER

## 2022-11-29 PROCEDURE — 99499 UNLISTED E&M SERVICE: CPT | Mod: HCNC,S$GLB,, | Performed by: NURSE PRACTITIONER

## 2022-11-29 PROCEDURE — 3061F NEG MICROALBUMINURIA REV: CPT | Mod: CPTII,S$GLB,, | Performed by: NURSE PRACTITIONER

## 2022-11-29 PROCEDURE — 1159F PR MEDICATION LIST DOCUMENTED IN MEDICAL RECORD: ICD-10-PCS | Mod: CPTII,S$GLB,, | Performed by: NURSE PRACTITIONER

## 2022-11-29 PROCEDURE — 3008F PR BODY MASS INDEX (BMI) DOCUMENTED: ICD-10-PCS | Mod: CPTII,S$GLB,, | Performed by: NURSE PRACTITIONER

## 2022-11-29 PROCEDURE — 3072F PR LOW RISK FOR RETINOPATHY: ICD-10-PCS | Mod: CPTII,S$GLB,, | Performed by: NURSE PRACTITIONER

## 2022-11-29 PROCEDURE — 1101F PT FALLS ASSESS-DOCD LE1/YR: CPT | Mod: CPTII,S$GLB,, | Performed by: NURSE PRACTITIONER

## 2022-11-29 PROCEDURE — 1160F PR REVIEW ALL MEDS BY PRESCRIBER/CLIN PHARMACIST DOCUMENTED: ICD-10-PCS | Mod: CPTII,S$GLB,, | Performed by: NURSE PRACTITIONER

## 2022-11-29 PROCEDURE — 3072F LOW RISK FOR RETINOPATHY: CPT | Mod: CPTII,S$GLB,, | Performed by: NURSE PRACTITIONER

## 2022-11-29 PROCEDURE — 3288F FALL RISK ASSESSMENT DOCD: CPT | Mod: CPTII,S$GLB,, | Performed by: NURSE PRACTITIONER

## 2022-11-29 PROCEDURE — 1101F PR PT FALLS ASSESS DOC 0-1 FALLS W/OUT INJ PAST YR: ICD-10-PCS | Mod: CPTII,S$GLB,, | Performed by: NURSE PRACTITIONER

## 2022-11-29 PROCEDURE — G0439 PPPS, SUBSEQ VISIT: HCPCS | Mod: S$GLB,,, | Performed by: NURSE PRACTITIONER

## 2022-11-29 PROCEDURE — 4010F ACE/ARB THERAPY RXD/TAKEN: CPT | Mod: CPTII,S$GLB,, | Performed by: NURSE PRACTITIONER

## 2022-11-29 PROCEDURE — G0439 PR MEDICARE ANNUAL WELLNESS SUBSEQUENT VISIT: ICD-10-PCS | Mod: S$GLB,,, | Performed by: NURSE PRACTITIONER

## 2022-11-29 PROCEDURE — 99999 PR PBB SHADOW E&M-EST. PATIENT-LVL IV: ICD-10-PCS | Mod: PBBFAC,,, | Performed by: NURSE PRACTITIONER

## 2022-11-29 PROCEDURE — 3066F NEPHROPATHY DOC TX: CPT | Mod: CPTII,S$GLB,, | Performed by: NURSE PRACTITIONER

## 2022-11-29 PROCEDURE — 99999 PR PBB SHADOW E&M-EST. PATIENT-LVL IV: CPT | Mod: PBBFAC,,, | Performed by: NURSE PRACTITIONER

## 2022-11-29 PROCEDURE — 3066F PR DOCUMENTATION OF TREATMENT FOR NEPHROPATHY: ICD-10-PCS | Mod: CPTII,S$GLB,, | Performed by: NURSE PRACTITIONER

## 2022-11-29 PROCEDURE — 3078F PR MOST RECENT DIASTOLIC BLOOD PRESSURE < 80 MM HG: ICD-10-PCS | Mod: CPTII,S$GLB,, | Performed by: NURSE PRACTITIONER

## 2022-11-29 PROCEDURE — 4010F PR ACE/ARB THEARPY RXD/TAKEN: ICD-10-PCS | Mod: CPTII,S$GLB,, | Performed by: NURSE PRACTITIONER

## 2022-11-29 PROCEDURE — 1159F MED LIST DOCD IN RCRD: CPT | Mod: CPTII,S$GLB,, | Performed by: NURSE PRACTITIONER

## 2022-11-29 PROCEDURE — 3044F PR MOST RECENT HEMOGLOBIN A1C LEVEL <7.0%: ICD-10-PCS | Mod: CPTII,S$GLB,, | Performed by: NURSE PRACTITIONER

## 2022-11-29 PROCEDURE — 3008F BODY MASS INDEX DOCD: CPT | Mod: CPTII,S$GLB,, | Performed by: NURSE PRACTITIONER

## 2022-11-29 NOTE — PROGRESS NOTES
"  Mita Max presented for a  Medicare AWV and comprehensive Health Risk Assessment today. The following components were reviewed and updated:    Medical history  Family History  Social history  Allergies and Current Medications  Health Risk Assessment  Health Maintenance  Care Team         ** See Completed Assessments for Annual Wellness Visit within the encounter summary.**         The following assessments were completed:  Living Situation  CAGE  Depression Screening  Timed Get Up and Go  Whisper Test  Cognitive Function Screening  Nutrition Screening  ADL Screening  PAQ Screening        Vitals:    11/29/22 1413   BP: 132/72   BP Location: Right arm   Patient Position: Sitting   Pulse: 80   Resp: 20   Weight: 73.4 kg (161 lb 13.1 oz)   Height:    Pain Scale 5' 3.5" (1.613 m)    3 C/o intermittent neck pain with ROM - States got a "crick in neck" while asleep     Body mass index is 28.22 kg/m².  Physical Exam  Constitutional:       General: She is not in acute distress.     Appearance: She is not ill-appearing or diaphoretic.   HENT:      Head: Normocephalic and atraumatic.      Mouth/Throat:      Mouth: Mucous membranes are moist.   Eyes:      General:         Right eye: No discharge.         Left eye: No discharge.      Extraocular Movements: Extraocular movements intact.      Conjunctiva/sclera: Conjunctivae normal.   Cardiovascular:      Rate and Rhythm: Normal rate and regular rhythm.   Pulmonary:      Effort: Pulmonary effort is normal. No respiratory distress.      Comments: Rare intermittent barely audible wheeze  Abdominal:      General: Bowel sounds are normal. There is no distension.      Palpations: Abdomen is soft.      Tenderness: There is no abdominal tenderness. There is no guarding.   Genitourinary:     Comments: Not examined  Musculoskeletal:         General: Normal range of motion.      Right lower leg: No edema.      Left lower leg: No edema.   Skin:     General: Skin is warm and dry.      " Capillary Refill: Capillary refill takes less than 2 seconds.   Neurological:      General: No focal deficit present.      Mental Status: She is alert and oriented to person, place, and time. Mental status is at baseline.      Cranial Nerves: No cranial nerve deficit.      Motor: No weakness.   Psychiatric:         Mood and Affect: Mood normal.         Behavior: Behavior normal.         Thought Content: Thought content normal.         Judgment: Judgment normal.           Diagnoses and health risks identified today and associated recommendations/orders:    1. Encounter for preventative adult health care examination  Review for opioid screening: Patient does not have Rx for Opioids  Review for substance use disorder: Patient does not use substance per chart    Patient with Hx of ETOH abuse in past but no alcohol intake since 2/02/1986      I offered to discuss advanced care planning, including how to pick a person who would make decisions for you if you were unable to make them for yourself, called a health care power of , and what kind of decisions you might make such as use of life sustaining treatments such as ventilators and tube feeding when faced with a life limiting illness recorded on a living will that they will need to know. (How you want to be cared for as you near the end of your natural life)      X Patient is interested in learning more about how to make advanced directives.  I provided them paperwork and offered to discuss this with them.      Patient has outstanding orders/ screenings by PCP. Discussed with patient importance of having all screenings done and for follow up afterwards and she verbalized understanding    2. Hypertension associated with diabetes  Monitor  Stable  Continue home norvasc, avapro      3. Hyperlipidemia associated with type 2 diabetes mellitus  Monitor  Stable  Continue home pravachol  Lab Results   Component Value Date    CHOL 159 08/08/2022    CHOL 169 05/25/2021     "CHOL 169 01/29/2021     Lab Results   Component Value Date    HDL 70 08/08/2022    HDL 61 05/25/2021    HDL 75 01/29/2021     Lab Results   Component Value Date    LDLCALC 73.0 08/08/2022    LDLCALC 89.2 05/25/2021    LDLCALC 75.0 01/29/2021     Lab Results   Component Value Date    TRIG 80 08/08/2022    TRIG 94 05/25/2021    TRIG 95 01/29/2021     Lab Results   Component Value Date    CHOLHDL 44.0 08/08/2022    CHOLHDL 36.1 05/25/2021    CHOLHDL 44.4 01/29/2021         4. Type 2 diabetes mellitus with hyperglycemia, without long-term current use of insulin  Monitor  Stable  Continue DM diet  Lab Results   Component Value Date    HGBA1C 6.6 (H) 08/08/2022       5. Diabetes mellitus without complication  Monitor  Stable  Continue DM diet  Lab Results   Component Value Date    HGBA1C 6.6 (H) 08/08/2022       6. Obesity (BMI 30.0-34.9)  Healthy lifestyle choices discussed  Patient reports she has "cut out all junk food" and exercising more    7. Alcohol dependence in remission  Monitor  Stable  Patient reports last alcohol intake was 2/02/1986  She states she still routinely goes to AA meetings and support groups      8.  Primary osteoarthritis of both knees  Monitor  Stable  Continue home voltaren gel as needed      9.  History of colon polyps  Patient is behind on her Colonoscopy screening  Discussed with patient importance of monitoring and continued follow up and she verbalized understanding    Lab Results   Component Value Date    WBC 5.51 05/25/2021    HGB 14.1 05/25/2021    HCT 43.0 05/25/2021    MCV 95 05/25/2021     05/25/2021     10. Nicotine Dependence  Smoking cessation education  discussed with patient > 3 minutes  Patient verbalized importance of quitting.     CMP  Sodium   Date Value Ref Range Status   08/08/2022 139 136 - 145 mmol/L Final     Potassium   Date Value Ref Range Status   08/08/2022 3.9 3.5 - 5.1 mmol/L Final     Chloride   Date Value Ref Range Status   08/08/2022 103 95 - 110 mmol/L " Final     CO2   Date Value Ref Range Status   08/08/2022 26 23 - 29 mmol/L Final     Glucose   Date Value Ref Range Status   08/08/2022 166 (H) 70 - 110 mg/dL Final     BUN   Date Value Ref Range Status   08/08/2022 12 8 - 23 mg/dL Final     Creatinine   Date Value Ref Range Status   08/08/2022 0.7 0.5 - 1.4 mg/dL Final     Calcium   Date Value Ref Range Status   08/08/2022 10.1 8.7 - 10.5 mg/dL Final     Total Protein   Date Value Ref Range Status   08/08/2022 7.5 6.0 - 8.4 g/dL Final     Albumin   Date Value Ref Range Status   08/08/2022 4.2 3.5 - 5.2 g/dL Final     Total Bilirubin   Date Value Ref Range Status   08/08/2022 1.3 (H) 0.1 - 1.0 mg/dL Final     Comment:     For infants and newborns, interpretation of results should be based  on gestational age, weight and in agreement with clinical  observations.    Premature Infant recommended reference ranges:  Up to 24 hours.............<8.0 mg/dL  Up to 48 hours............<12.0 mg/dL  3-5 days..................<15.0 mg/dL  6-29 days.................<15.0 mg/dL       Alkaline Phosphatase   Date Value Ref Range Status   08/08/2022 68 55 - 135 U/L Final     AST   Date Value Ref Range Status   08/08/2022 21 10 - 40 U/L Final     ALT   Date Value Ref Range Status   08/08/2022 24 10 - 44 U/L Final     Anion Gap   Date Value Ref Range Status   08/08/2022 10 8 - 16 mmol/L Final     eGFR   Date Value Ref Range Status   08/08/2022 >60.0 >60 mL/min/1.73 m^2 Final         Provided Mita with a 5-10 year written screening schedule and personal prevention plan. Recommendations were developed using the USPSTF age appropriate recommendations. Education, counseling, and referrals were provided as needed. After Visit Summary printed and given to patient which includes a list of additional screenings\tests needed.    Follow up for Annual wellness visit (AWV).    Raegan Adam, ASHOK

## 2022-11-29 NOTE — PATIENT INSTRUCTIONS
Counseling and Referral of Other Preventative  (Italic type indicates deductible and co-insurance are waived)    Patient Name: Mita Max  Today's Date: 11/29/2022    Health Maintenance       Date Due Completion Date    Mammogram 11/18/2021 11/18/2020    LDCT Lung Screen 05/31/2022 5/31/2021    DEXA Scan 06/20/2022 6/20/2019    Influenza Vaccine (1) 01/30/2023 (Originally 9/1/2022) 1/11/2022    Colorectal Cancer Screening 01/30/2023 (Originally 1950) 6/14/2019    COVID-19 Vaccine (5 - Booster for Pfizer series) 01/30/2023 (Originally 9/20/2022) 7/26/2022    Shingles Vaccine (2 of 3) 11/29/2023 (Originally 4/26/2016) 3/1/2016    Hemoglobin A1c 02/08/2023 8/8/2022    Override on 9/17/2013: Done    Eye Exam 07/25/2023 7/25/2022    Diabetes Urine Screening 08/08/2023 8/8/2022    Lipid Panel 08/08/2023 8/8/2022    Low Dose Statin 08/17/2023 8/17/2022    Foot Exam 11/29/2023 11/29/2022 (Done)    Override on 11/29/2022: Done    Override on 2/28/2019: Done    Override on 6/19/2017: Done    TETANUS VACCINE 01/19/2025 1/19/2015        No orders of the defined types were placed in this encounter.    The following information is provided to all patients.  This information is to help you find resources for any of the problems found today that may be affecting your health:                Living healthy guide: www.[facility].[place].gov      Understanding Diabetes: www.diabetes.org      Eating healthy: www.cdc.gov/healthyweight      CDC home safety checklist: www.cdc.gov/steadi/patient.html      Agency on Aging: www.goea.[place].gov      Alcoholics anonymous (AA): www.aa.org      Physical Activity: www.allan.nih.gov/sf3rhmz      Tobacco use: www.quitwithusla.org

## 2023-01-13 ENCOUNTER — IMMUNIZATION (OUTPATIENT)
Dept: PHARMACY | Facility: CLINIC | Age: 73
End: 2023-01-13
Payer: MEDICARE

## 2023-01-13 DIAGNOSIS — Z23 NEED FOR VACCINATION: Primary | ICD-10-CM

## 2023-02-06 ENCOUNTER — PATIENT OUTREACH (OUTPATIENT)
Dept: ADMINISTRATIVE | Facility: HOSPITAL | Age: 73
End: 2023-02-06
Payer: MEDICARE

## 2023-02-07 DIAGNOSIS — Z00.00 ENCOUNTER FOR MEDICARE ANNUAL WELLNESS EXAM: ICD-10-CM

## 2023-02-09 DIAGNOSIS — Z00.00 ENCOUNTER FOR MEDICARE ANNUAL WELLNESS EXAM: ICD-10-CM

## 2023-02-20 ENCOUNTER — LAB VISIT (OUTPATIENT)
Dept: LAB | Facility: HOSPITAL | Age: 73
End: 2023-02-20
Payer: MEDICARE

## 2023-02-20 ENCOUNTER — OFFICE VISIT (OUTPATIENT)
Dept: INTERNAL MEDICINE | Facility: CLINIC | Age: 73
End: 2023-02-20
Payer: MEDICARE

## 2023-02-20 VITALS
OXYGEN SATURATION: 95 % | WEIGHT: 162.94 LBS | SYSTOLIC BLOOD PRESSURE: 126 MMHG | BODY MASS INDEX: 27.82 KG/M2 | HEIGHT: 64 IN | HEART RATE: 71 BPM | RESPIRATION RATE: 16 BRPM | DIASTOLIC BLOOD PRESSURE: 68 MMHG

## 2023-02-20 DIAGNOSIS — R30.0 DYSURIA: ICD-10-CM

## 2023-02-20 DIAGNOSIS — M17.0 PRIMARY OSTEOARTHRITIS OF BOTH KNEES: ICD-10-CM

## 2023-02-20 DIAGNOSIS — Z86.010 HISTORY OF COLON POLYPS: ICD-10-CM

## 2023-02-20 DIAGNOSIS — I15.2 HYPERTENSION ASSOCIATED WITH DIABETES: Primary | ICD-10-CM

## 2023-02-20 DIAGNOSIS — Z12.11 COLON CANCER SCREENING: ICD-10-CM

## 2023-02-20 DIAGNOSIS — I15.2 HYPERTENSION ASSOCIATED WITH DIABETES: ICD-10-CM

## 2023-02-20 DIAGNOSIS — F10.21 ALCOHOL DEPENDENCE IN REMISSION: ICD-10-CM

## 2023-02-20 DIAGNOSIS — Z78.0 ASYMPTOMATIC MENOPAUSE: ICD-10-CM

## 2023-02-20 DIAGNOSIS — E11.59 HYPERTENSION ASSOCIATED WITH DIABETES: Primary | ICD-10-CM

## 2023-02-20 DIAGNOSIS — E11.65 TYPE 2 DIABETES MELLITUS WITH HYPERGLYCEMIA, WITHOUT LONG-TERM CURRENT USE OF INSULIN: ICD-10-CM

## 2023-02-20 DIAGNOSIS — E11.69 HYPERLIPIDEMIA ASSOCIATED WITH TYPE 2 DIABETES MELLITUS: ICD-10-CM

## 2023-02-20 DIAGNOSIS — J30.9 CHRONIC ALLERGIC RHINITIS: ICD-10-CM

## 2023-02-20 DIAGNOSIS — Z12.31 SCREENING MAMMOGRAM FOR HIGH-RISK PATIENT: ICD-10-CM

## 2023-02-20 DIAGNOSIS — E78.5 HYPERLIPIDEMIA ASSOCIATED WITH TYPE 2 DIABETES MELLITUS: ICD-10-CM

## 2023-02-20 DIAGNOSIS — E11.59 HYPERTENSION ASSOCIATED WITH DIABETES: ICD-10-CM

## 2023-02-20 DIAGNOSIS — F17.219 CIGARETTE NICOTINE DEPENDENCE WITH NICOTINE-INDUCED DISORDER: ICD-10-CM

## 2023-02-20 PROBLEM — E66.811 OBESITY (BMI 30.0-34.9): Status: RESOLVED | Noted: 2021-05-20 | Resolved: 2023-02-20

## 2023-02-20 PROBLEM — E11.9 DIABETES MELLITUS WITHOUT COMPLICATION: Status: RESOLVED | Noted: 2022-11-29 | Resolved: 2023-02-20

## 2023-02-20 PROBLEM — E66.9 OBESITY (BMI 30.0-34.9): Status: RESOLVED | Noted: 2021-05-20 | Resolved: 2023-02-20

## 2023-02-20 LAB
BILIRUB UR QL STRIP: NEGATIVE
CLARITY UR: ABNORMAL
COLOR UR: YELLOW
GLUCOSE UR QL STRIP: NEGATIVE
HGB UR QL STRIP: NEGATIVE
KETONES UR QL STRIP: NEGATIVE
LEUKOCYTE ESTERASE UR QL STRIP: NEGATIVE
NITRITE UR QL STRIP: NEGATIVE
PH UR STRIP: 7 [PH] (ref 5–8)
PROT UR QL STRIP: NEGATIVE
SP GR UR STRIP: 1.01 (ref 1–1.03)
URN SPEC COLLECT METH UR: ABNORMAL

## 2023-02-20 PROCEDURE — 3072F LOW RISK FOR RETINOPATHY: CPT | Mod: HCNC,CPTII,S$GLB, | Performed by: FAMILY MEDICINE

## 2023-02-20 PROCEDURE — 3074F SYST BP LT 130 MM HG: CPT | Mod: HCNC,CPTII,S$GLB, | Performed by: FAMILY MEDICINE

## 2023-02-20 PROCEDURE — 1101F PT FALLS ASSESS-DOCD LE1/YR: CPT | Mod: HCNC,CPTII,S$GLB, | Performed by: FAMILY MEDICINE

## 2023-02-20 PROCEDURE — 3072F PR LOW RISK FOR RETINOPATHY: ICD-10-PCS | Mod: HCNC,CPTII,S$GLB, | Performed by: FAMILY MEDICINE

## 2023-02-20 PROCEDURE — 3008F PR BODY MASS INDEX (BMI) DOCUMENTED: ICD-10-PCS | Mod: HCNC,CPTII,S$GLB, | Performed by: FAMILY MEDICINE

## 2023-02-20 PROCEDURE — 99214 OFFICE O/P EST MOD 30 MIN: CPT | Mod: HCNC,S$GLB,, | Performed by: FAMILY MEDICINE

## 2023-02-20 PROCEDURE — 99214 PR OFFICE/OUTPT VISIT, EST, LEVL IV, 30-39 MIN: ICD-10-PCS | Mod: HCNC,S$GLB,, | Performed by: FAMILY MEDICINE

## 2023-02-20 PROCEDURE — 1159F MED LIST DOCD IN RCRD: CPT | Mod: HCNC,CPTII,S$GLB, | Performed by: FAMILY MEDICINE

## 2023-02-20 PROCEDURE — 3008F BODY MASS INDEX DOCD: CPT | Mod: HCNC,CPTII,S$GLB, | Performed by: FAMILY MEDICINE

## 2023-02-20 PROCEDURE — 81003 URINALYSIS AUTO W/O SCOPE: CPT | Mod: HCNC | Performed by: FAMILY MEDICINE

## 2023-02-20 PROCEDURE — 3074F PR MOST RECENT SYSTOLIC BLOOD PRESSURE < 130 MM HG: ICD-10-PCS | Mod: HCNC,CPTII,S$GLB, | Performed by: FAMILY MEDICINE

## 2023-02-20 PROCEDURE — 3078F PR MOST RECENT DIASTOLIC BLOOD PRESSURE < 80 MM HG: ICD-10-PCS | Mod: HCNC,CPTII,S$GLB, | Performed by: FAMILY MEDICINE

## 2023-02-20 PROCEDURE — 3288F FALL RISK ASSESSMENT DOCD: CPT | Mod: HCNC,CPTII,S$GLB, | Performed by: FAMILY MEDICINE

## 2023-02-20 PROCEDURE — 1101F PR PT FALLS ASSESS DOC 0-1 FALLS W/OUT INJ PAST YR: ICD-10-PCS | Mod: HCNC,CPTII,S$GLB, | Performed by: FAMILY MEDICINE

## 2023-02-20 PROCEDURE — 1160F RVW MEDS BY RX/DR IN RCRD: CPT | Mod: HCNC,CPTII,S$GLB, | Performed by: FAMILY MEDICINE

## 2023-02-20 PROCEDURE — 1160F PR REVIEW ALL MEDS BY PRESCRIBER/CLIN PHARMACIST DOCUMENTED: ICD-10-PCS | Mod: HCNC,CPTII,S$GLB, | Performed by: FAMILY MEDICINE

## 2023-02-20 PROCEDURE — 3288F PR FALLS RISK ASSESSMENT DOCUMENTED: ICD-10-PCS | Mod: HCNC,CPTII,S$GLB, | Performed by: FAMILY MEDICINE

## 2023-02-20 PROCEDURE — 99999 PR PBB SHADOW E&M-EST. PATIENT-LVL IV: ICD-10-PCS | Mod: PBBFAC,HCNC,, | Performed by: FAMILY MEDICINE

## 2023-02-20 PROCEDURE — 99999 PR PBB SHADOW E&M-EST. PATIENT-LVL IV: CPT | Mod: PBBFAC,HCNC,, | Performed by: FAMILY MEDICINE

## 2023-02-20 PROCEDURE — 3078F DIAST BP <80 MM HG: CPT | Mod: HCNC,CPTII,S$GLB, | Performed by: FAMILY MEDICINE

## 2023-02-20 PROCEDURE — 1159F PR MEDICATION LIST DOCUMENTED IN MEDICAL RECORD: ICD-10-PCS | Mod: HCNC,CPTII,S$GLB, | Performed by: FAMILY MEDICINE

## 2023-02-20 RX ORDER — AMLODIPINE BESYLATE 10 MG/1
10 TABLET ORAL DAILY
Qty: 90 TABLET | Refills: 1 | Status: SHIPPED | OUTPATIENT
Start: 2023-02-20 | End: 2023-12-11

## 2023-02-20 RX ORDER — IRBESARTAN 300 MG/1
300 TABLET ORAL NIGHTLY
Qty: 90 TABLET | Refills: 1 | Status: SHIPPED | OUTPATIENT
Start: 2023-02-20 | End: 2023-09-06

## 2023-02-20 RX ORDER — PRAVASTATIN SODIUM 40 MG/1
40 TABLET ORAL NIGHTLY
Qty: 90 TABLET | Refills: 1 | Status: SHIPPED | OUTPATIENT
Start: 2023-02-20 | End: 2023-09-06

## 2023-02-20 NOTE — PROGRESS NOTES
"Subjective:       Patient ID: Mita Max is a 72 y.o. female.    Chief Complaint: Follow-up    72-year-old female patient with Patient Active Problem List:     Type 2 diabetes mellitus with hyperglycemia, without long-term current use of insulin     Alcohol dependence in remission     Hypertension associated with diabetes     Chronic allergic rhinitis     History of colon polyps     Hyperlipidemia associated with type 2 diabetes mellitus     Primary osteoarthritis of both knees     Cigarette nicotine dependence with nicotine-induced disorder  Here for follow-up on chronic medical conditions and reports that she is been taking her medications regularly and requesting refills  Patient has been going through divorce and reports that she will schedule mammogram DEXA scan and low-dose CT scan of the chest in couple of weeks  Continues to smoke 1 pack of cigarettes daily and denies any weight loss or persistent cough      Review of Systems   Constitutional:  Negative for fatigue and unexpected weight change.   Eyes:  Negative for visual disturbance.   Respiratory:  Negative for cough, shortness of breath and wheezing.    Cardiovascular:  Negative for chest pain and leg swelling.   Gastrointestinal:  Negative for abdominal pain, blood in stool, constipation, nausea and vomiting.   Musculoskeletal:  Negative for myalgias.   Skin:  Negative for rash.   Neurological:  Negative for light-headedness and headaches.   Psychiatric/Behavioral:  Negative for sleep disturbance.        /68 (BP Location: Left arm, Patient Position: Sitting, BP Method: Large (Manual))   Pulse 71   Resp 16   Ht 5' 3.5" (1.613 m)   Wt 73.9 kg (162 lb 14.7 oz)   SpO2 95%   BMI 28.41 kg/m²   Objective:      Physical Exam  Constitutional:       Appearance: She is well-developed.   HENT:      Head: Normocephalic and atraumatic.   Cardiovascular:      Rate and Rhythm: Normal rate and regular rhythm.      Heart sounds: Normal heart sounds. " No murmur heard.  Pulmonary:      Effort: Pulmonary effort is normal.      Breath sounds: Normal breath sounds. No wheezing.   Abdominal:      General: Bowel sounds are normal.      Palpations: Abdomen is soft.      Tenderness: There is no abdominal tenderness.   Skin:     General: Skin is warm and dry.      Findings: No rash.   Neurological:      Mental Status: She is alert and oriented to person, place, and time.   Psychiatric:         Mood and Affect: Mood normal.         Assessment/Plan:   1. Hypertension associated with diabetes  -     Comprehensive Metabolic Panel; Future; Expected date: 02/20/2023  -     irbesartan (AVAPRO) 300 MG tablet; Take 1 tablet (300 mg total) by mouth every evening.  Dispense: 90 tablet; Refill: 1  -     amLODIPine (NORVASC) 10 MG tablet; Take 1 tablet (10 mg total) by mouth once daily.  Dispense: 90 tablet; Refill: 1  -     Urinalysis, Reflex to Urine Culture Urine, Clean Catch; Future; Expected date: 02/20/2023  Blood pressure is stable today currently on amlodipine 10 mg and Avapro 300 mg daily    2. Hyperlipidemia associated with type 2 diabetes mellitus  -     pravastatin (PRAVACHOL) 40 MG tablet; Take 1 tablet (40 mg total) by mouth every evening.  Dispense: 90 tablet; Refill: 1  Currently taking pravastatin 40 mg daily    3. Type 2 diabetes mellitus with hyperglycemia, without long-term current use of insulin  -     Hemoglobin A1C; Future; Expected date: 02/20/2023  Diet controlled  Strict lifestyle changes recommended with 1800 ADA low-fat and low-cholesterol diet and exercise 30 minutes daily      4. Primary osteoarthritis of both knees  Graded exercise regimen recommended    5. Chronic allergic rhinitis  Stable    6. Cigarette nicotine dependence with nicotine-induced disorder  Encouraged to quit smoking    7. History of colon polyps  -     Ambulatory referral/consult to Endo Procedure ; Future; Expected date: 02/21/2023    8. Colon cancer screening  -      Ambulatory referral/consult to Endo Procedure ; Future; Expected date: 02/21/2023  Due for colonoscopy    9. Alcohol dependence in remission    10. Dysuria  -     Urinalysis, Reflex to Urine Culture Urine, Clean Catch; Future; Expected date: 02/20/2023  Will get urinalysis to rule out UTI  Encouraged to drink adequate fluids

## 2023-02-21 ENCOUNTER — TELEPHONE (OUTPATIENT)
Dept: INTERNAL MEDICINE | Facility: CLINIC | Age: 73
End: 2023-02-21
Payer: MEDICARE

## 2023-02-21 NOTE — TELEPHONE ENCOUNTER
----- Message from Tonya Douglass sent at 2/21/2023  4:42 PM CST -----  Contact: Mita  .Type:  Patient Returning Call    Who Called:Mita  Who Left Message for Patient:Antonia  Does the patient know what this is regarding?:unknown  Would the patient rather a call back or a response via [employer]? Call back  Best Call Back Number:[phone]  Additional Information: Mita missed a called and would like a call back    Thanks  FLORES

## 2023-03-01 ENCOUNTER — TELEPHONE (OUTPATIENT)
Dept: INTERNAL MEDICINE | Facility: CLINIC | Age: 73
End: 2023-03-01
Payer: MEDICARE

## 2023-03-01 ENCOUNTER — HOSPITAL ENCOUNTER (OUTPATIENT)
Dept: RADIOLOGY | Facility: HOSPITAL | Age: 73
Discharge: HOME | End: 2023-03-01
Attending: FAMILY MEDICINE
Payer: MEDICARE

## 2023-03-01 DIAGNOSIS — F17.219 CIGARETTE NICOTINE DEPENDENCE WITH NICOTINE-INDUCED DISORDER: ICD-10-CM

## 2023-03-01 DIAGNOSIS — Z87.891 PERSONAL HISTORY OF NICOTINE DEPENDENCE: ICD-10-CM

## 2023-03-01 DIAGNOSIS — R93.89 ABNORMAL CT OF THE CHEST: Primary | ICD-10-CM

## 2023-03-01 PROCEDURE — 71271 CT CHEST LUNG SCREENING LOW DOSE: ICD-10-PCS | Mod: 26,HCNC,, | Performed by: RADIOLOGY

## 2023-03-01 PROCEDURE — 71271 CT THORAX LUNG CANCER SCR C-: CPT | Mod: 26,HCNC,, | Performed by: RADIOLOGY

## 2023-03-01 PROCEDURE — 71271 CT THORAX LUNG CANCER SCR C-: CPT | Mod: TC,HCNC

## 2023-03-01 NOTE — TELEPHONE ENCOUNTER
No significant acute findings other than scarring noted to the left upper part lung-follow-up in 6 months low-dose CT scan-orders have been placed    Normal DEXA scan

## 2023-03-01 NOTE — TELEPHONE ENCOUNTER
S/W pt and informed her of No significant acute findings other than scarring noted to the left upper part lung-follow-up in 6 months low-dose CT scan-orders have been placed     Normal DEXA scan

## 2023-03-14 ENCOUNTER — HOSPITAL ENCOUNTER (OUTPATIENT)
Dept: PREADMISSION TESTING | Facility: HOSPITAL | Age: 73
Discharge: HOME | End: 2023-03-14
Attending: COLON & RECTAL SURGERY
Payer: MEDICARE

## 2023-03-14 DIAGNOSIS — Z12.11 COLON CANCER SCREENING: ICD-10-CM

## 2023-03-14 DIAGNOSIS — Z86.010 HISTORY OF COLON POLYPS: Primary | ICD-10-CM

## 2023-03-14 RX ORDER — POLYETHYLENE GLYCOL 3350, SODIUM SULFATE ANHYDROUS, SODIUM BICARBONATE, SODIUM CHLORIDE, POTASSIUM CHLORIDE 236; 22.74; 6.74; 5.86; 2.97 G/4L; G/4L; G/4L; G/4L; G/4L
4 POWDER, FOR SOLUTION ORAL ONCE
Qty: 4000 ML | Refills: 0 | Status: SHIPPED | OUTPATIENT
Start: 2023-03-14 | End: 2023-03-14

## 2023-03-21 ENCOUNTER — HOSPITAL ENCOUNTER (OUTPATIENT)
Dept: RADIOLOGY | Facility: HOSPITAL | Age: 73
Discharge: HOME | End: 2023-03-21
Attending: FAMILY MEDICINE
Payer: MEDICARE

## 2023-03-21 DIAGNOSIS — Z12.31 SCREENING MAMMOGRAM FOR HIGH-RISK PATIENT: ICD-10-CM

## 2023-03-21 PROCEDURE — 77067 SCR MAMMO BI INCL CAD: CPT | Mod: TC,HCNC

## 2023-03-21 PROCEDURE — 77067 SCR MAMMO BI INCL CAD: CPT | Mod: 26,HCNC,, | Performed by: RADIOLOGY

## 2023-03-21 PROCEDURE — 77067 MAMMO DIGITAL SCREENING BILAT WITH TOMO: ICD-10-PCS | Mod: 26,HCNC,, | Performed by: RADIOLOGY

## 2023-03-21 PROCEDURE — 77063 BREAST TOMOSYNTHESIS BI: CPT | Mod: 26,HCNC,, | Performed by: RADIOLOGY

## 2023-03-21 PROCEDURE — 77063 MAMMO DIGITAL SCREENING BILAT WITH TOMO: ICD-10-PCS | Mod: 26,HCNC,, | Performed by: RADIOLOGY

## 2023-04-20 ENCOUNTER — ANESTHESIA EVENT (OUTPATIENT)
Dept: ENDOSCOPY | Facility: HOSPITAL | Age: 73
End: 2023-04-20
Payer: MEDICARE

## 2023-04-20 NOTE — ANESTHESIA PREPROCEDURE EVALUATION
2023  Mita Max is a 72 y.o., female.  Past Surgical History:   Procedure Laterality Date     SECTION      COLONOSCOPY N/A 2019    Procedure: COLONOSCOPY;  Surgeon: Dick Sena MD;  Location: [facility];  Service: Endoscopy;  Laterality: N/A;    COLONOSCOPY W/ POLYPECTOMY  2019    polyps x 8 resected, multiple rectal polyps, tics; repeat 3 yrs. Dr Sena    SALIVARY GLAND SURGERY       Past Medical History:   Diagnosis Date    Alcohol dependence         Anxiety     Arthritis     Asthma     Back pain     Chronic bronchitis     Depression     DM2 (diabetes mellitus, type 2)     HTN (hypertension)     Obesity     Pneumonia due to other staphylococcus     Tobacco dependence     Type 2 diabetes mellitus     Urinary incontinence            Pre-op Assessment    I have reviewed the Patient Summary Reports.     I have reviewed the Nursing Notes. I have reviewed the NPO Status.   I have reviewed the Medications.     Review of Systems  Anesthesia Hx:  No problems with previous Anesthesia  History of prior surgery of interest to airway management or planning: Previous anesthesia: General Denies Family Hx of Anesthesia complications.   Denies Personal Hx of Anesthesia complications.   Social:  Smoker 1 ppd.   Hematology/Oncology:  Hematology Normal   Oncology Normal     EENT/Dental:EENT/Dental Normal   Cardiovascular:   Hypertension hyperlipidemia    Pulmonary:   Asthma    Renal/:  Renal/ Normal     Hepatic/GI:  Hepatic/GI Normal Bowel Prep.    Musculoskeletal:   Arthritis     Neurological:  Neurology Normal    Endocrine:   Diabetes, well controlled, type 2    Dermatological:  Skin Normal    Psych:   depression          Physical Exam  General: Well nourished, Cooperative, Alert and Oriented    Airway:  Mallampati: II   Mouth Opening: Normal  TM  Distance: Normal  Tongue: Normal  Neck ROM: Normal ROM    Dental:  Intact    Chest/Lungs:  Clear to auscultation, Normal Respiratory Rate    Heart:  Rate: Normal  Rhythm: Regular Rhythm        Anesthesia Plan  Type of Anesthesia, risks & benefits discussed:    Anesthesia Type: Gen Natural Airway  Intra-op Monitoring Plan: Standard ASA Monitors  Post Op Pain Control Plan: multimodal analgesia  Induction:  IV  Informed Consent: Informed consent signed with the Patient and all parties understand the risks and agree with anesthesia plan.  All questions answered. Patient consented to blood products? No  ASA Score: 3  Day of Surgery Review of History & Physical: H&P Update referred to the surgeon/provider.    Ready For Surgery From Anesthesia Perspective.     .

## 2023-04-24 ENCOUNTER — ANESTHESIA (OUTPATIENT)
Dept: ENDOSCOPY | Facility: HOSPITAL | Age: 73
End: 2023-04-24
Payer: MEDICARE

## 2023-04-24 ENCOUNTER — HOSPITAL ENCOUNTER (OUTPATIENT)
Facility: HOSPITAL | Age: 73
Discharge: HOME | End: 2023-04-24
Attending: INTERNAL MEDICINE | Admitting: INTERNAL MEDICINE
Payer: MEDICARE

## 2023-04-24 VITALS
DIASTOLIC BLOOD PRESSURE: 57 MMHG | RESPIRATION RATE: 18 BRPM | OXYGEN SATURATION: 99 % | SYSTOLIC BLOOD PRESSURE: 131 MMHG | HEART RATE: 70 BPM | TEMPERATURE: 98 F

## 2023-04-24 DIAGNOSIS — Z86.010 HISTORY OF COLON POLYPS: Primary | ICD-10-CM

## 2023-04-24 LAB — POCT GLUCOSE: 143 MG/DL (ref 70–110)

## 2023-04-24 PROCEDURE — 27201089 HC SNARE, DISP (ANY): Mod: HCNC | Performed by: INTERNAL MEDICINE

## 2023-04-24 PROCEDURE — 25000003 PHARM REV CODE 250: Mod: HCNC | Performed by: NURSE ANESTHETIST, CERTIFIED REGISTERED

## 2023-04-24 PROCEDURE — 37000008 HC ANESTHESIA 1ST 15 MINUTES: Mod: HCNC | Performed by: INTERNAL MEDICINE

## 2023-04-24 PROCEDURE — 63600175 PHARM REV CODE 636 W HCPCS: Mod: HCNC | Performed by: INTERNAL MEDICINE

## 2023-04-24 PROCEDURE — 45385 COLONOSCOPY W/LESION REMOVAL: CPT | Mod: PT,HCNC | Performed by: INTERNAL MEDICINE

## 2023-04-24 PROCEDURE — 37000009 HC ANESTHESIA EA ADD 15 MINS: Mod: HCNC | Performed by: INTERNAL MEDICINE

## 2023-04-24 PROCEDURE — 82962 GLUCOSE BLOOD TEST: CPT | Mod: HCNC | Performed by: INTERNAL MEDICINE

## 2023-04-24 PROCEDURE — 45385 PR COLONOSCOPY,REMV LESN,SNARE: ICD-10-PCS | Mod: PT,HCNC,, | Performed by: INTERNAL MEDICINE

## 2023-04-24 PROCEDURE — 45380 COLONOSCOPY AND BIOPSY: CPT | Mod: PT,59,HCNC, | Performed by: INTERNAL MEDICINE

## 2023-04-24 PROCEDURE — 45385 COLONOSCOPY W/LESION REMOVAL: CPT | Mod: PT,HCNC,, | Performed by: INTERNAL MEDICINE

## 2023-04-24 PROCEDURE — 63600175 PHARM REV CODE 636 W HCPCS: Mod: HCNC | Performed by: NURSE ANESTHETIST, CERTIFIED REGISTERED

## 2023-04-24 PROCEDURE — D9220A PRA ANESTHESIA: Mod: PT,HCNC,, | Performed by: NURSE ANESTHETIST, CERTIFIED REGISTERED

## 2023-04-24 PROCEDURE — 45380 COLONOSCOPY AND BIOPSY: CPT | Mod: PT,59,HCNC | Performed by: INTERNAL MEDICINE

## 2023-04-24 PROCEDURE — 45380 PR COLONOSCOPY,BIOPSY: ICD-10-PCS | Mod: PT,59,HCNC, | Performed by: INTERNAL MEDICINE

## 2023-04-24 PROCEDURE — 88305 TISSUE EXAM BY PATHOLOGIST: ICD-10-PCS | Mod: 26,HCNC,, | Performed by: PATHOLOGY

## 2023-04-24 PROCEDURE — D9220A PRA ANESTHESIA: ICD-10-PCS | Mod: PT,HCNC,, | Performed by: NURSE ANESTHETIST, CERTIFIED REGISTERED

## 2023-04-24 PROCEDURE — 27201012 HC FORCEPS, HOT/COLD, DISP: Mod: HCNC | Performed by: INTERNAL MEDICINE

## 2023-04-24 PROCEDURE — 88305 TISSUE EXAM BY PATHOLOGIST: CPT | Mod: 26,HCNC,, | Performed by: PATHOLOGY

## 2023-04-24 PROCEDURE — 88305 TISSUE EXAM BY PATHOLOGIST: CPT | Mod: HCNC | Performed by: PATHOLOGY

## 2023-04-24 RX ORDER — PROPOFOL 10 MG/ML
VIAL (ML) INTRAVENOUS
Status: DISCONTINUED | OUTPATIENT
Start: 2023-04-24 | End: 2023-04-24

## 2023-04-24 RX ORDER — LIDOCAINE HYDROCHLORIDE 20 MG/ML
INJECTION, SOLUTION EPIDURAL; INFILTRATION; INTRACAUDAL; PERINEURAL
Status: DISCONTINUED | OUTPATIENT
Start: 2023-04-24 | End: 2023-04-24

## 2023-04-24 RX ORDER — SODIUM CHLORIDE, SODIUM LACTATE, POTASSIUM CHLORIDE, CALCIUM CHLORIDE 600; 310; 30; 20 MG/100ML; MG/100ML; MG/100ML; MG/100ML
INJECTION, SOLUTION INTRAVENOUS CONTINUOUS
Status: DISCONTINUED | OUTPATIENT
Start: 2023-04-24 | End: 2023-04-24 | Stop reason: HOSPADM

## 2023-04-24 RX ADMIN — PROPOFOL 30 MG: 10 INJECTION, EMULSION INTRAVENOUS at 09:04

## 2023-04-24 RX ADMIN — LIDOCAINE HYDROCHLORIDE 50 MG: 20 INJECTION, SOLUTION INTRAVENOUS at 09:04

## 2023-04-24 RX ADMIN — PROPOFOL 20 MG: 10 INJECTION, EMULSION INTRAVENOUS at 09:04

## 2023-04-24 RX ADMIN — PROPOFOL 50 MG: 10 INJECTION, EMULSION INTRAVENOUS at 09:04

## 2023-04-24 RX ADMIN — SODIUM CHLORIDE, SODIUM LACTATE, POTASSIUM CHLORIDE, AND CALCIUM CHLORIDE: 600; 310; 30; 20 INJECTION, SOLUTION INTRAVENOUS at 09:04

## 2023-04-24 RX ADMIN — PROPOFOL 80 MG: 10 INJECTION, EMULSION INTRAVENOUS at 09:04

## 2023-04-24 RX ADMIN — PROPOFOL 10 MG: 10 INJECTION, EMULSION INTRAVENOUS at 09:04

## 2023-04-24 NOTE — H&P
Short Stay Endoscopy History and Physical    PCP - Janiya Linton MD    Procedure - Colonoscopy  ASA - 2  Mallampati - per anesthesia  History of Anesthesia problems - no  Family history Anesthesia problems -  no     HPI:  This is a 72 y.o. female here for evaluation of :   Active Hospital Problems    Diagnosis  POA    *History of colon polyps [Z86.010]  Not Applicable      Resolved Hospital Problems   No resolved problems to display.         Health Maintenance         Date Due Completion Date    Colorectal Cancer Screening 2022    Shingles Vaccine (2 of 3) 2023 (Originally 2016) 3/1/2016    Eye Exam 2023    Diabetes Urine Screening 2023    Lipid Panel 2023    Hemoglobin A1c 2023    Override on 2013: Done    Foot Exam 2023 (Done)    Override on 2022: Done    Override on 2019: Done    Override on 2017: Done    LDCT Lung Screen 2024 3/1/2023    Mammogram 2024 3/21/2023    Low Dose Statin 2024    TETANUS VACCINE 2025    DEXA Scan 2026 3/1/2023              ROS:  CONSTITUTIONAL: Denies weight change,  fatigue, fevers, chills, night sweats.  CARDIOVASCULAR: Denies chest pain, shortness of breath, orthopnea and edema.  RESPIRATORY: Denies cough, hemoptysis, dyspnea, and wheezing.  GI: See HPI.    Medical History:   Past Medical History:   Diagnosis Date    Alcohol dependence     1986    Anxiety     Arthritis     Asthma     Back pain     Chronic bronchitis     Depression     DM2 (diabetes mellitus, type 2)     HTN (hypertension)     Obesity     Pneumonia due to other staphylococcus     Tobacco dependence     Type 2 diabetes mellitus     Urinary incontinence        Surgical History:   Past Surgical History:   Procedure Laterality Date     SECTION      COLONOSCOPY N/A 2019    Procedure: COLONOSCOPY;  Surgeon: Dick Sena MD;   Location: [facility];  Service: Endoscopy;  Laterality: N/A;    COLONOSCOPY W/ POLYPECTOMY  2019    polyps x 8 resected, multiple rectal polyps, tics; repeat 3 yrs. Dr Sena    SALIVARY GLAND SURGERY         Family History:   Family History   Problem Relation Age of Onset    Heart attack Father     Coronary artery disease Father     Heart disease Father     Colon cancer Mother     Cancer Mother         colon    COPD Maternal Grandmother     Cancer Maternal Grandfather         throat    Diabetes Paternal Grandmother     Stroke Paternal Grandmother     Asthma Neg Hx     Hyperlipidemia Neg Hx     Hypertension Neg Hx        Social History:   Social History     Tobacco Use    Smoking status: Every Day     Packs/day: 1.00     Years: 40.00     Pack years: 40.00     Types: Cigarettes     Start date: 1969     Last attempt to quit: 2022     Years since quittin.7    Smokeless tobacco: Never   Substance Use Topics    Alcohol use: No     Comment: previous ETOH abuse 33 years sober- Last alcohol intake 1986    Drug use: No       Allergies:   Review of patient's allergies indicates:   Allergen Reactions    Corticosteroids (glucocorticoids)      Aggitation  Aggitation    Adhesive Rash     Band-aids  Band-aids  Band-aids    Pollen extracts Rash       Medications:   No current facility-administered medications on file prior to encounter.     Current Outpatient Medications on File Prior to Encounter   Medication Sig Dispense Refill    amLODIPine (NORVASC) 10 MG tablet Take 1 tablet (10 mg total) by mouth once daily. 90 tablet 1    aspirin (ECOTRIN) 81 MG EC tablet Take 1 tablet (81 mg total) by mouth once daily.  0    irbesartan (AVAPRO) 300 MG tablet Take 1 tablet (300 mg total) by mouth every evening. 90 tablet 1    pravastatin (PRAVACHOL) 40 MG tablet Take 1 tablet (40 mg total) by mouth every evening. 90 tablet 1    alcohol swabs (BD ALCOHOL SWABS) PadM Apply 1 each topically once daily. 100 each 11     blood glucose control, low (TRUE METRIX LEVEL 1) Soln To check blood glucose levels once daily 1 each 11    blood sugar diagnostic (TRUE METRIX GLUCOSE TEST STRIP) Strp To check blood glucose levels once daily 100 strip 11    blood-glucose meter (TRUE METRIX GLUCOSE METER) Misc To check blood glucose levels once daily 1 each 0    diclofenac sodium (VOLTAREN) 1 % Gel Apply 2 g topically 4 (four) times daily as needed.      lancets (TRUEPLUS LANCETS) 33 gauge Misc To check blood glucose levels once daily 100 each 11       Physical Exam:  Vital Signs: There were no vitals filed for this visit.  General Appearance: Well appearing in no acute distress  ENT: OP clear  Chest: CTA B  CV: RRR, no m/r/g  Abd: s/nt/nd/nabs  Ext: no edema    Labs:Reviewed    IMP:  Active Hospital Problems    Diagnosis  POA    *History of colon polyps [Z86.010]  Not Applicable      Resolved Hospital Problems   No resolved problems to display.         Plan:   I have explained the risks and benefits of colonoscopy to the patient including but not limited to bleeding, perforation, infection, and death. The patient wishes to proceed.

## 2023-04-24 NOTE — TRANSFER OF CARE
Anesthesia Transfer of Care Note    Patient: Mita Max    Procedure(s) Performed: Procedure(s) (LRB):  COLONOSCOPY (N/A)    Patient location: PACU    Anesthesia Type: general    Transport from OR: Transported from OR on room air with adequate spontaneous ventilation    Post pain: adequate analgesia    Post assessment: no apparent anesthetic complications    Post vital signs: stable    Level of consciousness: sedated    Nausea/Vomiting: no nausea/vomiting    Complications: none    Transfer of care protocol was followed      Last vitals:   Visit Vitals  BP (!) 119/57 (BP Location: Left arm, Patient Position: Lying)   Pulse 75   Temp 36.7 °C (98 °F)   Resp 18   SpO2 98%   Breastfeeding No

## 2023-04-24 NOTE — DISCHARGE SUMMARY
[facility] - Endoscopy 1st Fl  Discharge Note  Short Stay    Procedure(s) (LRB):  COLONOSCOPY (N/A)      OUTCOME: Patient tolerated treatment/procedure well without complication and is now ready for discharge.    DISPOSITION: Home or Self Care    FINAL DIAGNOSIS:  History of colon polyps    FOLLOWUP: With primary care provider    DISCHARGE INSTRUCTIONS:  No discharge procedures on file.

## 2023-04-24 NOTE — PROVATION PATIENT INSTRUCTIONS
Discharge Summary/Instructions after an Endoscopic Procedure  Patient Name: Mita Max  Patient MRN: [id number]  Patient YOB: 1950 Monday, April 24, 2023  Linn Hampton MD  Dear patient,  As a result of recent federal legislation (The Federal Cures Act), you may   receive lab or pathology results from your procedure in your [facility]   account before your physician is able to contact you. Your physician or   their representative will relay the results to you with their   recommendations at their soonest availability.  Thank you,  RESTRICTIONS:  During your procedure today, you received medications for sedation.  These   medications may affect your judgment, balance and coordination.  Therefore,   for 24 hours, you have the following restrictions:   - DO NOT drive a car, operate machinery, make legal/financial decisions,   sign important papers or drink alcohol.    ACTIVITY:  Today: no heavy lifting, straining or running due to procedural   sedation/anesthesia.  The following day: return to full activity including work.  DIET:  Eat and drink normally unless instructed otherwise.     TREATMENT FOR COMMON SIDE EFFECTS:  - Mild abdominal pain, nausea, belching, bloating or excessive gas:  rest,   eat lightly and use a heating pad.  - Sore Throat: treat with throat lozenges and/or gargle with warm salt   water.  - Because air was used during the procedure, expelling large amounts of air   from your rectum or belching is normal.  - If a bowel prep was taken, you may not have a bowel movement for 1-3 days.    This is normal.  SYMPTOMS TO WATCH FOR AND REPORT TO YOUR PHYSICIAN:  1. Abdominal pain or bloating, other than gas cramps.  2. Chest pain.  3. Back pain.  4. Signs of infection such as: chills or fever occurring within 24 hours   after the procedure.  5. Rectal bleeding, which would show as bright red, maroon, or black stools.   (A tablespoon of blood from the rectum is not serious, especially if    hemorrhoids are present.)  6. Vomiting.  7. Weakness or dizziness.  GO DIRECTLY TO THE NEAREST EMERGENCY ROOM IF YOU HAVE ANY OF THE FOLLOWING:      Difficulty breathing              Chills and/or fever over 101 F   Persistent vomiting and/or vomiting blood   Severe abdominal pain   Severe chest pain   Black, tarry stools   Bleeding- more than one tablespoon   Any other symptom or condition that you feel may need urgent attention  Your doctor recommends these additional instructions:  If any biopsies were taken, your doctors clinic will contact you in 1 to 2   weeks with any results.  - Discharge patient to home (via wheelchair).   - Resume previous diet.   - Continue present medications.   - Await pathology results.   - Repeat colonoscopy in 3 years for surveillance.   - Telephone GI clinic for pathology results in 2 weeks.   - Patient has a contact number available for emergencies.  The signs and   symptoms of potential delayed complications were discussed with the   patient.  Return to normal activities tomorrow.  Written discharge   instructions were provided to the patient.  For questions, problems or results please call your physician Linn Hampton MD at Work:  [phone]  If you have any questions about the above instructions, call the GI   department at [phone] or call the endoscopy unit at [phone]   from 7am until 3 pm.  [facility] [place], EMERGENCY ROOM PHONE NUMBER:   [phone]  IF A COMPLICATION OR EMERGENCY SITUATION ARISES AND YOU ARE UNABLE TO REACH   YOUR PHYSICIAN - GO DIRECTLY TO THE EMERGENCY ROOM.  I have read or have had read to me these discharge instructions for my   procedure and have received a written copy.  I understand these   instructions and will follow-up with my physician if I have any questions.     __________________________________       _____________________________________  Nurse Signature                                           Patient/Designated   Responsible Party Signature  MD Linn Rahman MD  4/24/2023 9:42:45 AM  PROVATION

## 2023-04-24 NOTE — PLAN OF CARE
Discharge instructions reviewed with patient and visitor. Handouts given & verbalized understanding with no further questions at this time. Dr. Hampton spoke to pt at bedside, reviewed procedure and findings, answered questions. Made aware they are awaiting biopsy results with MD telephone number provided per AVS sheet. VSS on RA, no pain or nausea noted, tolerating po fluids, no complaints noted. Fall precautions reviewed, consents in chart, PIV removed at this time.

## 2023-04-24 NOTE — ANESTHESIA POSTPROCEDURE EVALUATION
Anesthesia Post Evaluation    Patient: Mita Max    Procedure(s) Performed: Procedure(s) (LRB):  COLONOSCOPY (N/A)    Final Anesthesia Type: general      Patient location during evaluation: PACU  Patient participation: Yes- Able to Participate  Level of consciousness: awake and alert and oriented  Post-procedure vital signs: reviewed and stable  Pain management: adequate  Airway patency: patent    PONV status at discharge: No PONV  Anesthetic complications: no      Cardiovascular status: blood pressure returned to baseline, stable and hemodynamically stable  Respiratory status: unassisted  Hydration status: euvolemic  Follow-up not needed.          Vitals Value Taken Time   /57 04/24/23 1001   Temp 36.7 °C (98 °F) 04/24/23 1001   Pulse 70 04/24/23 1001   Resp 18 04/24/23 1001   SpO2 99 % 04/24/23 1001         Event Time   Out of Recovery 10:20:00         Pain/Robert Score: Robert Score: 9 (4/24/2023  9:51 AM)

## 2023-04-27 LAB
FINAL PATHOLOGIC DIAGNOSIS: NORMAL
Lab: NORMAL

## 2023-06-23 ENCOUNTER — PES CALL (OUTPATIENT)
Dept: ADMINISTRATIVE | Facility: CLINIC | Age: 73
End: 2023-06-23
Payer: MEDICARE

## 2023-07-06 ENCOUNTER — TELEPHONE (OUTPATIENT)
Dept: ADMINISTRATIVE | Facility: HOSPITAL | Age: 73
End: 2023-07-06
Payer: MEDICARE

## 2023-09-06 DIAGNOSIS — E78.5 HYPERLIPIDEMIA ASSOCIATED WITH TYPE 2 DIABETES MELLITUS: ICD-10-CM

## 2023-09-06 DIAGNOSIS — I15.2 HYPERTENSION ASSOCIATED WITH DIABETES: ICD-10-CM

## 2023-09-06 DIAGNOSIS — E11.59 HYPERTENSION ASSOCIATED WITH DIABETES: ICD-10-CM

## 2023-09-06 DIAGNOSIS — E11.69 HYPERLIPIDEMIA ASSOCIATED WITH TYPE 2 DIABETES MELLITUS: ICD-10-CM

## 2023-09-06 RX ORDER — PRAVASTATIN SODIUM 40 MG/1
40 TABLET ORAL NIGHTLY
Qty: 90 TABLET | Refills: 0 | Status: SHIPPED | OUTPATIENT
Start: 2023-09-06 | End: 2024-03-18

## 2023-09-06 RX ORDER — IRBESARTAN 300 MG/1
300 TABLET ORAL NIGHTLY
Qty: 90 TABLET | Refills: 1 | Status: SHIPPED | OUTPATIENT
Start: 2023-09-06

## 2023-09-06 NOTE — TELEPHONE ENCOUNTER
Care Due:                  Date            Visit Type   Department     Provider  --------------------------------------------------------------------------------                                EP -                              PRIMARY      [facility] INTER[facility]  Janiya Mainampati  Last Visit: 02-      CARE ([facility])   MEDICINE       [facility]  Next Visit: None Scheduled  None         None Found                                                            Last  Test          Frequency    Reason                     Performed    Due Date  --------------------------------------------------------------------------------    Lipid Panel.  12 months..  pravastatin..............  08- 08-    [facility] Embedded Care Due Messages. Reference number: [id number].   9/06/2023 3:42:30 PM CDT

## 2023-09-06 NOTE — TELEPHONE ENCOUNTER
Refill Routing Note   Medication(s) are not appropriate for processing by [facility] for the following reason(s):      Required labs outdated: lipid panel    [facility] action(s):  Defer  Approve Care Due:  Labs due          Appointments  past 12m or future 3m with PCP    Date Provider   Last Visit   2/20/2023 Janiya Linton MD   Next Visit   Visit date not found Janiya Linton MD   ED visits in past 90 days: 0        Note composed:6:25 PM 09/06/2023

## 2023-12-10 DIAGNOSIS — E11.59 HYPERTENSION ASSOCIATED WITH DIABETES: ICD-10-CM

## 2023-12-10 DIAGNOSIS — I15.2 HYPERTENSION ASSOCIATED WITH DIABETES: ICD-10-CM

## 2023-12-10 NOTE — TELEPHONE ENCOUNTER
Care Due:                  Date            Visit Type   Department     Provider  --------------------------------------------------------------------------------                                EP -                              PRIMARY      [facility]  Janiya Mainampati  Last Visit: 02-      CARE ([facility])   MEDICINE       [facility]  Next Visit: None Scheduled  None         None Found                                                            Last  Test          Frequency    Reason                     Performed    Due Date  --------------------------------------------------------------------------------    CMP.........  12 months..  irbesartan, pravastatin..  02- 02-    Lipid Panel.  12 months..  pravastatin..............  08- 08-    [facility] Embedded Care Due Messages. Reference number: [id number].   12/10/2023 11:17:15 AM CST

## 2023-12-11 RX ORDER — AMLODIPINE BESYLATE 10 MG/1
10 TABLET ORAL
Qty: 90 TABLET | Refills: 0 | Status: SHIPPED | OUTPATIENT
Start: 2023-12-11 | End: 2024-03-18

## 2023-12-11 NOTE — TELEPHONE ENCOUNTER
Provider Staff:  Action required for this patient    Requires labs      Please see care gap opportunities below in Care Due Message.    Thanks!  [facility]     Appointments      Date Provider   Last Visit   2/20/2023 Janiya Linton MD   Next Visit   Visit date not found Janiya Linton MD     Refill Decision Note   Mita Max  is requesting a refill authorization.  Brief Assessment and Rationale for Refill:  Approve     Medication Therapy Plan:         Comments:     Note composed:12:32 PM 12/11/2023

## 2023-12-20 ENCOUNTER — PATIENT MESSAGE (OUTPATIENT)
Dept: ADMINISTRATIVE | Facility: CLINIC | Age: 73
End: 2023-12-20
Payer: MEDICARE

## 2023-12-29 ENCOUNTER — OFFICE VISIT (OUTPATIENT)
Dept: INTERNAL MEDICINE | Facility: CLINIC | Age: 73
End: 2023-12-29
Payer: MEDICARE

## 2023-12-29 VITALS
WEIGHT: 168.44 LBS | TEMPERATURE: 97 F | HEART RATE: 87 BPM | BODY MASS INDEX: 28.76 KG/M2 | DIASTOLIC BLOOD PRESSURE: 62 MMHG | SYSTOLIC BLOOD PRESSURE: 120 MMHG | HEIGHT: 64 IN

## 2023-12-29 DIAGNOSIS — J32.9 SINUSITIS, UNSPECIFIED CHRONICITY, UNSPECIFIED LOCATION: Primary | ICD-10-CM

## 2023-12-29 DIAGNOSIS — Z72.0 TOBACCO USE: ICD-10-CM

## 2023-12-29 DIAGNOSIS — J40 BRONCHITIS: ICD-10-CM

## 2023-12-29 PROCEDURE — 1159F MED LIST DOCD IN RCRD: CPT | Mod: HCNC,CPTII,S$GLB, | Performed by: NURSE PRACTITIONER

## 2023-12-29 PROCEDURE — 99213 OFFICE O/P EST LOW 20 MIN: CPT | Mod: HCNC,S$GLB,, | Performed by: NURSE PRACTITIONER

## 2023-12-29 PROCEDURE — 1160F PR REVIEW ALL MEDS BY PRESCRIBER/CLIN PHARMACIST DOCUMENTED: ICD-10-PCS | Mod: HCNC,CPTII,S$GLB, | Performed by: NURSE PRACTITIONER

## 2023-12-29 PROCEDURE — 3072F LOW RISK FOR RETINOPATHY: CPT | Mod: HCNC,CPTII,S$GLB, | Performed by: NURSE PRACTITIONER

## 2023-12-29 PROCEDURE — 1159F PR MEDICATION LIST DOCUMENTED IN MEDICAL RECORD: ICD-10-PCS | Mod: HCNC,CPTII,S$GLB, | Performed by: NURSE PRACTITIONER

## 2023-12-29 PROCEDURE — 3078F PR MOST RECENT DIASTOLIC BLOOD PRESSURE < 80 MM HG: ICD-10-PCS | Mod: HCNC,CPTII,S$GLB, | Performed by: NURSE PRACTITIONER

## 2023-12-29 PROCEDURE — 99999 PR PBB SHADOW E&M-EST. PATIENT-LVL III: CPT | Mod: PBBFAC,HCNC,, | Performed by: NURSE PRACTITIONER

## 2023-12-29 PROCEDURE — 3074F PR MOST RECENT SYSTOLIC BLOOD PRESSURE < 130 MM HG: ICD-10-PCS | Mod: HCNC,CPTII,S$GLB, | Performed by: NURSE PRACTITIONER

## 2023-12-29 PROCEDURE — 3044F HG A1C LEVEL LT 7.0%: CPT | Mod: HCNC,CPTII,S$GLB, | Performed by: NURSE PRACTITIONER

## 2023-12-29 PROCEDURE — 1126F PR PAIN SEVERITY QUANTIFIED, NO PAIN PRESENT: ICD-10-PCS | Mod: HCNC,CPTII,S$GLB, | Performed by: NURSE PRACTITIONER

## 2023-12-29 PROCEDURE — 3008F PR BODY MASS INDEX (BMI) DOCUMENTED: ICD-10-PCS | Mod: HCNC,CPTII,S$GLB, | Performed by: NURSE PRACTITIONER

## 2023-12-29 PROCEDURE — 1160F RVW MEDS BY RX/DR IN RCRD: CPT | Mod: HCNC,CPTII,S$GLB, | Performed by: NURSE PRACTITIONER

## 2023-12-29 PROCEDURE — 3044F PR MOST RECENT HEMOGLOBIN A1C LEVEL <7.0%: ICD-10-PCS | Mod: HCNC,CPTII,S$GLB, | Performed by: NURSE PRACTITIONER

## 2023-12-29 PROCEDURE — 3078F DIAST BP <80 MM HG: CPT | Mod: HCNC,CPTII,S$GLB, | Performed by: NURSE PRACTITIONER

## 2023-12-29 PROCEDURE — 1126F AMNT PAIN NOTED NONE PRSNT: CPT | Mod: HCNC,CPTII,S$GLB, | Performed by: NURSE PRACTITIONER

## 2023-12-29 PROCEDURE — 99999 PR PBB SHADOW E&M-EST. PATIENT-LVL III: ICD-10-PCS | Mod: PBBFAC,HCNC,, | Performed by: NURSE PRACTITIONER

## 2023-12-29 PROCEDURE — 3072F PR LOW RISK FOR RETINOPATHY: ICD-10-PCS | Mod: HCNC,CPTII,S$GLB, | Performed by: NURSE PRACTITIONER

## 2023-12-29 PROCEDURE — 3074F SYST BP LT 130 MM HG: CPT | Mod: HCNC,CPTII,S$GLB, | Performed by: NURSE PRACTITIONER

## 2023-12-29 PROCEDURE — 3008F BODY MASS INDEX DOCD: CPT | Mod: HCNC,CPTII,S$GLB, | Performed by: NURSE PRACTITIONER

## 2023-12-29 PROCEDURE — 4010F PR ACE/ARB THEARPY RXD/TAKEN: ICD-10-PCS | Mod: HCNC,CPTII,S$GLB, | Performed by: NURSE PRACTITIONER

## 2023-12-29 PROCEDURE — 4010F ACE/ARB THERAPY RXD/TAKEN: CPT | Mod: HCNC,CPTII,S$GLB, | Performed by: NURSE PRACTITIONER

## 2023-12-29 PROCEDURE — 99213 PR OFFICE/OUTPT VISIT, EST, LEVL III, 20-29 MIN: ICD-10-PCS | Mod: HCNC,S$GLB,, | Performed by: NURSE PRACTITIONER

## 2023-12-29 RX ORDER — FLUTICASONE PROPIONATE 50 MCG
2 SPRAY, SUSPENSION (ML) NASAL DAILY
Qty: 16 G | Refills: 0 | Status: SHIPPED | OUTPATIENT
Start: 2023-12-29

## 2023-12-29 RX ORDER — ALBUTEROL SULFATE 90 UG/1
2 AEROSOL, METERED RESPIRATORY (INHALATION) EVERY 4 HOURS PRN
Qty: 8 G | Refills: 1 | Status: SHIPPED | OUTPATIENT
Start: 2023-12-29

## 2023-12-29 RX ORDER — PROMETHAZINE HYDROCHLORIDE AND DEXTROMETHORPHAN HYDROBROMIDE 6.25; 15 MG/5ML; MG/5ML
5 SYRUP ORAL NIGHTLY PRN
Qty: 120 ML | Refills: 0 | Status: SHIPPED | OUTPATIENT
Start: 2023-12-29

## 2023-12-29 RX ORDER — AMOXICILLIN AND CLAVULANATE POTASSIUM 875; 125 MG/1; MG/1
1 TABLET, FILM COATED ORAL 2 TIMES DAILY
Qty: 20 TABLET | Refills: 0 | Status: SHIPPED | OUTPATIENT
Start: 2023-12-29 | End: 2024-01-08

## 2023-12-29 NOTE — PROGRESS NOTES
"Subjective:       Patient ID: Mita Max is a 73 y.o. female.    Chief Complaint: Sinus Problem    73 year old here for sinus infection  Ongoing 2 weeks  Has lots of post nasal drip and drainage  She is taking generic mucinex dm  Having facial pressure  Coughed up yellow last night    Sinus Problem  Associated symptoms include congestion, coughing and sinus pressure. Pertinent negatives include no chills, diaphoresis, headaches, neck pain or shortness of breath.       /62 (BP Location: Left arm)   Pulse 87   Temp 97.1 °F (36.2 °C)   Ht 5' 3.5" (1.613 m)   Wt 76.4 kg (168 lb 6.9 oz)   BMI 29.37 kg/m²     Review of Systems   Constitutional:  Positive for activity change. Negative for appetite change, chills, diaphoresis, fatigue, fever and unexpected weight change.   HENT:  Positive for congestion, postnasal drip, rhinorrhea, sinus pressure and sinus pain. Negative for dental problem, drooling, ear discharge, hearing loss, mouth sores, nosebleeds, tinnitus, trouble swallowing and voice change.    Eyes:  Negative for pain, discharge and redness.   Respiratory:  Positive for cough. Negative for choking, chest tightness, shortness of breath and wheezing.    Cardiovascular:  Negative for chest pain, palpitations and leg swelling.   Gastrointestinal: Negative.  Negative for abdominal pain, diarrhea, nausea and vomiting.   Endocrine: Negative for cold intolerance and heat intolerance.   Genitourinary: Negative.  Negative for dysuria.   Musculoskeletal: Negative.  Negative for arthralgias, joint swelling, myalgias and neck pain.   Skin:  Negative for color change, pallor, rash and wound.   Allergic/Immunologic: Positive for environmental allergies. Negative for food allergies and immunocompromised state.   Neurological: Negative.  Negative for dizziness, syncope, facial asymmetry, light-headedness and headaches.   Hematological:  Negative for adenopathy. Does not bruise/bleed easily. "   Psychiatric/Behavioral:  Negative for agitation, behavioral problems and confusion.        Objective:      Physical Exam  Vitals and nursing note reviewed.   Constitutional:       Appearance: She is well-developed. She is not diaphoretic.   HENT:      Head: Normocephalic and atraumatic.      Right Ear: Tympanic membrane, ear canal and external ear normal.      Left Ear: Tympanic membrane, ear canal and external ear normal.      Nose: Mucosal edema and rhinorrhea present.      Right Sinus: Maxillary sinus tenderness and frontal sinus tenderness present.      Left Sinus: Maxillary sinus tenderness and frontal sinus tenderness present.      Mouth/Throat:      Pharynx: Uvula midline. No oropharyngeal exudate or posterior oropharyngeal erythema.   Eyes:      General:         Right eye: No discharge.         Left eye: No discharge.      Conjunctiva/sclera: Conjunctivae normal.   Cardiovascular:      Rate and Rhythm: Normal rate and regular rhythm.      Heart sounds: Normal heart sounds. No murmur heard.  Pulmonary:      Effort: Pulmonary effort is normal. No accessory muscle usage or respiratory distress.      Breath sounds: Normal breath sounds. No decreased breath sounds, wheezing, rhonchi or rales.   Chest:      Chest wall: No tenderness.   Abdominal:      General: There is no distension.      Palpations: Abdomen is soft.   Musculoskeletal:         General: Normal range of motion.      Cervical back: Normal range of motion.   Skin:     General: Skin is warm and dry.   Neurological:      Mental Status: She is alert and oriented to person, place, and time.   Psychiatric:         Behavior: Behavior normal.         Assessment:       1. Sinusitis, unspecified chronicity, unspecified location    2. Bronchitis    3. Tobacco use        Plan:       Mita was seen today for sinus problem.    Diagnoses and all orders for this visit:    Sinusitis, unspecified chronicity, unspecified location  -     amoxicillin-clavulanate  875-125mg (AUGMENTIN) 875-125 mg per tablet; Take 1 tablet by mouth 2 (two) times daily. for 10 days  Rest  Push fluids  Supportive care advised  Smoking cessation advised  Follow up with Primary Care Physician if not improving/worse     Bronchitis  -     promethazine-dextromethorphan (PROMETHAZINE-DM) 6.25-15 mg/5 mL Syrp; Take 5 mLs by mouth nightly as needed (Cough).  -     amoxicillin-clavulanate 875-125mg (AUGMENTIN) 875-125 mg per tablet; Take 1 tablet by mouth 2 (two) times daily. for 10 days  -     fluticasone propionate (FLONASE) 50 mcg/actuation nasal spray; 2 sprays (100 mcg total) by Each Nostril route once daily.  -     albuterol (PROVENTIL/VENTOLIN HFA) 90 mcg/actuation inhaler; Inhale 2 puffs into the lungs every 4 (four) hours as needed for Wheezing or Shortness of Breath.    Tobacco use  -     promethazine-dextromethorphan (PROMETHAZINE-DM) 6.25-15 mg/5 mL Syrp; Take 5 mLs by mouth nightly as needed (Cough).  -     amoxicillin-clavulanate 875-125mg (AUGMENTIN) 875-125 mg per tablet; Take 1 tablet by mouth 2 (two) times daily. for 10 days  -     fluticasone propionate (FLONASE) 50 mcg/actuation nasal spray; 2 sprays (100 mcg total) by Each Nostril route once daily.  -     albuterol (PROVENTIL/VENTOLIN HFA) 90 mcg/actuation inhaler; Inhale 2 puffs into the lungs every 4 (four) hours as needed for Wheezing or Shortness of Breath.

## 2024-01-04 ENCOUNTER — OFFICE VISIT (OUTPATIENT)
Dept: INTERNAL MEDICINE | Facility: CLINIC | Age: 74
End: 2024-01-04
Payer: MEDICARE

## 2024-01-04 VITALS
HEIGHT: 64 IN | WEIGHT: 168 LBS | RESPIRATION RATE: 20 BRPM | BODY MASS INDEX: 28.68 KG/M2 | DIASTOLIC BLOOD PRESSURE: 84 MMHG | HEART RATE: 74 BPM | SYSTOLIC BLOOD PRESSURE: 150 MMHG

## 2024-01-04 DIAGNOSIS — E11.65 TYPE 2 DIABETES MELLITUS WITH HYPERGLYCEMIA, WITHOUT LONG-TERM CURRENT USE OF INSULIN: ICD-10-CM

## 2024-01-04 DIAGNOSIS — J06.9 UPPER RESPIRATORY TRACT INFECTION, UNSPECIFIED TYPE: ICD-10-CM

## 2024-01-04 DIAGNOSIS — I25.10 CORONARY ARTERY DISEASE INVOLVING NATIVE CORONARY ARTERY OF NATIVE HEART WITHOUT ANGINA PECTORIS: ICD-10-CM

## 2024-01-04 DIAGNOSIS — Z86.010 HISTORY OF COLON POLYPS: ICD-10-CM

## 2024-01-04 DIAGNOSIS — F10.21 ALCOHOL DEPENDENCE IN REMISSION: ICD-10-CM

## 2024-01-04 DIAGNOSIS — I15.2 HYPERTENSION ASSOCIATED WITH DIABETES: ICD-10-CM

## 2024-01-04 DIAGNOSIS — E11.36 DIABETIC CATARACT: ICD-10-CM

## 2024-01-04 DIAGNOSIS — Z00.00 ENCOUNTER FOR PREVENTATIVE ADULT HEALTH CARE EXAMINATION: Primary | ICD-10-CM

## 2024-01-04 DIAGNOSIS — I70.0 AORTIC ATHEROSCLEROSIS: ICD-10-CM

## 2024-01-04 DIAGNOSIS — F17.219 CIGARETTE NICOTINE DEPENDENCE WITH NICOTINE-INDUCED DISORDER: ICD-10-CM

## 2024-01-04 DIAGNOSIS — J43.9 PULMONARY EMPHYSEMA, UNSPECIFIED EMPHYSEMA TYPE: ICD-10-CM

## 2024-01-04 DIAGNOSIS — E11.59 HYPERTENSION ASSOCIATED WITH DIABETES: ICD-10-CM

## 2024-01-04 DIAGNOSIS — E78.5 HYPERLIPIDEMIA ASSOCIATED WITH TYPE 2 DIABETES MELLITUS: ICD-10-CM

## 2024-01-04 DIAGNOSIS — E11.69 HYPERLIPIDEMIA ASSOCIATED WITH TYPE 2 DIABETES MELLITUS: ICD-10-CM

## 2024-01-04 DIAGNOSIS — R91.8 PULMONARY NODULES: ICD-10-CM

## 2024-01-04 DIAGNOSIS — Z00.00 ENCOUNTER FOR MEDICARE ANNUAL WELLNESS EXAM: ICD-10-CM

## 2024-01-04 DIAGNOSIS — J41.0 SMOKERS' COUGH: ICD-10-CM

## 2024-01-04 DIAGNOSIS — J30.9 CHRONIC ALLERGIC RHINITIS: ICD-10-CM

## 2024-01-04 DIAGNOSIS — M17.0 PRIMARY OSTEOARTHRITIS OF BOTH KNEES: ICD-10-CM

## 2024-01-04 PROCEDURE — 1159F MED LIST DOCD IN RCRD: CPT | Mod: HCNC,CPTII,S$GLB, | Performed by: NURSE PRACTITIONER

## 2024-01-04 PROCEDURE — 1160F RVW MEDS BY RX/DR IN RCRD: CPT | Mod: HCNC,CPTII,S$GLB, | Performed by: NURSE PRACTITIONER

## 2024-01-04 PROCEDURE — 1101F PT FALLS ASSESS-DOCD LE1/YR: CPT | Mod: HCNC,CPTII,S$GLB, | Performed by: NURSE PRACTITIONER

## 2024-01-04 PROCEDURE — 3079F DIAST BP 80-89 MM HG: CPT | Mod: HCNC,CPTII,S$GLB, | Performed by: NURSE PRACTITIONER

## 2024-01-04 PROCEDURE — 3288F FALL RISK ASSESSMENT DOCD: CPT | Mod: HCNC,CPTII,S$GLB, | Performed by: NURSE PRACTITIONER

## 2024-01-04 PROCEDURE — 1170F FXNL STATUS ASSESSED: CPT | Mod: HCNC,CPTII,S$GLB, | Performed by: NURSE PRACTITIONER

## 2024-01-04 PROCEDURE — 99999 PR PBB SHADOW E&M-EST. PATIENT-LVL V: CPT | Mod: PBBFAC,HCNC,, | Performed by: NURSE PRACTITIONER

## 2024-01-04 PROCEDURE — G0439 PPPS, SUBSEQ VISIT: HCPCS | Mod: HCNC,S$GLB,, | Performed by: NURSE PRACTITIONER

## 2024-01-04 PROCEDURE — 3077F SYST BP >= 140 MM HG: CPT | Mod: HCNC,CPTII,S$GLB, | Performed by: NURSE PRACTITIONER

## 2024-01-04 RX ORDER — MULTIVITAMIN
1 TABLET ORAL DAILY
COMMUNITY

## 2024-01-04 RX ORDER — VITAMIN E 268 MG
400 CAPSULE ORAL DAILY
COMMUNITY

## 2024-01-04 RX ORDER — ASCORBIC ACID 500 MG
500 TABLET ORAL 2 TIMES DAILY
COMMUNITY

## 2024-01-04 NOTE — PROGRESS NOTES
"  Mita Max presented for a  Medicare AWV and comprehensive Health Risk Assessment today. The following components were reviewed and updated:    Medical history  Family History  Social history  Allergies and Current Medications  Health Risk Assessment  Health Maintenance  Care Team         ** See Completed Assessments for Annual Wellness Visit within the encounter summary.**         The following assessments were completed:  Living Situation  CAGE  Depression Screening  Timed Get Up and Go  Whisper Test  Cognitive Function Screening  Nutrition Screening  ADL Screening  PAQ Screening        Vitals:    01/04/24 1417   BP: (!) 150/84   BP Location: Right arm   Patient Position: Sitting   Pulse: 74   Resp: 20   Weight: 76.2 kg (167 lb 15.9 oz)   Height:    Pain scale 5' 3.5" (1.613 m)    0 at this time     Body mass index is 29.29 kg/m².  Physical Exam  Constitutional:       General: She is not in acute distress.     Appearance: She is not ill-appearing or diaphoretic.   HENT:      Mouth/Throat:      Mouth: Mucous membranes are moist.   Eyes:      General:         Right eye: No discharge.         Left eye: No discharge.      Conjunctiva/sclera: Conjunctivae normal.   Cardiovascular:      Rate and Rhythm: Normal rate and regular rhythm.      Heart sounds: Normal heart sounds.   Pulmonary:      Effort: Pulmonary effort is normal. No respiratory distress.      Comments: BBS diminished  Skin:     General: Skin is warm and dry.   Neurological:      Mental Status: She is alert and oriented to person, place, and time. Mental status is at baseline.   Psychiatric:         Mood and Affect: Mood normal.         Behavior: Behavior normal.         Thought Content: Thought content normal.         Judgment: Judgment normal.     Diagnoses and health risks identified today and associated recommendations/orders:    1. Encounter for preventative adult health care examination, Encounter for Medicare annual wellness exam  - Ambulatory " Referral/Consult to Enhanced Annual Wellness Visit (eAWV)  Review for opioid screening: Patient does not have Rx for Opioids  Review for substance use disorder: Patient does not use substance per chart    Patient states she does not drink alcohol and has been sober coming on 38 years in February    I offered to discuss advanced care planning, including how to pick a person who would make decisions for you if you were unable to make them for yourself, called a health care power of , and what kind of decisions you might make such as use of life sustaining treatments such as ventilators and tube feeding when faced with a life limiting illness recorded on a living will that they will need to know. (How you want to be cared for as you near the end of your natural life)     X Patient is interested in learning more about how to make advanced directives.  I provided them paperwork and offered to discuss this with them.    2. Alcohol dependence in remission  Monitor  Stable- Doing well- Patient states she does not drink alcohol and has been sober coming on 38 years in February   Patient states she goes to daily AA meetings    3. Hypertension associated with diabetes  Monitor  Follow up with PCP  Continue home norvasc, avapro    4. Diabetic cataract   Monitor  Follow up with Ophtho    5. Hyperlipidemia associated with type 2 diabetes mellitus  Monitor  Follow up with PCP  Continue home pravachol   - POCT Lipid Panel    6. Type 2 diabetes mellitus with hyperglycemia, without long-term current use of insulin   Monitor  Follow up with PCP  Continue home DM diet  - HEMOGLOBIN A1C; Future  - Microalbumin/creatinine urine ratio    7. Pulmonary emphysema, unspecified emphysema type, Pulmonary nodules, Cigarette nicotine dependence with nicotine-induced disorder, Smokers' cough  3/01/2023 CT scan- Lungs: No significant change in multiple bilateral both calcified and noncalcified pulmonary nodules.  New left apical focal  opacity, likely scarring.  The lungs show findings consistent with emphysema. Dx discussed with patient  Patient states she smokes 1PPD cigarettes but is getting ready to quit. She declined Smoking cessation program today but states will notify PCP or me when ready to enroll.    Monitor  Follow up with PCP  Smoking cessation education    8. Primary osteoarthritis of both knees  Monitor  Follow up as needed, directed  Continue home voltaren gel    9. Aortic atherosclerosis, Coronary artery disease involving native coronary artery of native heart without angina pectoris  3/01/2023 CT scan- Aorta and vasculature: Atherosclerosis including coronary arteries. Dx discussed with patient    Monitor  Follow up with PCP  Discussed option of  cardiology referral for routine cardiac workup due to CAD with high risk factors- Patient wanted to wait on referral and states she will discuss with Dr. Linton  Continue home asa, pravachol     10. History of colon polyps   Monitor  Follow up with GI    11. Chronic allergic rhinitis  Monitor  Stable  Continue home flonase    12.  Upper respiratory tract infection, unspecified type   Monitor  Follow up as needed, directed  Continue home Augmentin, promethazine DM                    Provided Mita with a 5-10 year written screening schedule and personal prevention plan. Recommendations were developed using the USPSTF age appropriate recommendations. Education, counseling, and referrals were provided as needed. After Visit Summary printed and given to patient which includes a list of additional screenings\tests needed.    Follow up in about 1 year (around 1/4/2025) for Annual wellness visit.    ASHOK Celestin

## 2024-01-04 NOTE — PATIENT INSTRUCTIONS
Counseling and Referral of Other Preventative  (Italic type indicates deductible and co-insurance are waived)    Patient Name: Mita Max  Today's Date: 1/4/2024    Health Maintenance       Date Due Completion Date    RSV Vaccine (Age 60+ and Pregnant patients) (1 - 1-dose 60+ series) Never done ---    Shingles Vaccine (2 of 3) 04/26/2016 3/1/2016    Eye Exam 07/25/2023 7/25/2022    Diabetes Urine Screening 08/08/2023 8/8/2022    Lipid Panel 08/08/2023 8/8/2022    Hemoglobin A1c 08/20/2023 2/20/2023    Override on 9/17/2013: Done    Influenza Vaccine (1) 09/01/2023 1/13/2023    COVID-19 Vaccine (6 - 2023-24 season) 09/01/2023 1/13/2023    Foot Exam 11/29/2023 11/29/2022 (Done)    Override on 11/29/2022: Done    Override on 2/28/2019: Done    Override on 6/19/2017: Done    Mammogram 03/21/2024 3/21/2023    LDCT Lung Screen 03/01/2024 3/1/2023    High Dose Statin 12/29/2024 12/29/2023    TETANUS VACCINE 01/19/2025 1/19/2015    DEXA Scan 03/01/2026 3/1/2023    Colorectal Cancer Screening 04/24/2026 4/24/2023        Orders Placed This Encounter   Procedures    HEMOGLOBIN A1C    Microalbumin/creatinine urine ratio    POCT Lipid Panel     The following information is provided to all patients.  This information is to help you find resources for any of the problems found today that may be affecting your health:                  Living healthy guide: www.[facility].[place].gov      Understanding Diabetes: www.diabetes.org      Eating healthy: www.cdc.gov/healthyweight      CDC home safety checklist: www.cdc.gov/steadi/patient.html      Agency on Aging: www.goea.[place].gov      Alcoholics anonymous (AA): www.aa.org      Physical Activity: www.allan.nih.gov/fq3suzr      Tobacco use: www.quitwithusla.org

## 2024-01-05 ENCOUNTER — PATIENT MESSAGE (OUTPATIENT)
Dept: ADMINISTRATIVE | Facility: HOSPITAL | Age: 74
End: 2024-01-05
Payer: MEDICARE

## 2024-03-17 DIAGNOSIS — E11.69 HYPERLIPIDEMIA ASSOCIATED WITH TYPE 2 DIABETES MELLITUS: ICD-10-CM

## 2024-03-17 DIAGNOSIS — E78.5 HYPERLIPIDEMIA ASSOCIATED WITH TYPE 2 DIABETES MELLITUS: ICD-10-CM

## 2024-03-17 DIAGNOSIS — I15.2 HYPERTENSION ASSOCIATED WITH DIABETES: ICD-10-CM

## 2024-03-17 DIAGNOSIS — E11.59 HYPERTENSION ASSOCIATED WITH DIABETES: ICD-10-CM

## 2024-03-17 NOTE — TELEPHONE ENCOUNTER
Care Due:                  Date            Visit Type   Department     Provider  --------------------------------------------------------------------------------                                EP -                              PRIMARY      [facility]  Janiya Mainampati  Last Visit: 02-      CARE ([facility])   MEDICINE       [facility]  Next Visit: None Scheduled  None         None Found                                                            Last  Test          Frequency    Reason                     Performed    Due Date  --------------------------------------------------------------------------------    Office Visit  15 months..  amLODIPine, irbesartan,    02-   05-                             pravastatin..............    CMP.........  12 months..  irbesartan, pravastatin..  02- 02-    Lipid Panel.  12 months..  pravastatin..............  08- 08-    [facility] Embedded Care Due Messages. Reference number: [id number].   3/17/2024 6:48:48 PM CDT

## 2024-03-18 RX ORDER — AMLODIPINE BESYLATE 10 MG/1
10 TABLET ORAL
Qty: 90 TABLET | Refills: 0 | Status: SHIPPED | OUTPATIENT
Start: 2024-03-18

## 2024-03-18 RX ORDER — PRAVASTATIN SODIUM 40 MG/1
40 TABLET ORAL NIGHTLY
Qty: 90 TABLET | Refills: 0 | Status: SHIPPED | OUTPATIENT
Start: 2024-03-18

## 2024-03-18 NOTE — TELEPHONE ENCOUNTER
Refill Routing Note   Medication(s) are not appropriate for processing by [facility] for the following reason(s):        Required vitals abnormal  Required labs outdated    [facility] action(s):  Defer   Requires appointment : Yes     Requires labs : Yes             Appointments  past 12m or future 3m with PCP    Date Provider   Last Visit   2/20/2023 Janiya Linton MD   Next Visit   Visit date not found Janiya Linton MD   ED visits in past 90 days: 0        Note composed:5:28 AM 03/18/2024

## 2024-03-21 ENCOUNTER — PATIENT MESSAGE (OUTPATIENT)
Dept: ADMINISTRATIVE | Facility: HOSPITAL | Age: 74
End: 2024-03-21
Payer: MEDICARE

## 2024-03-21 ENCOUNTER — PATIENT OUTREACH (OUTPATIENT)
Dept: ADMINISTRATIVE | Facility: HOSPITAL | Age: 74
End: 2024-03-21
Payer: MEDICARE

## 2024-03-21 NOTE — PROGRESS NOTES
VBHM Score: 8     Urine Screening  Eye Exam  Hemoglobin A1c  Lipid Panel  Mammogram  Foot Exam  Uncontrolled BP  LDCT Lung Screen    Influenza Vaccine  Shingles/Zoster Vaccine  RSV Vaccine                  Health Maintenance Topic(s) Outreach Outcomes & Actions Taken:    Breast Cancer Screening - Outreach Outcomes & Actions Taken  : Attempted to contact pt no ans, lvm sent portal letter    Lab(s) - Outreach Outcomes & Actions Taken  : No ans, lvm    Tobacco History - Outreach Outcomes & Actions Taken  : Other    Declined smoking cessation at awv 01/04/24    Diabetic Foot Exam - Outreach Outcomes & Actions Taken  : No ans, lvm    Eye Exam - Outreach Outcomes & Actions Taken  : No ans, lvm           Additional Notes:                 Care Management, Digital Medicine, and/or Education Referrals    OPCM Risk Score: 18.7         Next Steps - Referral Actions: Pt had recent awv 1/4/24, topics covered.        Additional Notes:

## 2024-04-22 NOTE — PROGRESS NOTES
VBHM Score: 8     Urine Screening  Eye Exam  Hemoglobin A1c  Lipid Panel  Mammogram  Foot Exam  Uncontrolled BP  LDCT Lung Screen    Influenza Vaccine  Shingles/Zoster Vaccine  RSV Vaccine                  Health Maintenance Topic(s) Outreach Outcomes & Actions Taken:         Additional Notes:    Pt overdue for all above HM topics, did not respond to portal message sent, attempted to contact pt again today, no ans, lvm.

## 2024-05-17 ENCOUNTER — TELEPHONE (OUTPATIENT)
Dept: INTERNAL MEDICINE | Facility: CLINIC | Age: 74
End: 2024-05-17
Payer: MEDICARE

## 2024-05-17 ENCOUNTER — PATIENT MESSAGE (OUTPATIENT)
Dept: INTERNAL MEDICINE | Facility: CLINIC | Age: 74
End: 2024-05-17
Payer: MEDICARE

## 2024-06-14 ENCOUNTER — TELEPHONE (OUTPATIENT)
Dept: INTERNAL MEDICINE | Facility: CLINIC | Age: 74
End: 2024-06-14
Payer: MEDICARE

## 2024-06-26 ENCOUNTER — OFFICE VISIT (OUTPATIENT)
Dept: INTERNAL MEDICINE | Facility: CLINIC | Age: 74
End: 2024-06-26
Payer: MEDICARE

## 2024-06-26 VITALS
SYSTOLIC BLOOD PRESSURE: 132 MMHG | HEART RATE: 82 BPM | OXYGEN SATURATION: 95 % | BODY MASS INDEX: 28.64 KG/M2 | RESPIRATION RATE: 18 BRPM | WEIGHT: 167.75 LBS | HEIGHT: 64 IN | DIASTOLIC BLOOD PRESSURE: 70 MMHG

## 2024-06-26 DIAGNOSIS — E11.9 TYPE 2 DIABETES MELLITUS WITHOUT COMPLICATION, WITHOUT LONG-TERM CURRENT USE OF INSULIN: ICD-10-CM

## 2024-06-26 DIAGNOSIS — R91.8 OTHER NONSPECIFIC ABNORMAL FINDING OF LUNG FIELD: ICD-10-CM

## 2024-06-26 DIAGNOSIS — R91.8 PULMONARY NODULES: ICD-10-CM

## 2024-06-26 DIAGNOSIS — N30.00 ACUTE CYSTITIS WITHOUT HEMATURIA: ICD-10-CM

## 2024-06-26 DIAGNOSIS — I15.2 HYPERTENSION ASSOCIATED WITH DIABETES: ICD-10-CM

## 2024-06-26 DIAGNOSIS — Z87.891 PERSONAL HISTORY OF NICOTINE DEPENDENCE: ICD-10-CM

## 2024-06-26 DIAGNOSIS — I70.0 AORTIC ATHEROSCLEROSIS: ICD-10-CM

## 2024-06-26 DIAGNOSIS — E11.65 TYPE 2 DIABETES MELLITUS WITH HYPERGLYCEMIA, WITHOUT LONG-TERM CURRENT USE OF INSULIN: ICD-10-CM

## 2024-06-26 DIAGNOSIS — E11.69 HYPERLIPIDEMIA ASSOCIATED WITH TYPE 2 DIABETES MELLITUS: ICD-10-CM

## 2024-06-26 DIAGNOSIS — J30.9 CHRONIC ALLERGIC RHINITIS: ICD-10-CM

## 2024-06-26 DIAGNOSIS — M17.0 PRIMARY OSTEOARTHRITIS OF BOTH KNEES: ICD-10-CM

## 2024-06-26 DIAGNOSIS — Z12.31 SCREENING MAMMOGRAM FOR HIGH-RISK PATIENT: ICD-10-CM

## 2024-06-26 DIAGNOSIS — E78.5 HYPERLIPIDEMIA ASSOCIATED WITH TYPE 2 DIABETES MELLITUS: ICD-10-CM

## 2024-06-26 DIAGNOSIS — Z86.010 HISTORY OF COLON POLYPS: ICD-10-CM

## 2024-06-26 DIAGNOSIS — I25.10 CORONARY ARTERY DISEASE INVOLVING NATIVE CORONARY ARTERY OF NATIVE HEART WITHOUT ANGINA PECTORIS: ICD-10-CM

## 2024-06-26 DIAGNOSIS — E11.59 HYPERTENSION ASSOCIATED WITH DIABETES: ICD-10-CM

## 2024-06-26 DIAGNOSIS — J43.9 PULMONARY EMPHYSEMA, UNSPECIFIED EMPHYSEMA TYPE: ICD-10-CM

## 2024-06-26 DIAGNOSIS — B35.1 ONYCHOMYCOSIS: ICD-10-CM

## 2024-06-26 DIAGNOSIS — Z00.00 ROUTINE GENERAL MEDICAL EXAMINATION AT A HEALTH CARE FACILITY: Primary | ICD-10-CM

## 2024-06-26 PROBLEM — J06.9 UPPER RESPIRATORY TRACT INFECTION: Status: RESOLVED | Noted: 2024-01-04 | Resolved: 2024-06-26

## 2024-06-26 LAB
ALBUMIN SERPL BCP-MCNC: 3.9 G/DL (ref 3.5–5.2)
ALP SERPL-CCNC: 62 U/L (ref 55–135)
ALT SERPL W/O P-5'-P-CCNC: 18 U/L (ref 10–44)
ANION GAP SERPL CALC-SCNC: 10 MMOL/L (ref 8–16)
AST SERPL-CCNC: 17 U/L (ref 10–40)
BASOPHILS # BLD AUTO: 0.07 K/UL (ref 0–0.2)
BASOPHILS NFR BLD: 1.2 % (ref 0–1.9)
BILIRUB SERPL-MCNC: 1 MG/DL (ref 0.1–1)
BUN SERPL-MCNC: 13 MG/DL (ref 8–23)
CALCIUM SERPL-MCNC: 10.2 MG/DL (ref 8.7–10.5)
CHLORIDE SERPL-SCNC: 106 MMOL/L (ref 95–110)
CHOLEST SERPL-MCNC: 131 MG/DL (ref 120–199)
CHOLEST/HDLC SERPL: 2.3 {RATIO} (ref 2–5)
CO2 SERPL-SCNC: 22 MMOL/L (ref 23–29)
CREAT SERPL-MCNC: 0.8 MG/DL (ref 0.5–1.4)
DIFFERENTIAL METHOD BLD: ABNORMAL
EOSINOPHIL # BLD AUTO: 0.3 K/UL (ref 0–0.5)
EOSINOPHIL NFR BLD: 4.4 % (ref 0–8)
ERYTHROCYTE [DISTWIDTH] IN BLOOD BY AUTOMATED COUNT: 12.6 % (ref 11.5–14.5)
EST. GFR  (NO RACE VARIABLE): >60 ML/MIN/1.73 M^2
ESTIMATED AVG GLUCOSE: 128 MG/DL (ref 68–131)
GLUCOSE SERPL-MCNC: 178 MG/DL (ref 70–110)
HBA1C MFR BLD: 6.1 % (ref 4–5.6)
HCT VFR BLD AUTO: 39.9 % (ref 37–48.5)
HDLC SERPL-MCNC: 56 MG/DL (ref 40–75)
HDLC SERPL: 42.7 % (ref 20–50)
HGB BLD-MCNC: 13.3 G/DL (ref 12–16)
IMM GRANULOCYTES # BLD AUTO: 0.01 K/UL (ref 0–0.04)
IMM GRANULOCYTES NFR BLD AUTO: 0.2 % (ref 0–0.5)
LDLC SERPL CALC-MCNC: 58.8 MG/DL (ref 63–159)
LYMPHOCYTES # BLD AUTO: 2.1 K/UL (ref 1–4.8)
LYMPHOCYTES NFR BLD: 36.7 % (ref 18–48)
MCH RBC QN AUTO: 32.5 PG (ref 27–31)
MCHC RBC AUTO-ENTMCNC: 33.3 G/DL (ref 32–36)
MCV RBC AUTO: 98 FL (ref 82–98)
MONOCYTES # BLD AUTO: 0.4 K/UL (ref 0.3–1)
MONOCYTES NFR BLD: 7.6 % (ref 4–15)
NEUTROPHILS # BLD AUTO: 2.8 K/UL (ref 1.8–7.7)
NEUTROPHILS NFR BLD: 49.9 % (ref 38–73)
NONHDLC SERPL-MCNC: 75 MG/DL
NRBC BLD-RTO: 0 /100 WBC
PLATELET # BLD AUTO: 304 K/UL (ref 150–450)
PMV BLD AUTO: 9.3 FL (ref 9.2–12.9)
POTASSIUM SERPL-SCNC: 3.7 MMOL/L (ref 3.5–5.1)
PROT SERPL-MCNC: 7.2 G/DL (ref 6–8.4)
RBC # BLD AUTO: 4.09 M/UL (ref 4–5.4)
SODIUM SERPL-SCNC: 138 MMOL/L (ref 136–145)
TRIGL SERPL-MCNC: 81 MG/DL (ref 30–150)
TSH SERPL DL<=0.005 MIU/L-ACNC: 1.4 UIU/ML (ref 0.4–4)
WBC # BLD AUTO: 5.69 K/UL (ref 3.9–12.7)

## 2024-06-26 PROCEDURE — 80053 COMPREHEN METABOLIC PANEL: CPT | Mod: HCNC | Performed by: FAMILY MEDICINE

## 2024-06-26 PROCEDURE — 99397 PER PM REEVAL EST PAT 65+ YR: CPT | Mod: HCNC,S$GLB,, | Performed by: FAMILY MEDICINE

## 2024-06-26 PROCEDURE — 82570 ASSAY OF URINE CREATININE: CPT | Mod: HCNC | Performed by: FAMILY MEDICINE

## 2024-06-26 PROCEDURE — 85025 COMPLETE CBC W/AUTO DIFF WBC: CPT | Mod: HCNC | Performed by: FAMILY MEDICINE

## 2024-06-26 PROCEDURE — 1160F RVW MEDS BY RX/DR IN RCRD: CPT | Mod: HCNC,CPTII,S$GLB, | Performed by: FAMILY MEDICINE

## 2024-06-26 PROCEDURE — 84443 ASSAY THYROID STIM HORMONE: CPT | Mod: HCNC | Performed by: FAMILY MEDICINE

## 2024-06-26 PROCEDURE — 81000 URINALYSIS NONAUTO W/SCOPE: CPT | Mod: HCNC | Performed by: FAMILY MEDICINE

## 2024-06-26 PROCEDURE — 4010F ACE/ARB THERAPY RXD/TAKEN: CPT | Mod: HCNC,CPTII,S$GLB, | Performed by: FAMILY MEDICINE

## 2024-06-26 PROCEDURE — 3075F SYST BP GE 130 - 139MM HG: CPT | Mod: HCNC,CPTII,S$GLB, | Performed by: FAMILY MEDICINE

## 2024-06-26 PROCEDURE — 1159F MED LIST DOCD IN RCRD: CPT | Mod: HCNC,CPTII,S$GLB, | Performed by: FAMILY MEDICINE

## 2024-06-26 PROCEDURE — 99999 PR PBB SHADOW E&M-EST. PATIENT-LVL IV: CPT | Mod: PBBFAC,HCNC,, | Performed by: FAMILY MEDICINE

## 2024-06-26 PROCEDURE — 3008F BODY MASS INDEX DOCD: CPT | Mod: HCNC,CPTII,S$GLB, | Performed by: FAMILY MEDICINE

## 2024-06-26 PROCEDURE — 80061 LIPID PANEL: CPT | Mod: HCNC | Performed by: FAMILY MEDICINE

## 2024-06-26 PROCEDURE — 83036 HEMOGLOBIN GLYCOSYLATED A1C: CPT | Mod: HCNC | Performed by: FAMILY MEDICINE

## 2024-06-26 PROCEDURE — 1101F PT FALLS ASSESS-DOCD LE1/YR: CPT | Mod: HCNC,CPTII,S$GLB, | Performed by: FAMILY MEDICINE

## 2024-06-26 PROCEDURE — 3078F DIAST BP <80 MM HG: CPT | Mod: HCNC,CPTII,S$GLB, | Performed by: FAMILY MEDICINE

## 2024-06-26 PROCEDURE — 3288F FALL RISK ASSESSMENT DOCD: CPT | Mod: HCNC,CPTII,S$GLB, | Performed by: FAMILY MEDICINE

## 2024-06-26 RX ORDER — CICLOPIROX 80 MG/ML
SOLUTION TOPICAL NIGHTLY
Qty: 6.6 ML | Refills: 11 | Status: SHIPPED | OUTPATIENT
Start: 2024-06-26

## 2024-06-26 RX ORDER — AMLODIPINE BESYLATE 10 MG/1
10 TABLET ORAL DAILY
Qty: 90 TABLET | Refills: 1 | Status: SHIPPED | OUTPATIENT
Start: 2024-06-26

## 2024-06-26 RX ORDER — PRAVASTATIN SODIUM 40 MG/1
40 TABLET ORAL NIGHTLY
Qty: 90 TABLET | Refills: 1 | Status: SHIPPED | OUTPATIENT
Start: 2024-06-26

## 2024-06-26 RX ORDER — IRBESARTAN 300 MG/1
300 TABLET ORAL NIGHTLY
Qty: 90 TABLET | Refills: 1 | Status: SHIPPED | OUTPATIENT
Start: 2024-06-26

## 2024-06-26 NOTE — PROGRESS NOTES
Subjective:       Patient ID: Mita Max is a 74 y.o. female.    Chief Complaint: Annual Exam, Medication Refill, and Urinary Tract Infection    74-year-old female patient with Patient Active Problem List:     Type 2 diabetes mellitus without complication, without long-term current use of insulin     Alcohol dependence in remission     Hypertension associated with diabetes     Chronic allergic rhinitis     History of colon polyps     Hyperlipidemia associated with type 2 diabetes mellitus     Primary osteoarthritis of both knees     Cigarette nicotine dependence with nicotine-induced disorder     Emphysema lung     Pulmonary nodules     Aortic atherosclerosis     Coronary artery disease involving native coronary artery of native heart without angina pectoris     Smokers' cough     Diabetic cataract  Here for routine annual physicals  Patient continues to smoke more than 1 pack of cigarettes daily and reports that she has currently not been drinking alcohol  Denies of any chest pain or difficulty breathing with palpitations and requesting refills on blood pressure and cholesterol medication  Patient has been having minimal discomfort with urine      Review of Systems   Constitutional:  Positive for activity change. Negative for fatigue and unexpected weight change.   HENT:  Negative for hearing loss, rhinorrhea and trouble swallowing.    Eyes:  Negative for discharge and visual disturbance.   Respiratory:  Positive for cough. Negative for chest tightness, shortness of breath and wheezing.    Cardiovascular:  Negative for chest pain, palpitations and leg swelling.   Gastrointestinal:  Negative for abdominal pain, blood in stool, constipation, diarrhea, nausea and vomiting.   Endocrine: Negative for polydipsia and polyuria.   Genitourinary:  Positive for difficulty urinating and dysuria. Negative for hematuria and menstrual problem.   Musculoskeletal:  Negative for arthralgias, joint swelling, myalgias and  "neck pain.   Skin:  Negative for rash.   Neurological:  Negative for weakness, light-headedness and headaches.   Psychiatric/Behavioral:  Negative for confusion, dysphoric mood and sleep disturbance.          /70 (BP Location: Right arm, Patient Position: Sitting, BP Method: Large (Manual))   Pulse 82   Resp 18   Ht 5' 3.5" (1.613 m)   Wt 76.1 kg (167 lb 12.3 oz)   SpO2 95%   BMI 29.25 kg/m²   Objective:      Physical Exam  Constitutional:       Appearance: She is well-developed.   HENT:      Head: Normocephalic and atraumatic.   Cardiovascular:      Rate and Rhythm: Normal rate and regular rhythm.      Pulses:           Posterior tibial pulses are 2+ on the right side and 2+ on the left side.      Heart sounds: Normal heart sounds. No murmur heard.  Pulmonary:      Effort: Pulmonary effort is normal.      Breath sounds: Normal breath sounds. No wheezing.   Abdominal:      General: Bowel sounds are normal.      Palpations: Abdomen is soft.      Tenderness: There is no abdominal tenderness.   Feet:      Right foot:      Protective Sensation: 10 sites tested.  10 sites sensed.      Toenail Condition: Fungal disease present.     Left foot:      Protective Sensation: 10 sites tested.  10 sites sensed.      Toenail Condition: Fungal disease present.  Skin:     General: Skin is warm and dry.      Findings: No rash.   Neurological:      Mental Status: She is alert and oriented to person, place, and time.   Psychiatric:         Mood and Affect: Mood normal.           Assessment/Plan:   1. Routine general medical examination at a health care facility  -     Urinalysis, Reflex to Urine Culture Urine, Clean Catch; Future; Expected date: 06/26/2024  -     Hemoglobin A1C; Future; Expected date: 06/26/2024  -     TSH; Future; Expected date: 06/26/2024  -     Lipid Panel; Future; Expected date: 06/26/2024  -     Comprehensive Metabolic Panel; Future; Expected date: 06/26/2024  -     CBC Auto Differential; Future; " Expected date: 06/26/2024  Vital signs stable today.  Clinical exam stable  Continue lifestyle modifications with low-fat and low-cholesterol diet and exercise 30 minutes daily  Encouraged to consider getting RSV and shingles vaccination at outside pharmacy    2. Hypertension associated with diabetes  -     amLODIPine (NORVASC) 10 MG tablet; Take 1 tablet (10 mg total) by mouth once daily.  Dispense: 90 tablet; Refill: 1  -     irbesartan (AVAPRO) 300 MG tablet; Take 1 tablet (300 mg total) by mouth every evening.  Dispense: 90 tablet; Refill: 1  -     Urinalysis, Reflex to Urine Culture Urine, Clean Catch; Future; Expected date: 06/26/2024  -     TSH; Future; Expected date: 06/26/2024  -     Lipid Panel; Future; Expected date: 06/26/2024  -     Comprehensive Metabolic Panel; Future; Expected date: 06/26/2024  Blood pressure is stable currently on amlodipine 10 mg and irbesartan 300 mg daily    3. Hyperlipidemia associated with type 2 diabetes mellitus  -     pravastatin (PRAVACHOL) 40 MG tablet; Take 1 tablet (40 mg total) by mouth every evening.  Dispense: 90 tablet; Refill: 1  -     Lipid Panel; Future; Expected date: 06/26/2024  Currently taking pravastatin 40 mg daily    4. Type 2 diabetes mellitus without complication, without long-term current use of insulin  -     Hemoglobin A1C; Future; Expected date: 06/26/2024  -     CBC Auto Differential; Future; Expected date: 06/26/2024  -     Microalbumin/Creatinine Ratio, Urine; Future; Expected date: 06/26/2024  -     Ambulatory referral/consult to Optometry; Future; Expected date: 07/03/2024  -     blood sugar diagnostic (TRUE METRIX GLUCOSE TEST STRIP) Strp; To check blood glucose levels once daily  Dispense: 100 strip; Refill: 11  Diet controlled  Diabetic foot exam stable today  Strict lifestyle changes recommended with 1800 ADA low-fat and low-cholesterol diet and exercise 30 minutes daily  Due for eye exam    5. Chronic allergic rhinitis  Stable    6. Primary  osteoarthritis of both knees  Graded exercise regimen recommended    7. Pulmonary emphysema, unspecified emphysema type  Overview:  3/01/2023 CT scan- Lungs: No significant change in multiple bilateral both calcified and noncalcified pulmonary nodules.  New left apical focal opacity, likely scarring.  The lungs show findings consistent with emphysema.          Orders:  -     CT Chest Lung Screening Low Dose; Future; Expected date: 06/26/2024  8. Pulmonary nodules  Overview:  3/01/2023 CT scan- Lungs: No significant change in multiple bilateral both calcified and noncalcified pulmonary nodules.  New left apical focal opacity, likely scarring.  The lungs show findings consistent with emphysema.       Orders:  -     CT Chest Lung Screening Low Dose; Future; Expected date: 06/26/2024    Will get low-dose CT scan for further evaluation    9. Aortic atherosclerosis  Overview:  3/01/2023 CT scan- Aorta and vasculature: Atherosclerosis including coronary arteries.  Continue aspirin statins    10. Coronary artery disease involving native coronary artery of native heart without angina pectoris  Overview:  3/01/2023 CT scan- Aorta and vasculature: Atherosclerosis including coronary arteries.  Stable and asymptomatic    11. History of colon polyps    12. Other nonspecific abnormal finding of lung field  -     CT Chest Lung Screening Low Dose; Future; Expected date: 06/26/2024    13. Screening mammogram for high-risk patient  -     Mammo Digital Screening Bilat w/ [facility]; Future; Expected date: 06/26/2024  Due for mammogram    14. Personal history of nicotine dependence  -     CT Chest Lung Screening Low Dose; Future; Expected date: 06/26/2024  Encouraged to quit smoking    15. Onychomycosis  -     ciclopirox (PENLAC) 8 % Soln; Apply topically nightly. To great toenails bilaterraly  Dispense: 6.6 mL; Refill: 11  Penlac lotion prescribed today for symptomatic relief

## 2024-06-27 LAB
ALBUMIN/CREAT UR: 23.1 UG/MG (ref 0–30)
BACTERIA #/AREA URNS HPF: ABNORMAL /HPF
BILIRUB UR QL STRIP: NEGATIVE
CLARITY UR: CLEAR
COLOR UR: YELLOW
CREAT UR-MCNC: 26 MG/DL (ref 15–325)
GLUCOSE UR QL STRIP: NEGATIVE
HGB UR QL STRIP: NEGATIVE
KETONES UR QL STRIP: NEGATIVE
LEUKOCYTE ESTERASE UR QL STRIP: NEGATIVE
MICROALBUMIN UR DL<=1MG/L-MCNC: 6 UG/ML
MICROSCOPIC COMMENT: ABNORMAL
NITRITE UR QL STRIP: POSITIVE
PH UR STRIP: 6 [PH] (ref 5–8)
PROT UR QL STRIP: NEGATIVE
RBC #/AREA URNS HPF: 1 /HPF (ref 0–4)
SP GR UR STRIP: 1.01 (ref 1–1.03)
URN SPEC COLLECT METH UR: ABNORMAL
WBC #/AREA URNS HPF: 2 /HPF (ref 0–5)

## 2024-06-27 RX ORDER — NITROFURANTOIN 25; 75 MG/1; MG/1
100 CAPSULE ORAL 2 TIMES DAILY
Qty: 10 CAPSULE | Refills: 0 | Status: SHIPPED | OUTPATIENT
Start: 2024-06-27 | End: 2024-07-02

## 2024-07-23 ENCOUNTER — PATIENT OUTREACH (OUTPATIENT)
Dept: ADMINISTRATIVE | Facility: HOSPITAL | Age: 74
End: 2024-07-23
Payer: MEDICARE

## 2024-07-23 NOTE — PROGRESS NOTES
VBHM Score: 4     Eye Exam  Mammogram  Foot Exam  LDCT Lung Screen    Shingles/Zoster Vaccine  RSV Vaccine                  Health Maintenance Topic(s) Outreach Outcomes & Actions Taken:    Eye Exam - Outreach Outcomes & Actions Taken  : Ophthalmology referral sent at pt last PCP visit 6.26.24    Breast Cancer Screening - Outreach Outcomes & Actions Taken  : Mammogram ord placed at last PCP visit 6.26.24    Diabetic Foot Exam - Outreach Outcomes & Actions Taken  : Done at PCP ofc visit 6.26.24,  up dated    Low Dose CT Screening - Outreach Outcomes & Actions Taken  : PCP ordered at visit 6.26.24             Additional Notes:  Spoke to pt, she declined scheduling right now due to work, says she does not know when her day off is going to be until 2 wks ahead of time, I did suggest pt schedule all test/appts same day which will help her save time.               Care Management, Digital Medicine, and/or Education Referrals    OPCM Risk Score: 23         Next Steps - Referral Actions: Declined services

## 2024-07-31 ENCOUNTER — PATIENT MESSAGE (OUTPATIENT)
Dept: RESEARCH | Facility: HOSPITAL | Age: 74
End: 2024-07-31
Payer: MEDICARE

## 2024-11-01 ENCOUNTER — OFFICE VISIT (OUTPATIENT)
Dept: URGENT CARE | Facility: CLINIC | Age: 74
End: 2024-11-01
Payer: MEDICARE

## 2024-11-01 ENCOUNTER — HOSPITAL ENCOUNTER (OUTPATIENT)
Dept: RADIOLOGY | Facility: CLINIC | Age: 74
Discharge: HOME | End: 2024-11-01
Attending: PHYSICIAN ASSISTANT
Payer: MEDICARE

## 2024-11-01 VITALS
SYSTOLIC BLOOD PRESSURE: 129 MMHG | HEART RATE: 86 BPM | TEMPERATURE: 99 F | BODY MASS INDEX: 29.02 KG/M2 | DIASTOLIC BLOOD PRESSURE: 58 MMHG | RESPIRATION RATE: 18 BRPM | WEIGHT: 170 LBS | OXYGEN SATURATION: 93 % | HEIGHT: 64 IN

## 2024-11-01 DIAGNOSIS — R05.9 COUGH, UNSPECIFIED TYPE: Primary | ICD-10-CM

## 2024-11-01 DIAGNOSIS — R05.9 COUGH, UNSPECIFIED TYPE: ICD-10-CM

## 2024-11-01 PROCEDURE — 71046 X-RAY EXAM CHEST 2 VIEWS: CPT | Mod: S$GLB,,, | Performed by: RADIOLOGY

## 2024-11-01 RX ORDER — BROMPHENIRAMINE MALEATE, PSEUDOEPHEDRINE HYDROCHLORIDE, AND DEXTROMETHORPHAN HYDROBROMIDE 2; 30; 10 MG/5ML; MG/5ML; MG/5ML
10 SYRUP ORAL EVERY 6 HOURS PRN
Qty: 118 ML | Refills: 0 | Status: SHIPPED | OUTPATIENT
Start: 2024-11-01 | End: 2024-11-11

## 2024-12-03 DIAGNOSIS — E11.59 HYPERTENSION ASSOCIATED WITH DIABETES: ICD-10-CM

## 2024-12-03 DIAGNOSIS — I15.2 HYPERTENSION ASSOCIATED WITH DIABETES: ICD-10-CM

## 2024-12-03 DIAGNOSIS — E78.5 HYPERLIPIDEMIA ASSOCIATED WITH TYPE 2 DIABETES MELLITUS: ICD-10-CM

## 2024-12-03 DIAGNOSIS — E11.69 HYPERLIPIDEMIA ASSOCIATED WITH TYPE 2 DIABETES MELLITUS: ICD-10-CM

## 2024-12-03 RX ORDER — IRBESARTAN 300 MG/1
300 TABLET ORAL NIGHTLY
Qty: 90 TABLET | Refills: 1 | Status: SHIPPED | OUTPATIENT
Start: 2024-12-03

## 2024-12-03 RX ORDER — AMLODIPINE BESYLATE 10 MG/1
10 TABLET ORAL DAILY
Qty: 90 TABLET | Refills: 1 | Status: SHIPPED | OUTPATIENT
Start: 2024-12-03

## 2024-12-03 RX ORDER — PRAVASTATIN SODIUM 40 MG/1
40 TABLET ORAL NIGHTLY
Qty: 90 TABLET | Refills: 1 | Status: SHIPPED | OUTPATIENT
Start: 2024-12-03

## 2024-12-03 NOTE — TELEPHONE ENCOUNTER
No care due was identified.  Health [facility] Embedded Care Due Messages. Reference number: [id number].   12/03/2024 10:25:25 AM CST

## 2024-12-04 ENCOUNTER — PATIENT OUTREACH (OUTPATIENT)
Dept: ADMINISTRATIVE | Facility: HOSPITAL | Age: 74
End: 2024-12-04
Payer: MEDICARE

## 2024-12-04 NOTE — PROGRESS NOTES
VBC OUTREACH: per chart review pt is overdue for LDCT, attempted to contact pt no ans, lvm, sent portal message.

## 2025-01-16 ENCOUNTER — OFFICE VISIT (OUTPATIENT)
Dept: OPHTHALMOLOGY | Facility: CLINIC | Age: 75
End: 2025-01-16
Payer: MEDICARE

## 2025-01-16 DIAGNOSIS — H25.813 COMBINED FORM OF AGE-RELATED CATARACT, BOTH EYES: ICD-10-CM

## 2025-01-16 DIAGNOSIS — H52.203 HYPEROPIA WITH ASTIGMATISM AND PRESBYOPIA, BILATERAL: ICD-10-CM

## 2025-01-16 DIAGNOSIS — H52.4 HYPEROPIA WITH ASTIGMATISM AND PRESBYOPIA, BILATERAL: ICD-10-CM

## 2025-01-16 DIAGNOSIS — H52.03 HYPEROPIA WITH ASTIGMATISM AND PRESBYOPIA, BILATERAL: ICD-10-CM

## 2025-01-16 DIAGNOSIS — E11.9 DIABETES MELLITUS WITHOUT COMPLICATION: Primary | ICD-10-CM

## 2025-01-16 DIAGNOSIS — Z98.890 HISTORY OF REFRACTIVE SURGERY: ICD-10-CM

## 2025-01-16 PROCEDURE — 92014 COMPRE OPH EXAM EST PT 1/>: CPT | Mod: S$GLB,,, | Performed by: OPTOMETRIST

## 2025-01-16 PROCEDURE — 1160F RVW MEDS BY RX/DR IN RCRD: CPT | Mod: CPTII,S$GLB,, | Performed by: OPTOMETRIST

## 2025-01-16 PROCEDURE — 1159F MED LIST DOCD IN RCRD: CPT | Mod: CPTII,S$GLB,, | Performed by: OPTOMETRIST

## 2025-01-16 PROCEDURE — 99999 PR PBB SHADOW E&M-EST. PATIENT-LVL III: CPT | Mod: PBBFAC,,, | Performed by: OPTOMETRIST

## 2025-01-16 PROCEDURE — 92015 DETERMINE REFRACTIVE STATE: CPT | Mod: S$GLB,,, | Performed by: OPTOMETRIST

## 2025-01-16 PROCEDURE — 2023F DILAT RTA XM W/O RTNOPTHY: CPT | Mod: CPTII,S$GLB,, | Performed by: OPTOMETRIST

## 2025-01-16 NOTE — PROGRESS NOTES
HPI     Diabetic Eye Exam            Comments: Patient here today for yearly DM eye exam          Comments    Diagnosed with diabetes in early 2000  Lab Results       Component                Value               Date                       HGBA1C                   6.1 (H)             06/26/2024            Vision changes since last eye exam?: None noticed   Wears PAL  glasses full-time    Any eye pain today: No    Other ocular symptoms: Allergies    Interested in contact lens fitting today? No                      Last edited by Aleyda Bass, PCT on 1/16/2025 10:40 AM.            Assessment /Plan     For exam results, see Encounter Report.    1. Diabetes mellitus without complication  Last A1c 6.1 There was no diabetic retinopathy present on either eye on examination today. Recommend good blood pressure control, strict blood glucose control, and good cholesterol control.  Continue close care with PCP regarding diabetes.    2. Combined form of age-related cataract, both eyes  Cataracts are not visually significant and not affecting activities of daily living. Annual observation is recommended at this time. Patient to call or return to clinic with any significant change in vision prior to next visit.    3. History of refractive surgery  S/p RK OU. Observe.     4. Hyperopia with astigmatism and presbyopia, bilateral  Eyeglass Final Rx       Eyeglass Final Rx         Sphere Cylinder Axis Add    Right +4.00 +0.75 115 +2.50    Left +3.50 +1.00 167 +2.50      Type: PAL    Expiration Date: 1/16/2026   PD 58                  RTC 1 yr for dilated eye exam or sooner if any changes to vision.   Discussed above and answered questions.                 
---

## 2025-01-29 ENCOUNTER — OFFICE VISIT (OUTPATIENT)
Dept: INTERNAL MEDICINE | Facility: CLINIC | Age: 75
End: 2025-01-29
Payer: MEDICARE

## 2025-01-29 ENCOUNTER — HOSPITAL ENCOUNTER (OUTPATIENT)
Dept: RADIOLOGY | Facility: HOSPITAL | Age: 75
Discharge: HOME | End: 2025-01-29
Attending: NURSE PRACTITIONER
Payer: MEDICARE

## 2025-01-29 VITALS
WEIGHT: 156.94 LBS | SYSTOLIC BLOOD PRESSURE: 132 MMHG | HEART RATE: 97 BPM | OXYGEN SATURATION: 98 % | TEMPERATURE: 97 F | HEIGHT: 64 IN | DIASTOLIC BLOOD PRESSURE: 70 MMHG | BODY MASS INDEX: 26.79 KG/M2

## 2025-01-29 DIAGNOSIS — M54.42 ACUTE BILATERAL LOW BACK PAIN WITH BILATERAL SCIATICA: ICD-10-CM

## 2025-01-29 DIAGNOSIS — F17.200 NEEDS SMOKING CESSATION EDUCATION: ICD-10-CM

## 2025-01-29 DIAGNOSIS — E11.9 TYPE 2 DIABETES MELLITUS WITHOUT COMPLICATION, WITHOUT LONG-TERM CURRENT USE OF INSULIN: ICD-10-CM

## 2025-01-29 DIAGNOSIS — I15.2 HYPERTENSION ASSOCIATED WITH DIABETES: ICD-10-CM

## 2025-01-29 DIAGNOSIS — F10.21 ALCOHOL DEPENDENCE IN REMISSION: ICD-10-CM

## 2025-01-29 DIAGNOSIS — J43.9 PULMONARY EMPHYSEMA, UNSPECIFIED EMPHYSEMA TYPE: ICD-10-CM

## 2025-01-29 DIAGNOSIS — M54.41 ACUTE BILATERAL LOW BACK PAIN WITH BILATERAL SCIATICA: ICD-10-CM

## 2025-01-29 DIAGNOSIS — M43.16 ANTEROLISTHESIS OF LUMBAR SPINE: Primary | ICD-10-CM

## 2025-01-29 DIAGNOSIS — E11.59 HYPERTENSION ASSOCIATED WITH DIABETES: ICD-10-CM

## 2025-01-29 PROCEDURE — G2211 COMPLEX E/M VISIT ADD ON: HCPCS | Mod: S$GLB,,, | Performed by: NURSE PRACTITIONER

## 2025-01-29 PROCEDURE — 1159F MED LIST DOCD IN RCRD: CPT | Mod: CPTII,S$GLB,, | Performed by: NURSE PRACTITIONER

## 2025-01-29 PROCEDURE — 72110 X-RAY EXAM L-2 SPINE 4/>VWS: CPT | Mod: 26,,, | Performed by: RADIOLOGY

## 2025-01-29 PROCEDURE — 99999 PR PBB SHADOW E&M-EST. PATIENT-LVL V: CPT | Mod: PBBFAC,,, | Performed by: NURSE PRACTITIONER

## 2025-01-29 PROCEDURE — 3008F BODY MASS INDEX DOCD: CPT | Mod: CPTII,S$GLB,, | Performed by: NURSE PRACTITIONER

## 2025-01-29 PROCEDURE — 3075F SYST BP GE 130 - 139MM HG: CPT | Mod: CPTII,S$GLB,, | Performed by: NURSE PRACTITIONER

## 2025-01-29 PROCEDURE — 99214 OFFICE O/P EST MOD 30 MIN: CPT | Mod: S$GLB,,, | Performed by: NURSE PRACTITIONER

## 2025-01-29 PROCEDURE — 3078F DIAST BP <80 MM HG: CPT | Mod: CPTII,S$GLB,, | Performed by: NURSE PRACTITIONER

## 2025-01-29 PROCEDURE — 1125F AMNT PAIN NOTED PAIN PRSNT: CPT | Mod: CPTII,S$GLB,, | Performed by: NURSE PRACTITIONER

## 2025-01-29 PROCEDURE — 1160F RVW MEDS BY RX/DR IN RCRD: CPT | Mod: CPTII,S$GLB,, | Performed by: NURSE PRACTITIONER

## 2025-01-29 PROCEDURE — 3044F HG A1C LEVEL LT 7.0%: CPT | Mod: CPTII,S$GLB,, | Performed by: NURSE PRACTITIONER

## 2025-01-29 PROCEDURE — 1101F PT FALLS ASSESS-DOCD LE1/YR: CPT | Mod: CPTII,S$GLB,, | Performed by: NURSE PRACTITIONER

## 2025-01-29 PROCEDURE — 3288F FALL RISK ASSESSMENT DOCD: CPT | Mod: CPTII,S$GLB,, | Performed by: NURSE PRACTITIONER

## 2025-01-29 PROCEDURE — 72110 X-RAY EXAM L-2 SPINE 4/>VWS: CPT | Mod: TC

## 2025-01-29 RX ORDER — NAPROXEN 500 MG/1
500 TABLET ORAL 2 TIMES DAILY WITH MEALS
Qty: 14 TABLET | Refills: 0 | Status: SHIPPED | OUTPATIENT
Start: 2025-01-29 | End: 2025-02-05

## 2025-01-29 RX ORDER — CYCLOBENZAPRINE HCL 5 MG
5 TABLET ORAL 3 TIMES DAILY PRN
Qty: 21 TABLET | Refills: 0 | Status: SHIPPED | OUTPATIENT
Start: 2025-01-29 | End: 2025-02-05

## 2025-01-29 RX ORDER — ALBUTEROL SULFATE 90 UG/1
2 INHALANT RESPIRATORY (INHALATION) EVERY 6 HOURS PRN
Qty: 18 G | Refills: 1 | Status: SHIPPED | OUTPATIENT
Start: 2025-01-29

## 2025-02-01 DIAGNOSIS — M43.16 ANTEROLISTHESIS OF LUMBAR SPINE: Primary | ICD-10-CM

## 2025-02-01 NOTE — PROGRESS NOTES
Subjective:      Patient ID: Mita Max is a 74 y.o. female.    Chief Complaint: Back Pain    History of Present Illness    CHIEF COMPLAINT:  Mita presents today for back pain.    BACK PAIN:  She reports back pain that started about a month ago with varying location. Pain severity fluctuates from 1/10 at rest to 10/10 at worst, exacerbated by lying down and getting out of the car. She notes improvement with movement and heating pad use. She has a history of similar back pain 20 years ago, which was treated with naproxen and physical therapy.    MEDICATIONS:  She currently takes low dose aspirin. She previously used naproxen for back pain management and reports rare past use of Flexeril, typically not completing prescribed quantities.    RESPIRATORY:  She has a history of pulmonary emphysema and reports current congestion. She has an inhaler but is not using it.    DIABETES:  She is overdue for A1C testing and does not perform glucose monitoring at home.    SOCIAL HISTORY:  She works at [employer] where she is on her feet for at least 4.5 hours daily, achieving approximately 5,000 steps during some workdays. She is a member of Alcoholics Anonymous approaching 39 years of sobriety.      ROS:  General: -fever, -chills, -fatigue, -weight gain, -weight loss  Eyes: -vision changes, -redness, -discharge  ENT: -ear pain, +nasal congestion, -sore throat  Cardiovascular: -chest pain, -palpitations, -lower extremity edema  Respiratory: -cough, -shortness of breath  Gastrointestinal: -abdominal pain, -nausea, -vomiting, -diarrhea, -constipation, -blood in stool  Genitourinary: -dysuria, -hematuria, -frequency  Musculoskeletal: -joint pain, -muscle pain, +back pain  Skin: -rash, -lesion  Neurological: -headache, -dizziness, -numbness, -tingling  Psychiatric: -anxiety, -depression, -sleep difficulty          Patient Active Problem List   Diagnosis    Type 2 diabetes mellitus without complication, without long-term current  use of insulin    Alcohol dependence in remission    Hypertension associated with diabetes    Chronic allergic rhinitis    History of colon polyps    Hyperlipidemia associated with type 2 diabetes mellitus    Primary osteoarthritis of both knees    Cigarette nicotine dependence with nicotine-induced disorder    Emphysema lung    Pulmonary nodules    Aortic atherosclerosis    Coronary artery disease involving native coronary artery of native heart without angina pectoris    Smokers' cough    Diabetic cataract         Current Outpatient Medications:     alcohol swabs (BD ALCOHOL SWABS) PadM, Apply 1 each topically once daily., Disp: 100 each, Rfl: 11    amLODIPine (NORVASC) 10 MG tablet, Take 1 tablet (10 mg total) by mouth once daily., Disp: 90 tablet, Rfl: 1    ascorbic acid, vitamin C, (VITAMIN C) 500 MG tablet, Take 500 mg by mouth 2 (two) times daily., Disp: , Rfl:     aspirin (ECOTRIN) 81 MG EC tablet, Take 1 tablet (81 mg total) by mouth once daily., Disp: , Rfl: 0    blood glucose control, low (TRUE METRIX LEVEL 1) Soln, To check blood glucose levels once daily, Disp: 1 each, Rfl: 11    blood sugar diagnostic (TRUE METRIX GLUCOSE TEST STRIP) Strp, To check blood glucose levels once daily, Disp: 100 strip, Rfl: 11    blood-glucose meter (TRUE METRIX GLUCOSE METER) Misc, To check blood glucose levels once daily, Disp: 1 each, Rfl: 0    ciclopirox (PENLAC) 8 % Soln, Apply topically nightly. To great toenails bilaterraly, Disp: 6.6 mL, Rfl: 11    diclofenac sodium (VOLTAREN) 1 % Gel, Apply 2 g topically 4 (four) times daily as needed., Disp: , Rfl:     irbesartan (AVAPRO) 300 MG tablet, Take 1 tablet (300 mg total) by mouth every evening., Disp: 90 tablet, Rfl: 1    lancets (TRUEPLUS LANCETS) 33 gauge Misc, To check blood glucose levels once daily, Disp: 100 each, Rfl: 11    multivitamin (THERAGRAN) per tablet, Take 1 tablet by mouth once daily., Disp: , Rfl:     pravastatin (PRAVACHOL) 40 MG tablet, Take 1 tablet  "(40 mg total) by mouth every evening., Disp: 90 tablet, Rfl: 1    vitamin E 400 UNIT capsule, Take 400 Units by mouth once daily., Disp: , Rfl:     albuterol (VENTOLIN HFA) 90 mcg/actuation inhaler, Inhale 2 puffs into the lungs every 6 (six) hours as needed for Wheezing or Shortness of Breath. Rescue, Disp: 18 g, Rfl: 1    cyclobenzaprine (FLEXERIL) 5 MG tablet, Take 1 tablet (5 mg total) by mouth 3 (three) times daily as needed for Muscle spasms., Disp: 21 tablet, Rfl: 0    naproxen (NAPROSYN) 500 MG tablet, Take 1 tablet (500 mg total) by mouth 2 (two) times daily with meals. for 7 days, Disp: 14 tablet, Rfl: 0      Objective:   /70 (BP Location: Left arm, Patient Position: Sitting)   Pulse 97   Temp 97.1 °F (36.2 °C) (Tympanic)   Ht 5' 4" (1.626 m)   Wt 71.2 kg (156 lb 15.5 oz)   SpO2 98%   BMI 26.94 kg/m²     Physical Exam             Physical Exam  Vitals and nursing note reviewed.   Constitutional:       General: She is awake. She is not in acute distress.     Appearance: Normal appearance. She is well-developed and well-groomed. She is not ill-appearing, toxic-appearing or diaphoretic.   HENT:      Head: Normocephalic and atraumatic.      Right Ear: External ear normal.      Left Ear: External ear normal.   Eyes:      Pupils: Pupils are equal, round, and reactive to light.   Cardiovascular:      Rate and Rhythm: Normal rate and regular rhythm.      Heart sounds: Normal heart sounds. No murmur heard.  Pulmonary:      Effort: Pulmonary effort is normal. No tachypnea, bradypnea, accessory muscle usage, prolonged expiration, respiratory distress or retractions.      Breath sounds: Normal breath sounds. No stridor, decreased air movement or transmitted upper airway sounds. No decreased breath sounds, wheezing, rhonchi or rales.   Abdominal:      General: Abdomen is flat. Bowel sounds are normal.      Palpations: Abdomen is soft.   Musculoskeletal:         General: No swelling, deformity or signs of " injury.      Cervical back: Normal range of motion and neck supple.      Lumbar back: Tenderness present. No swelling, edema, deformity or spasms. Decreased range of motion. Positive right straight leg raise test and positive left straight leg raise test.        Back:       Right lower leg: No edema.      Left lower leg: No edema.   Skin:     General: Skin is warm and dry.      Capillary Refill: Capillary refill takes less than 2 seconds.   Neurological:      General: No focal deficit present.      Mental Status: She is alert and oriented to person, place, and time.      Gait: Gait abnormal.   Psychiatric:         Attention and Perception: Attention and perception normal.         Mood and Affect: Mood normal.         Speech: Speech normal.         Behavior: Behavior normal. Behavior is cooperative.         Cognition and Memory: Cognition normal.          Assessment/Plan :   1. Anterolisthesis of lumbar spine    2. Acute bilateral low back pain with bilateral sciatica  -     Urinalysis, Reflex to Urine Culture Urine, Clean Catch; Future; Expected date: 01/29/2025  -     X-Ray Lumbar Spine 5 View; Future; Expected date: 01/29/2025  -     naproxen (NAPROSYN) 500 MG tablet; Take 1 tablet (500 mg total) by mouth 2 (two) times daily with meals. for 7 days  Dispense: 14 tablet; Refill: 0  -     cyclobenzaprine (FLEXERIL) 5 MG tablet; Take 1 tablet (5 mg total) by mouth 3 (three) times daily as needed for Muscle spasms.  Dispense: 21 tablet; Refill: 0  -     Ambulatory Referral/Consult to Physical Therapy/Occupational Therapy; Future; Expected date: 02/05/2025    3. Type 2 diabetes mellitus without complication, without long-term current use of insulin  -     Hemoglobin A1C; Future; Expected date: 01/29/2025    4. Hypertension associated with diabetes    5. Alcohol dependence in remission    6. Pulmonary emphysema, unspecified emphysema type  Overview:  3/01/2023 CT scan- Lungs: No significant change in multiple bilateral  both calcified and noncalcified pulmonary nodules.  New left apical focal opacity, likely scarring.  The lungs show findings consistent with emphysema.          Orders:  -     albuterol (VENTOLIN HFA) 90 mcg/actuation inhaler; Inhale 2 puffs into the lungs every 6 (six) hours as needed for Wheezing or Shortness of Breath. Rescue  Dispense: 18 g; Refill: 1         Assessment & Plan    Assessed patient with chronic low back pain, worsening over the past month  Performed physical exam  Considered muscular origin of pain based on patient's description and exam  Evaluated for potential sciatica given pain radiation to legs  Noted patient's history of emphysema and current wheezing on exam  Identified need for better management of existing conditions (diabetes, emphysema)    TYPE 2 DIABETES MELLITUS WITH OTHER CIRCULATORY COMPLICATIONS:  - Ordered A1C test to be performed during the visit.  - Instructed patient to monitor blood sugar regularly, noting non-compliance with diabetes management.  - Mita has lost 14 lbs.  - Evaluated blood pressure, which is currently well-controlled; patient has been managing hypertension for 30 years.    ALCOHOL DEPENDENCE IN REMISSION:  - Mita is a member of Alcoholics Anonymous, attending regional meetings, and is about to celebrate 39 years of sobriety.    PULMONARY EMPHYSEMA, UNSPECIFIED EMPHYSEMA TYPE:  - Auscultated patient's lungs due to history of pulmonary emphysema.  - Detected wheezing during exam and prescribed a new inhaler for management of congestion and wheezing.    BACK PAIN:  - Assessed patient's back pain, which started about a month ago and radiates down the legs.  - Physical exam, including straight leg test and leg crossing, elicited pain.  - Tender areas identified during palpation.  - Pain level fluctuates from 1 to 10 out of 10.  - Ordered x-ray of lower spine for further evaluation.  - Prescribed Flexeril (cyclobenzaprine) for muscle relaxation, 21 tablets to be  dispensed at [facility] in [place] on [address], to be taken at night.  - Recommend using a heating pad for pain relief.  - Referred patient to physical therapy and advised to stay in motion to alleviate pain.    HYPERTENSION:  - Continued current treatment with Irbesartan and Amlodipine for hypertension management.    HYPERLIPIDEMIA:  - Continued current treatment with Pravastatin for cholesterol management.    MAMMOGRAM:  - Mita is overdue for a mammogram.  - Recommend scheduling one during the current visit.    OTHER INSTRUCTIONS:  - Mita reports taking low-dose aspirin and having arthritis in the knees.          Follow up if symptoms worsen or fail to improve.    This note was generated with the assistance of ambient listening technology. Verbal consent was obtained by the patient and accompanying visitor(s) for the recording of patient appointment to facilitate this note. I attest to having reviewed and edited the generated note for accuracy, though some syntax or spelling errors may persist. Please contact the author of this note for any clarification.

## 2025-02-05 ENCOUNTER — CLINICAL SUPPORT (OUTPATIENT)
Dept: REHABILITATION | Facility: HOSPITAL | Age: 75
End: 2025-02-05
Payer: MEDICARE

## 2025-02-05 DIAGNOSIS — M54.41 ACUTE BILATERAL LOW BACK PAIN WITH BILATERAL SCIATICA: ICD-10-CM

## 2025-02-05 DIAGNOSIS — M54.50 CHRONIC BILATERAL LOW BACK PAIN WITHOUT SCIATICA: Primary | ICD-10-CM

## 2025-02-05 DIAGNOSIS — M25.551 BILATERAL HIP PAIN: ICD-10-CM

## 2025-02-05 DIAGNOSIS — M54.42 ACUTE BILATERAL LOW BACK PAIN WITH BILATERAL SCIATICA: ICD-10-CM

## 2025-02-05 DIAGNOSIS — R29.898 WEAKNESS OF BOTH LOWER EXTREMITIES: ICD-10-CM

## 2025-02-05 DIAGNOSIS — M25.552 BILATERAL HIP PAIN: ICD-10-CM

## 2025-02-05 DIAGNOSIS — G89.29 CHRONIC BILATERAL LOW BACK PAIN WITHOUT SCIATICA: Primary | ICD-10-CM

## 2025-02-05 PROCEDURE — 97110 THERAPEUTIC EXERCISES: CPT | Mod: PN

## 2025-02-05 PROCEDURE — 97162 PT EVAL MOD COMPLEX 30 MIN: CPT | Mod: PN

## 2025-02-06 NOTE — PROGRESS NOTES
Outpatient Rehab    Physical Therapy Evaluation    Patient Name: Mita Max  MRN: [id number]  YOB: 1950  Today's Date: 2025    Therapy Diagnosis:   Encounter Diagnoses   Name Primary?    Acute bilateral low back pain with bilateral sciatica     Chronic bilateral low back pain without sciatica Yes    Weakness of both lower extremities     Bilateral hip pain      Physician: Vlad Dickerson NP    Physician Orders: Eval and Treat  Medical Diagnosis: M54.42,M54.41 (ICD-10-CM) - Acute bilateral low back pain with bilateral sciatic     Visit # / Visits Authorized:     Date of Evaluation:  2025   Insurance Authorization Period: 2025 to 2025  Plan of Care Certification:  2025 to 2025      Time In: 1345   Time Out: 1445  Total Time: 60   Total Billable Time: 60 minutes      Subjective   History of Present Illness  Mita is a 74 y.o. female who reports to physical therapy with a chief concern of low back pain, hip pain, leg pain. According to the patient's chart, Mita has a past medical history of Alcohol dependence, Anxiety, Arthritis, Asthma, Back pain, Chronic bronchitis, Coronary artery disease involving native coronary artery of native heart without angina pectoris, Depression, DM2 (diabetes mellitus, type 2), Emphysema lung, HTN (hypertension), Obesity, Pneumonia due to other staphylococcus, Tobacco dependence, Type 2 diabetes mellitus, and Urinary incontinence. Mita has a past surgical history that includes  section; Salivary gland surgery (); Colonoscopy w/ polypectomy (2019); Colonoscopy (N/A, 2019); and Colonoscopy (N/A, 2023).        Diagnostic tests related to this condition: X-ray.   X-Ray Details: This examination consists of 5 views of the lumbar spine. There are 5 lumbar type vertebral bodies.  There is grade 1 anterolisthesis of L4 on L5.  There is no fracture or scoliosis. There is normal lumbar lordosis.    History of  Present Condition/Illness: Patient reports having bilateral low back, hip and leg pain. She states that her back pain began about a month ago and denies any falls, trauma, or accidents. Her pain will occasionally travel down her legs, more on the right side than the left side. She states that about two weeks ago she noticed that she was having trouble lifting up her right foot. She states that her pain changes from side to side but does have more pain frequently on her right as compared to her left. She has increased pain with transitional movements such as sitting to standing. She has much difficulty getting off the commode due to pain and has to use the railing in her home to help her. She is comfortable with little to no pain in sitting and standing. She has much pain with lying down. Her pain at times can wake her up at night, but not every night. She also has trouble finding a comfortable position to sleep due to pain with lying flat and on both sides. She has trouble getting up from sitting to walking due to pain. Once she is able to start walking she notes decreased pain. The worst is getting out of the car to walk.     Activities of Daily Living  Social history was obtained from Patient.               Previously independent with activities of daily living? Yes     Currently independent with activities of daily living? Yes          Previously independent with instrumental activities of daily living? Yes     Currently independent with instrumental activities of daily living? Yes              Pain     Patient reports a current pain level of 0/10. Pain at best is reported as 0/10. Pain at worst is reported as 9/10.   Location: low back, hips, BLE  Clinical Progression (since onset): Worsening  Pain Qualities: Aching, Burning, Sharp, Radiating, Dull  Pain-Relieving Factors: Activity modification  Pain-Aggravating Factors: Bending, Lifting, Lying down, Transfers         Living Arrangements  Living  Situation  Housing: Home independently  Living Arrangements: Alone    Home Setup  Type of Structure: Apartment/condo  Home Access: Level entry  Number of Levels in Home: One level        Employment  Patient reports: Does the patient's condition impact their ability to work?  Employment Status: Employed full-time   Works 3-4 days a week, 4-5 hours a day at [employer].       Past Medical History/Physical Systems Review:   Mita Max  has a past medical history of Alcohol dependence, Anxiety, Arthritis, Asthma, Back pain, Chronic bronchitis, Coronary artery disease involving native coronary artery of native heart without angina pectoris, Depression, DM2 (diabetes mellitus, type 2), Emphysema lung, HTN (hypertension), Obesity, Pneumonia due to other staphylococcus, Tobacco dependence, Type 2 diabetes mellitus, and Urinary incontinence.    Mita Max  has a past surgical history that includes  section; Salivary gland surgery (); Colonoscopy w/ polypectomy (2019); Colonoscopy (N/A, 2019); and Colonoscopy (N/A, 2023).    Mita has a current medication list which includes the following prescription(s): albuterol, alcohol swabs, amlodipine, ascorbic acid (vitamin c), aspirin, true metrix level 1, blood sugar diagnostic, blood-glucose meter, ciclopirox, diclofenac sodium, irbesartan, lancets, multivitamin, pravastatin, and vitamin e.    Review of patient's allergies indicates:   Allergen Reactions    Corticosteroids (glucocorticoids)      Aggitation  Aggitation    Adhesive Rash     Band-aids  Band-aids  Band-aids    Pollen extracts Rash        Objective   Posture  Patient presents with a Forward head position. Increased thoracic kyphosis is observed.   Shoulders are Rounded.             Spinal Muscle Palpation     Tenderness of right PSIS, piriformis, QL       Slight tenderness of PSIS        Lumbar Range of Motion   Active (deg) Passive (deg) Pain   Flexion  (increased difficutly and  pain with ascending from forward flexed position)       Extension  (Increased pain)       Right Lateral Flexion  (increased right pain with sharp pain down RLE)       Right Rotation  (slight tightness on right side)       Left Lateral Flexion  (pulling on right side)       Left Rotation           Patient exhibits within functional limits of lumbar flexion, side bending and rotation. She has decreased range of motion in extension.                    Hip Strength - Planes of Motion   Right Strength Right Pain Left Strength Left  Pain   Flexion (L2) 4   4+     Extension 4   4     ABduction 4   4+     ADduction 4   4+     Internal Rotation 4   4     External Rotation 4   4         Knee Strength   Right Strength Right Pain Left Strength Left  Pain   Flexion (S2) 3   4     Prone Flexion           Extension (L3) 3+   4            Ankle/Foot Strength - Planes of Motion   Right Strength Right Pain Left Strength Left  Pain   Dorsiflexion (L4) 3   5     Plantar Flexion (S1) 5   5     Inversion 3   4+     Eversion 3   4+     Great Toe Flexion 3   5     Great Toe Extension (L5) 3+   5     Lesser Toes Flexion           Lesser Toes Extension                  Lumbar/Pelvic Girdle Special Tests  Lumbar Tests - Repeated  Positive: Flexion and Extension       Lumbar Tests - SLR and Tension  Negative: Right Passive Straight Leg Raise and Left Passive Straight Leg Raise                 Pelvic Girdle / Sacrum Tests  Positive: Right Gapping, Left Gapping, Right Sacroiliac Compression, and Left Sacroiliac Compression  Negative: Right LUCAS, Left LUCAS, Right FADIR, and Left FADIR         Hip Special Tests  Intra-Articular/Impingement Tests  Negative: Right LUCAS, Left LUCAS, Right FADIR, and Left FADIR  Sacroiliac Joint Tests  Positive: Right Compression and Left Compression           Transfers Assessment  Sit to Stand Assistance: Supervision  Sit to Stand Assistance Details: increased pain and time to complete transition    Bed Mobility  Assessment  Rolling Assistance: Independent  Sidelying to Sit Assistance: Independent  Sidelying to Sit Assistance Details: increased pain and time needed to complete transition  Sit to Sidelying Assistance: Independent  Sit to Sidelying Assistance Details: increased pain and time needed to complete transition      Sit to Stand Testing  The patient completed 5 sit to stand transfers in 27.88 sec. first rep was very hard but became easier the more reps she did              Gait Analysis  Base of Support: Narrow  Gait Pattern: Antalgic  Walking Speed: Decreased    Right Side Walking Observations  Decreased: Stance Time, Step Length, and Arm Swing  Right Foot Contact Pattern: Flat foot    Left Side Walking Observations  Decreased: Step Length and Arm Swing  Left Foot Contact Pattern: Heel to toe  Hip Observations During Gait  Bilateral: Decreased Hip Extension  Knee Observations During Gait  Bilateral: Decreased Knee Flexion in Swing and Decreased Knee Extension in Initial Contact  Ankle/Foot Observations During Gait  Right: Foot Drop         Intake Outcome Measure for FOTO Survey    Therapist reviewed FOTO scores for Mita Max on 2/5/2025.   FOTO report - see Media section or FOTO account episode details.     Intake Score: 47%    Treatment:  Therapeutic Exercise  Therapeutic Exercise Activity 1: LTR  Therapeutic Exercise Activity 2: Double knee to chest  Therapeutic Exercise Activity 3: ankle ABCs  Therapeutic Exercise Activity 4: Ankle 4-way  Therapeutic Exercise Activity 5: seated DL DF  Therapeutic Exercise Activity 6: Standing alternating DF  Therapeutic Exercise Activity 7: heel walking    Patient's spiritual, cultural, and educational needs considered and patient agreeable to plan of care and goals.     Assessment & Plan   Assessment  Mita presents with a condition of Moderate complexity.   Presentation of Symptoms: Changing  Will Comorbidities Impact Care: Yes       Functional Limitations: Activity  tolerance, Ambulating on uneven surfaces, Bed mobility, Functional mobility, Gait limitations, Getting off the floor, Increased risk of fall, Maintaining balance, Painful locomotion/ambulation, Range of motion, Squatting, Transfers  Impairments: Impaired physical strength, Pain with functional activity, Impaired balance, Abnormal gait  Personal Factors Affecting Prognosis: Pain    Patient Goal for Therapy (PT): decreased pain, increased flexion of right foot  Prognosis: Good    Plan  From a physical therapy perspective, the patient would benefit from: Skilled Rehab Services    Planned therapy interventions include: Therapeutic exercise, Therapeutic activities, Neuromuscular re-education, Manual therapy, ADLs/IADLs, and Gait training.    Planned modalities to include: Cryotherapy (cold pack), Electrical stimulation - attended, Electrical stimulation - passive/unattended, Mechanical traction, and Thermotherapy (hot pack).        Visit Frequency: 2 times Per Week for 10 Weeks.       This plan was discussed with Patient.   Discussion participants: Agreed Upon Plan of Care             Goals:   Active       Long Term Goals       Patient will demonstrate improved function as indicated by a functional score of 65 on the FOTO.        Start:  02/05/25    Expected End:  04/17/25            Pt will demonstrate improved pain by reports of less than or equal to 5/10 worst pain on the verbal rating scale in order to progress toward maximal functional ability and improve QOL.         Start:  02/05/25    Expected End:  04/17/25            patient will improve bilateral lower extremity strength to greater than or equal to 4+/5 MMT for improved functional strength.        Start:  02/05/25    Expected End:  04/17/25            Patient will improve 5 time sit to stand to less than 15 seconds for improved functional strength and decreased fall risk.        Start:  02/05/25    Expected End:  04/17/25            Patient will ambulate with  normalized gait mechanics for return to prior level of function.        Start:  02/05/25    Expected End:  04/17/25               Short Term Goals       Patient will demonstrate independence with HEP in order to progress toward functional independence        Start:  02/05/25    Expected End:  04/17/25            Pt will demonstrate improved pain by reports of less than or equal to 7/10 worst pain on the verbal rating scale in order to progress toward maximal functional ability and improve QOL.         Start:  02/05/25    Expected End:  04/17/25            Patient will improve bilateral lower extremity strength by 1/2 MMT for improved strength needed for increased ambulation distance.        Start:  02/05/25    Expected End:  04/17/25            Patient will improve 5 time sit to stand to less than 20 seconds for improved functional strength and decreased fall risk.        Start:  02/05/25    Expected End:  04/17/25                Maria Luisa Castillo, PT,DPT  2/6/2025

## 2025-02-10 ENCOUNTER — TELEPHONE (OUTPATIENT)
Dept: PAIN MEDICINE | Facility: CLINIC | Age: 75
End: 2025-02-10
Payer: MEDICARE

## 2025-02-11 ENCOUNTER — TELEPHONE (OUTPATIENT)
Dept: PAIN MEDICINE | Facility: CLINIC | Age: 75
End: 2025-02-11
Payer: MEDICARE

## 2025-02-12 ENCOUNTER — CLINICAL SUPPORT (OUTPATIENT)
Dept: REHABILITATION | Facility: HOSPITAL | Age: 75
End: 2025-02-12
Payer: MEDICARE

## 2025-02-12 DIAGNOSIS — R29.898 WEAKNESS OF BOTH LOWER EXTREMITIES: ICD-10-CM

## 2025-02-12 DIAGNOSIS — M25.551 BILATERAL HIP PAIN: ICD-10-CM

## 2025-02-12 DIAGNOSIS — M25.552 BILATERAL HIP PAIN: ICD-10-CM

## 2025-02-12 DIAGNOSIS — G89.29 CHRONIC BILATERAL LOW BACK PAIN WITHOUT SCIATICA: Primary | ICD-10-CM

## 2025-02-12 DIAGNOSIS — M54.50 CHRONIC BILATERAL LOW BACK PAIN WITHOUT SCIATICA: Primary | ICD-10-CM

## 2025-02-12 PROCEDURE — 97110 THERAPEUTIC EXERCISES: CPT | Mod: PN

## 2025-02-12 PROCEDURE — 97112 NEUROMUSCULAR REEDUCATION: CPT | Mod: PN

## 2025-02-13 NOTE — PROGRESS NOTES
Outpatient Rehab  Physical Therapy Visit    Patient Name: Mita Max  MRN: [id number]  YOB: 1950  Today's Date: 2/13/2025    Therapy Diagnosis:   Encounter Diagnoses   Name Primary?    Chronic bilateral low back pain without sciatica Yes    Weakness of both lower extremities     Bilateral hip pain      Physician: Vlad Dickerson NP    Physician Orders: Eval and Treat  Medical Diagnosis: M54.42,M54.41 (ICD-10-CM) - Acute bilateral low back pain with bilateral sciatic    Visit # / Visits Authorized:  1 / 1   Date of Evaluation:  2/5/2025  Insurance Authorization Period: 2/5/2025 to 12/3/2026  Plan of Care Certification:  2/5/2025 to 4/17/2025      Time In: 1530   Time Out: 1630  Total Time: 60   Total Billable Time: 60 minutes (billed 30 min 1:1)     Subjective   She states being very tired from her recent trip to [place]. She states while traveling she fell 3 times due to increased right foot drop. Her last fall she bruised her left knee and thigh. She also is having increased low back pain..       Objective   No test and measures assessed today.      Treatment:  Therapeutic Exercise: 20 minutes  Nu-step x 10 minutes (lvl 2)  Ankle 3-way  Standing calf raises 2x10  Seated soleus raises, 15# 2x10  Seated alternating DF 2x10    Manual Therapy: 20 minutes  IASTM/cupping to (R) anterior tib  Manual resistance & overpressure DF/PF    Balance/Neuromuscular Re-Education  Nepalese x10 min with DF (10 on/10 off)  Ankle ABC's [id number]    Assessment & Plan   Assessment: Patient presents to her first follow-up after initial evaluation where POC was initiated. She ambulates into clinic with a cane due to fear of falling and increased fatigue and pain today. Began session with Nepalese to right anterior tibialis and peroneal muscles with DF hold 10 seconds on /10 seconds off. Following Nepalese, patient was able to actively perform DF to almost neutral which she was unable to do prior to start of therapy  session. All exercises focused on ankle strengthening and range of motion with good tolerance. Increased muscular fatigue noted at end of therapy session. Will inquire response at next session and progress as tolerated.     Patient will continue to benefit from skilled outpatient physical therapy to address the deficits listed in the problem list box on initial evaluation, provide pt/family education and to maximize pt's level of independence in the home and community environment.     Patient's spiritual, cultural, and educational needs considered and patient agreeable to plan of care and goals.     Plan: Continue with POC and progress as tolerated    Goals:   Active       Long Term Goals       Patient will demonstrate improved function as indicated by a functional score of 65 on the FOTO.  (Progressing)       Start:  02/05/25    Expected End:  04/17/25            Pt will demonstrate improved pain by reports of less than or equal to 5/10 worst pain on the verbal rating scale in order to progress toward maximal functional ability and improve QOL.   (Progressing)       Start:  02/05/25    Expected End:  04/17/25            patient will improve bilateral lower extremity strength to greater than or equal to 4+/5 MMT for improved functional strength.  (Progressing)       Start:  02/05/25    Expected End:  04/17/25            Patient will improve 5 time sit to stand to less than 15 seconds for improved functional strength and decreased fall risk.  (Progressing)       Start:  02/05/25    Expected End:  04/17/25            Patient will ambulate with normalized gait mechanics for return to prior level of function.  (Progressing)       Start:  02/05/25    Expected End:  04/17/25               Short Term Goals       Patient will demonstrate independence with HEP in order to progress toward functional independence  (Progressing)       Start:  02/05/25    Expected End:  04/17/25            Pt will demonstrate improved pain by  reports of less than or equal to 7/10 worst pain on the verbal rating scale in order to progress toward maximal functional ability and improve QOL.   (Progressing)       Start:  02/05/25    Expected End:  04/17/25            Patient will improve bilateral lower extremity strength by 1/2 MMT for improved strength needed for increased ambulation distance.  (Progressing)       Start:  02/05/25    Expected End:  04/17/25            Patient will improve 5 time sit to stand to less than 20 seconds for improved functional strength and decreased fall risk.  (Progressing)       Start:  02/05/25    Expected End:  04/17/25                Maria Luisa Castillo PT,DPT  2/12/2025

## 2025-02-14 ENCOUNTER — CLINICAL SUPPORT (OUTPATIENT)
Dept: REHABILITATION | Facility: HOSPITAL | Age: 75
End: 2025-02-14
Payer: MEDICARE

## 2025-02-14 DIAGNOSIS — M25.552 BILATERAL HIP PAIN: ICD-10-CM

## 2025-02-14 DIAGNOSIS — M54.50 CHRONIC BILATERAL LOW BACK PAIN WITHOUT SCIATICA: Primary | ICD-10-CM

## 2025-02-14 DIAGNOSIS — R29.898 WEAKNESS OF BOTH LOWER EXTREMITIES: ICD-10-CM

## 2025-02-14 DIAGNOSIS — M25.551 BILATERAL HIP PAIN: ICD-10-CM

## 2025-02-14 DIAGNOSIS — G89.29 CHRONIC BILATERAL LOW BACK PAIN WITHOUT SCIATICA: Primary | ICD-10-CM

## 2025-02-14 PROCEDURE — 97112 NEUROMUSCULAR REEDUCATION: CPT | Mod: PN

## 2025-02-14 PROCEDURE — 97110 THERAPEUTIC EXERCISES: CPT | Mod: PN

## 2025-02-14 NOTE — PROGRESS NOTES
"Outpatient Rehab  Physical Therapy Visit    Patient Name: Mita Max  MRN: [id number]  YOB: 1950  Today's Date: 2/14/2025    Therapy Diagnosis:   Encounter Diagnoses   Name Primary?    Chronic bilateral low back pain without sciatica Yes    Weakness of both lower extremities     Bilateral hip pain      Physician: Vlad Dickerson NP    Physician Orders: Eval and Treat  Medical Diagnosis: M54.42,M54.41 (ICD-10-CM) - Acute bilateral low back pain with bilateral sciatic    Visit # / Visits Authorized:  2 / 10 (+eval)   Date of Evaluation:  2/5/2025  Insurance Authorization Period: 2/5/2025 to 12/3/2026  Plan of Care Certification:  2/5/2025 to 4/17/2025      Time In: 1025   Time Out: 1120  Total Time: 55   Total Billable Time: 55 minutes (billed 30 min 1:1)     Subjective   She reports having much relief of pain following therapy session, but may have overdone it and now has much pain in her back today..       Objective   No test and measures assessed today.      Treatment: (exercises performed today are bolded)  Therapeutic Exercise: 20 minutes  Nu-step x 8 minutes (lvl 2)  Ankle 3-way  LTR 1x10  DKTC with SB 1x15  Standing calf raises 2x10  Seated soleus raises, 15# 2x10  Seated alternating DF 2x10    Manual Therapy: 15 minutes  IASTM/cupping to (R) anterior tib  Manual resistance & overpressure DF/PF    Balance/Neuromuscular Re-Education: 20 minutes  Maldivian x10 min with DF (10 on/10 off)  Ankle ABC's [id number]  Seated shotgun 5" hold 1x10  Seated TA isometric hold 1x10    Assessment & Plan   Assessment: Patient presents with continued pain in her low back and ambulates wtih a cane. Continued with Maldivian for improved DF activation with good carryover with active DF exercsies following. Added in some lumbar stretching and gentle core exercises. pt able to tolerate about 10 minutes in a reclined position before having much increase in low back pain. Increased muscular fatigue noted at end of " therapy session. Will inquire response at next session and progress as tolerated.     Patient will continue to benefit from skilled outpatient physical therapy to address the deficits listed in the problem list box on initial evaluation, provide pt/family education and to maximize pt's level of independence in the home and community environment.     Patient's spiritual, cultural, and educational needs considered and patient agreeable to plan of care and goals.     Plan: Continue with POC and progress as tolerated    Goals:   Active       Long Term Goals       Patient will demonstrate improved function as indicated by a functional score of 65 on the FOTO.  (Progressing)       Start:  02/05/25    Expected End:  04/17/25            Pt will demonstrate improved pain by reports of less than or equal to 5/10 worst pain on the verbal rating scale in order to progress toward maximal functional ability and improve QOL.   (Progressing)       Start:  02/05/25    Expected End:  04/17/25            patient will improve bilateral lower extremity strength to greater than or equal to 4+/5 MMT for improved functional strength.  (Progressing)       Start:  02/05/25    Expected End:  04/17/25            Patient will improve 5 time sit to stand to less than 15 seconds for improved functional strength and decreased fall risk.  (Progressing)       Start:  02/05/25    Expected End:  04/17/25            Patient will ambulate with normalized gait mechanics for return to prior level of function.  (Progressing)       Start:  02/05/25    Expected End:  04/17/25               Short Term Goals       Patient will demonstrate independence with HEP in order to progress toward functional independence  (Progressing)       Start:  02/05/25    Expected End:  04/17/25            Pt will demonstrate improved pain by reports of less than or equal to 7/10 worst pain on the verbal rating scale in order to progress toward maximal functional ability and  improve QOL.   (Progressing)       Start:  02/05/25    Expected End:  04/17/25            Patient will improve bilateral lower extremity strength by 1/2 MMT for improved strength needed for increased ambulation distance.  (Progressing)       Start:  02/05/25    Expected End:  04/17/25            Patient will improve 5 time sit to stand to less than 20 seconds for improved functional strength and decreased fall risk.  (Progressing)       Start:  02/05/25    Expected End:  04/17/25                Maria Luisa Castillo PT,DPT  02/14/2025

## 2025-02-18 ENCOUNTER — OFFICE VISIT (OUTPATIENT)
Dept: PAIN MEDICINE | Facility: CLINIC | Age: 75
End: 2025-02-18
Payer: MEDICARE

## 2025-02-18 VITALS
DIASTOLIC BLOOD PRESSURE: 76 MMHG | WEIGHT: 152.31 LBS | HEART RATE: 80 BPM | HEIGHT: 64 IN | BODY MASS INDEX: 26 KG/M2 | SYSTOLIC BLOOD PRESSURE: 146 MMHG | RESPIRATION RATE: 17 BRPM

## 2025-02-18 DIAGNOSIS — M54.9 DORSALGIA, UNSPECIFIED: ICD-10-CM

## 2025-02-18 DIAGNOSIS — M54.16 LUMBAR RADICULOPATHY: Primary | ICD-10-CM

## 2025-02-18 DIAGNOSIS — M51.9 LUMBAR DISC DISEASE: ICD-10-CM

## 2025-02-18 DIAGNOSIS — M43.16 ANTEROLISTHESIS OF LUMBAR SPINE: ICD-10-CM

## 2025-02-18 DIAGNOSIS — M21.371 RIGHT FOOT DROP: ICD-10-CM

## 2025-02-18 DIAGNOSIS — M43.16 SPONDYLOLISTHESIS OF LUMBAR REGION: ICD-10-CM

## 2025-02-18 RX ORDER — PREGABALIN 50 MG/1
50 CAPSULE ORAL 2 TIMES DAILY
Qty: 60 CAPSULE | Refills: 1 | Status: SHIPPED | OUTPATIENT
Start: 2025-02-18

## 2025-02-18 NOTE — PROGRESS NOTES
New Patient Interventional Pain Note (Initial Visit)    Referring Physician: Vlad Dickerson NP    PCP: Janiya Linton MD    Chief Complaint:  Lower back pain    SUBJECTIVE:    Mita Max is a 74 y.o. female who presents to the clinic for the evaluation of lower back pain.   Patient reports three-month history of lower back pain.  Patient denies any previous surgeries in her lumbar spine or bilateral lower extremities.  Patient denies any inciting traumas to her lower back pain.  Lower back pain described as a throbbing aching pain that starts in the midline and radiates out in a band across the lower back, right-greater-than-left.  This pain then radiates primarily down the right lower extremity along the lateral aspect to the ankle.  Patient reports occasional left lower extremity pain, but reports that her right lower extremity pain is primarily pain.  Patient reports 3-4 week history of decreased strength in her right foot with this pain.  Pain is worse with lying supine and walking, better with sitting down.  Pain is currently rated a 2/10, but can increase to a 10/10 with exacerbating activity.  Patient denies any fevers, chills, saddle anesthesia, or bowel and bladder incontinence.      Non-Pharmacologic Treatments:  Physical Therapy/Home Exercise: yes  Ice/Heat:yes  TENS: no  Acupuncture: no  Massage: yes  Chiropractic: yes        Previous Pain Medications:  NSAIDs, Tylenol, muscle relaxers, neuropathics, opioids, topicals       report:  Reviewed and consistent with medication use as prescribed.    Pain Procedures:   none    Pain Disability Index Review:         2/18/2025     9:24 AM   Last 3 PDI Scores   Pain Disability Index (PDI) 12       Imaging:     Results for orders placed during the hospital encounter of 01/29/25    X-Ray Lumbar Spine 5 View    Narrative  EXAM:  XR LUMBAR SPINE COMPLETE 5 VIEW    CLINICAL HISTORY: [M54.42]-Lumbago with sciatica, left side./[M54.41]-Lumbago with  sciatica, right side.    COMPARISON: None    FINDINGS: This examination consists of 5 views of the lumbar spine. There are 5 lumbar type vertebral bodies.  There is grade 1 anterolisthesis of L4 on L5.  There is no fracture or scoliosis. There is normal lumbar lordosis.    Impression  There is grade 1 anterolisthesis of L4 on L5.    Finalized on: 2025 1:42 PM By:  Felix Piedra MD  [facility]# [id number]      2025 13:[id number]     [facility]        Past Medical History:   Diagnosis Date    Alcohol dependence         Anxiety     Arthritis     Asthma     Back pain     Chronic bronchitis     Coronary artery disease involving native coronary artery of native heart without angina pectoris 2024    Depression     DM2 (diabetes mellitus, type 2)     Emphysema lung 2024    HTN (hypertension)     Obesity     Pneumonia due to other staphylococcus     Tobacco dependence     Type 2 diabetes mellitus     Urinary incontinence      Past Surgical History:   Procedure Laterality Date     SECTION      COLONOSCOPY N/A 2019    Procedure: COLONOSCOPY;  Surgeon: Dick Sena MD;  Location: [facility] ENDO;  Service: Endoscopy;  Laterality: N/A;    COLONOSCOPY N/A 2023    Procedure: COLONOSCOPY;  Surgeon: Linn Hampton MD;  Location: [facility] ENDO;  Service: Endoscopy;  Laterality: N/A;    COLONOSCOPY W/ POLYPECTOMY  2019    polyps x 8 resected, multiple rectal polyps, tics; repeat 3 yrs. Dr Sena    SALIVARY GLAND SURGERY       Social History[1]  Family History   Problem Relation Name Age of Onset    Colon cancer Mother      Cancer Mother          colon    Heart attack Father      Coronary artery disease Father      Heart disease Father      COPD Maternal Grandmother      Cancer Maternal Grandfather          throat    Diabetes Paternal Grandmother      Stroke Paternal Grandmother      Asthma Neg Hx      Hyperlipidemia Neg Hx      Hypertension Neg Hx         Review of patient's allergies  indicates:   Allergen Reactions    Corticosteroids (glucocorticoids)      Aggitation  Aggitation    Adhesive Rash     Band-aids  Band-aids  Band-aids    Pollen extracts Rash       Current Outpatient Medications   Medication Sig    albuterol (VENTOLIN HFA) 90 mcg/actuation inhaler Inhale 2 puffs into the lungs every 6 (six) hours as needed for Wheezing or Shortness of Breath. Rescue    alcohol swabs (BD ALCOHOL SWABS) PadM Apply 1 each topically once daily.    amLODIPine (NORVASC) 10 MG tablet Take 1 tablet (10 mg total) by mouth once daily.    ascorbic acid, vitamin C, (VITAMIN C) 500 MG tablet Take 500 mg by mouth 2 (two) times daily.    aspirin (ECOTRIN) 81 MG EC tablet Take 1 tablet (81 mg total) by mouth once daily.    blood glucose control, low (TRUE METRIX LEVEL 1) Soln To check blood glucose levels once daily    blood sugar diagnostic (TRUE METRIX GLUCOSE TEST STRIP) Strp To check blood glucose levels once daily    blood-glucose meter (TRUE METRIX GLUCOSE METER) Misc To check blood glucose levels once daily    ciclopirox (PENLAC) 8 % Soln Apply topically nightly. To great toenails bilaterraly    diclofenac sodium (VOLTAREN) 1 % Gel Apply 2 g topically 4 (four) times daily as needed.    irbesartan (AVAPRO) 300 MG tablet Take 1 tablet (300 mg total) by mouth every evening.    lancets (TRUEPLUS LANCETS) 33 gauge Misc To check blood glucose levels once daily    multivitamin (THERAGRAN) per tablet Take 1 tablet by mouth once daily.    pravastatin (PRAVACHOL) 40 MG tablet Take 1 tablet (40 mg total) by mouth every evening.    vitamin E 400 UNIT capsule Take 400 Units by mouth once daily.    pregabalin (LYRICA) 50 MG capsule Take 1 capsule (50 mg total) by mouth 2 (two) times daily.     No current facility-administered medications for this visit.         ROS  Review of Systems   Constitutional:  Negative for chills, diaphoresis, fatigue and fever.   HENT:  Negative for ear discharge, ear pain, rhinorrhea, trouble  "swallowing and voice change.    Respiratory:  Negative for chest tightness, shortness of breath, wheezing and stridor.    Cardiovascular:  Negative for chest pain and leg swelling.   Gastrointestinal:  Negative for blood in stool, diarrhea, nausea and vomiting.   Endocrine: Negative for cold intolerance and heat intolerance.   Genitourinary:  Negative for dysuria, hematuria and urgency.   Musculoskeletal:  Positive for arthralgias, back pain, gait problem and myalgias. Negative for joint swelling, neck pain and neck stiffness.   Skin:  Negative for rash.   Neurological:  Positive for weakness and numbness. Negative for tremors, seizures, speech difficulty and headaches.   Hematological:  Does not bruise/bleed easily.   Psychiatric/Behavioral:  Negative for agitation, confusion and suicidal ideas.             OBJECTIVE:  BP (!) 146/76 (BP Location: Left arm, Patient Position: Sitting)   Pulse 80   Resp 17   Ht 5' 4" (1.626 m)   Wt 69.1 kg (152 lb 5.4 oz)   BMI 26.15 kg/m²         Physical Exam  Constitutional:       Appearance: Normal appearance.   HENT:      Head: Normocephalic and atraumatic.   Eyes:      Extraocular Movements: Extraocular movements intact.      Pupils: Pupils are equal, round, and reactive to light.   Cardiovascular:      Pulses: Normal pulses.   Pulmonary:      Effort: Pulmonary effort is normal.   Skin:     General: Skin is warm.      Capillary Refill: Capillary refill takes less than 2 seconds.   Neurological:      Mental Status: She is alert and oriented to person, place, and time.      Sensory: No sensory deficit.      Motor: Weakness present. No abnormal muscle tone.      Gait: Gait abnormal.      Deep Tendon Reflexes:      Reflex Scores:       Patellar reflexes are 2+ on the right side and 2+ on the left side.       Achilles reflexes are 0 on the right side and 2+ on the left side.     Comments: 3/5 strength in right dorsiflexion   Psychiatric:         Mood and Affect: Mood normal.    "      Behavior: Behavior normal.         Thought Content: Thought content normal.           Musculoskeletal:      Lumbar Exam  Incision: no  Pain with Flexion: yes  Pain with Extension: yes, extension worse than flexion  ROM:  Decreased  Paraspinous TTP:  Right-greater-than-left  Facet TTP:  L4-5  Facet Loading:  Right-greater-than-left  SLR:  Positive on the right at 75°  SIJ TTP:  Negative bilaterally  LUCAS:  Negative bilaterally      LABS:  Lab Results   Component Value Date    WBC 5.69 06/26/2024    HGB 13.3 06/26/2024    HCT 39.9 06/26/2024    MCV 98 06/26/2024     06/26/2024       CMP  Sodium   Date Value Ref Range Status   06/26/2024 138 136 - 145 mmol/L Final     Potassium   Date Value Ref Range Status   06/26/2024 3.7 3.5 - 5.1 mmol/L Final     Chloride   Date Value Ref Range Status   06/26/2024 106 95 - 110 mmol/L Final     CO2   Date Value Ref Range Status   06/26/2024 22 (L) 23 - 29 mmol/L Final     Glucose   Date Value Ref Range Status   06/26/2024 178 (H) 70 - 110 mg/dL Final     BUN   Date Value Ref Range Status   06/26/2024 13 8 - 23 mg/dL Final     Creatinine   Date Value Ref Range Status   06/26/2024 0.8 0.5 - 1.4 mg/dL Final     Calcium   Date Value Ref Range Status   06/26/2024 10.2 8.7 - 10.5 mg/dL Final     Total Protein   Date Value Ref Range Status   06/26/2024 7.2 6.0 - 8.4 g/dL Final     Albumin   Date Value Ref Range Status   06/26/2024 3.9 3.5 - 5.2 g/dL Final     Total Bilirubin   Date Value Ref Range Status   06/26/2024 1.0 0.1 - 1.0 mg/dL Final     Comment:     For infants and newborns, interpretation of results should be based  on gestational age, weight and in agreement with clinical  observations.    Premature Infant recommended reference ranges:  Up to 24 hours.............<8.0 mg/dL  Up to 48 hours............<12.0 mg/dL  3-5 days..................<15.0 mg/dL  6-29 days.................<15.0 mg/dL       Alkaline Phosphatase   Date Value Ref Range Status   06/26/2024 62 55 -  135 U/L Final     AST   Date Value Ref Range Status   06/26/2024 17 10 - 40 U/L Final     ALT   Date Value Ref Range Status   06/26/2024 18 10 - 44 U/L Final     Anion Gap   Date Value Ref Range Status   06/26/2024 10 8 - 16 mmol/L Final     eGFR if    Date Value Ref Range Status   05/25/2021 >60.0 >60 mL/min/1.73 m^2 Final     eGFR if non    Date Value Ref Range Status   05/25/2021 >60.0 >60 mL/min/1.73 m^2 Final     Comment:     Calculation used to obtain the estimated glomerular filtration  rate (eGFR) is the CKD-EPI equation.          Lab Results   Component Value Date    HGBA1C 6.0 (H) 01/29/2025             ASSESSMENT:       74 y.o. year old female with lower back pain, consistent with     1. Lumbar radiculopathy  MRI Lumbar Spine Without Contrast    pregabalin (LYRICA) 50 MG capsule      2. Anterolisthesis of lumbar spine  Ambulatory referral/consult to [facility]    MRI Lumbar Spine Without Contrast    pregabalin (LYRICA) 50 MG capsule      3. Spondylolisthesis of lumbar region  pregabalin (LYRICA) 50 MG capsule      4. Lumbar disc disease  pregabalin (LYRICA) 50 MG capsule      5. Dorsalgia, unspecified  pregabalin (LYRICA) 50 MG capsule      6. Right foot drop  MRI Lumbar Spine Without Contrast    pregabalin (LYRICA) 50 MG capsule        Lumbar radiculopathy  -     MRI Lumbar Spine Without Contrast; Future; Expected date: 02/18/2025  -     pregabalin (LYRICA) 50 MG capsule; Take 1 capsule (50 mg total) by mouth 2 (two) times daily.  Dispense: 60 capsule; Refill: 1    Anterolisthesis of lumbar spine  -     Ambulatory referral/consult to [facility]  -     MRI Lumbar Spine Without Contrast; Future; Expected date: 02/18/2025  -     pregabalin (LYRICA) 50 MG capsule; Take 1 capsule (50 mg total) by mouth 2 (two) times daily.  Dispense: 60 capsule; Refill: 1    Spondylolisthesis of lumbar region  -     pregabalin (LYRICA) 50 MG capsule; Take 1 capsule (50 mg  total) by mouth 2 (two) times daily.  Dispense: 60 capsule; Refill: 1    Lumbar disc disease  -     pregabalin (LYRICA) 50 MG capsule; Take 1 capsule (50 mg total) by mouth 2 (two) times daily.  Dispense: 60 capsule; Refill: 1    Dorsalgia, unspecified  -     pregabalin (LYRICA) 50 MG capsule; Take 1 capsule (50 mg total) by mouth 2 (two) times daily.  Dispense: 60 capsule; Refill: 1    Right foot drop  -     MRI Lumbar Spine Without Contrast; Future; Expected date: 02/18/2025  -     pregabalin (LYRICA) 50 MG capsule; Take 1 capsule (50 mg total) by mouth 2 (two) times daily.  Dispense: 60 capsule; Refill: 1             PLAN:   - Interventions:   - none at this time.  Pain appears to be consistent with right lumbar radiculopathy with associated footdrop.  We will obtain updated MRI of the lumbar spine without contrast to further evaluate etiology of the pain.    - Anticoagulation use:   Yes, aspirin    - Medications:  - start pregabalin 50 mg twice a day    - Therapy:   - patient has completed 6 weeks of physician lead home physical therapy without relief.  - refer patient to formal physical therapy for lower back pain with right lumbar radiculopathy    - Imaging/Diagnostic:  - x-rays of lumbar spine reviewed and findings discussed with patient.  That has grade 1 anterolisthesis of L4 upon L5 with associated loss of disc height and facet hypertrophy at this level  - we will obtain updated MRI lumbar spine without contrast to further evaluate lower back pain with right lumbar radiculopathy and right footdrop that has continued despite over 8 weeks of conservative therapy    - Consults:   - none at this time        - Patient Questions: Answered all of the patient's questions regarding diagnosis, therapy, and treatment     This condition does not require this patient to take time off of work, and the primary goal of our Pain Management services is to improve the patient's functional capacity.     - Follow up visit:  return to clinic after MRI        The above plan and management options were discussed at length with patient. Patient is in agreement with the above and verbalized understanding.    I discussed the goals of interventional chronic pain management with the patient on today's visit.  I explained the utility of injections for diagnostic and therapeutic purposes.  We discussed a multimodal approach to pain including treating the patient's given worst pain at any given time.  We will use a systematic approach to addressing pain.  We will also adopt a multimodal approach that includes injections, adjuvant medications, physical therapy, at times psychiatry.  There may be a limited role for opioid use intermittently in the treatment of pain, more particularly for acute pain although no one approach can be used as a sole treatment modality.    I emphasized the importance of regular exercise, core strengthening and stretching, diet and weight loss as a cornerstone of long-term pain management.      Santy Marshall MD  Interventional Pain Management  [facility]    Future Appointments   Date Time Provider Department Center   2/19/2025  2:00 PM Maria Luisa Castillo, PT [facility] OP [facility]   2/26/2025  3:00 PM [facility] MRI1 [facility] MRI [facility]   4/2/2025  8:20 AM Janiya Linton MD [facility] [place]   4/10/2025  3:20 PM Batool Starks[facility]           Disclaimer:  This note was prepared using voice recognition system and is likely to have sound alike errors that may have been overlooked even after proof reading.  Please call me with any questions      I spent a total of 45 minutes on the day of the visit.  This includes face to face time and non-face to face time preparing to see the patient (eg, review of tests), obtaining and/or reviewing separately obtained history, documenting clinical information in the electronic or other health record, independently interpreting results and communicating  results to the patient/family/caregiver, or care coordinator.         [1]   Social History  Socioeconomic History    Marital status:    Tobacco Use    Smoking status: Every Day     Current packs/day: 0.00     Average packs/day: 1 pack/day for 52.9 years (52.9 ttl pk-yrs)     Types: Cigarettes     Start date: 1969     Last attempt to quit: 2022     Years since quittin.5    Smokeless tobacco: Current   Substance and Sexual Activity    Alcohol use: No     Comment: previous ETOH abuse 33 years sober- Last alcohol intake 1986    Drug use: No    Sexual activity: Yes     Partners: Male     Social Drivers of Health     Financial Resource Strain: Low Risk  (2024)    Overall Financial Resource Strain (CARDIA)     Difficulty of Paying Living Expenses: Not very hard   Food Insecurity: No Food Insecurity (2024)    Hunger Vital Sign     Worried About Running Out of Food in the Last Year: Never true     Ran Out of Food in the Last Year: Never true   Transportation Needs: No Transportation Needs (2024)    PRAPARE - Transportation     Lack of Transportation (Medical): No     Lack of Transportation (Non-Medical): No   Physical Activity: Sufficiently Active (2024)    Exercise Vital Sign     Days of Exercise per Week: 3 days     Minutes of Exercise per Session: 60 min   Stress: Stress Concern Present (2024)    [place] [employer] - Occupational Stress Questionnaire     Feeling of Stress : To some extent   Housing Stability: Low Risk  (2024)    Housing Stability Vital Sign     Unable to Pay for Housing in the Last Year: No     Number of Places Lived in the Last Year: 1     Unstable Housing in the Last Year: No

## 2025-02-19 ENCOUNTER — CLINICAL SUPPORT (OUTPATIENT)
Dept: REHABILITATION | Facility: HOSPITAL | Age: 75
End: 2025-02-19
Attending: PHYSICAL THERAPIST
Payer: MEDICARE

## 2025-02-19 DIAGNOSIS — R29.898 WEAKNESS OF BOTH LOWER EXTREMITIES: ICD-10-CM

## 2025-02-19 DIAGNOSIS — M25.552 BILATERAL HIP PAIN: ICD-10-CM

## 2025-02-19 DIAGNOSIS — M25.551 BILATERAL HIP PAIN: ICD-10-CM

## 2025-02-19 DIAGNOSIS — G89.29 CHRONIC BILATERAL LOW BACK PAIN WITHOUT SCIATICA: Primary | ICD-10-CM

## 2025-02-19 DIAGNOSIS — M54.50 CHRONIC BILATERAL LOW BACK PAIN WITHOUT SCIATICA: Primary | ICD-10-CM

## 2025-02-19 PROCEDURE — 97112 NEUROMUSCULAR REEDUCATION: CPT | Mod: PN | Performed by: PHYSICAL THERAPIST

## 2025-02-19 PROCEDURE — 97110 THERAPEUTIC EXERCISES: CPT | Mod: PN | Performed by: PHYSICAL THERAPIST

## 2025-02-19 PROCEDURE — 97140 MANUAL THERAPY 1/> REGIONS: CPT | Mod: PN | Performed by: PHYSICAL THERAPIST

## 2025-02-20 ENCOUNTER — TELEPHONE (OUTPATIENT)
Dept: PAIN MEDICINE | Facility: CLINIC | Age: 75
End: 2025-02-20
Payer: MEDICARE

## 2025-02-20 NOTE — TELEPHONE ENCOUNTER
Spoke with patient.  She was calling to inform Dr. Cannon that she had a bout with dizziness after taking Pregabalin the first time while sitting down no more after that but will inform him if it happens again after pm dosage.

## 2025-02-20 NOTE — TELEPHONE ENCOUNTER
----- Message from Maria Luisa sent at 2/20/2025  8:49 AM CST -----  Contact: Mita  TYPE: Patient Call BackWho called:PatientWhat is the request in detail:  Patient called in concerning some dizzy ness she is having she states that Doctor inform her to call when having these symptoms.  Can the clinic reply by [facility]?   Would the patient rather a call back or a response via [facility] call back number:..[phone]

## 2025-02-24 ENCOUNTER — CLINICAL SUPPORT (OUTPATIENT)
Dept: REHABILITATION | Facility: HOSPITAL | Age: 75
End: 2025-02-24
Payer: MEDICARE

## 2025-02-24 DIAGNOSIS — M25.551 BILATERAL HIP PAIN: ICD-10-CM

## 2025-02-24 DIAGNOSIS — M25.552 BILATERAL HIP PAIN: ICD-10-CM

## 2025-02-24 DIAGNOSIS — M54.50 CHRONIC BILATERAL LOW BACK PAIN WITHOUT SCIATICA: Primary | ICD-10-CM

## 2025-02-24 DIAGNOSIS — R29.898 WEAKNESS OF BOTH LOWER EXTREMITIES: ICD-10-CM

## 2025-02-24 DIAGNOSIS — G89.29 CHRONIC BILATERAL LOW BACK PAIN WITHOUT SCIATICA: Primary | ICD-10-CM

## 2025-02-24 PROCEDURE — 97140 MANUAL THERAPY 1/> REGIONS: CPT | Mod: PN

## 2025-02-24 PROCEDURE — 97110 THERAPEUTIC EXERCISES: CPT | Mod: PN

## 2025-02-24 PROCEDURE — 97112 NEUROMUSCULAR REEDUCATION: CPT | Mod: PN

## 2025-02-24 NOTE — PROGRESS NOTES
"Outpatient Rehab  Physical Therapy Visit    Patient Name: Mita Max  MRN: [id number]  YOB: 1950  Today's Date: 2/24/2025    Therapy Diagnosis:   Encounter Diagnoses   Name Primary?    Chronic bilateral low back pain without sciatica Yes    Weakness of both lower extremities     Bilateral hip pain      Physician: Vlad Dickerson NP    Physician Orders: Eval and Treat  Medical Diagnosis: M54.42,M54.41 (ICD-10-CM) - Acute bilateral low back pain with bilateral sciatic    Visit # / Visits Authorized:  4/ 10 (+eval)   Date of Evaluation:  2/5/2025  Insurance Authorization Period: 2/5/2025 to 12/3/2026  Plan of Care Certification:  2/5/2025 to 4/17/2025      Time In: 0730   Time Out: 0830  Total Time: 60   Total Billable Time: 60    Subjective   She reports having continued pain in her low back. She has an MRI scheduled on Wednesday but does not have a follow-up until April. She reports noticing that she doesnt have to think about lifting up her foot as much with ambulation. She has a goal that she wants to be able to walk without a cane by [employer]. Patrick's day..       Objective   No test and measures assessed today.      Treatment: (exercises performed today are bolded)  Therapeutic Exercise: 30 minutes  Nu-step x 8 minutes (lvl 2)  Ankle 3-way  LTR 1x10  DKTC with SB 1x15  Standing calf raises 2x10  Seated soleus raises, 15# 2x10  Seated alternating DF 2x10  LAQ 2x10ea  Standing pulldowns 7# 2x10  Seated Hamstring stretch 2x 30"  Seated piriformis stretch 2x30"    Manual Therapy: 10 minutes  IASTM/cupping to (R) anterior tib  Manual resistance & overpressure DF/PF    Balance/Neuromuscular Re-Education: 20 minutes  [place] x10 min with DF (10 on/10 off)  Ankle ABC's [id number]  Seated shotgun 5" hold 1x10  Seated TA isometric hold 1x10    Assessment & Plan   Assessment: Patient tolerated therapy session good today. She continues to ambulate with cane, but has increased right foot DF with decreased foot " drop. Continued with [place] to right anterior tib and peroneal muscles. She is still able to actively DF foot, but fatigue after a few repetitions. Added in hamstring and piriformis stretches for improved lower extremity range of motion and decreased low back tension. Will inquire response at next session and progress as tolerated.     Patient will continue to benefit from skilled outpatient physical therapy to address the deficits listed in the problem list box on initial evaluation, provide pt/family education and to maximize pt's level of independence in the home and community environment.     Patient's spiritual, cultural, and educational needs considered and patient agreeable to plan of care and goals.     Plan: Continue with POC and progress as tolerated    Goals:   Active       Long Term Goals       Patient will demonstrate improved function as indicated by a functional score of 65 on the FOTO.  (Progressing)       Start:  02/05/25    Expected End:  04/17/25            Pt will demonstrate improved pain by reports of less than or equal to 5/10 worst pain on the verbal rating scale in order to progress toward maximal functional ability and improve QOL.   (Progressing)       Start:  02/05/25    Expected End:  04/17/25            patient will improve bilateral lower extremity strength to greater than or equal to 4+/5 MMT for improved functional strength.  (Progressing)       Start:  02/05/25    Expected End:  04/17/25            Patient will improve 5 time sit to stand to less than 15 seconds for improved functional strength and decreased fall risk.  (Progressing)       Start:  02/05/25    Expected End:  04/17/25            Patient will ambulate with normalized gait mechanics for return to prior level of function.  (Progressing)       Start:  02/05/25    Expected End:  04/17/25               Short Term Goals       Patient will demonstrate independence with HEP in order to progress toward functional independence   (Progressing)       Start:  02/05/25    Expected End:  04/17/25            Pt will demonstrate improved pain by reports of less than or equal to 7/10 worst pain on the verbal rating scale in order to progress toward maximal functional ability and improve QOL.   (Progressing)       Start:  02/05/25    Expected End:  04/17/25            Patient will improve bilateral lower extremity strength by 1/2 MMT for improved strength needed for increased ambulation distance.  (Progressing)       Start:  02/05/25    Expected End:  04/17/25            Patient will improve 5 time sit to stand to less than 20 seconds for improved functional strength and decreased fall risk.  (Progressing)       Start:  02/05/25    Expected End:  04/17/25                Maria Luisa Castillo, PT,DPT  02/24/2025

## 2025-02-25 ENCOUNTER — TELEPHONE (OUTPATIENT)
Dept: PAIN MEDICINE | Facility: CLINIC | Age: 75
End: 2025-02-25
Payer: MEDICARE

## 2025-02-25 NOTE — TELEPHONE ENCOUNTER
----- Message from Willis sent at 2/25/2025  4:42 PM CST -----  Contact: Mita  Type:  Sooner Apoointment RequestCaller is requesting a sooner appointment.  Caller declined first available appointment listed below.  Caller will not accept being placed on the waitlist and is requesting a message be sent to doctor.Name of Caller:Mita When is the first available appointment?Pt has appointment on 4/10/25Symptoms: follow up of MRI Would the patient rather a call back or a response via [facility]? Call Back Best Call Back Number:[phone] Information:

## 2025-02-26 ENCOUNTER — HOSPITAL ENCOUNTER (OUTPATIENT)
Dept: RADIOLOGY | Facility: HOSPITAL | Age: 75
Discharge: HOME | End: 2025-02-26
Attending: ANESTHESIOLOGY
Payer: MEDICARE

## 2025-02-26 ENCOUNTER — CLINICAL SUPPORT (OUTPATIENT)
Dept: REHABILITATION | Facility: HOSPITAL | Age: 75
End: 2025-02-26
Payer: MEDICARE

## 2025-02-26 DIAGNOSIS — M21.371 RIGHT FOOT DROP: ICD-10-CM

## 2025-02-26 DIAGNOSIS — M25.551 BILATERAL HIP PAIN: ICD-10-CM

## 2025-02-26 DIAGNOSIS — M43.16 ANTEROLISTHESIS OF LUMBAR SPINE: ICD-10-CM

## 2025-02-26 DIAGNOSIS — M25.552 BILATERAL HIP PAIN: ICD-10-CM

## 2025-02-26 DIAGNOSIS — R29.898 WEAKNESS OF BOTH LOWER EXTREMITIES: ICD-10-CM

## 2025-02-26 DIAGNOSIS — M54.50 CHRONIC BILATERAL LOW BACK PAIN WITHOUT SCIATICA: Primary | ICD-10-CM

## 2025-02-26 DIAGNOSIS — M54.16 LUMBAR RADICULOPATHY: ICD-10-CM

## 2025-02-26 DIAGNOSIS — G89.29 CHRONIC BILATERAL LOW BACK PAIN WITHOUT SCIATICA: Primary | ICD-10-CM

## 2025-02-26 PROCEDURE — 72148 MRI LUMBAR SPINE W/O DYE: CPT | Mod: TC,PN

## 2025-02-26 PROCEDURE — 97110 THERAPEUTIC EXERCISES: CPT | Mod: PN

## 2025-02-26 PROCEDURE — 97140 MANUAL THERAPY 1/> REGIONS: CPT | Mod: PN

## 2025-02-26 PROCEDURE — 97112 NEUROMUSCULAR REEDUCATION: CPT | Mod: PN

## 2025-02-26 PROCEDURE — 72148 MRI LUMBAR SPINE W/O DYE: CPT | Mod: 26,,, | Performed by: RADIOLOGY

## 2025-02-26 NOTE — PROGRESS NOTES
"Outpatient Rehab  Physical Therapy Visit    Patient Name: Mita Mxa  MRN: [id number]  YOB: 1950  Today's Date: 2/26/2025    Therapy Diagnosis:   Encounter Diagnoses   Name Primary?    Chronic bilateral low back pain without sciatica Yes    Weakness of both lower extremities     Bilateral hip pain      Physician: Vlad Dickerson NP    Physician Orders: Eval and Treat  Medical Diagnosis: M54.42,M54.41 (ICD-10-CM) - Acute bilateral low back pain with bilateral sciatic    Visit # / Visits Authorized:  4/ 10 (+eval)   Date of Evaluation:  2/5/2025  Insurance Authorization Period: 2/5/2025 to 12/3/2026  Plan of Care Certification:  2/5/2025 to 4/17/2025      Time In: 0900   Time Out: 1000  Total Time: 60   Total Billable Time: 60    Subjective   Continued right low back pain and mist difficutly getting out of the car and ambulating after. She continues with improved DF during ambulation and has forgotten her cane while in her house and has been able to sfaely navigate..       Objective   No test and measures assessed today.      Treatment: (exercises performed today are bolded)  Therapeutic Exercise: 30 minutes  Nu-step x 8 minutes (lvl 2)  Ankle 3-way  LTR 1x10  DKTC with SB 1x15  Standing calf raises 2x10  Seated soleus raises, 15# 2x10  Seated alternating DF 2x10  LAQ 2x10ea  Standing pulldowns 7# 2x10  Seated Hamstring stretch 2x 30"  Seated piriformis stretch 2x30"    Manual Therapy: 10 minutes  IASTM/cupping to (R) anterior tib  Manual resistance & overpressure DF/PF    Balance/Neuromuscular Re-Education: 20 minutes  Croatian x10 min with DF (10 on/10 off)  Ankle ABC's [id number]  Seated shotgun 5" hold 1x10  Seated TA isometric hold 1x10  Sit to stand with march 2x5  Standing hip abduction 1x5ea    Assessment & Plan   Assessment: Patient tolerated therapy session good today. She continues to ambulate with cane, but has increased right foot DF with decreased foot drop. Still with much rigth hip " weakness and pain with standing on RLE.  Continues to tolerate more activity during therapy session. Less muscle fatigue noted wtih DF motions today. Will inquire response at next session and progress as tolerated.     Patient will continue to benefit from skilled outpatient physical therapy to address the deficits listed in the problem list box on initial evaluation, provide pt/family education and to maximize pt's level of independence in the home and community environment.     Patient's spiritual, cultural, and educational needs considered and patient agreeable to plan of care and goals.     Plan: Continue with POC and progress as tolerated    Goals:   Active       Long Term Goals       Patient will demonstrate improved function as indicated by a functional score of 65 on the FOTO.  (Progressing)       Start:  02/05/25    Expected End:  04/17/25            Pt will demonstrate improved pain by reports of less than or equal to 5/10 worst pain on the verbal rating scale in order to progress toward maximal functional ability and improve QOL.   (Progressing)       Start:  02/05/25    Expected End:  04/17/25            patient will improve bilateral lower extremity strength to greater than or equal to 4+/5 MMT for improved functional strength.  (Progressing)       Start:  02/05/25    Expected End:  04/17/25            Patient will improve 5 time sit to stand to less than 15 seconds for improved functional strength and decreased fall risk.  (Progressing)       Start:  02/05/25    Expected End:  04/17/25            Patient will ambulate with normalized gait mechanics for return to prior level of function.  (Progressing)       Start:  02/05/25    Expected End:  04/17/25               Short Term Goals       Patient will demonstrate independence with HEP in order to progress toward functional independence  (Progressing)       Start:  02/05/25    Expected End:  04/17/25            Pt will demonstrate improved pain by reports  of less than or equal to 7/10 worst pain on the verbal rating scale in order to progress toward maximal functional ability and improve QOL.   (Progressing)       Start:  02/05/25    Expected End:  04/17/25            Patient will improve bilateral lower extremity strength by 1/2 MMT for improved strength needed for increased ambulation distance.  (Progressing)       Start:  02/05/25    Expected End:  04/17/25            Patient will improve 5 time sit to stand to less than 20 seconds for improved functional strength and decreased fall risk.  (Progressing)       Start:  02/05/25    Expected End:  04/17/25                Maria Luisa Castillo PT,DPT  02/26/2025

## 2025-02-27 NOTE — PROGRESS NOTES
"Outpatient Rehab  Physical Therapy Visit    Patient Name: Mita Max  MRN: [id number]  YOB: 1950  Today's Date: 2/26/2025    Therapy Diagnosis:   Encounter Diagnoses   Name Primary?    Chronic bilateral low back pain without sciatica Yes    Weakness of both lower extremities     Bilateral hip pain      Physician: Vlad Dickerson NP    Physician Orders: Eval and Treat  Medical Diagnosis: M54.42,M54.41 (ICD-10-CM) - Acute bilateral low back pain with bilateral sciatic    Visit # / Visits Authorized:  3 / 10 (+eval)   Date of Evaluation:  2/5/2025  Insurance Authorization Period: 2/5/2025 to 12/3/2026  Plan of Care Certification:  2/5/2025 to 4/17/2025      Time In: 1400   Time Out: 1500  Total Time: 60   Total Billable Time: 55 minutes    Subjective   Feels foot has improved motion. Just does not have endurance in it. walking with SPC still..  Pain reported as 5/10. Low back  Objective   No test and measures assessed today.      Treatment: (exercises performed today are bolded)  Therapeutic Exercise: 20 minutes  Nu-step x 10 minutes (lvl 2)  Ankle 3-way  LTR 1x10  DKTC with SB 1x15  Standing calf raises 2x10  Seated soleus raises, 15# 2x10  Seated alternating DF 2x10    Manual Therapy: 15 minutes  IASTM/cupping to (R) anterior tib  Manual resistance & overpressure DF/PF  Long axis traction to RLE    Balance/Neuromuscular Re-Education: 20 minutes  [place]er x10 min with DF (10 on/10 off)  Ankle ABC's [id number]  Seated shotgun 5" hold 1x10  Seated TA isometric hold 2x10  Seated lat pulls 7# 3x10  Seated nerve glides 20x    Assessment & Plan   Assessment: Patient presents with right sided drop foot, most likely from lumbar nerve impingement. IMproving symptoms in foot overall. Still needs further strength in TA along with improved core strength to stabilize low back.  Evaluation/Treatment Tolerance: Patient tolerated treatment well  Patient will continue to benefit from skilled outpatient physical " therapy to address the deficits listed in the problem list box on initial evaluation, provide pt/family education and to maximize pt's level of independence in the home and community environment.     Patient's spiritual, cultural, and educational needs considered and patient agreeable to plan of care and goals.     Plan: Continue with POC and progress as tolerated    Goals:   Active       Long Term Goals       Patient will demonstrate improved function as indicated by a functional score of 65 on the FOTO.  (Progressing)       Start:  02/05/25    Expected End:  04/17/25            Pt will demonstrate improved pain by reports of less than or equal to 5/10 worst pain on the verbal rating scale in order to progress toward maximal functional ability and improve QOL.   (Progressing)       Start:  02/05/25    Expected End:  04/17/25            patient will improve bilateral lower extremity strength to greater than or equal to 4+/5 MMT for improved functional strength.  (Progressing)       Start:  02/05/25    Expected End:  04/17/25            Patient will improve 5 time sit to stand to less than 15 seconds for improved functional strength and decreased fall risk.  (Progressing)       Start:  02/05/25    Expected End:  04/17/25            Patient will ambulate with normalized gait mechanics for return to prior level of function.  (Progressing)       Start:  02/05/25    Expected End:  04/17/25               Short Term Goals       Patient will demonstrate independence with HEP in order to progress toward functional independence  (Progressing)       Start:  02/05/25    Expected End:  04/17/25            Pt will demonstrate improved pain by reports of less than or equal to 7/10 worst pain on the verbal rating scale in order to progress toward maximal functional ability and improve QOL.   (Progressing)       Start:  02/05/25    Expected End:  04/17/25            Patient will improve bilateral lower extremity strength by 1/2 MMT  for improved strength needed for increased ambulation distance.  (Progressing)       Start:  02/05/25    Expected End:  04/17/25            Patient will improve 5 time sit to stand to less than 20 seconds for improved functional strength and decreased fall risk.  (Progressing)       Start:  02/05/25    Expected End:  04/17/25                Arnulfo Foley PT, DPT,DPT  02/26/2025

## 2025-03-03 ENCOUNTER — CLINICAL SUPPORT (OUTPATIENT)
Dept: REHABILITATION | Facility: HOSPITAL | Age: 75
End: 2025-03-03
Payer: MEDICARE

## 2025-03-03 VITALS — SYSTOLIC BLOOD PRESSURE: 149 MMHG | DIASTOLIC BLOOD PRESSURE: 76 MMHG

## 2025-03-03 DIAGNOSIS — G89.29 CHRONIC BILATERAL LOW BACK PAIN WITHOUT SCIATICA: Primary | ICD-10-CM

## 2025-03-03 DIAGNOSIS — R29.898 WEAKNESS OF BOTH LOWER EXTREMITIES: ICD-10-CM

## 2025-03-03 DIAGNOSIS — M25.551 BILATERAL HIP PAIN: ICD-10-CM

## 2025-03-03 DIAGNOSIS — M25.552 BILATERAL HIP PAIN: ICD-10-CM

## 2025-03-03 DIAGNOSIS — M54.50 CHRONIC BILATERAL LOW BACK PAIN WITHOUT SCIATICA: Primary | ICD-10-CM

## 2025-03-03 PROCEDURE — 97110 THERAPEUTIC EXERCISES: CPT | Mod: PN

## 2025-03-03 PROCEDURE — 97112 NEUROMUSCULAR REEDUCATION: CPT | Mod: PN

## 2025-03-03 NOTE — PROGRESS NOTES
"Outpatient Rehab  Physical Therapy Visit    Patient Name: Mita Max  MRN: [id number]  YOB: 1950  Today's Date: 3/3/2025    Therapy Diagnosis:   Encounter Diagnoses   Name Primary?    Chronic bilateral low back pain without sciatica Yes    Weakness of both lower extremities     Bilateral hip pain      Physician: Vlad Dickerson NP    Physician Orders: Eval and Treat  Medical Diagnosis: M54.42,M54.41 (ICD-10-CM) - Acute bilateral low back pain with bilateral sciatic    Visit # / Visits Authorized:  4/ 10 (+eval)   Date of Evaluation:  2/5/2025  Insurance Authorization Period: 2/5/2025 to 12/3/2026  Plan of Care Certification:  2/5/2025 to 4/17/2025      Time In: 0855   Time Out: 0955  Total Time: 60   Total Billable Time: 55 minutes (billed 30 minutes 1:1)     Subjective   She is having increased low back pain today and some anterior shin pain. She states that she over did it this weekend and has not been performing her HEP and that could be leading to increased pain. She has been having many good days where she has normal stride and decreased focus on DF of her right foot..       Objective   No test and measures assessed today.      Treatment: (exercises performed today are bolded)  Therapeutic Exercise: 30 minutes  Nu-step x 8 minutes (lvl 2)  Standing calf stretch lvl 2 3x30"  Ankle 3-way  LTR 1x10  Standing calf raises 2x10  Seated soleus raises, 15# 2x10  Seated alternating DF 2x10  LAQ 2x10ea  Standing pulldowns 7# 2x10  Seated Hamstring stretch 2x 30"  Seated piriformis stretch 2x30"    Manual Therapy: 10 minutes  IASTM/cupping to (R) anterior tib  Manual resistance & overpressure DF/PF    Balance/Neuromuscular Re-Education: 20 minutes  German x10 min with DF (10 on/10 off)  Ankle ABC's [id number]  Seated shotgun 5" hold 1x10  Seated TA isometric hold 1x10  Sit to stand with march 2x5  Standing hip abduction 1x5ea    Assessment & Plan   Assessment: Patient tolerated therapy session fair " today. She presents with increased right sided back and hip pain today. She also presents with increased bilateral ankle swelling with no edema. BP was taken in recumbant position and found to be 149/76 wiht patient reporting that she took her BP medicine this morning. Continued with therapy session, monitoring symptoms. Continued with established exercises. She had many low back spasms throughout therapy session. She continues to be mostly limited by pain. Will inquire response at next session.     Patient will continue to benefit from skilled outpatient physical therapy to address the deficits listed in the problem list box on initial evaluation, provide pt/family education and to maximize pt's level of independence in the home and community environment.     Patient's spiritual, cultural, and educational needs considered and patient agreeable to plan of care and goals.     Plan: Continue with POC and progress as tolerated    Goals:   Active       Long Term Goals       Patient will demonstrate improved function as indicated by a functional score of 65 on the FOTO.  (Progressing)       Start:  02/05/25    Expected End:  04/17/25            Pt will demonstrate improved pain by reports of less than or equal to 5/10 worst pain on the verbal rating scale in order to progress toward maximal functional ability and improve QOL.   (Progressing)       Start:  02/05/25    Expected End:  04/17/25            patient will improve bilateral lower extremity strength to greater than or equal to 4+/5 MMT for improved functional strength.  (Progressing)       Start:  02/05/25    Expected End:  04/17/25            Patient will improve 5 time sit to stand to less than 15 seconds for improved functional strength and decreased fall risk.  (Progressing)       Start:  02/05/25    Expected End:  04/17/25            Patient will ambulate with normalized gait mechanics for return to prior level of function.  (Progressing)       Start:   02/05/25    Expected End:  04/17/25               Short Term Goals       Patient will demonstrate independence with HEP in order to progress toward functional independence  (Progressing)       Start:  02/05/25    Expected End:  04/17/25            Pt will demonstrate improved pain by reports of less than or equal to 7/10 worst pain on the verbal rating scale in order to progress toward maximal functional ability and improve QOL.   (Progressing)       Start:  02/05/25    Expected End:  04/17/25            Patient will improve bilateral lower extremity strength by 1/2 MMT for improved strength needed for increased ambulation distance.  (Progressing)       Start:  02/05/25    Expected End:  04/17/25            Patient will improve 5 time sit to stand to less than 20 seconds for improved functional strength and decreased fall risk.  (Progressing)       Start:  02/05/25    Expected End:  04/17/25                Maria Luisa Castillo, PT,DPT  03/03/2025

## 2025-03-05 ENCOUNTER — PATIENT MESSAGE (OUTPATIENT)
Dept: PAIN MEDICINE | Facility: CLINIC | Age: 75
End: 2025-03-05
Payer: MEDICARE

## 2025-03-05 ENCOUNTER — CLINICAL SUPPORT (OUTPATIENT)
Dept: REHABILITATION | Facility: HOSPITAL | Age: 75
End: 2025-03-05
Payer: MEDICARE

## 2025-03-05 ENCOUNTER — TELEPHONE (OUTPATIENT)
Dept: PAIN MEDICINE | Facility: CLINIC | Age: 75
End: 2025-03-05
Payer: MEDICARE

## 2025-03-05 DIAGNOSIS — M25.551 BILATERAL HIP PAIN: ICD-10-CM

## 2025-03-05 DIAGNOSIS — M25.552 BILATERAL HIP PAIN: ICD-10-CM

## 2025-03-05 DIAGNOSIS — M54.50 CHRONIC BILATERAL LOW BACK PAIN WITHOUT SCIATICA: Primary | ICD-10-CM

## 2025-03-05 DIAGNOSIS — R29.898 WEAKNESS OF BOTH LOWER EXTREMITIES: ICD-10-CM

## 2025-03-05 DIAGNOSIS — G89.29 CHRONIC BILATERAL LOW BACK PAIN WITHOUT SCIATICA: Primary | ICD-10-CM

## 2025-03-05 PROCEDURE — 97140 MANUAL THERAPY 1/> REGIONS: CPT | Mod: PN

## 2025-03-05 PROCEDURE — 97110 THERAPEUTIC EXERCISES: CPT | Mod: PN

## 2025-03-05 PROCEDURE — 97112 NEUROMUSCULAR REEDUCATION: CPT | Mod: PN

## 2025-03-05 NOTE — PROGRESS NOTES
"Outpatient Rehab  Physical Therapy Visit    Patient Name: Mita Max  MRN: [id number]  YOB: 1950  Today's Date: 3/5/2025    Therapy Diagnosis:   Encounter Diagnoses   Name Primary?    Chronic bilateral low back pain without sciatica Yes    Weakness of both lower extremities     Bilateral hip pain      Physician: Vlad Dickerson NP    Physician Orders: Eval and Treat  Medical Diagnosis: M54.42,M54.41 (ICD-10-CM) - Acute bilateral low back pain with bilateral sciatic    Visit # / Visits Authorized:  4/ 10 (+eval)   Date of Evaluation:  2/5/2025  Insurance Authorization Period: 2/5/2025 to 12/3/2026  Plan of Care Certification:  2/5/2025 to 4/17/2025      Time In: 1345   Time Out: 1445  Total Time: 60   Total Billable Time: 60 min    Subjective   She reports decreased overall pain in her back and showing good improvements and ease with active dirsiflexion motions at home..       Objective   No test and measures assessed today.      Treatment: (exercises performed today are bolded)  Therapeutic Exercise: 30 minutes  Nu-step x 8 minutes (lvl 2)  Standing calf stretch lvl 2 3x30"  Ankle 3-way x10  LTR 1x10  Standing calf raises 2x10  Seated soleus raises, 15# 2x10  Seated alternating DF 2x10  LAQ 2x10ea  Seated Hamstring curls, RTB 2x10  Seated hip adduction 20x  Seated hip abduction 20x  Standing pulldowns 7# 2x10  Seated Hamstring stretch 2x 30"  Seated piriformis stretch 2x30"    Manual Therapy: 10 minutes  IASTM/cupping to (R) anterior tib  Manual resistance & overpressure DF/PF    Balance/Neuromuscular Re-Education: 20 minutes  [place]rian x10 min with DF (10 on/10 off)  Ankle ABC's [id number]  Seated shotgun 5" hold 1x10  Seated TA isometric hold 1x10  Sit to stand with march 2x5  Standing hip abduction 1x5ea    Assessment & Plan   Assessment: Patient tolerated therapy session good today. Continued with established exercises with good overall carryover of DF following [place]oc[place]. Plan to work on some " more standing activities and balance at next session. She is limited by both pain and fear of pain. Will inquire response at next session.  Evaluation/Treatment Tolerance: Patient tolerated treatment well  Patient will continue to benefit from skilled outpatient physical therapy to address the deficits listed in the problem list box on initial evaluation, provide pt/family education and to maximize pt's level of independence in the home and community environment.     Patient's spiritual, cultural, and educational needs considered and patient agreeable to plan of care and goals.     Plan: Continue with POC and progress as tolerated    Goals:   Active       Long Term Goals       Patient will demonstrate improved function as indicated by a functional score of 65 on the FOTO.  (Progressing)       Start:  02/05/25    Expected End:  04/17/25            Pt will demonstrate improved pain by reports of less than or equal to 5/10 worst pain on the verbal rating scale in order to progress toward maximal functional ability and improve QOL.   (Progressing)       Start:  02/05/25    Expected End:  04/17/25            patient will improve bilateral lower extremity strength to greater than or equal to 4+/5 MMT for improved functional strength.  (Progressing)       Start:  02/05/25    Expected End:  04/17/25            Patient will improve 5 time sit to stand to less than 15 seconds for improved functional strength and decreased fall risk.  (Progressing)       Start:  02/05/25    Expected End:  04/17/25            Patient will ambulate with normalized gait mechanics for return to prior level of function.  (Progressing)       Start:  02/05/25    Expected End:  04/17/25               Short Term Goals       Patient will demonstrate independence with HEP in order to progress toward functional independence  (Progressing)       Start:  02/05/25    Expected End:  04/17/25            Pt will demonstrate improved pain by reports of less than  or equal to 7/10 worst pain on the verbal rating scale in order to progress toward maximal functional ability and improve QOL.   (Progressing)       Start:  02/05/25    Expected End:  04/17/25            Patient will improve bilateral lower extremity strength by 1/2 MMT for improved strength needed for increased ambulation distance.  (Progressing)       Start:  02/05/25    Expected End:  04/17/25            Patient will improve 5 time sit to stand to less than 20 seconds for improved functional strength and decreased fall risk.  (Progressing)       Start:  02/05/25    Expected End:  04/17/25                Maria Luisa Castillo PT,DPT  03/05/2025

## 2025-03-05 NOTE — PROGRESS NOTES
Chronic Pain -- Established Patient (Follow-up visit)    Referring Physician: Vlad Dickerson NP    PCP: aJniya Linton MD    Chief complaint:  Follow-up (MRI results )     Lower back pain with RLE radicular pain, + shopping cart sign       SUBJECTIVE:      Interval History (3/6/2025):  Patient presents today for follow-up visit.  Patient was last seen about a month ago. At that visit, the plan was to start physical therapy, which she does feels is slowly helping.  She is making progress, albeit slow.  She also does at home exercises that are recommended by the therapist.  She also feels that walking with drop foot has improved.  She is considering getting an insert because physical therapy told her she had a leg length discrepancy.  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  She does feel that the Lyrica is helping though.  She has an old prescription of Flexeril, but she has not started to take it because she was not sure if she can mix any of these medications.  She is also inquiring about taking Mucinex or ibuprofen with her current medications.  She reports that she also has swelling in her legs, which she has not yet discuss with primary care.  She feels this started about a week ago and is wondering if it is coming from her back.  She reports she is very careful with walking so she does not fall.  Pain is worse with lying down, so she often sleeps in a chair.  She has list of questions today.  She is wondering about taking a job with [employer], although it would require a lot of traveling (about 30 we can is a year).  She is wondering if she will ever get to a level of pain relief that she could consider doing this.  She is inquiring about an inversion table, although she has not used 1 before.  Patient reports pain as 6/10 today.    Initial HPI (02/18/2025):  Mita Max is a 74 y.o. female who presents to the clinic for the evaluation of lower back pain.    Patient reports three-month history of lower back pain.  Patient denies any previous surgeries in her lumbar spine or bilateral lower extremities.  Patient denies any inciting traumas to her lower back pain.  Lower back pain described as a throbbing aching pain that starts in the midline and radiates out in a band across the lower back, right-greater-than-left.  This pain then radiates primarily down the right lower extremity along the lateral aspect to the ankle.  Patient reports occasional left lower extremity pain, but reports that her right lower extremity pain is primarily pain.  Patient reports 3-4 week history of decreased strength in her right foot with this pain.  Pain is worse with lying supine and walking, better with sitting down.  Pain is currently rated a 2/10, but can increase to a 10/10 with exacerbating activity.  Patient denies any fevers, chills, saddle anesthesia, or bowel and bladder incontinence.      Non-Pharmacologic Treatments:  Physical Therapy/Home Exercise: yes  Ice/Heat:yes  TENS: no  Acupuncture: no  Massage: yes  Chiropractic: yes        Previous Pain Medications:  NSAIDs, Tylenol, muscle relaxers, neuropathics, opioids, topicals        Pain Procedures:   None       Pain Disability Index (PDI) Score Review:      2/18/2025     9:24 AM   Last 3 PDI Scores   Pain Disability Index (PDI) 12         Imaging/ Diagnostic Studies/ Labs (Reviewed on 3/6/2025):    02/26/2025 MRI Lumbar Spine Without Contrast  COMPARISON:  Lumbar radiograph 01/29/2025    FINDINGS:  Alignment: L4-L5 grade 1 degenerative appearing anterolisthesis.    Vertebrae: Lumbar vertebral body heights are maintained.  Minor multilevel endplate degenerative changes throughout the lumbar spine.  Minimal L2-L3 dorsal endplate edema.  There is edema of the left L5 pedicle and left greater than right L5-S1 facet edema.  No evidence of an acute fracture.  Marrow signal is otherwise within normal limits.    Discs: Disc desiccation  throughout the lumbar spine with disc height loss most pronounced at T11-T12 and L2-L3.    Cord: Prominent cord deformity at T11-T12. Conus terminates at L1.    Degenerative findings:    T11-T12: Appearance of a large left paracentral disc extrusion, facet arthropathy and ligamentum flavum hypertrophy contributing to at least moderate spinal canal stenosis.    T12-L1: No significant posterior disc bulge, spinal canal stenosis or neural foraminal stenosis.    L1-L2: Minimal broad-based posterior disc bulge and mild facet arthropathy.  No significant spinal canal stenosis or neural foraminal stenosis.    L2-L3: Mild broad-based posterior disc bulge, facet arthropathy and ligamentum flavum hypertrophy contribute to mild spinal canal stenosis, mild to moderate right L2-3 neural foraminal stenosis and mild left-sided neural foraminal stenosis.    L3-L4: Broad-based posterior disc bulge, facet arthropathy and ligamentum flavum hypertrophy contributes to moderate spinal canal stenosis at L3-4 and moderate Right > Left L3-4 neural foraminal stenosis.    L4-L5: Grade 1 anterolisthesis.  Broad-based posterior disc bulge, facet arthropathy and ligamentum flavum hypertrophy contributes to severe spinal canal stenosis at L4-5. Moderate right and mild-to-moderate left-sided L4-5 neural foraminal stenosis.    L5-S1: Mild broad-based posterior disc bulge is asymmetric to the right.  Bilateral facet arthropathy and ligamentum flavum hypertrophy contribute to severe right L5-S1 neuroforaminal narrowing and mild-to-moderate left-sided L5-S1 neural foraminal stenosis.  No significant spinal canal stenosis.    Paraspinal muscles & soft tissues: Within normal limits.  Impression:   1. Multilevel degenerative changes of the lumbar spine are most pronounced at L4-L5 with grade 1 anterolisthesis contributing to severe spinal canal stenosis, moderate right and mild-to-moderate left-sided neural foraminal stenosis.  2. L3-L4 moderate spinal  canal stenosis with moderate bilateral neural foraminal stenosis.  3. T11-T12 partially visualized large disc extrusion contributes to at least moderate spinal canal stenosis.  MRI thoracic spine could be utilized for further evaluation.  4. L2-L3 mild spinal canal stenosis with mild-to-moderate right and mild left-sided neural foraminal stenosis.  5. L5-S1 severe right and mild-to-moderate left-sided neural foraminal stenosis.  Left L5 pedicle stress reaction and left L5-S1 facet joint edema likely contribute to back pain.       01/29/2025 X-Ray Lumbar Spine 5 View  COMPARISON: None  FINDINGS: This examination consists of 5 views of the lumbar spine. There are 5 lumbar type vertebral bodies.  There is grade 1 anterolisthesis of L4 on L5.  There is no fracture or scoliosis. There is normal lumbar lordosis.  Impression  There is grade 1 anterolisthesis of L4 on L5.                Review of Systems:   Constitutional:  Negative for chills, diaphoresis, fatigue and fever.   HENT:  Negative for ear discharge, ear pain, rhinorrhea, trouble swallowing and voice change.    Respiratory:  Negative for chest tightness, shortness of breath, wheezing and stridor.    Cardiovascular:  Negative for chest pain and leg swelling.   Gastrointestinal:  Negative for blood in stool, diarrhea, nausea and vomiting.   Endocrine: Negative for cold intolerance and heat intolerance.   Genitourinary:  Negative for dysuria, hematuria and urgency.   Musculoskeletal:  Positive for arthralgias, back pain, gait problem and myalgias. Negative for joint swelling, neck pain and neck stiffness.   Skin:  Negative for rash.   Neurological:  Positive for weakness and numbness. Negative for tremors, seizures, speech difficulty and headaches.   Hematological:  Does not bruise/bleed easily.   Psychiatric/Behavioral:  Negative for agitation, confusion and suicidal ideas.           OBJECTIVE:    Physical Exam  Vitals:    03/06/25 0936   BP: (!) 189/84   Pulse:  "76   Resp: 17   Weight: 70.9 kg (156 lb 4.9 oz)   Height: 5' 4" (1.626 m)   PainSc:   6   PainLoc: Back   Body mass index is 26.83 kg/m².   (reviewed on 3/6/2025)    Constitutional:       Appearance: Normal appearance.   HENT:      Head: Normocephalic and atraumatic.   Eyes:      Extraocular Movements: Extraocular movements intact.   Cardiovascular:      Pulses: Normal pulses.   Pulmonary:      Effort: Pulmonary effort is normal.   Skin:     General: Skin is warm.      Capillary Refill: Capillary refill takes less than 2 seconds.   Neurological:      Mental Status: She is alert and oriented to person, place, and time.      Sensory: No sensory deficit.      Motor: Weakness present. No abnormal muscle tone.      Gait: Gait abnormal.      Deep Tendon Reflexes:      Reflex Scores:       Patellar reflexes are 2+ on the right side and 2+ on the left side.       Achilles reflexes are 0 on the right side and 2+ on the left side.     Comments: 3/5 strength in right dorsiflexion   Psychiatric:         Mood and Affect: Mood normal.         Behavior: Behavior normal.         Thought Content: Thought content normal.       Musculoskeletal:  Lumbar Exam  Incision: no  Pain with Flexion: yes  Pain with Extension: yes, extension worse than flexion  ROM:  Decreased  Paraspinous TTP:  Right-greater-than-left  Facet TTP:  L4-5  Facet Loading:  Right-greater-than-left  SLR:  Positive on the right at 75°  SIJ TTP:  Negative bilaterally  LUCAS:  Negative bilaterally              ASSESSMENT:     74 y.o. year old female with lower back pain, consistent with     1. Right lumbar radiculopathy  Case Request-RAD/Other Procedure Area: right L4/5 + L5/S1 TF YUNIOR // light sedation // stop ASA 5 days      2. Right foot drop        3. Spondylolisthesis of lumbar region        4. Anterolisthesis of lumbar spine        5. Degenerative lumbar spinal stenosis          Right lumbar radiculopathy  -     Case Request-RAD/Other Procedure Area: right L4/5 + " L5/S1 TF YUNIOR // light sedation // stop ASA 5 days    Right foot drop    Spondylolisthesis of lumbar region    Anterolisthesis of lumbar spine    Degenerative lumbar spinal stenosis         PLAN:   - Interventions:   - Schedule right L4/5 + L5/S1 TF YUNIOR. Patient is taking ASA as 1° prevention; she will have to stop 5 days prior to procedure.  Will get clearance from Dr. Linton (PCP).      - Anticoagulation use:   Yes, ASA - 1° prevention - Dr. Linton (PCP)    - Medications:  - Refill Lyrica (pregabalin) 50mg BID - which is helping.  - Can utilize Flexeril (cyclobenzaprine) 5mg at night, which may help with sleeping.  - Continue ibuprofen 800mg PRN pain.     - LA  reviewed and appropriate.      - Therapy:   - Continue physical therapy, which is helping.    - Imaging/Diagnostic:  - Lumbar MRI (2025) reviewed with patient.  - Consider thoracic MRI.     - Consults:   - None at this time.    - Follow up visit: 4 weeks post-procedure - in clinic (per pt request)   extended visit      Future Appointments   Date Time Provider Department Center   3/10/2025  3:30 PM Maria Luisa Castillo, PT MICHELE OP [facility]   3/12/2025  3:30 PM Maria Luisa Castillo, PT [facility]Z OP [facility]   3/13/2025  9:40 AM Janiya Linton MD [facility] [place]   3/17/2025  3:30 PM Maria Luisa Castillo PT GNZ [facility]   3/19/2025  3:30 PM [facility]       - Direct patient care provided: 22 minutes. Over 50% of the time was spent counseling/educating the patient. Total time spent on patient care including prep time reviewing the chart before the visit, time spent with patient during the visit, and the time spent after the visit reviewing studies, documenting note, obtaining information from collaborative providers, etc.: >40 minutes.     - Patient Questions: Answered all of the patient's questions regarding diagnosis, therapy, and treatment.    - This condition does not require this patient to take time off of work, and the primary goal  of our Pain Management services is to improve the patient's functional capacity.   - I discussed the risks, benefits, and alternatives to potential treatment options. All questions and concerns were fully addressed today in clinic.         Batool Starks PA-C  Interventional Pain Management - [facility]    Disclaimer:  This note was prepared using voice recognition system and is likely to have sound alike errors that may have been overlooked even after proof reading.  Please call me with any questions.

## 2025-03-06 ENCOUNTER — OFFICE VISIT (OUTPATIENT)
Dept: PAIN MEDICINE | Facility: CLINIC | Age: 75
End: 2025-03-06
Payer: MEDICARE

## 2025-03-06 VITALS
DIASTOLIC BLOOD PRESSURE: 84 MMHG | HEART RATE: 76 BPM | SYSTOLIC BLOOD PRESSURE: 189 MMHG | BODY MASS INDEX: 26.69 KG/M2 | RESPIRATION RATE: 17 BRPM | HEIGHT: 64 IN | WEIGHT: 156.31 LBS

## 2025-03-06 DIAGNOSIS — M21.371 RIGHT FOOT DROP: ICD-10-CM

## 2025-03-06 DIAGNOSIS — M43.16 ANTEROLISTHESIS OF LUMBAR SPINE: ICD-10-CM

## 2025-03-06 DIAGNOSIS — M48.061 DEGENERATIVE LUMBAR SPINAL STENOSIS: ICD-10-CM

## 2025-03-06 DIAGNOSIS — M43.16 SPONDYLOLISTHESIS OF LUMBAR REGION: ICD-10-CM

## 2025-03-06 DIAGNOSIS — M54.16 RIGHT LUMBAR RADICULOPATHY: Primary | ICD-10-CM

## 2025-03-06 PROCEDURE — 3008F BODY MASS INDEX DOCD: CPT | Mod: CPTII,S$GLB,, | Performed by: PHYSICIAN ASSISTANT

## 2025-03-06 PROCEDURE — 99215 OFFICE O/P EST HI 40 MIN: CPT | Mod: S$GLB,,, | Performed by: PHYSICIAN ASSISTANT

## 2025-03-06 PROCEDURE — 3044F HG A1C LEVEL LT 7.0%: CPT | Mod: CPTII,S$GLB,, | Performed by: PHYSICIAN ASSISTANT

## 2025-03-06 PROCEDURE — 1100F PTFALLS ASSESS-DOCD GE2>/YR: CPT | Mod: CPTII,S$GLB,, | Performed by: PHYSICIAN ASSISTANT

## 2025-03-06 PROCEDURE — 1159F MED LIST DOCD IN RCRD: CPT | Mod: CPTII,S$GLB,, | Performed by: PHYSICIAN ASSISTANT

## 2025-03-06 PROCEDURE — 1125F AMNT PAIN NOTED PAIN PRSNT: CPT | Mod: CPTII,S$GLB,, | Performed by: PHYSICIAN ASSISTANT

## 2025-03-06 PROCEDURE — 3288F FALL RISK ASSESSMENT DOCD: CPT | Mod: CPTII,S$GLB,, | Performed by: PHYSICIAN ASSISTANT

## 2025-03-06 PROCEDURE — 1160F RVW MEDS BY RX/DR IN RCRD: CPT | Mod: CPTII,S$GLB,, | Performed by: PHYSICIAN ASSISTANT

## 2025-03-06 PROCEDURE — 3077F SYST BP >= 140 MM HG: CPT | Mod: CPTII,S$GLB,, | Performed by: PHYSICIAN ASSISTANT

## 2025-03-06 PROCEDURE — 99999 PR PBB SHADOW E&M-EST. PATIENT-LVL V: CPT | Mod: PBBFAC,,, | Performed by: PHYSICIAN ASSISTANT

## 2025-03-06 PROCEDURE — 3079F DIAST BP 80-89 MM HG: CPT | Mod: CPTII,S$GLB,, | Performed by: PHYSICIAN ASSISTANT

## 2025-03-06 NOTE — PATIENT INSTRUCTIONS
Here are some brief explanations of conditions on MRI:    When the disc bulge is large enough and herniates out toward the spinal canal, it puts pressure on the sensitive spinal nerves, causing pain - including lumbar radiculopathy/ sciatica, a sharp, often shooting pain that extends from the buttocks down the back of one leg [or cervical radiculopathy when shooting from the neck into the arm]. Numbness, tingling, and weakness in one extremity is also common.  If the herniated disc is not pressing on a nerve, the patient may experience a neck or back ache or even no pain at all.       Spinal stenosis is a narrowing of the spaces within your spine, which can put pressure on the nerves that travel through the spine.  - central stenosis (area around the spinal cord) - can cause leg pain with walking, along with low back pain [or in terms of cervical issues: arm pain, along with neck pain]  - neural foraminal stenosis (area on the sides where nerves come out to come together to form large nerve that goes down your arm or leg) - can cause leg pain [or neck pain] as well.                   Facet joint syndrome is an arthritis-like condition of the spine that can be a significant source of back and neck pain. It is caused by degenerative changes to the joints between the spine bones. The cartilage inside the facet joint can break down and become inflamed, triggering pain signals in nearby nerve endings.              Spondylolisthesis (anterolisthesis): one bone in your back (vertebra) slides forward over the bone below it.  It is not an issue on its own - only when it leads to your spinal cord or nerve roots being squeezed. This can cause back pain and leg pain.           Scoliosis is an abnormal lateral curvature of the spine.

## 2025-03-10 ENCOUNTER — CLINICAL SUPPORT (OUTPATIENT)
Dept: REHABILITATION | Facility: HOSPITAL | Age: 75
End: 2025-03-10
Payer: MEDICARE

## 2025-03-10 ENCOUNTER — TELEPHONE (OUTPATIENT)
Dept: PAIN MEDICINE | Facility: CLINIC | Age: 75
End: 2025-03-10
Payer: MEDICARE

## 2025-03-10 ENCOUNTER — PATIENT MESSAGE (OUTPATIENT)
Dept: INTERNAL MEDICINE | Facility: CLINIC | Age: 75
End: 2025-03-10
Payer: MEDICARE

## 2025-03-10 ENCOUNTER — PATIENT MESSAGE (OUTPATIENT)
Dept: PAIN MEDICINE | Facility: CLINIC | Age: 75
End: 2025-03-10
Payer: MEDICARE

## 2025-03-10 DIAGNOSIS — M62.830 MUSCLE SPASM OF BACK: Primary | ICD-10-CM

## 2025-03-10 DIAGNOSIS — M25.552 BILATERAL HIP PAIN: ICD-10-CM

## 2025-03-10 DIAGNOSIS — R29.898 WEAKNESS OF BOTH LOWER EXTREMITIES: ICD-10-CM

## 2025-03-10 DIAGNOSIS — G89.29 CHRONIC BILATERAL LOW BACK PAIN WITHOUT SCIATICA: Primary | ICD-10-CM

## 2025-03-10 DIAGNOSIS — M54.50 CHRONIC BILATERAL LOW BACK PAIN WITHOUT SCIATICA: Primary | ICD-10-CM

## 2025-03-10 DIAGNOSIS — M25.551 BILATERAL HIP PAIN: ICD-10-CM

## 2025-03-10 PROCEDURE — 97140 MANUAL THERAPY 1/> REGIONS: CPT | Mod: PN

## 2025-03-10 PROCEDURE — 97110 THERAPEUTIC EXERCISES: CPT | Mod: PN

## 2025-03-10 PROCEDURE — 97112 NEUROMUSCULAR REEDUCATION: CPT | Mod: PN

## 2025-03-10 NOTE — TELEPHONE ENCOUNTER
Tried calling the patient to let her know that I took her off the schedule for March but no answer, I left her a voicemail to give our office a call back.    Georgia BECKER ([facility])

## 2025-03-10 NOTE — TELEPHONE ENCOUNTER
I called the patient letting her know that I haven't received her clearance yet from her provider. But I did go ahead and schedule the patient letting her know to continue the ASA until she hears from us stating that we received the clearance. Patient does understand as well to call back 5 days before her procedure to see if received the clear or not.    Georgia BECKER ([facility])

## 2025-03-10 NOTE — TELEPHONE ENCOUNTER
Spoke with patient, informed her that she has to see PCP to be able to receive a clearance to have the procedure done.  Her appt with PCP is on 4/2/25.

## 2025-03-11 ENCOUNTER — TELEPHONE (OUTPATIENT)
Dept: INTERNAL MEDICINE | Facility: CLINIC | Age: 75
End: 2025-03-11
Payer: MEDICARE

## 2025-03-11 ENCOUNTER — TELEPHONE (OUTPATIENT)
Dept: PAIN MEDICINE | Facility: CLINIC | Age: 75
End: 2025-03-11
Payer: MEDICARE

## 2025-03-11 DIAGNOSIS — M54.42 ACUTE BILATERAL LOW BACK PAIN WITH BILATERAL SCIATICA: Primary | ICD-10-CM

## 2025-03-11 DIAGNOSIS — M54.41 ACUTE BILATERAL LOW BACK PAIN WITH BILATERAL SCIATICA: Primary | ICD-10-CM

## 2025-03-11 RX ORDER — CYCLOBENZAPRINE HCL 5 MG
5 TABLET ORAL 3 TIMES DAILY PRN
Qty: 21 TABLET | Refills: 0 | Status: SHIPPED | OUTPATIENT
Start: 2025-03-11 | End: 2025-03-18

## 2025-03-11 RX ORDER — CYCLOBENZAPRINE HCL 5 MG
5 TABLET ORAL 3 TIMES DAILY PRN
Qty: 1 TABLET | Refills: 0 | Status: SHIPPED | OUTPATIENT
Start: 2025-03-11 | End: 2025-03-11

## 2025-03-11 NOTE — TELEPHONE ENCOUNTER
I tried reaching out to the patient, still no answer, but I left her a voicemail. I also told the patient that she has a follow up with Dr. Linton on 03/13/2025 as well in that voicemail.    Georgia BECKER ([facility])

## 2025-03-11 NOTE — TELEPHONE ENCOUNTER
I tried calling the patient to let her know that she has an appointment with Dr. Linton on 03/13/2025 but no answer. I left her a message as well.    Georgia BECKER ([facility])

## 2025-03-11 NOTE — TELEPHONE ENCOUNTER
----- Message from Janiya Linton MD sent at 3/10/2025  4:47 PM CDT -----  Regarding: RE: right L4/5 + L5/S1 TF YUNIOR  Okay to set up a virtual appointment or in office appointment to discuss further, have not seen her since June of 2024Dr. Royer  ----- Message -----  From: Georgia Faustin MA  Sent: 3/10/2025  10:42 AM CDT  To: Janiya Linton MD  Subject: right L4/5 + L5/S1 TF YUNIOR                        Janiya Painting:Mita Max was seen in office with complaints of severe low back pain. Dr. Marshall would like to perform lumbar epidural, and Mita Max would like to proceed. Dr. Marshall is requesting for clearance to hold ASA 5 days prior to procedure. Patient Mita Max can resume medication following the procedure. Is it okay for the patient to proceed with this procedure? EXTERNAL REQUEST:If you are in agreement with this request, please sign below and fax back to our clinic. Patient is cleared to proceed with procedure listed above with these anticoagulant protocols. Patient will be instructed when to resume these medication following the procedure. Thank you, _________________________________                       (Please Sign Here)Janiya Painting  (Primary Care)Georgia BECKER ([facility])/Interventional Pain Surgery Scheduler

## 2025-03-11 NOTE — PROGRESS NOTES
"Outpatient Rehab  Physical Therapy Visit    Patient Name: Mita Max  MRN: [id number]  YOB: 1950  Today's Date: 3/11/2025    Therapy Diagnosis:   Encounter Diagnoses   Name Primary?    Chronic bilateral low back pain without sciatica Yes    Weakness of both lower extremities     Bilateral hip pain      Physician: Vlad Dickerson NP    Physician Orders: Eval and Treat  Medical Diagnosis: M54.42,M54.41 (ICD-10-CM) - Acute bilateral low back pain with bilateral sciatic    Visit # / Visits Authorized:  4/ 10 (+eval)   Date of Evaluation:  2/5/2025  Insurance Authorization Period: 2/5/2025 to 12/3/2026  Plan of Care Certification:  2/5/2025 to 4/17/2025      Time In: 1530   Time Out: 1630  Total Time: 60   Total Billable Time: 60 min    Subjective   She is feeling good today. She has minimal low back pain and reports that she was able to climb bleachers and go to the zoo over the weekend..       Objective   No test and measures assessed today.      Treatment: (exercises performed today are bolded)  Therapeutic Exercise: 20 minutes  Nu-step x 8 minutes (lvl 2)  Standing calf stretch lvl 2 3x30"  LTR 1x10  Standing calf raises 2x10  Seated soleus raises, 15# 2x10  Seated alternating DF 2x10  LAQ 2x10ea  Seated Hamstring curls, RTB 2x10  Seated hip adduction 20x  Seated hip abduction 20x  Standing pulldowns 7# 2x10  Seated Hamstring stretch 2x 30"  Seated piriformis stretch 2x30"    Manual Therapy: 8 minutes  IASTM/cupping to (R) anterior tib  Manual resistance & overpressure DF/PF    Balance/Neuromuscular Re-Education: 32 minutes  Citizen of [place] x 10 minutes with active DF  ABC's x 2 trials  Seated TA isometrics 20x  Shotgun 5" hold 1x10  Alternating Standing hip abduction 1x10 ea  Sit to stand with march 2x5  Standing Alternating march to 4 inch step 2x10  Lateral step with cone tap x 3 laps  Ball toss (feet together) x10 tosses  Ball toss x 10 tosses  Walking in // bars without UE x 3 laps    Assessment & " Plan   Assessment: Patient tolerated therapy session good today. Continues with good DF carryover following Polish. Added in more standing exercises to challenege strength and balance with good tolerance. Good amount of fatigue and soreness noted. Patient apprehensive to walk without cane due to fear of falling. She presents wtih good safety awareness and improved balance. Increased ease with sit to stand exericses follwoing verbal and tactile cueing for increased anteriuor trunk lean.  Will inquire response at next session.  Evaluation/Treatment Tolerance: Patient tolerated treatment well  Patient will continue to benefit from skilled outpatient physical therapy to address the deficits listed in the problem list box on initial evaluation, provide pt/family education and to maximize pt's level of independence in the home and community environment.     Patient's spiritual, cultural, and educational needs considered and patient agreeable to plan of care and goals.     Plan: Continue with POC and progress as tolerated    Goals:   Active       Long Term Goals       Patient will demonstrate improved function as indicated by a functional score of 65 on the FOTO.  (Progressing)       Start:  02/05/25    Expected End:  04/17/25            Pt will demonstrate improved pain by reports of less than or equal to 5/10 worst pain on the verbal rating scale in order to progress toward maximal functional ability and improve QOL.   (Progressing)       Start:  02/05/25    Expected End:  04/17/25            patient will improve bilateral lower extremity strength to greater than or equal to 4+/5 MMT for improved functional strength.  (Progressing)       Start:  02/05/25    Expected End:  04/17/25            Patient will improve 5 time sit to stand to less than 15 seconds for improved functional strength and decreased fall risk.  (Progressing)       Start:  02/05/25    Expected End:  04/17/25            Patient will ambulate with  normalized gait mechanics for return to prior level of function.  (Progressing)       Start:  02/05/25    Expected End:  04/17/25               Short Term Goals       Patient will demonstrate independence with HEP in order to progress toward functional independence  (Met)       Start:  02/05/25    Expected End:  04/17/25    Resolved:  03/11/25         Pt will demonstrate improved pain by reports of less than or equal to 7/10 worst pain on the verbal rating scale in order to progress toward maximal functional ability and improve QOL.   (Met)       Start:  02/05/25    Expected End:  04/17/25    Resolved:  03/11/25         Patient will improve bilateral lower extremity strength by 1/2 MMT for improved strength needed for increased ambulation distance.  (Progressing)       Start:  02/05/25    Expected End:  04/17/25            Patient will improve 5 time sit to stand to less than 20 seconds for improved functional strength and decreased fall risk.  (Progressing)       Start:  02/05/25    Expected End:  04/17/25                Maria Luisa Castillo, PT,DPT  03/11/2025

## 2025-03-12 ENCOUNTER — CLINICAL SUPPORT (OUTPATIENT)
Dept: REHABILITATION | Facility: HOSPITAL | Age: 75
End: 2025-03-12
Payer: MEDICARE

## 2025-03-12 DIAGNOSIS — M54.50 CHRONIC BILATERAL LOW BACK PAIN WITHOUT SCIATICA: Primary | ICD-10-CM

## 2025-03-12 DIAGNOSIS — G89.29 CHRONIC BILATERAL LOW BACK PAIN WITHOUT SCIATICA: Primary | ICD-10-CM

## 2025-03-12 DIAGNOSIS — R29.898 WEAKNESS OF BOTH LOWER EXTREMITIES: ICD-10-CM

## 2025-03-12 DIAGNOSIS — M25.552 BILATERAL HIP PAIN: ICD-10-CM

## 2025-03-12 DIAGNOSIS — M25.551 BILATERAL HIP PAIN: ICD-10-CM

## 2025-03-12 PROCEDURE — 97530 THERAPEUTIC ACTIVITIES: CPT | Mod: PN

## 2025-03-12 PROCEDURE — 97112 NEUROMUSCULAR REEDUCATION: CPT | Mod: PN

## 2025-03-12 NOTE — TELEPHONE ENCOUNTER
Sent my chart message that medication was refilled but future refills should go to pain management or pt PCP.

## 2025-03-12 NOTE — TELEPHONE ENCOUNTER
Medication sent; any future refills should be directed to her pain management physician or primary care physician

## 2025-03-12 NOTE — PROGRESS NOTES
"Outpatient Rehab  Physical Therapy Visit    Patient Name: Mita Max  MRN: [id number]  YOB: 1950  Today's Date: 3/13/2025    Therapy Diagnosis:   Encounter Diagnoses   Name Primary?    Chronic bilateral low back pain without sciatica Yes    Weakness of both lower extremities     Bilateral hip pain      Physician: Vlad Dickerson NP    Physician Orders: Eval and Treat  Medical Diagnosis: M54.42,M54.41 (ICD-10-CM) - Acute bilateral low back pain with bilateral sciatic    Visit # / Visits Authorized:  9/ 10 (+eval)   Date of Evaluation:  2/5/2025  Insurance Authorization Period: 2/5/2025 to 12/3/2026  Plan of Care Certification:  2/5/2025 to 4/17/2025      Time In: 1510   Time Out: 1610  Total Time: 60   Total Billable Time: 60 min (billed 45 min 1:1)     Subjective   She reports that she fell at work yesterday. She was distracted in conversation with two other individuals while walking and her toe hit an uneven piece of concrete and ended up falling backwards. She reports no increase in pain. She does note increased pain with soft surfaces..       Objective   No test and measures assessed today.      Treatment: (exercises performed today are bolded)  Therapeutic Exercise: 20 minutes  Nu-step x 8 minutes (lvl 2)  Standing calf stretch lvl 2 3x30"  LTR 1x10  Standing calf raises 2x10  Seated soleus raises, 15# 2x10  Seated alternating DF 2x10  LAQ 2x10ea  Seated Hamstring curls, RTB 2x10  Seated hip adduction 20x  Seated hip abduction 20x  Standing pulldowns 7# 2x10  Seated Hamstring stretch 2x 30"  Seated piriformis stretch 2x30"    Manual Therapy: 8 minutes  IASTM/cupping to (R) anterior tib  Manual resistance & overpressure DF/PF    Balance/Neuromuscular Re-Education: 32 minutes  Estonian x 10 minutes with active DF  ABC's x 2 trials  Seated TA isometrics 20x  Shotgun 5" hold 1x10  Alternating Standing hip abduction 1x10 ea  Sit to stand with march 2x5  Standing Alternating march to 4 inch step " "2x10  Lateral step with cone tap x 3 laps  Ball toss (feet together) x10 tosses  Ball toss x 10 tosses  Tandem stance 3 x30"  Walking in // bars without UE x 3 laps    Assessment & Plan   Assessment: Patient tolerated therapy session good today. Continued with established exercises today with good tolerance. provocation of low back pain during standing marches and sit to stands todyay, but able to complete all repetitions. She is still sore from her recent fall but reports decreased pain. Good improved dorsiflexion carryover during ambulation. Good amount of fatigue and soreness noted. Will inquire response at next session.  Evaluation/Treatment Tolerance: Patient tolerated treatment well  Patient will continue to benefit from skilled outpatient physical therapy to address the deficits listed in the problem list box on initial evaluation, provide pt/family education and to maximize pt's level of independence in the home and community environment.     Patient's spiritual, cultural, and educational needs considered and patient agreeable to plan of care and goals.     Plan: Continue with POC and progress as tolerated    Goals:   Active       Long Term Goals       Patient will demonstrate improved function as indicated by a functional score of 65 on the FOTO.  (Progressing)       Start:  02/05/25    Expected End:  04/17/25            Pt will demonstrate improved pain by reports of less than or equal to 5/10 worst pain on the verbal rating scale in order to progress toward maximal functional ability and improve QOL.   (Progressing)       Start:  02/05/25    Expected End:  04/17/25            patient will improve bilateral lower extremity strength to greater than or equal to 4+/5 MMT for improved functional strength.  (Progressing)       Start:  02/05/25    Expected End:  04/17/25            Patient will improve 5 time sit to stand to less than 15 seconds for improved functional strength and decreased fall risk.  " (Progressing)       Start:  02/05/25    Expected End:  04/17/25            Patient will ambulate with normalized gait mechanics for return to prior level of function.  (Progressing)       Start:  02/05/25    Expected End:  04/17/25               Short Term Goals       Patient will demonstrate independence with HEP in order to progress toward functional independence  (Met)       Start:  02/05/25    Expected End:  04/17/25    Resolved:  03/11/25         Pt will demonstrate improved pain by reports of less than or equal to 7/10 worst pain on the verbal rating scale in order to progress toward maximal functional ability and improve QOL.   (Met)       Start:  02/05/25    Expected End:  04/17/25    Resolved:  03/11/25         Patient will improve bilateral lower extremity strength by 1/2 MMT for improved strength needed for increased ambulation distance.  (Progressing)       Start:  02/05/25    Expected End:  04/17/25            Patient will improve 5 time sit to stand to less than 20 seconds for improved functional strength and decreased fall risk.  (Progressing)       Start:  02/05/25    Expected End:  04/17/25                Maria Luisa Castillo, PT,DPT  03/13/2025

## 2025-03-13 ENCOUNTER — HOSPITAL ENCOUNTER (OUTPATIENT)
Dept: CARDIOLOGY | Facility: HOSPITAL | Age: 75
Discharge: HOME | End: 2025-03-13
Payer: MEDICARE

## 2025-03-13 ENCOUNTER — OFFICE VISIT (OUTPATIENT)
Dept: INTERNAL MEDICINE | Facility: CLINIC | Age: 75
End: 2025-03-13
Payer: MEDICARE

## 2025-03-13 VITALS
WEIGHT: 160.25 LBS | SYSTOLIC BLOOD PRESSURE: 118 MMHG | DIASTOLIC BLOOD PRESSURE: 72 MMHG | TEMPERATURE: 98 F | BODY MASS INDEX: 27.51 KG/M2 | RESPIRATION RATE: 20 BRPM | HEART RATE: 84 BPM | OXYGEN SATURATION: 96 %

## 2025-03-13 DIAGNOSIS — E11.9 TYPE 2 DIABETES MELLITUS WITHOUT COMPLICATION, WITHOUT LONG-TERM CURRENT USE OF INSULIN: ICD-10-CM

## 2025-03-13 DIAGNOSIS — I25.10 CORONARY ARTERY DISEASE INVOLVING NATIVE CORONARY ARTERY OF NATIVE HEART WITHOUT ANGINA PECTORIS: ICD-10-CM

## 2025-03-13 DIAGNOSIS — M25.552 BILATERAL HIP PAIN: ICD-10-CM

## 2025-03-13 DIAGNOSIS — F10.21 ALCOHOL DEPENDENCE IN REMISSION: ICD-10-CM

## 2025-03-13 DIAGNOSIS — F17.219 CIGARETTE NICOTINE DEPENDENCE WITH NICOTINE-INDUCED DISORDER: ICD-10-CM

## 2025-03-13 DIAGNOSIS — R91.8 PULMONARY NODULES: ICD-10-CM

## 2025-03-13 DIAGNOSIS — M54.50 CHRONIC BILATERAL LOW BACK PAIN WITHOUT SCIATICA: Primary | ICD-10-CM

## 2025-03-13 DIAGNOSIS — E11.59 HYPERTENSION ASSOCIATED WITH DIABETES: ICD-10-CM

## 2025-03-13 DIAGNOSIS — M25.551 BILATERAL HIP PAIN: ICD-10-CM

## 2025-03-13 DIAGNOSIS — Z01.818 PRE-OPERATIVE CLEARANCE: ICD-10-CM

## 2025-03-13 DIAGNOSIS — G89.29 CHRONIC BILATERAL LOW BACK PAIN WITHOUT SCIATICA: Primary | ICD-10-CM

## 2025-03-13 DIAGNOSIS — J43.9 PULMONARY EMPHYSEMA, UNSPECIFIED EMPHYSEMA TYPE: ICD-10-CM

## 2025-03-13 DIAGNOSIS — I70.0 AORTIC ATHEROSCLEROSIS: ICD-10-CM

## 2025-03-13 DIAGNOSIS — Z12.31 SCREENING MAMMOGRAM FOR BREAST CANCER: ICD-10-CM

## 2025-03-13 DIAGNOSIS — E11.69 HYPERLIPIDEMIA ASSOCIATED WITH TYPE 2 DIABETES MELLITUS: ICD-10-CM

## 2025-03-13 DIAGNOSIS — Z86.0100 HISTORY OF COLON POLYPS: ICD-10-CM

## 2025-03-13 DIAGNOSIS — I15.2 HYPERTENSION ASSOCIATED WITH DIABETES: ICD-10-CM

## 2025-03-13 DIAGNOSIS — G89.29 CHRONIC BILATERAL LOW BACK PAIN WITHOUT SCIATICA: ICD-10-CM

## 2025-03-13 DIAGNOSIS — M17.0 PRIMARY OSTEOARTHRITIS OF BOTH KNEES: ICD-10-CM

## 2025-03-13 DIAGNOSIS — E78.5 HYPERLIPIDEMIA ASSOCIATED WITH TYPE 2 DIABETES MELLITUS: ICD-10-CM

## 2025-03-13 DIAGNOSIS — M54.50 CHRONIC BILATERAL LOW BACK PAIN WITHOUT SCIATICA: ICD-10-CM

## 2025-03-13 LAB
ALBUMIN SERPL BCP-MCNC: 3.7 G/DL (ref 3.5–5.2)
ALP SERPL-CCNC: 60 U/L (ref 40–150)
ALT SERPL W/O P-5'-P-CCNC: 20 U/L (ref 10–44)
ANION GAP SERPL CALC-SCNC: 11 MMOL/L (ref 8–16)
AST SERPL-CCNC: 25 U/L (ref 10–40)
BASOPHILS # BLD AUTO: 0.06 K/UL (ref 0–0.2)
BASOPHILS NFR BLD: 1.1 % (ref 0–1.9)
BILIRUB SERPL-MCNC: 0.8 MG/DL (ref 0.1–1)
BUN SERPL-MCNC: 11 MG/DL (ref 8–23)
CALCIUM SERPL-MCNC: 9.9 MG/DL (ref 8.7–10.5)
CHLORIDE SERPL-SCNC: 106 MMOL/L (ref 95–110)
CO2 SERPL-SCNC: 23 MMOL/L (ref 23–29)
CREAT SERPL-MCNC: 0.7 MG/DL (ref 0.5–1.4)
DIFFERENTIAL METHOD BLD: ABNORMAL
EOSINOPHIL # BLD AUTO: 0.1 K/UL (ref 0–0.5)
EOSINOPHIL NFR BLD: 2.1 % (ref 0–8)
ERYTHROCYTE [DISTWIDTH] IN BLOOD BY AUTOMATED COUNT: 13 % (ref 11.5–14.5)
EST. GFR  (NO RACE VARIABLE): >60 ML/MIN/1.73 M^2
ESTIMATED AVG GLUCOSE: 120 MG/DL (ref 68–131)
GLUCOSE SERPL-MCNC: 160 MG/DL (ref 70–110)
HBA1C MFR BLD: 5.8 % (ref 4–5.6)
HCT VFR BLD AUTO: 40.3 % (ref 37–48.5)
HGB BLD-MCNC: 13 G/DL (ref 12–16)
IMM GRANULOCYTES # BLD AUTO: 0.01 K/UL (ref 0–0.04)
IMM GRANULOCYTES NFR BLD AUTO: 0.2 % (ref 0–0.5)
LYMPHOCYTES # BLD AUTO: 1.3 K/UL (ref 1–4.8)
LYMPHOCYTES NFR BLD: 23.2 % (ref 18–48)
MCH RBC QN AUTO: 32.3 PG (ref 27–31)
MCHC RBC AUTO-ENTMCNC: 32.3 G/DL (ref 32–36)
MCV RBC AUTO: 100 FL (ref 82–98)
MONOCYTES # BLD AUTO: 0.4 K/UL (ref 0.3–1)
MONOCYTES NFR BLD: 7.2 % (ref 4–15)
NEUTROPHILS # BLD AUTO: 3.8 K/UL (ref 1.8–7.7)
NEUTROPHILS NFR BLD: 66.2 % (ref 38–73)
NRBC BLD-RTO: 0 /100 WBC
OHS QRS DURATION: 94 MS
OHS QTC CALCULATION: 430 MS
PLATELET # BLD AUTO: 317 K/UL (ref 150–450)
PMV BLD AUTO: 9.3 FL (ref 9.2–12.9)
POTASSIUM SERPL-SCNC: 3.8 MMOL/L (ref 3.5–5.1)
PROT SERPL-MCNC: 6.9 G/DL (ref 6–8.4)
RBC # BLD AUTO: 4.03 M/UL (ref 4–5.4)
SODIUM SERPL-SCNC: 140 MMOL/L (ref 136–145)
WBC # BLD AUTO: 5.7 K/UL (ref 3.9–12.7)

## 2025-03-13 PROCEDURE — 80053 COMPREHEN METABOLIC PANEL: CPT | Performed by: FAMILY MEDICINE

## 2025-03-13 PROCEDURE — 83036 HEMOGLOBIN GLYCOSYLATED A1C: CPT | Performed by: FAMILY MEDICINE

## 2025-03-13 PROCEDURE — 85025 COMPLETE CBC W/AUTO DIFF WBC: CPT | Performed by: FAMILY MEDICINE

## 2025-03-13 PROCEDURE — 93005 ELECTROCARDIOGRAM TRACING: CPT

## 2025-03-13 PROCEDURE — 99999 PR PBB SHADOW E&M-EST. PATIENT-LVL V: CPT | Mod: PBBFAC,,, | Performed by: FAMILY MEDICINE

## 2025-03-13 PROCEDURE — 93010 ELECTROCARDIOGRAM REPORT: CPT | Mod: ,,, | Performed by: INTERNAL MEDICINE

## 2025-03-13 NOTE — PROGRESS NOTES
Subjective:       Patient ID: Mita Max is a 74 y.o. female presents with Problem List[1]     Chief Complaint: Pre-op Exam    74-year-old female patient with Patient Active Problem List:     Type 2 diabetes mellitus without complication, without long-term current use of insulin     Alcohol dependence in remission     Hypertension associated with diabetes     Chronic allergic rhinitis     History of colon polyps     Hyperlipidemia associated with type 2 diabetes mellitus     Primary osteoarthritis of both knees     Cigarette nicotine dependence with nicotine-induced disorder     Emphysema lung     Pulmonary nodules     Aortic atherosclerosis     Coronary artery disease involving native coronary artery of native heart without angina pectoris     Smokers' cough     Diabetic cataract     Chronic bilateral low back pain without sciatica     Weakness of both lower extremities     Bilateral hip pain  Here for preop clearance for YUNIOR secondary to DJD of the lumbar spine causing chronic low back pain and bilateral hip pain occasionally radiating to the right leg, patient is scheduled for YUNIOR by Dr. Dooley  on 03/27/2025  Denies of any problems with bleeding or bruising, chest tightness or difficulty breathing, cough, fever with chills nausea vomiting.   Patient continues to smoke 1 pack of cigarettes daily and has been trying to take patches over-the-counter to help quit smoking, denies of any trouble with breathing      Review of Systems   Constitutional:  Negative for chills, fatigue and fever.   Eyes:  Negative for visual disturbance.   Respiratory:  Negative for cough, chest tightness, shortness of breath and wheezing.    Cardiovascular:  Negative for chest pain and leg swelling.   Gastrointestinal:  Negative for abdominal pain, nausea and vomiting.   Genitourinary:  Negative for dysuria.   Musculoskeletal:  Positive for arthralgias, back pain and gait problem. Negative for myalgias.   Skin:  Negative for  rash.   Neurological:  Positive for numbness. Negative for weakness, light-headedness and headaches.   Psychiatric/Behavioral:  Negative for sleep disturbance.          /72 (BP Location: Right arm, Patient Position: Sitting)   Pulse 84   Temp 98.4 °F (36.9 °C) (Temporal)   Resp 20   Wt 72.7 kg (160 lb 4.4 oz)   SpO2 96%   BMI 27.51 kg/m²   Objective:      Physical Exam  Constitutional:       Appearance: She is well-developed.   HENT:      Head: Normocephalic and atraumatic.   Cardiovascular:      Rate and Rhythm: Normal rate and regular rhythm.      Heart sounds: Normal heart sounds. No murmur heard.  Pulmonary:      Effort: Pulmonary effort is normal.      Breath sounds: Normal breath sounds. No wheezing.   Abdominal:      General: Bowel sounds are normal.      Palpations: Abdomen is soft.      Tenderness: There is no abdominal tenderness.   Musculoskeletal:         General: Tenderness present.      Comments: Positive for paraspinal lumbar muscle tenderness in the midline and bilateral hip tenderness   Skin:     General: Skin is warm and dry.      Findings: No rash.   Neurological:      Mental Status: She is alert and oriented to person, place, and time.   Psychiatric:         Mood and Affect: Mood normal.         Hospital Outpatient Visit on 03/13/2025   Component Date Value Ref Range Status    QRS Duration 03/13/2025 94  ms Final    [facility] QTC Calculation 03/13/2025 430  ms Final   Office Visit on 03/13/2025   Component Date Value Ref Range Status    Hemoglobin A1C 03/13/2025 5.8 (H)  4.0 - 5.6 % Final    Comment: ADA Screening Guidelines:  5.7-6.4%  Consistent with prediabetes  >or=6.5%  Consistent with diabetes    High levels of fetal hemoglobin interfere with the HbA1C  assay. Heterozygous hemoglobin variants (HbS, HgC, etc)do  not significantly interfere with this assay.   However, presence of multiple variants may affect accuracy.      Estimated Avg Glucose 03/13/2025 120  68 - 131 mg/dL Final     Sodium 03/13/2025 140  136 - 145 mmol/L Final    Potassium 03/13/2025 3.8  3.5 - 5.1 mmol/L Final    Chloride 03/13/2025 106  95 - 110 mmol/L Final    CO2 03/13/2025 23  23 - 29 mmol/L Final    Glucose 03/13/2025 160 (H)  70 - 110 mg/dL Final    BUN 03/13/2025 11  8 - 23 mg/dL Final    Creatinine 03/13/2025 0.7  0.5 - 1.4 mg/dL Final    Calcium 03/13/2025 9.9  8.7 - 10.5 mg/dL Final    Total Protein 03/13/2025 6.9  6.0 - 8.4 g/dL Final    Albumin 03/13/2025 3.7  3.5 - 5.2 g/dL Final    Total Bilirubin 03/13/2025 0.8  0.1 - 1.0 mg/dL Final    Comment: For infants and newborns, interpretation of results should be based  on gestational age, weight and in agreement with clinical  observations.    Premature Infant recommended reference ranges:  Up to 24 hours.............<8.0 mg/dL  Up to 48 hours............<12.0 mg/dL  3-5 days..................<15.0 mg/dL  6-29 days.................<15.0 mg/dL      Alkaline Phosphatase 03/13/2025 60  40 - 150 U/L Final    AST 03/13/2025 25  10 - 40 U/L Final    ALT 03/13/2025 20  10 - 44 U/L Final    eGFR 03/13/2025 >60.0  >60 mL/min/1.73 m^2 Final    Anion Gap 03/13/2025 11  8 - 16 mmol/L Final    WBC 03/13/2025 5.70  3.90 - 12.70 K/uL Final    RBC 03/13/2025 4.03  4.00 - 5.40 M/uL Final    Hemoglobin 03/13/2025 13.0  12.0 - 16.0 g/dL Final    Hematocrit 03/13/2025 40.3  37.0 - 48.5 % Final    MCV 03/13/2025 100 (H)  82 - 98 fL Final    MCH 03/13/2025 32.3 (H)  27.0 - 31.0 pg Final    MCHC 03/13/2025 32.3  32.0 - 36.0 g/dL Final    RDW 03/13/2025 13.0  11.5 - 14.5 % Final    Platelets 03/13/2025 317  150 - 450 K/uL Final    MPV 03/13/2025 9.3  9.2 - 12.9 fL Final    Immature Granulocytes 03/13/2025 0.2  0.0 - 0.5 % Final    Gran # (ANC) 03/13/2025 3.8  1.8 - 7.7 K/uL Final    Immature Grans (Abs) 03/13/2025 0.01  0.00 - 0.04 K/uL Final    Comment: Mild elevation in immature granulocytes is non specific and   can be seen in a variety of conditions including stress response,   acute  inflammation, trauma and pregnancy. Correlation with other   laboratory and clinical findings is essential.      Lymph # 03/13/2025 1.3  1.0 - 4.8 K/uL Final    Mono # 03/13/2025 0.4  0.3 - 1.0 K/uL Final    Eos # 03/13/2025 0.1  0.0 - 0.5 K/uL Final    Baso # 03/13/2025 0.06  0.00 - 0.20 K/uL Final    nRBC 03/13/2025 0  0 /100 WBC Final    Gran % 03/13/2025 66.2  38.0 - 73.0 % Final    Lymph % 03/13/2025 23.2  18.0 - 48.0 % Final    Mono % 03/13/2025 7.2  4.0 - 15.0 % Final    Eosinophil % 03/13/2025 2.1  0.0 - 8.0 % Final    Basophil % 03/13/2025 1.1  0.0 - 1.9 % Final    Differential Method 03/13/2025 Automated   Final     Results for orders placed or performed during the hospital encounter of 03/13/25   EKG 12-lead    Collection Time: 03/13/25 11:15 AM   Result Value Ref Range    QRS Duration 94 ms    [facility] QTC Calculation 430 ms    Narrative    Test Reason : M54.50,G89.29,Z01.818,    Vent. Rate :  69 BPM     Atrial Rate :  69 BPM     P-R Int : 150 ms          QRS Dur :  94 ms      QT Int : 402 ms       P-R-T Axes :  79  92  67 degrees    QTcB Int : 430 ms    Normal sinus rhythm  Rightward axis  Borderline Abnormal ECG  No previous ECGs available  Confirmed by Hammad Henriquez ([id number]) on 3/13/2025 4:51:27 PM    Referred By: MIGUEL KERR           Confirmed By: Hammad Henriquez      Assessment/Plan:   1. Chronic bilateral low back pain without sciatica  -     CBC Auto Differential; Future; Expected date: 03/13/2025  -     Comprehensive Metabolic Panel; Future; Expected date: 03/13/2025  -     EKG 12-lead; Future  2. Bilateral hip pain  3. Pre-operative clearance  -     CBC Auto Differential; Future; Expected date: 03/13/2025  -     Comprehensive Metabolic Panel; Future; Expected date: 03/13/2025  -     EKG 12-lead; Future    Will check further labs including EKG for further evaluation  Patient seems to be doing well, will complete preop clearance after reviewing labs and will send it to Dr. George     Labs look stable  including EKG  Patient is at low risk for the proposed surgery      4. Hypertension associated with diabetes  -     Comprehensive Metabolic Panel; Future; Expected date: 03/13/2025  Blood pressure is stable currently on amlodipine 10 mg Avapro 300 mg    5. Hyperlipidemia associated with type 2 diabetes mellitus  Currently taking pravastatin 40 mg daily    6. Type 2 diabetes mellitus without complication, without long-term current use of insulin  -     Hemoglobin A1C; Future; Expected date: 03/13/2025  Stable with diet changes  Strict lifestyle changes recommended with 1800 ADA low-fat and low-cholesterol diet and exercise 30 minutes daily      7. Primary osteoarthritis of both knees  Graded exercise regimen recommended, continue using diclofenac gel    8. Cigarette nicotine dependence with nicotine-induced disorder  -     CT Chest Lung Screening Low Dose; Future; Expected date: 03/13/2025  -     Ambulatory referral/consult to Smoking Cessation Program; Future; Expected date: 03/20/2025  9. Pulmonary emphysema, unspecified emphysema type  Overview:  3/01/2023 CT scan- Lungs: No significant change in multiple bilateral both calcified and noncalcified pulmonary nodules.  New left apical focal opacity, likely scarring.  The lungs show findings consistent with emphysema.  10. Pulmonary nodules  Overview:  3/01/2023 CT scan- Lungs: No significant change in multiple bilateral both calcified and noncalcified pulmonary nodules.  New left apical focal opacity, likely scarring.  The lungs show findings consistent with emphysema.       Orders:  -     CT Chest Lung Screening Low Dose; Future; Expected date: 03/13/2025    Stable and asymptomatic  Will plan to get low-dose CT scan for follow-up on lung nodules and emphysema  Encouraged to quit smoking and will refer to smoking cessation program    11. Aortic atherosclerosis  Overview:  3/01/2023 CT scan- Aorta and vasculature: Atherosclerosis including coronary arteries  12.  Coronary artery disease involving native coronary artery of native heart without angina pectoris  Overview:  3/01/2023 CT scan- Aorta and vasculature: Atherosclerosis including coronary arteries.  Stable and asymptomatic  Continue taking aspirin statins    13. History of colon polyps    14. Alcohol dependence in remission  Stable    15. Screening mammogram for breast cancer  -     Mammo Digital Screening Bilat w/ [facility] (XPD); Future; Expected date: 03/13/2025  Due for mammogram     Visit today included increased complexity associated with the care of the episodic problem  addressed and managing the longitudinal care of the patient due to the serious and/or complex managed problem(s) .              [1]   Patient Active Problem List  Diagnosis    Type 2 diabetes mellitus without complication, without long-term current use of insulin    Alcohol dependence in remission    Hypertension associated with diabetes    Chronic allergic rhinitis    History of colon polyps    Hyperlipidemia associated with type 2 diabetes mellitus    Primary osteoarthritis of both knees    Cigarette nicotine dependence with nicotine-induced disorder    Emphysema lung    Pulmonary nodules    Aortic atherosclerosis    Coronary artery disease involving native coronary artery of native heart without angina pectoris    Smokers' cough    Diabetic cataract    Chronic bilateral low back pain without sciatica    Weakness of both lower extremities    Bilateral hip pain

## 2025-03-14 ENCOUNTER — RESULTS FOLLOW-UP (OUTPATIENT)
Dept: INTERNAL MEDICINE | Facility: CLINIC | Age: 75
End: 2025-03-14

## 2025-03-14 ENCOUNTER — CLINICAL SUPPORT (OUTPATIENT)
Dept: REHABILITATION | Facility: HOSPITAL | Age: 75
End: 2025-03-14
Payer: MEDICARE

## 2025-03-14 DIAGNOSIS — M25.551 BILATERAL HIP PAIN: ICD-10-CM

## 2025-03-14 DIAGNOSIS — G89.29 CHRONIC BILATERAL LOW BACK PAIN WITHOUT SCIATICA: Primary | ICD-10-CM

## 2025-03-14 DIAGNOSIS — R29.898 WEAKNESS OF BOTH LOWER EXTREMITIES: ICD-10-CM

## 2025-03-14 DIAGNOSIS — M54.50 CHRONIC BILATERAL LOW BACK PAIN WITHOUT SCIATICA: Primary | ICD-10-CM

## 2025-03-14 DIAGNOSIS — M25.552 BILATERAL HIP PAIN: ICD-10-CM

## 2025-03-14 PROCEDURE — 97110 THERAPEUTIC EXERCISES: CPT | Mod: PN

## 2025-03-14 PROCEDURE — 97112 NEUROMUSCULAR REEDUCATION: CPT | Mod: PN

## 2025-03-14 PROCEDURE — 97140 MANUAL THERAPY 1/> REGIONS: CPT | Mod: PN

## 2025-03-14 NOTE — PROGRESS NOTES
"Outpatient Rehab  Physical Therapy Visit    Patient Name: Mita Max  MRN: [id number]  YOB: 1950  Today's Date: 3/14/2025    Therapy Diagnosis:   Encounter Diagnoses   Name Primary?    Chronic bilateral low back pain without sciatica Yes    Weakness of both lower extremities     Bilateral hip pain      Physician: Vlad Dickerson NP    Physician Orders: Eval and Treat  Medical Diagnosis: M54.42,M54.41 (ICD-10-CM) - Acute bilateral low back pain with bilateral sciatic    Visit # / Visits Authorized:  10/ 10 (+eval)   Date of Evaluation:  2/5/2025  Insurance Authorization Period: 2/5/2025 to 12/3/2026  Plan of Care Certification:  2/5/2025 to 4/17/2025      Time In: 1040   Time Out: 1140  Total Time: 60   Total Billable Time: 60 min     Subjective   Feeling good overall. Slept on a different mattress that made her back not hurt as much. Still has most trouble with walking after getting up from prolonged static postion..       Objective   No test and measures assessed today.      Treatment: (exercises performed today are bolded)  Therapeutic Exercise: 20 minutes  Nu-step x 8 minutes (lvl 2)  Standing calf stretch lvl 2 3x30"  LTR 1x10  Standing calf raises 2x10  Seated soleus raises, 15# 2x10  Seated alternating DF 2x10  LAQ 2x10ea; 2#  Seated Hamstring curls, GTB 2x10  Seated hip adduction 20x  Seated hip abduction 20x, GTB  Standing pulldowns 7# 2x10  Seated Hamstring stretch 2x 30"  Seated piriformis stretch 2x30"    Manual Therapy: 10 minutes  IASTM/cupping to (R) anterior tib  Manual resistance & overpressure DF/PF    Balance/Neuromuscular Re-Education: 30 minutes  Bel[place]an x 10 minutes with active DF  ABC's x 2 trials  Seated TA isometrics 20x  Shotgun 5" hold 1x10  Alternating Standing hip abduction 1x10 ea  Sit to stand with march 2x5  Standing Alternating march to 4 inch step 2x10  Lateral step with cone tap x 3 laps  Ball toss (feet together) x10 tosses  Ball toss x 10 tosses  Tandem " "stance 3 x30"  Walking in // bars without UE x 3 laps  Brenda ambulation around clinic without AD    Assessment & Plan   Assessment: Patient tolerated therapy session well today. She presents with decreased low back pain and continues with good Dorsiflexion and step length with ambulation. Good imporvements of lower extremity strength. She is still limited by low back pain and apprehension of falling. At end of therapy session, patient was able to ambulate greater than 500 feet without assisted device with minimal assistance. She continued with good step length and foot clearnace, just very nervous. Will inquire response at next session.     Patient will continue to benefit from skilled outpatient physical therapy to address the deficits listed in the problem list box on initial evaluation, provide pt/family education and to maximize pt's level of independence in the home and community environment.     Patient's spiritual, cultural, and educational needs considered and patient agreeable to plan of care and goals.     Plan:      Goals:   Active       Long Term Goals       Patient will demonstrate improved function as indicated by a functional score of 65 on the FOTO.  (Progressing)       Start:  02/05/25    Expected End:  04/17/25            Pt will demonstrate improved pain by reports of less than or equal to 5/10 worst pain on the verbal rating scale in order to progress toward maximal functional ability and improve QOL.   (Progressing)       Start:  02/05/25    Expected End:  04/17/25            patient will improve bilateral lower extremity strength to greater than or equal to 4+/5 MMT for improved functional strength.  (Progressing)       Start:  02/05/25    Expected End:  04/17/25            Patient will improve 5 time sit to stand to less than 15 seconds for improved functional strength and decreased fall risk.  (Progressing)       Start:  02/05/25    Expected End:  04/17/25            Patient will ambulate with " normalized gait mechanics for return to prior level of function.  (Progressing)       Start:  02/05/25    Expected End:  04/17/25               Short Term Goals       Patient will demonstrate independence with HEP in order to progress toward functional independence  (Met)       Start:  02/05/25    Expected End:  04/17/25    Resolved:  03/11/25         Pt will demonstrate improved pain by reports of less than or equal to 7/10 worst pain on the verbal rating scale in order to progress toward maximal functional ability and improve QOL.   (Met)       Start:  02/05/25    Expected End:  04/17/25    Resolved:  03/11/25         Patient will improve bilateral lower extremity strength by 1/2 MMT for improved strength needed for increased ambulation distance.  (Progressing)       Start:  02/05/25    Expected End:  04/17/25            Patient will improve 5 time sit to stand to less than 20 seconds for improved functional strength and decreased fall risk.  (Progressing)       Start:  02/05/25    Expected End:  04/17/25                Maria Luisa Castillo, PT,DPT  03/14/2025

## 2025-03-17 ENCOUNTER — PATIENT MESSAGE (OUTPATIENT)
Dept: INTERNAL MEDICINE | Facility: CLINIC | Age: 75
End: 2025-03-17
Payer: MEDICARE

## 2025-03-17 ENCOUNTER — TELEPHONE (OUTPATIENT)
Dept: INTERNAL MEDICINE | Facility: CLINIC | Age: 75
End: 2025-03-17
Payer: MEDICARE

## 2025-03-17 ENCOUNTER — CLINICAL SUPPORT (OUTPATIENT)
Dept: REHABILITATION | Facility: HOSPITAL | Age: 75
End: 2025-03-17
Payer: MEDICARE

## 2025-03-17 DIAGNOSIS — M25.552 BILATERAL HIP PAIN: ICD-10-CM

## 2025-03-17 DIAGNOSIS — M25.551 BILATERAL HIP PAIN: ICD-10-CM

## 2025-03-17 DIAGNOSIS — R29.898 WEAKNESS OF BOTH LOWER EXTREMITIES: ICD-10-CM

## 2025-03-17 DIAGNOSIS — G89.29 CHRONIC BILATERAL LOW BACK PAIN WITHOUT SCIATICA: Primary | ICD-10-CM

## 2025-03-17 DIAGNOSIS — M54.50 CHRONIC BILATERAL LOW BACK PAIN WITHOUT SCIATICA: Primary | ICD-10-CM

## 2025-03-17 PROCEDURE — 97110 THERAPEUTIC EXERCISES: CPT | Mod: PN

## 2025-03-17 PROCEDURE — 97140 MANUAL THERAPY 1/> REGIONS: CPT | Mod: PN

## 2025-03-17 PROCEDURE — 97112 NEUROMUSCULAR REEDUCATION: CPT | Mod: PN

## 2025-03-17 NOTE — TELEPHONE ENCOUNTER
----- Message from Mattie sent at 3/17/2025  2:07 PM CDT -----  Contact: Mita  .Patient is calling to speak with the nurse regarding questions and concerns  . Reports wanting to know if the pt needs more therapy sessions . Please give patient a call back at [phone]

## 2025-03-17 NOTE — PROGRESS NOTES
"Outpatient Rehab  Physical Therapy Visit    Patient Name: Mita Max  MRN: [id number]  YOB: 1950  Today's Date: 3/18/2025    Therapy Diagnosis:   Encounter Diagnoses   Name Primary?    Chronic bilateral low back pain without sciatica Yes    Weakness of both lower extremities     Bilateral hip pain      Physician: Vlad Dickerson NP    Physician Orders: Eval and Treat  Medical Diagnosis: M54.42,M54.41 (ICD-10-CM) - Acute bilateral low back pain with bilateral sciatic    Visit # / Visits Authorized:  11/30 (+eval)   Date of Evaluation:  2/5/2025  Insurance Authorization Period: 2/5/2025 to 12/3/2026  Plan of Care Certification:  2/5/2025 to 4/17/2025      Time In: 1530   Time Out: 1630  Total Time: 60   Total Billable Time: 60 min     Subjective   Feeling good today. The more firm the surface the less pain and the softer the surface the more pain. Her pain still comes and goes dependent on her movements..       Objective   No test and measures assessed today.      Treatment: (exercises performed today are bolded)  Therapeutic Exercise: 20 minutes  Nu-step x 8 minutes (lvl 2)  Standing calf stretch lvl 2 3x30"  LTR 1x10  Standing calf raises 2x10  Seated soleus raises, 15# 2x10  Seated alternating DF 2x10  LAQ 2x10ea; 2#  Seated Hamstring curls, GTB 2x10  Seated hip adduction 20x  Seated hip abduction 20x, GTB  Standing pulldowns 7# 2x10  Seated Hamstring stretch 2x 30"  Seated piriformis stretch 2x30"    Manual Therapy: 10 minutes  IASTM/cupping to (R) anterior tib  Manual resistance & overpressure DF/PF    Balance/Neuromuscular Re-Education: 30 minutes  Honduran x 10 minutes with active DF  ABC's x 2 trials  Seated TA isometrics 20x  Shotgun 5" hold 1x10  Alternating Standing hip abduction 1x10 ea  Sit to stand with march 2x5  Standing Alternating march to 4 inch step 2x10  Lateral step with cone tap x 3 laps  Ball toss (feet together) x10 tosses  Ball toss x 10 tosses  Tandem stance 3 " "x30"  Walking in // bars without UE x 3 laps  rBenda ambulation around clinic without AD    Assessment & Plan   Assessment: Patient tolerated therapy session well today. She continues to show good overall progress in BLE strength noted with decreased high intensity pain episodes. She also continues with good foot clearance. She is still limited by pain, strength, and apprehension. Continued to practice ambulation in the clinic without AD. Plan to progress this next week.     Patient will continue to benefit from skilled outpatient physical therapy to address the deficits listed in the problem list box on initial evaluation, provide pt/family education and to maximize pt's level of independence in the home and community environment.     Patient's spiritual, cultural, and educational needs considered and patient agreeable to plan of care and goals.     Plan: Continue with POC and progress as tolerated    Goals:   Active       Long Term Goals       Patient will demonstrate improved function as indicated by a functional score of 65 on the FOTO.  (Progressing)       Start:  02/05/25    Expected End:  04/17/25            Pt will demonstrate improved pain by reports of less than or equal to 5/10 worst pain on the verbal rating scale in order to progress toward maximal functional ability and improve QOL.   (Progressing)       Start:  02/05/25    Expected End:  04/17/25            patient will improve bilateral lower extremity strength to greater than or equal to 4+/5 MMT for improved functional strength.  (Progressing)       Start:  02/05/25    Expected End:  04/17/25            Patient will improve 5 time sit to stand to less than 15 seconds for improved functional strength and decreased fall risk.  (Progressing)       Start:  02/05/25    Expected End:  04/17/25            Patient will ambulate with normalized gait mechanics for return to prior level of function.  (Progressing)       Start:  02/05/25    Expected End:  " 04/17/25               Short Term Goals       Patient will demonstrate independence with HEP in order to progress toward functional independence  (Met)       Start:  02/05/25    Expected End:  04/17/25    Resolved:  03/11/25         Pt will demonstrate improved pain by reports of less than or equal to 7/10 worst pain on the verbal rating scale in order to progress toward maximal functional ability and improve QOL.   (Met)       Start:  02/05/25    Expected End:  04/17/25    Resolved:  03/11/25         Patient will improve bilateral lower extremity strength by 1/2 MMT for improved strength needed for increased ambulation distance.  (Progressing)       Start:  02/05/25    Expected End:  04/17/25            Patient will improve 5 time sit to stand to less than 20 seconds for improved functional strength and decreased fall risk.  (Progressing)       Start:  02/05/25    Expected End:  04/17/25                Maria Luisa Castillo, PT,DPT  03/18/2025

## 2025-03-19 ENCOUNTER — CLINICAL SUPPORT (OUTPATIENT)
Dept: REHABILITATION | Facility: HOSPITAL | Age: 75
End: 2025-03-19
Payer: MEDICARE

## 2025-03-19 DIAGNOSIS — M54.50 CHRONIC BILATERAL LOW BACK PAIN WITHOUT SCIATICA: Primary | ICD-10-CM

## 2025-03-19 DIAGNOSIS — R29.898 WEAKNESS OF BOTH LOWER EXTREMITIES: ICD-10-CM

## 2025-03-19 DIAGNOSIS — M25.552 BILATERAL HIP PAIN: ICD-10-CM

## 2025-03-19 DIAGNOSIS — M25.551 BILATERAL HIP PAIN: ICD-10-CM

## 2025-03-19 DIAGNOSIS — G89.29 CHRONIC BILATERAL LOW BACK PAIN WITHOUT SCIATICA: Primary | ICD-10-CM

## 2025-03-19 PROCEDURE — 97140 MANUAL THERAPY 1/> REGIONS: CPT | Mod: PN

## 2025-03-19 PROCEDURE — 97112 NEUROMUSCULAR REEDUCATION: CPT | Mod: PN

## 2025-03-19 PROCEDURE — 97110 THERAPEUTIC EXERCISES: CPT | Mod: PN

## 2025-03-19 NOTE — PROGRESS NOTES
"Outpatient Rehab  Physical Therapy Visit    Patient Name: Mita Max  MRN: [id number]  YOB: 1950  Today's Date: 3/20/2025    Therapy Diagnosis:   Encounter Diagnoses   Name Primary?    Chronic bilateral low back pain without sciatica Yes    Weakness of both lower extremities     Bilateral hip pain      Physician: Vlad Dickerson NP    Physician Orders: Eval and Treat  Medical Diagnosis: M54.42,M54.41 (ICD-10-CM) - Acute bilateral low back pain with bilateral sciatic    Visit # / Visits Authorized:  12/30 (+eval)   Date of Evaluation:  2/5/2025  Insurance Authorization Period: 2/5/2025 to 12/3/2026  Plan of Care Certification:  2/5/2025 to 4/17/2025      Time In: 1530   Time Out: 1630  Total Time: 60   Total Billable Time: 60 min (billed 30 min 1:1)     Subjective   Feeling okay today, having some back pain..       Objective   No test and measures assessed today.      Treatment: (exercises performed today are bolded)  Therapeutic Exercise: 25 minutes  Nu-step x 8 minutes (lvl 2)  Standing calf stretch lvl 2 3x30"  LTR 1x10  Standing calf raises 2x10  Seated soleus raises, 15# 2x10  Seated alternating DF 2x10  LAQ 2x10ea; 2#  Seated Hamstring curls, GTB 2x10  Seated hip adduction 20x  Seated hip abduction 20x, GTB  Standing pulldowns 7# 2x10  Seated Hamstring stretch 2x 30"  Seated piriformis stretch 2x30"    Manual Therapy: 15 minutes  IASTM/cupping to (R) anterior tib  Manual resistance & overpressure DF/PF  STM to lumbar paraspinals     Balance/Neuromuscular Re-Education: 20 minutes  [place] x 10 minutes with active DF  ABC's x 2 trials  Seated TA isometrics 20x  Shotgun 5" hold 1x10  Alternating Standing hip abduction 1x10 ea  Sit to stand with march 2x5  Standing Alternating march to 4 inch step 2x10  Lateral step with cone tap x 3 laps  Ball toss (feet together) x10 tosses  Ball toss x 10 tosses  Tandem stance 3 x30"  Walking in // bars without UE x 3 laps  Brenda ambulation around clinic " without AD    Assessment & Plan   Assessment: Patient tolerated therapy session fair today. She exhibits more pain in her low back today. Regressed session due to pain that focused on mostly sitting exercseis focused on BLE strengthening and DF strengthening. Ended session with STM to low back with much relief following. Will inquire response at next session.     Patient will continue to benefit from skilled outpatient physical therapy to address the deficits listed in the problem list box on initial evaluation, provide pt/family education and to maximize pt's level of independence in the home and community environment.     Patient's spiritual, cultural, and educational needs considered and patient agreeable to plan of care and goals.     Plan: Continue with POC and progress as tolerated    Goals:   Active       Long Term Goals       Patient will demonstrate improved function as indicated by a functional score of 65 on the FOTO.  (Progressing)       Start:  02/05/25    Expected End:  04/17/25            Pt will demonstrate improved pain by reports of less than or equal to 5/10 worst pain on the verbal rating scale in order to progress toward maximal functional ability and improve QOL.   (Progressing)       Start:  02/05/25    Expected End:  04/17/25            patient will improve bilateral lower extremity strength to greater than or equal to 4+/5 MMT for improved functional strength.  (Progressing)       Start:  02/05/25    Expected End:  04/17/25            Patient will improve 5 time sit to stand to less than 15 seconds for improved functional strength and decreased fall risk.  (Progressing)       Start:  02/05/25    Expected End:  04/17/25            Patient will ambulate with normalized gait mechanics for return to prior level of function.  (Progressing)       Start:  02/05/25    Expected End:  04/17/25               Short Term Goals       Patient will demonstrate independence with HEP in order to progress  toward functional independence  (Met)       Start:  02/05/25    Expected End:  04/17/25    Resolved:  03/11/25         Pt will demonstrate improved pain by reports of less than or equal to 7/10 worst pain on the verbal rating scale in order to progress toward maximal functional ability and improve QOL.   (Met)       Start:  02/05/25    Expected End:  04/17/25    Resolved:  03/11/25         Patient will improve bilateral lower extremity strength by 1/2 MMT for improved strength needed for increased ambulation distance.  (Progressing)       Start:  02/05/25    Expected End:  04/17/25            Patient will improve 5 time sit to stand to less than 20 seconds for improved functional strength and decreased fall risk.  (Progressing)       Start:  02/05/25    Expected End:  04/17/25                Maria Luisa Castillo, PT,DPT  03/20/2025

## 2025-03-24 ENCOUNTER — CLINICAL SUPPORT (OUTPATIENT)
Dept: REHABILITATION | Facility: HOSPITAL | Age: 75
End: 2025-03-24
Payer: MEDICARE

## 2025-03-24 DIAGNOSIS — R29.898 WEAKNESS OF BOTH LOWER EXTREMITIES: ICD-10-CM

## 2025-03-24 DIAGNOSIS — G89.29 CHRONIC BILATERAL LOW BACK PAIN WITHOUT SCIATICA: Primary | ICD-10-CM

## 2025-03-24 DIAGNOSIS — M25.552 BILATERAL HIP PAIN: ICD-10-CM

## 2025-03-24 DIAGNOSIS — M25.551 BILATERAL HIP PAIN: ICD-10-CM

## 2025-03-24 DIAGNOSIS — M54.50 CHRONIC BILATERAL LOW BACK PAIN WITHOUT SCIATICA: Primary | ICD-10-CM

## 2025-03-24 PROCEDURE — 97112 NEUROMUSCULAR REEDUCATION: CPT | Mod: PN

## 2025-03-24 PROCEDURE — 97110 THERAPEUTIC EXERCISES: CPT | Mod: PN

## 2025-03-24 NOTE — PROGRESS NOTES
"Outpatient Rehab  Physical Therapy Visit    Patient Name: Mita Max  MRN: [id number]  YOB: 1950  Today's Date: 3/24/2025    Therapy Diagnosis:   Encounter Diagnoses   Name Primary?    Chronic bilateral low back pain without sciatica Yes    Weakness of both lower extremities     Bilateral hip pain      Physician: Vlad Dickerson NP    Physician Orders: Eval and Treat  Medical Diagnosis: M54.42,M54.41 (ICD-10-CM) - Acute bilateral low back pain with bilateral sciatic    Visit # / Visits Authorized:  12/30 (+eval)   Date of Evaluation:  2/5/2025  Insurance Authorization Period: 2/5/2025 to 12/3/2026  Plan of Care Certification:  2/5/2025 to 4/17/2025      Time In: 0950   Time Out: 1100  Total Time: 70   Total Billable Time: 60 min (billed 30 min 1:1)     Subjective   she is having increased low back pain today, mostly since she had to get in and out of her car..       Objective   No test and measures assessed today.      Treatment: (exercises performed today are bolded)  Therapeutic Exercise: 30 minutes  Nu-step x 8 minutes (lvl 2)  Ankle inversion/eversion 1x10ea  Standing calf stretch lvl 2 3x30"  Standing calf raises 2x10  Seated soleus raises, 15# 2x10  Seated alternating DF 2x10  LAQ 2x10ea; 2#  Seated Hamstring curls, GTB 2x10  Seated hip adduction 20x  Seated hip abduction 20x, GTB  Standing pulldowns 10# 2x10  Seated Hamstring stretch 2x 30"  Seated piriformis stretch 2x30"    Manual Therapy: 10 minutes  IASTM/cupping to (R) anterior tib  Manual resistance & overpressure DF/PF  STM to lumbar paraspinals     Balance/Neuromuscular Re-Education: 20 minutes  Chadian x 10 minutes with active DF  ABC's x 2 trials  Hip 3-way x5 ea  Alternating Standing hip abduction 1x10 ea  Sit to stand with march 2x5  Standing Alternating march to 4 inch step 2x10  Lateral step with cone tap x 3 laps  Ball toss (feet together) x10 tosses  Ball toss x 10 tosses  Tandem stance 3 x30"  Walking in // bars without " UE x 3 laps  Brenda ambulation around clinic without AD    Assessment & Plan   Assessment: Patient tolerated therapy session good today. She presents with low back pain, but not as much as last time. Able to slowly progress her back into her normal therapy regimen. Still with much pain and difficutly with standing on right leg during single leg exercises. still very apprehensive about ambulating without cane due to fear of falling. Will inquire response at next session.  Evaluation/Treatment Tolerance: Patient tolerated treatment well  Patient will continue to benefit from skilled outpatient physical therapy to address the deficits listed in the problem list box on initial evaluation, provide pt/family education and to maximize pt's level of independence in the home and community environment.     Patient's spiritual, cultural, and educational needs considered and patient agreeable to plan of care and goals.     Plan: Continue with POC and progress as tolerated    Goals:   Active       Long Term Goals       Patient will demonstrate improved function as indicated by a functional score of 65 on the FOTO.  (Progressing)       Start:  02/05/25    Expected End:  04/17/25            Pt will demonstrate improved pain by reports of less than or equal to 5/10 worst pain on the verbal rating scale in order to progress toward maximal functional ability and improve QOL.   (Progressing)       Start:  02/05/25    Expected End:  04/17/25            patient will improve bilateral lower extremity strength to greater than or equal to 4+/5 MMT for improved functional strength.  (Progressing)       Start:  02/05/25    Expected End:  04/17/25            Patient will improve 5 time sit to stand to less than 15 seconds for improved functional strength and decreased fall risk.  (Progressing)       Start:  02/05/25    Expected End:  04/17/25            Patient will ambulate with normalized gait mechanics for return to prior level of function.   (Progressing)       Start:  02/05/25    Expected End:  04/17/25               Short Term Goals       Patient will demonstrate independence with HEP in order to progress toward functional independence  (Met)       Start:  02/05/25    Expected End:  04/17/25    Resolved:  03/11/25         Pt will demonstrate improved pain by reports of less than or equal to 7/10 worst pain on the verbal rating scale in order to progress toward maximal functional ability and improve QOL.   (Met)       Start:  02/05/25    Expected End:  04/17/25    Resolved:  03/11/25         Patient will improve bilateral lower extremity strength by 1/2 MMT for improved strength needed for increased ambulation distance.  (Progressing)       Start:  02/05/25    Expected End:  04/17/25            Patient will improve 5 time sit to stand to less than 20 seconds for improved functional strength and decreased fall risk.  (Progressing)       Start:  02/05/25    Expected End:  04/17/25                Maria Luisa Castillo, PT,DPT  03/24/2025

## 2025-03-26 ENCOUNTER — CLINICAL SUPPORT (OUTPATIENT)
Dept: REHABILITATION | Facility: HOSPITAL | Age: 75
End: 2025-03-26
Payer: MEDICARE

## 2025-03-26 ENCOUNTER — HOSPITAL ENCOUNTER (OUTPATIENT)
Dept: RADIOLOGY | Facility: HOSPITAL | Age: 75
Discharge: HOME | End: 2025-03-26
Attending: FAMILY MEDICINE
Payer: MEDICARE

## 2025-03-26 DIAGNOSIS — M25.551 BILATERAL HIP PAIN: ICD-10-CM

## 2025-03-26 DIAGNOSIS — Z12.31 SCREENING MAMMOGRAM FOR BREAST CANCER: ICD-10-CM

## 2025-03-26 DIAGNOSIS — M25.552 BILATERAL HIP PAIN: ICD-10-CM

## 2025-03-26 DIAGNOSIS — R29.898 WEAKNESS OF BOTH LOWER EXTREMITIES: ICD-10-CM

## 2025-03-26 DIAGNOSIS — M54.50 CHRONIC BILATERAL LOW BACK PAIN WITHOUT SCIATICA: Primary | ICD-10-CM

## 2025-03-26 DIAGNOSIS — G89.29 CHRONIC BILATERAL LOW BACK PAIN WITHOUT SCIATICA: Primary | ICD-10-CM

## 2025-03-26 PROCEDURE — 77067 SCR MAMMO BI INCL CAD: CPT | Mod: TC

## 2025-03-26 PROCEDURE — 97112 NEUROMUSCULAR REEDUCATION: CPT | Mod: PN

## 2025-03-26 PROCEDURE — 77067 SCR MAMMO BI INCL CAD: CPT | Mod: 26,,, | Performed by: RADIOLOGY

## 2025-03-26 PROCEDURE — 97110 THERAPEUTIC EXERCISES: CPT | Mod: PN

## 2025-03-26 PROCEDURE — 77063 BREAST TOMOSYNTHESIS BI: CPT | Mod: 26,,, | Performed by: RADIOLOGY

## 2025-03-26 NOTE — PROGRESS NOTES
"Outpatient Rehab  Physical Therapy Visit    Patient Name: Mita Max  MRN: [id number]  YOB: 1950  Today's Date: 3/26/2025    Therapy Diagnosis:   Encounter Diagnoses   Name Primary?    Chronic bilateral low back pain without sciatica Yes    Weakness of both lower extremities     Bilateral hip pain      Physician: Vlad Dickerson NP    Physician Orders: Eval and Treat  Medical Diagnosis: M54.42,M54.41 (ICD-10-CM) - Acute bilateral low back pain with bilateral sciatic    Visit # / Visits Authorized:  14/30 (+eval)   Date of Evaluation:  2/5/2025  Insurance Authorization Period: 2/5/2025 to 12/3/2026  Plan of Care Certification:  2/5/2025 to 4/17/2025      Time In: 1050   Time Out: 1150  Total Time: 60   Total Billable Time: 60 min (billed 30 min 1:1)     Subjective   She is having increased low back pain today. She had a good day yesterday and slept pretty good..       Objective   No test and measures assessed today.      Treatment: (exercises performed today are bolded)  Therapeutic Exercise: 35 minutes  Nu-step x 8 minutes (lvl 2)  Ankle inversion/eversion 1x10ea  Standing calf stretch lvl 2 3x30"  Standing calf raises 2x10  Seated soleus raises, 15# 2x10  Seated alternating DF 2x10  LAQ 2x10ea; 2.5#  Seated Hamstring curls, GTB 2x10  Seated hip adduction 20x  Seated hip abduction 20x, GTB  Standing pulldowns 10# 2x10  Seated Hamstring stretch 2x 30"  Seated piriformis stretch 2x30"    Manual Therapy: 10 minutes  IASTM/cupping to (R) anterior tib  Manual resistance & overpressure DF/PF  STM to lumbar paraspinals     Balance/Neuromuscular Re-Education: 15 minutes  [place] x 10 minutes with active DF  ABC's x 2 trials  Hip 3-way x5 ea  Sit to stand with march 2x5  Standing Alternating march to 4 inch step 2x10  Lateral step with cone tap x 3 laps  Ball toss (feet together) x10 tosses  Ball toss x 10 tosses  Tandem stance 3 x30"  Walking in // bars without UE x 3 laps  Brenda ambulation around " clinic without AD    Assessment & Plan   Assessment: Patient tolerated therapy session good today. She continues wt minimal low back pain. Still having most difficutly and pain when standing on her right leg. Much pain during first few repetitions of sit to stand but increased ease and decreased pain with more repetitions. Did not walk without cane today due to increased low back pain. At end of session, pt reports decreased pain in her back. Will inquire response at next session.  Evaluation/Treatment Tolerance: Patient tolerated treatment well  Patient will continue to benefit from skilled outpatient physical therapy to address the deficits listed in the problem list box on initial evaluation, provide pt/family education and to maximize pt's level of independence in the home and community environment.     Patient's spiritual, cultural, and educational needs considered and patient agreeable to plan of care and goals.     Plan: Continue with POC and progress as tolerated    Goals:   Active       Long Term Goals       Patient will demonstrate improved function as indicated by a functional score of 65 on the FOTO.  (Progressing)       Start:  02/05/25    Expected End:  04/17/25            Pt will demonstrate improved pain by reports of less than or equal to 5/10 worst pain on the verbal rating scale in order to progress toward maximal functional ability and improve QOL.   (Progressing)       Start:  02/05/25    Expected End:  04/17/25            patient will improve bilateral lower extremity strength to greater than or equal to 4+/5 MMT for improved functional strength.  (Progressing)       Start:  02/05/25    Expected End:  04/17/25            Patient will improve 5 time sit to stand to less than 15 seconds for improved functional strength and decreased fall risk.  (Progressing)       Start:  02/05/25    Expected End:  04/17/25            Patient will ambulate with normalized gait mechanics for return to prior level  of function.  (Progressing)       Start:  02/05/25    Expected End:  04/17/25               Short Term Goals       Patient will demonstrate independence with HEP in order to progress toward functional independence  (Met)       Start:  02/05/25    Expected End:  04/17/25    Resolved:  03/11/25         Pt will demonstrate improved pain by reports of less than or equal to 7/10 worst pain on the verbal rating scale in order to progress toward maximal functional ability and improve QOL.   (Met)       Start:  02/05/25    Expected End:  04/17/25    Resolved:  03/11/25         Patient will improve bilateral lower extremity strength by 1/2 MMT for improved strength needed for increased ambulation distance.  (Progressing)       Start:  02/05/25    Expected End:  04/17/25            Patient will improve 5 time sit to stand to less than 20 seconds for improved functional strength and decreased fall risk.  (Progressing)       Start:  02/05/25    Expected End:  04/17/25                Maria Luisa Castillo, PT,DPT  03/26/2025

## 2025-03-31 ENCOUNTER — CLINICAL SUPPORT (OUTPATIENT)
Dept: REHABILITATION | Facility: HOSPITAL | Age: 75
End: 2025-03-31
Payer: MEDICARE

## 2025-03-31 DIAGNOSIS — R29.898 WEAKNESS OF BOTH LOWER EXTREMITIES: ICD-10-CM

## 2025-03-31 DIAGNOSIS — M25.551 BILATERAL HIP PAIN: ICD-10-CM

## 2025-03-31 DIAGNOSIS — M25.552 BILATERAL HIP PAIN: ICD-10-CM

## 2025-03-31 DIAGNOSIS — M54.50 CHRONIC BILATERAL LOW BACK PAIN WITHOUT SCIATICA: Primary | ICD-10-CM

## 2025-03-31 DIAGNOSIS — G89.29 CHRONIC BILATERAL LOW BACK PAIN WITHOUT SCIATICA: Primary | ICD-10-CM

## 2025-03-31 PROCEDURE — 97140 MANUAL THERAPY 1/> REGIONS: CPT | Mod: PN

## 2025-03-31 PROCEDURE — 97112 NEUROMUSCULAR REEDUCATION: CPT | Mod: PN

## 2025-03-31 PROCEDURE — 97110 THERAPEUTIC EXERCISES: CPT | Mod: PN

## 2025-03-31 NOTE — PROGRESS NOTES
"Outpatient Rehab  Physical Therapy Visit    Patient Name: Mita Max  MRN: [id number]  YOB: 1950  Today's Date: 3/31/2025    Therapy Diagnosis:   Encounter Diagnoses   Name Primary?    Chronic bilateral low back pain without sciatica Yes    Weakness of both lower extremities     Bilateral hip pain        Physician: Vlad Dickerson NP    Physician Orders: Eval and Treat  Medical Diagnosis: M54.42,M54.41 (ICD-10-CM) - Acute bilateral low back pain with bilateral sciatic    Visit # / Visits Authorized:  16/30 (+eval)   Date of Evaluation:  2/5/2025  Insurance Authorization Period: 2/5/2025 to 12/3/2026  Plan of Care Certification:  2/5/2025 to 4/17/2025      Time In: 1510   Time Out: 1610  Total Time: 60   Total Billable Time: 60 min (billed 45 min 1:1)     Subjective   She went on business trip this weekend with no increased low back pain on the car rides. Was very nervous and cautious walking around due to fear of falling; hoever states that when she got home felt that she had significant ease with walking..       Objective   No test and measures assessed today.      Treatment: (exercises performed today are bolded)  Therapeutic Exercise: 35 minutes  Nu-step x 8 minutes (lvl 2)  Ankle inversion/eversion 1x10ea  Standing calf stretch lvl 2 3x30"  Standing calf raises 2x10  Seated soleus raises, 15# 2x10  Seated alternating DF 2x10  LAQ 2x10ea; 2.5#  Seated Hamstring curls, GTB 2x10  Seated hip adduction 20x  Seated hip abduction 20x, GTB  Standing pulldowns 10# 2x10  Seated Hamstring stretch 2x 30"  Seated piriformis stretch 2x30"    Manual Therapy: 10 minutes  IASTM/cupping to (R) anterior tib  Manual resistance & overpressure DF/PF  STM to lumbar paraspinals     Balance/Neuromuscular Re-Education: 15 minutes  [place] Islander x 10 minutes with active DF  ABC's x 2 trials  Hip 3-way x5 ea  Sit to stand with march 2x5  Standing Alternating march to 4 inch step 2x10  Lateral step with cone tap x 3 " "laps  Ball toss (feet together) x10 tosses  Ball toss x 10 tosses  Tandem stance 3 x30"  Walking in // bars without UE x 3 laps  Standing marches 1x10  Standing HS curls 1x10  Standing alternating hip abduction 1x20  Leg press Machine 22# 1x10  Brenda ambulation around clinic without AD    Assessment & Plan   Assessment: Patient tolerated therapy session well today. Decreased low back pain and episodes lasting less in regards to her pain. She still ambulates with cane and much caution causing decreased step length and glo. Continued to progress a little with therapy but limited by fear and some pain. Will continue to benefit from further BLE strengthening for improved gait and ease wtih functional mobility. Will inquire response at next session.     Patient will continue to benefit from skilled outpatient physical therapy to address the deficits listed in the problem list box on initial evaluation, provide pt/family education and to maximize pt's level of independence in the home and community environment.     Patient's spiritual, cultural, and educational needs considered and patient agreeable to plan of care and goals.     Plan: Continue with POC and progress as tolerated    Goals:   Active       Long Term Goals       Patient will demonstrate improved function as indicated by a functional score of 65 on the FOTO.  (Progressing)       Start:  02/05/25    Expected End:  04/17/25            Pt will demonstrate improved pain by reports of less than or equal to 5/10 worst pain on the verbal rating scale in order to progress toward maximal functional ability and improve QOL.   (Progressing)       Start:  02/05/25    Expected End:  04/17/25            patient will improve bilateral lower extremity strength to greater than or equal to 4+/5 MMT for improved functional strength.  (Progressing)       Start:  02/05/25    Expected End:  04/17/25            Patient will improve 5 time sit to stand to less than 15 seconds for " improved functional strength and decreased fall risk.  (Progressing)       Start:  02/05/25    Expected End:  04/17/25            Patient will ambulate with normalized gait mechanics for return to prior level of function.  (Progressing)       Start:  02/05/25    Expected End:  04/17/25               Short Term Goals       Patient will demonstrate independence with HEP in order to progress toward functional independence  (Met)       Start:  02/05/25    Expected End:  04/17/25    Resolved:  03/11/25         Pt will demonstrate improved pain by reports of less than or equal to 7/10 worst pain on the verbal rating scale in order to progress toward maximal functional ability and improve QOL.   (Met)       Start:  02/05/25    Expected End:  04/17/25    Resolved:  03/11/25         Patient will improve bilateral lower extremity strength by 1/2 MMT for improved strength needed for increased ambulation distance.  (Progressing)       Start:  02/05/25    Expected End:  04/17/25            Patient will improve 5 time sit to stand to less than 20 seconds for improved functional strength and decreased fall risk.  (Progressing)       Start:  02/05/25    Expected End:  04/17/25                Maria Luisa Castillo, PT,DPT  03/31/2025

## 2025-04-01 ENCOUNTER — PATIENT MESSAGE (OUTPATIENT)
Dept: INTERNAL MEDICINE | Facility: CLINIC | Age: 75
End: 2025-04-01
Payer: MEDICARE

## 2025-04-01 DIAGNOSIS — M62.830 MUSCLE SPASM OF BACK: ICD-10-CM

## 2025-04-01 RX ORDER — CYCLOBENZAPRINE HCL 5 MG
5 TABLET ORAL 3 TIMES DAILY PRN
Qty: 21 TABLET | Refills: 0 | Status: CANCELLED | OUTPATIENT
Start: 2025-04-01 | End: 2025-04-08

## 2025-04-01 RX ORDER — CYCLOBENZAPRINE HCL 5 MG
5 TABLET ORAL 2 TIMES DAILY PRN
Qty: 60 TABLET | Refills: 0 | Status: SHIPPED | OUTPATIENT
Start: 2025-04-01

## 2025-04-01 NOTE — TELEPHONE ENCOUNTER
Pt is requesting for a refill of: cyclobenzaprine (FLEXERIL) 5 MG tablet   Last filled:3/11/25  Last encounter: 3/06/25  Up coming apt: n/a   Pharmacy:[id number] - [place] - [id number] AT [facility] [address]

## 2025-04-02 ENCOUNTER — CLINICAL SUPPORT (OUTPATIENT)
Dept: REHABILITATION | Facility: HOSPITAL | Age: 75
End: 2025-04-02
Payer: MEDICARE

## 2025-04-02 DIAGNOSIS — M25.551 BILATERAL HIP PAIN: ICD-10-CM

## 2025-04-02 DIAGNOSIS — G89.29 CHRONIC BILATERAL LOW BACK PAIN WITHOUT SCIATICA: Primary | ICD-10-CM

## 2025-04-02 DIAGNOSIS — M25.552 BILATERAL HIP PAIN: ICD-10-CM

## 2025-04-02 DIAGNOSIS — M54.50 CHRONIC BILATERAL LOW BACK PAIN WITHOUT SCIATICA: Primary | ICD-10-CM

## 2025-04-02 DIAGNOSIS — R29.898 WEAKNESS OF BOTH LOWER EXTREMITIES: ICD-10-CM

## 2025-04-02 PROCEDURE — 97110 THERAPEUTIC EXERCISES: CPT | Mod: PN

## 2025-04-02 PROCEDURE — 97140 MANUAL THERAPY 1/> REGIONS: CPT | Mod: PN

## 2025-04-02 PROCEDURE — 97112 NEUROMUSCULAR REEDUCATION: CPT | Mod: PN

## 2025-04-02 NOTE — PROGRESS NOTES
"Outpatient Rehab  Physical Therapy Visit    Patient Name: Mita Max  MRN: [id number]  YOB: 1950  Today's Date: 4/2/2025    Therapy Diagnosis:   Encounter Diagnoses   Name Primary?    Chronic bilateral low back pain without sciatica Yes    Weakness of both lower extremities     Bilateral hip pain          Physician: Vlad Dickerson NP    Physician Orders: Eval and Treat  Medical Diagnosis: M54.42,M54.41 (ICD-10-CM) - Acute bilateral low back pain with bilateral sciatic    Visit # / Visits Authorized:  16/30 (+eval)   Date of Evaluation:  2/5/2025  Insurance Authorization Period: 2/5/2025 to 12/3/2026  Plan of Care Certification:  2/5/2025 to 4/17/2025      Time In: 1430   Time Out: 1530  Total Time: 60   Total Billable Time: 60 min (billed 45 min 1:1)     Subjective   Still feeling better overall. She reports that she forgets her cane sometimes while in her home and is able to move around without her cane, but states is "gimpy"..       Objective   No test and measures assessed today.      Treatment: (exercises performed today are bolded)  Therapeutic Exercise: 35 minutes  Nu-step x 8 minutes (lvl 2)  Ankle inversion/eversion 1x10ea  Standing calf stretch lvl 2 3x30"  Standing calf raises 2x10  Seated soleus raises, 15# 2x10  Seated alternating DF 2x10  LAQ 2x10ea; 3#  Seated Hamstring curls, GTB 2x10  Seated hip adduction 30x  Seated hip abduction 30x, GTB  Standing pulldowns 10# 2x10  Seated Hamstring stretch 2x 30"  Seated piriformis stretch 2x30"    Manual Therapy: 10 minutes  IASTM/cupping to (R) anterior tib  Manual resistance & overpressure DF/PF  STM to lumbar paraspinals     Balance/Neuromuscular Re-Education: 15 minutes  Swazi x 10 minutes with active DF  ABC's x 2 trials  Hip 3-way x5 ea  Sit to stand with march 2x10  Standing Alternating march to 4 inch step 2x10  Lateral step with cone tap x 3 laps  Ball toss (feet together) x10 tosses  Ball toss x 10 tosses  Tandem stance 3 " "x30"  Walking in // bars without UE x 3 laps  Standing marches 1x10  Standing HS curls 1x10  Standing alternating hip abduction 1x20  Leg press Machine 22# 1x10  Brenda ambulation around clinic without AD    Assessment & Plan   Assessment: Patient continues with decreased pain overall. She continues to ambulate with cane and impaired gait mechanics. She is able to tolerate more standing activities and gentle progressions throughout therapy session. Still lacking full DF during gait, BLE weakness (R>L), and impaired gait patterns. She will continue to benefit from further strengthening for improved functional mobility and decreased fall risk. Good amounts of fatigue and soreness noted at nend of her session.     Patient will continue to benefit from skilled outpatient physical therapy to address the deficits listed in the problem list box on initial evaluation, provide pt/family education and to maximize pt's level of independence in the home and community environment.     Patient's spiritual, cultural, and educational needs considered and patient agreeable to plan of care and goals.     Plan: Continue with POC and progress as tolerated    Goals:   Active       Long Term Goals       Patient will demonstrate improved function as indicated by a functional score of 65 on the FOTO.  (Progressing)       Start:  02/05/25    Expected End:  04/17/25            Pt will demonstrate improved pain by reports of less than or equal to 5/10 worst pain on the verbal rating scale in order to progress toward maximal functional ability and improve QOL.   (Progressing)       Start:  02/05/25    Expected End:  04/17/25            patient will improve bilateral lower extremity strength to greater than or equal to 4+/5 MMT for improved functional strength.  (Progressing)       Start:  02/05/25    Expected End:  04/17/25            Patient will improve 5 time sit to stand to less than 15 seconds for improved functional strength and decreased " fall risk.  (Progressing)       Start:  02/05/25    Expected End:  04/17/25            Patient will ambulate with normalized gait mechanics for return to prior level of function.  (Progressing)       Start:  02/05/25    Expected End:  04/17/25               Short Term Goals       Patient will demonstrate independence with HEP in order to progress toward functional independence  (Met)       Start:  02/05/25    Expected End:  04/17/25    Resolved:  03/11/25         Pt will demonstrate improved pain by reports of less than or equal to 7/10 worst pain on the verbal rating scale in order to progress toward maximal functional ability and improve QOL.   (Met)       Start:  02/05/25    Expected End:  04/17/25    Resolved:  03/11/25         Patient will improve bilateral lower extremity strength by 1/2 MMT for improved strength needed for increased ambulation distance.  (Progressing)       Start:  02/05/25    Expected End:  04/17/25            Patient will improve 5 time sit to stand to less than 20 seconds for improved functional strength and decreased fall risk.  (Progressing)       Start:  02/05/25    Expected End:  04/17/25                Maria Luisa Castillo, PT,DPT  04/02/2025

## 2025-04-04 ENCOUNTER — CLINICAL SUPPORT (OUTPATIENT)
Dept: REHABILITATION | Facility: HOSPITAL | Age: 75
End: 2025-04-04
Payer: MEDICARE

## 2025-04-04 DIAGNOSIS — R29.898 WEAKNESS OF BOTH LOWER EXTREMITIES: ICD-10-CM

## 2025-04-04 DIAGNOSIS — M54.50 CHRONIC BILATERAL LOW BACK PAIN WITHOUT SCIATICA: Primary | ICD-10-CM

## 2025-04-04 DIAGNOSIS — M25.552 BILATERAL HIP PAIN: ICD-10-CM

## 2025-04-04 DIAGNOSIS — M25.551 BILATERAL HIP PAIN: ICD-10-CM

## 2025-04-04 DIAGNOSIS — G89.29 CHRONIC BILATERAL LOW BACK PAIN WITHOUT SCIATICA: Primary | ICD-10-CM

## 2025-04-04 PROCEDURE — 97140 MANUAL THERAPY 1/> REGIONS: CPT | Mod: PN

## 2025-04-04 PROCEDURE — 97112 NEUROMUSCULAR REEDUCATION: CPT | Mod: PN

## 2025-04-04 PROCEDURE — 97110 THERAPEUTIC EXERCISES: CPT | Mod: PN

## 2025-04-04 NOTE — PROGRESS NOTES
"Outpatient Rehab  Physical Therapy Visit    Patient Name: Mita Max  MRN: [id number]  YOB: 1950  Today's Date: 4/4/2025    Therapy Diagnosis:   Encounter Diagnoses   Name Primary?    Chronic bilateral low back pain without sciatica Yes    Weakness of both lower extremities     Bilateral hip pain          Physician: Vlad Dickerson NP    Physician Orders: Eval and Treat  Medical Diagnosis: M54.42,M54.41 (ICD-10-CM) - Acute bilateral low back pain with bilateral sciatic    Visit # / Visits Authorized:  16/30 (+eval)   Date of Evaluation:  2/5/2025  Insurance Authorization Period: 2/5/2025 to 12/3/2026  Plan of Care Certification:  2/5/2025 to 4/17/2025      Time In: 0940   Time Out: 1055  Total Time: 75   Total Billable Time: 75 min (billed 45 min 1:1)     Subjective   Feeling better overall, still with some low back pain and apprehension with walking. She reports that she still is unable to walk without her cane outside of her home..       Objective   No test and measures assessed today.      Treatment: (exercises performed today are bolded)  Therapeutic Exercise: 45 minutes  Nu-step x 8 minutes (lvl 2)  Ankle inversion/eversion 1x10ea  Standing calf stretch lvl 2 3x30"  Standing calf raises 2x10  Seated soleus raises, 15# 2x10  Seated alternating DF 2x10  LAQ 2x10ea; 3#  Seated Hamstring curls, GTB 2x10  Seated hip adduction 30x  Seated hip abduction 30x, GTB  Standing pulldowns 10# 2x10  Seated Hamstring stretch 2x 30"  Seated piriformis stretch 2x30"    Manual Therapy: 10 minutes  IASTM/cupping to (R) anterior tib  Manual resistance & overpressure DF/PF  STM to lumbar paraspinals     Balance/Neuromuscular Re-Education: 20 minutes  Sen[place]alese x 10 minutes with active DF  ABC's x 2 trials  Hip 3-way x5 ea  Sit to stand 2x10  Lateral step ups 1x10  Cone taps 3x10  Lateral step with cone tap x 3 laps  Ball toss (feet together) x10 tosses  Ball toss x 10 tosses  Tandem stance 3 x30"  Walking in // " bars without UE x 3 laps  Standing marches 1x10  Standing HS curls 1x10  Standing alternating hip abduction 1x20  Leg press Machine 22# 1x10  Brenda ambulation around clinic without AD    Assessment & Plan   Assessment: Patient continues with improved low back pain and activity tolerance. She shows good improvements in BLE strength but still lacking strength needed to perform all job duties, climb stairs, and walk independently. She shows progress with standing activities in parallel bars but still requires UE support with most standing exercises. Will continue to benefit from further strengthening for improved functional mobility and balance work for return to independent ambulation.     Patient will continue to benefit from skilled outpatient physical therapy to address the deficits listed in the problem list box on initial evaluation, provide pt/family education and to maximize pt's level of independence in the home and community environment.     Patient's spiritual, cultural, and educational needs considered and patient agreeable to plan of care and goals.     Plan: Continue with POC and progress as tolerated    Goals:   Active       Long Term Goals       Patient will demonstrate improved function as indicated by a functional score of 65 on the FOTO.  (Progressing)       Start:  02/05/25    Expected End:  04/17/25            Pt will demonstrate improved pain by reports of less than or equal to 5/10 worst pain on the verbal rating scale in order to progress toward maximal functional ability and improve QOL.   (Progressing)       Start:  02/05/25    Expected End:  04/17/25            patient will improve bilateral lower extremity strength to greater than or equal to 4+/5 MMT for improved functional strength.  (Progressing)       Start:  02/05/25    Expected End:  04/17/25            Patient will improve 5 time sit to stand to less than 15 seconds for improved functional strength and decreased fall risk.   (Progressing)       Start:  02/05/25    Expected End:  04/17/25            Patient will ambulate with normalized gait mechanics for return to prior level of function.  (Progressing)       Start:  02/05/25    Expected End:  04/17/25               Short Term Goals       Patient will demonstrate independence with HEP in order to progress toward functional independence  (Met)       Start:  02/05/25    Expected End:  04/17/25    Resolved:  03/11/25         Pt will demonstrate improved pain by reports of less than or equal to 7/10 worst pain on the verbal rating scale in order to progress toward maximal functional ability and improve QOL.   (Met)       Start:  02/05/25    Expected End:  04/17/25    Resolved:  03/11/25         Patient will improve bilateral lower extremity strength by 1/2 MMT for improved strength needed for increased ambulation distance.  (Progressing)       Start:  02/05/25    Expected End:  04/17/25            Patient will improve 5 time sit to stand to less than 20 seconds for improved functional strength and decreased fall risk.  (Progressing)       Start:  02/05/25    Expected End:  04/17/25                Maria Luisa Castillo, PT,DPT  04/04/2025

## 2025-04-07 ENCOUNTER — CLINICAL SUPPORT (OUTPATIENT)
Dept: REHABILITATION | Facility: HOSPITAL | Age: 75
End: 2025-04-07
Payer: MEDICARE

## 2025-04-07 DIAGNOSIS — M25.552 BILATERAL HIP PAIN: ICD-10-CM

## 2025-04-07 DIAGNOSIS — R29.898 WEAKNESS OF BOTH LOWER EXTREMITIES: ICD-10-CM

## 2025-04-07 DIAGNOSIS — M54.50 CHRONIC BILATERAL LOW BACK PAIN WITHOUT SCIATICA: Primary | ICD-10-CM

## 2025-04-07 DIAGNOSIS — M25.551 BILATERAL HIP PAIN: ICD-10-CM

## 2025-04-07 DIAGNOSIS — G89.29 CHRONIC BILATERAL LOW BACK PAIN WITHOUT SCIATICA: Primary | ICD-10-CM

## 2025-04-07 PROCEDURE — 97110 THERAPEUTIC EXERCISES: CPT | Mod: PN

## 2025-04-07 PROCEDURE — 97112 NEUROMUSCULAR REEDUCATION: CPT | Mod: PN

## 2025-04-07 PROCEDURE — 97140 MANUAL THERAPY 1/> REGIONS: CPT | Mod: PN

## 2025-04-07 NOTE — PROGRESS NOTES
"Outpatient Rehab  Physical Therapy Visit    Patient Name: Mita Max  MRN: [id number]  YOB: 1950  Today's Date: 4/7/2025    Therapy Diagnosis:   Encounter Diagnoses   Name Primary?    Chronic bilateral low back pain without sciatica Yes    Weakness of both lower extremities     Bilateral hip pain        Physician: Vlad Dickerson NP    Physician Orders: Eval and Treat  Medical Diagnosis: M54.42,M54.41 (ICD-10-CM) - Acute bilateral low back pain with bilateral sciatic    Visit # / Visits Authorized:  18/30 (+eval)   Date of Evaluation:  2/5/2025  Insurance Authorization Period: 2/5/2025 to 12/3/2026  Plan of Care Certification:  2/5/2025 to 4/17/2025      Time In: 1000   Time Out: 1100  Total Time: 60   Total Billable Time: 75 min (billed 45 min 1:1)     Subjective   Feeling okay today. She fell asleep on the couch last night and does not have more pain but is more cautious of having pain. Most pain still comes with initial tranistional movement from sit to stand position..       Objective   No test and measures assessed today.      Treatment: (exercises performed today are bolded)  Therapeutic Exercise: 45 minutes  Nu-step x 8 minutes (lvl 2)  Standing calf stretch 3x30"  Standing calf raises 3x10  Seated soleus raises, 15# 25x  Seated alternating DF 2x10  LAQ 2x10ea; 4#  Seated Hamstring curls, GTB 2x10  Seated hip adduction 30x  Seated hip abduction 30x, GTB  Standing pulldowns 10# 2x10  Seated Hamstring stretch 2x 30"  Seated piriformis stretch 2x30"    Manual Therapy: 10 minutes  IASTM/cupping to (R) anterior tib  Manual resistance & overpressure DF/PF  STM to lumbar paraspinals     Balance/Neuromuscular Re-Education: 20 minutes  Citizen of [place] x 10 minutes with active DF  ABC's x 2 trials  Hip 3-way x5 ea  Sit to stand 2x10  Lateral step ups 1x10  Step ups 1x10  Cone taps 3x10  Lateral step with cone tap x 3 laps  Ball toss (feet together) x10 tosses  Ball toss x 10 tosses  Tandem stance 3 " "x30"  Walking in // bars without UE x 3 laps  Standing marches 1x10  Standing HS curls 1x10  Standing alternating hip abduction 1x20  Leg press Machine 22# 1x10  Brenda ambulation around clinic without AD    Assessment & Plan   Assessment: Patient continues with improved low back pain and activity tolerance. Able to complete both step up and lateral setp ups at the stairs today requiring BUE assistance. Provocation of pain with right single leg stance during hip three way with only one hand hold but able to complete exercise with bilateral upper extremity support. Will continue to benefit from further strengthening for improved functional mobility and balance work for return to independent ambulation.     Patient will continue to benefit from skilled outpatient physical therapy to address the deficits listed in the problem list box on initial evaluation, provide pt/family education and to maximize pt's level of independence in the home and community environment.     Patient's spiritual, cultural, and educational needs considered and patient agreeable to plan of care and goals.     Plan: Continue with POC and progress as tolerated    Goals:   Active       Long Term Goals       Patient will demonstrate improved function as indicated by a functional score of 65 on the FOTO.  (Progressing)       Start:  02/05/25    Expected End:  04/17/25            Pt will demonstrate improved pain by reports of less than or equal to 5/10 worst pain on the verbal rating scale in order to progress toward maximal functional ability and improve QOL.   (Progressing)       Start:  02/05/25    Expected End:  04/17/25            patient will improve bilateral lower extremity strength to greater than or equal to 4+/5 MMT for improved functional strength.  (Progressing)       Start:  02/05/25    Expected End:  04/17/25            Patient will improve 5 time sit to stand to less than 15 seconds for improved functional strength and decreased fall " risk.  (Progressing)       Start:  02/05/25    Expected End:  04/17/25            Patient will ambulate with normalized gait mechanics for return to prior level of function.  (Progressing)       Start:  02/05/25    Expected End:  04/17/25               Short Term Goals       Patient will demonstrate independence with HEP in order to progress toward functional independence  (Met)       Start:  02/05/25    Expected End:  04/17/25    Resolved:  03/11/25         Pt will demonstrate improved pain by reports of less than or equal to 7/10 worst pain on the verbal rating scale in order to progress toward maximal functional ability and improve QOL.   (Met)       Start:  02/05/25    Expected End:  04/17/25    Resolved:  03/11/25         Patient will improve bilateral lower extremity strength by 1/2 MMT for improved strength needed for increased ambulation distance.  (Progressing)       Start:  02/05/25    Expected End:  04/17/25            Patient will improve 5 time sit to stand to less than 20 seconds for improved functional strength and decreased fall risk.  (Progressing)       Start:  02/05/25    Expected End:  04/17/25                Maria Luisa Castillo, PT,DPT  04/07/2025

## 2025-04-09 ENCOUNTER — CLINICAL SUPPORT (OUTPATIENT)
Dept: REHABILITATION | Facility: HOSPITAL | Age: 75
End: 2025-04-09
Payer: MEDICARE

## 2025-04-09 DIAGNOSIS — M25.552 BILATERAL HIP PAIN: ICD-10-CM

## 2025-04-09 DIAGNOSIS — M54.50 CHRONIC BILATERAL LOW BACK PAIN WITHOUT SCIATICA: Primary | ICD-10-CM

## 2025-04-09 DIAGNOSIS — M25.551 BILATERAL HIP PAIN: ICD-10-CM

## 2025-04-09 DIAGNOSIS — R29.898 WEAKNESS OF BOTH LOWER EXTREMITIES: ICD-10-CM

## 2025-04-09 DIAGNOSIS — G89.29 CHRONIC BILATERAL LOW BACK PAIN WITHOUT SCIATICA: Primary | ICD-10-CM

## 2025-04-09 PROCEDURE — 97140 MANUAL THERAPY 1/> REGIONS: CPT | Mod: PN

## 2025-04-09 PROCEDURE — 97110 THERAPEUTIC EXERCISES: CPT | Mod: PN

## 2025-04-09 PROCEDURE — 97112 NEUROMUSCULAR REEDUCATION: CPT | Mod: PN

## 2025-04-10 NOTE — PROGRESS NOTES
"Outpatient Rehab  Physical Therapy Visit    Patient Name: Mita Max  MRN: [id number]  YOB: 1950  Today's Date: 4/10/2025    Therapy Diagnosis:   Encounter Diagnoses   Name Primary?    Chronic bilateral low back pain without sciatica Yes    Weakness of both lower extremities     Bilateral hip pain        Physician: Vlad Dickerson NP    Physician Orders: Eval and Treat  Medical Diagnosis: M54.42,M54.41 (ICD-10-CM) - Acute bilateral low back pain with bilateral sciatic    Visit # / Visits Authorized:  18/30 (+eval)   Date of Evaluation:  2/5/2025  Insurance Authorization Period: 2/5/2025 to 12/3/2026  Plan of Care Certification:  2/5/2025 to 4/17/2025      Time In: 1530   Time Out: 1630  Total Time: 60   Total Billable Time: 60 min     Subjective   Still overall feeling better with less pain, but reorts increased diffculty with right dorsiflexion motions..       Objective   No test and measures assessed today.      Treatment: (exercises performed today are bolded)  Therapeutic Exercise: 30 minutes  Nu-step x 8 minutes (lvl 2)  Standing calf stretch 3x30"  Standing calf raises 3x10  Seated soleus raises, 15# 25x  Seated alternating DF 2x10  LAQ 2x10ea; 4#  Seated Hamstring curls, GTB 2x10  Seated hip adduction 30x  Seated hip abduction 30x, GTB  Standing pulldowns 10# 2x10  Seated Hamstring stretch 2x 30"  Seated piriformis stretch 2x30"    Manual Therapy: 10 minutes  IASTM/cupping to (R) anterior tib  Manual resistance & overpressure DF/PF  STM to lumbar paraspinals     Balance/Neuromuscular Re-Education: 20 minutes  Guinean x 10 minutes with active DF  ABC's x 2 trials  Hip 3-way x5 ea  Sit to stand 2x10  Lateral step ups 1x10  Step ups 1x10  Cone taps 3x10  Lateral step with cone tap x 3 laps  Ball toss (feet together) x10 tosses  Ball toss x 10 tosses  Tandem stance 3 x30"  Walking in // bars without UE x 3 laps  Standing marches 1x10  Standing HS curls 1x10  Standing alternating hip " abduction 1x20  Leg press Machine 22# 1x10  Brenda ambulation around clinic without AD    Assessment & Plan   Assessment: Patient continues with improved low back pain and activity tolerance.Continues with good improvements in strength and plan to progress to more standing and balance exercises at next session.  Will continue to benefit from further strengthening for improved functional mobility and balance work for return to independent ambulation.     Patient will continue to benefit from skilled outpatient physical therapy to address the deficits listed in the problem list box on initial evaluation, provide pt/family education and to maximize pt's level of independence in the home and community environment.     Patient's spiritual, cultural, and educational needs considered and patient agreeable to plan of care and goals.     Plan: Continue with POC and progress as tolerated    Goals:   Active       Long Term Goals       Patient will demonstrate improved function as indicated by a functional score of 65 on the FOTO.  (Progressing)       Start:  02/05/25    Expected End:  04/17/25            Pt will demonstrate improved pain by reports of less than or equal to 5/10 worst pain on the verbal rating scale in order to progress toward maximal functional ability and improve QOL.   (Progressing)       Start:  02/05/25    Expected End:  04/17/25            patient will improve bilateral lower extremity strength to greater than or equal to 4+/5 MMT for improved functional strength.  (Progressing)       Start:  02/05/25    Expected End:  04/17/25            Patient will improve 5 time sit to stand to less than 15 seconds for improved functional strength and decreased fall risk.  (Progressing)       Start:  02/05/25    Expected End:  04/17/25            Patient will ambulate with normalized gait mechanics for return to prior level of function.  (Progressing)       Start:  02/05/25    Expected End:  04/17/25               Short  Term Goals       Patient will demonstrate independence with HEP in order to progress toward functional independence  (Met)       Start:  02/05/25    Expected End:  04/17/25    Resolved:  03/11/25         Pt will demonstrate improved pain by reports of less than or equal to 7/10 worst pain on the verbal rating scale in order to progress toward maximal functional ability and improve QOL.   (Met)       Start:  02/05/25    Expected End:  04/17/25    Resolved:  03/11/25         Patient will improve bilateral lower extremity strength by 1/2 MMT for improved strength needed for increased ambulation distance.  (Progressing)       Start:  02/05/25    Expected End:  04/17/25            Patient will improve 5 time sit to stand to less than 20 seconds for improved functional strength and decreased fall risk.  (Progressing)       Start:  02/05/25    Expected End:  04/17/25                Maria Luisa Castillo, PT,DPT  04/10/2025

## 2025-04-11 ENCOUNTER — CLINICAL SUPPORT (OUTPATIENT)
Dept: REHABILITATION | Facility: HOSPITAL | Age: 75
End: 2025-04-11
Payer: MEDICARE

## 2025-04-11 DIAGNOSIS — M25.552 BILATERAL HIP PAIN: ICD-10-CM

## 2025-04-11 DIAGNOSIS — G89.29 CHRONIC BILATERAL LOW BACK PAIN WITHOUT SCIATICA: Primary | ICD-10-CM

## 2025-04-11 DIAGNOSIS — M25.551 BILATERAL HIP PAIN: ICD-10-CM

## 2025-04-11 DIAGNOSIS — M54.50 CHRONIC BILATERAL LOW BACK PAIN WITHOUT SCIATICA: Primary | ICD-10-CM

## 2025-04-11 DIAGNOSIS — R29.898 WEAKNESS OF BOTH LOWER EXTREMITIES: ICD-10-CM

## 2025-04-11 PROCEDURE — 97112 NEUROMUSCULAR REEDUCATION: CPT | Mod: PN

## 2025-04-11 PROCEDURE — 97110 THERAPEUTIC EXERCISES: CPT | Mod: PN

## 2025-04-12 NOTE — PROGRESS NOTES
"Outpatient Rehab  Physical Therapy Visit    Patient Name: Mita Max  MRN: [id number]  YOB: 1950  Today's Date: 4/12/2025    Therapy Diagnosis:   Encounter Diagnoses   Name Primary?    Chronic bilateral low back pain without sciatica Yes    Weakness of both lower extremities     Bilateral hip pain        Physician: Vlad Dickerson NP    Physician Orders: Eval and Treat  Medical Diagnosis: M54.42,M54.41 (ICD-10-CM) - Acute bilateral low back pain with bilateral sciatic    Visit # / Visits Authorized:  18/30 (+eval)   Date of Evaluation:  2/5/2025  Insurance Authorization Period: 2/5/2025 to 12/3/2026  Plan of Care Certification:  2/5/2025 to 4/17/2025      Time In: 0915   Time Out: 1015  Total Time: 60   Total Billable Time: 60 min (billed 45 minutes 1:1)     Subjective   Still feeling good overall, just anxious about walking without her cane. Still having pain in her low back with sit to stand transitions..       Objective   No test and measures assessed today.      Treatment: (exercises performed today are bolded)  Therapeutic Exercise: 25 minutes  Nustep x 5 minutes (lvl 4)  Calf stretch (lvl 2) 3x 30"  Seated alternating DF 2x10  Standing heel raises 25x  Standing pulldowns 7# 2x10  Seated LAQ 2x10, 5#  Seated IR 20x, RTB  Seated ER 20x, RTB  Seated hip abduction, GTB 30x    Manual Therapy: 10 minutes  IASTM/cupping to (R) anterior tib  Manual resistance & overpressure DF/PF  STM to lumbar paraspinals     Balance/Neuromuscular Re-Education: 25 minutes  Tanzanian x 10 minutes with active DF  ABC's x 2 trials  Sit to stand 2x10  Cone walking x 3laps  Lateral stepping, YTB x3laps  Hip 3-way, 2x10  Standing alternating marches on foam 1x10  Standing cone taps 3x10  Ball toss on foam x20 tosses  Lateral step ups 1x10  Tandem stance 2x20" ea  Walking in // bars without UE x 4 laps  YTB ambulation x1lap    Assessment & Plan   Assessment: Patient tolerated stnading progressions well today. Very " apprehensive to walk without a cane and has sharp pain with sit to stand transitions. Will continue to benefit from further strengthening for improved functional mobility and balance work for return to independent ambulation.     Patient will continue to benefit from skilled outpatient physical therapy to address the deficits listed in the problem list box on initial evaluation, provide pt/family education and to maximize pt's level of independence in the home and community environment.     Patient's spiritual, cultural, and educational needs considered and patient agreeable to plan of care and goals.     Plan: Continue with POC and progress as tolerated    Goals:   Active       Long Term Goals       Patient will demonstrate improved function as indicated by a functional score of 65 on the FOTO.  (Progressing)       Start:  02/05/25    Expected End:  04/17/25            Pt will demonstrate improved pain by reports of less than or equal to 5/10 worst pain on the verbal rating scale in order to progress toward maximal functional ability and improve QOL.   (Progressing)       Start:  02/05/25    Expected End:  04/17/25            patient will improve bilateral lower extremity strength to greater than or equal to 4+/5 MMT for improved functional strength.  (Progressing)       Start:  02/05/25    Expected End:  04/17/25            Patient will improve 5 time sit to stand to less than 15 seconds for improved functional strength and decreased fall risk.  (Progressing)       Start:  02/05/25    Expected End:  04/17/25            Patient will ambulate with normalized gait mechanics for return to prior level of function.  (Progressing)       Start:  02/05/25    Expected End:  04/17/25               Short Term Goals       Patient will demonstrate independence with HEP in order to progress toward functional independence  (Met)       Start:  02/05/25    Expected End:  04/17/25    Resolved:  03/11/25         Pt will demonstrate  improved pain by reports of less than or equal to 7/10 worst pain on the verbal rating scale in order to progress toward maximal functional ability and improve QOL.   (Met)       Start:  02/05/25    Expected End:  04/17/25    Resolved:  03/11/25         Patient will improve bilateral lower extremity strength by 1/2 MMT for improved strength needed for increased ambulation distance.  (Progressing)       Start:  02/05/25    Expected End:  04/17/25            Patient will improve 5 time sit to stand to less than 20 seconds for improved functional strength and decreased fall risk.  (Progressing)       Start:  02/05/25    Expected End:  04/17/25                Maria Luisa Castillo PT,DPT  04/12/2025

## 2025-04-14 ENCOUNTER — CLINICAL SUPPORT (OUTPATIENT)
Dept: REHABILITATION | Facility: HOSPITAL | Age: 75
End: 2025-04-14
Payer: MEDICARE

## 2025-04-14 DIAGNOSIS — M54.50 CHRONIC BILATERAL LOW BACK PAIN WITHOUT SCIATICA: Primary | ICD-10-CM

## 2025-04-14 DIAGNOSIS — M25.552 BILATERAL HIP PAIN: ICD-10-CM

## 2025-04-14 DIAGNOSIS — M25.551 BILATERAL HIP PAIN: ICD-10-CM

## 2025-04-14 DIAGNOSIS — R29.898 WEAKNESS OF BOTH LOWER EXTREMITIES: ICD-10-CM

## 2025-04-14 DIAGNOSIS — G89.29 CHRONIC BILATERAL LOW BACK PAIN WITHOUT SCIATICA: Primary | ICD-10-CM

## 2025-04-14 PROCEDURE — 97110 THERAPEUTIC EXERCISES: CPT | Mod: PN

## 2025-04-14 PROCEDURE — 97140 MANUAL THERAPY 1/> REGIONS: CPT | Mod: PN

## 2025-04-14 PROCEDURE — 97112 NEUROMUSCULAR REEDUCATION: CPT | Mod: PN

## 2025-04-14 NOTE — PROGRESS NOTES
"Outpatient Rehab  Physical Therapy Visit    Patient Name: Mita Max  MRN: [id number]  YOB: 1950  Today's Date: 4/14/2025    Therapy Diagnosis:   Encounter Diagnoses   Name Primary?    Chronic bilateral low back pain without sciatica Yes    Weakness of both lower extremities     Bilateral hip pain        Physician: Vlad Dickerson NP    Physician Orders: Eval and Treat  Medical Diagnosis: M54.42,M54.41 (ICD-10-CM) - Acute bilateral low back pain with bilateral sciatic    Visit # / Visits Authorized:  18/30 (+eval)   Date of Evaluation:  2/5/2025  Insurance Authorization Period: 2/5/2025 to 12/3/2026  Plan of Care Certification:  2/5/2025 to 4/17/2025      Time In: 1340   Time Out: 1440  Total Time: 60   Total Billable Time: 60 min (billed 45 minutes 1:1)     Subjective   Some mild low back pain today, but overall doing good. Has difficulty naviagting a curb without at least one UE support..       Objective   No test and measures assessed today.      Treatment: (exercises performed today are bolded)  Therapeutic Exercise: 25 minutes  Nustep x 5 minutes (lvl 4)  Calf stretch (lvl 2) 3x 30"  Seated alternating DF 2x10  Standing heel raises 25x  Standing pulldowns 7# 2x10  Seated LAQ 2x10, 5#  Seated IR 20x, RTB  Seated ER 20x, RTB  Seated hip abduction, GTB 30x  Seated hip adduction 30x  Seated HS curls, Blue TB 20x    Manual Therapy: 10 minutes  IASTM/cupping to (R) anterior tib  Manual resistance & overpressure DF/PF  STM to lumbar paraspinals     Balance/Neuromuscular Re-Education: 25 minutes  Togolese x 10 minutes with active DF  ABC's x 2 trials  Sit to stand 2x10  Cone walking x 3laps  Lateral stepping, YTB x3laps  Hip 3-way, 2x10  Standing alternating marches on foam 1x10  Standing cone taps 3x10  Ball toss on foam x20 tosses  Ball toss in tandem x20 tosses  Lateral step ups 1x10  Hip abduction 2x10 ea  Tandem stance 2x20" ea  Walking in // bars without UE x 4 laps  YTB ambulation " x1lap    Assessment & Plan   Assessment: Continued to focus on standing hip, core and balance exercises. less spprehension overall. Good amount of fatigue noted at end of session. held Croatian today to see how she tolerated session. Will continue to benefit from further strengthening for improved functional mobility and balance work for return to independent ambulation.     Patient will continue to benefit from skilled outpatient physical therapy to address the deficits listed in the problem list box on initial evaluation, provide pt/family education and to maximize pt's level of independence in the home and community environment.     Patient's spiritual, cultural, and educational needs considered and patient agreeable to plan of care and goals.     Plan: Continue with POC and progress as tolerated    Goals:   Active       Long Term Goals       Patient will demonstrate improved function as indicated by a functional score of 65 on the FOTO.  (Progressing)       Start:  02/05/25    Expected End:  04/17/25            Pt will demonstrate improved pain by reports of less than or equal to 5/10 worst pain on the verbal rating scale in order to progress toward maximal functional ability and improve QOL.   (Progressing)       Start:  02/05/25    Expected End:  04/17/25            patient will improve bilateral lower extremity strength to greater than or equal to 4+/5 MMT for improved functional strength.  (Progressing)       Start:  02/05/25    Expected End:  04/17/25            Patient will improve 5 time sit to stand to less than 15 seconds for improved functional strength and decreased fall risk.  (Progressing)       Start:  02/05/25    Expected End:  04/17/25            Patient will ambulate with normalized gait mechanics for return to prior level of function.  (Progressing)       Start:  02/05/25    Expected End:  04/17/25               Short Term Goals       Patient will demonstrate independence with HEP in order to  progress toward functional independence  (Met)       Start:  02/05/25    Expected End:  04/17/25    Resolved:  03/11/25         Pt will demonstrate improved pain by reports of less than or equal to 7/10 worst pain on the verbal rating scale in order to progress toward maximal functional ability and improve QOL.   (Met)       Start:  02/05/25    Expected End:  04/17/25    Resolved:  03/11/25         Patient will improve bilateral lower extremity strength by 1/2 MMT for improved strength needed for increased ambulation distance.  (Progressing)       Start:  02/05/25    Expected End:  04/17/25            Patient will improve 5 time sit to stand to less than 20 seconds for improved functional strength and decreased fall risk.  (Progressing)       Start:  02/05/25    Expected End:  04/17/25                Maria Luisa Castillo, PT,DPT  04/14/2025

## 2025-04-16 ENCOUNTER — CLINICAL SUPPORT (OUTPATIENT)
Dept: REHABILITATION | Facility: HOSPITAL | Age: 75
End: 2025-04-16
Payer: MEDICARE

## 2025-04-16 DIAGNOSIS — G89.29 CHRONIC BILATERAL LOW BACK PAIN WITHOUT SCIATICA: Primary | ICD-10-CM

## 2025-04-16 DIAGNOSIS — R29.898 WEAKNESS OF BOTH LOWER EXTREMITIES: ICD-10-CM

## 2025-04-16 DIAGNOSIS — M25.552 BILATERAL HIP PAIN: ICD-10-CM

## 2025-04-16 DIAGNOSIS — M25.551 BILATERAL HIP PAIN: ICD-10-CM

## 2025-04-16 DIAGNOSIS — M54.50 CHRONIC BILATERAL LOW BACK PAIN WITHOUT SCIATICA: Primary | ICD-10-CM

## 2025-04-16 PROCEDURE — 97112 NEUROMUSCULAR REEDUCATION: CPT | Mod: PN

## 2025-04-16 PROCEDURE — 97530 THERAPEUTIC ACTIVITIES: CPT | Mod: PN

## 2025-04-16 PROCEDURE — 97110 THERAPEUTIC EXERCISES: CPT | Mod: PN

## 2025-04-16 PROCEDURE — 97140 MANUAL THERAPY 1/> REGIONS: CPT | Mod: PN

## 2025-04-17 NOTE — PROGRESS NOTES
"Outpatient Rehab  Physical Therapy Visit    Patient Name: Mita Max  MRN: [id number]  YOB: 1950  Today's Date: 4/17/2025    Therapy Diagnosis:   Encounter Diagnoses   Name Primary?    Chronic bilateral low back pain without sciatica Yes    Weakness of both lower extremities     Bilateral hip pain        Physician: Vlad Dickerson NP    Physician Orders: Eval and Treat  Medical Diagnosis: M54.42,M54.41 (ICD-10-CM) - Acute bilateral low back pain with bilateral sciatic    Visit # / Visits Authorized:  22/30 (+eval)   Date of Evaluation:  2/5/2025  Insurance Authorization Period: 2/5/2025 to 12/3/2026  Plan of Care Certification:  2/5/2025 to 4/17/2025      Time In: 1510   Time Out: 1610  Total Time: 60   Total Billable Time: 60 minutes     Subjective   Feeling good overall..       Objective   No test and measures assessed today.      Treatment: (exercises performed today are bolded)  Therapeutic Exercise: 10 minutes  Nustep x 5 minutes (lvl 4)  Calf stretch (lvl 2) 3x 30"  Seated alternating DF 2x10  Standing heel raises 25x  Standing pulldowns 7# 2x10  Seated LAQ 2x10, 5#  Seated IR 20x, RTB  Seated ER 20x, RTB  Seated hip abduction, blue TB 30x  Seated hip adduction 30x  Seated HS curls, Blue TB 20x    Manual Therapy: 10 minutes  IASTM/cupping to (R) anterior tib  Manual resistance & overpressure DF/PF  STM to lumbar paraspinals     Balance/Neuromuscular Re-Education: 20 minutes  Colombian x 10 minutes with active DF  ABC's x 2 trials  Sit to stand 2x10  Cone walking x 3laps  Lateral stepping, YTB x3laps  Hip 3-way, 2x10  Standing alternating marches on foam 1x10  Standing cone taps 3x10  Ball toss on foam x20 tosses  Ball toss in tandem x20 tosses  Lateral step ups 1x10  Hip abduction 2x10 ea  Tandem stance 2x20" ea  Walking in // bars without UE x 4 laps  YTB ambulation x1lap    Therapeutic Activity: 20 minutes  Walking around clinic without AD  Sit to stand 2x10    Assessment & Plan "   Assessment: Continued to focus on standing hip, core and balance exercises. Walking around clinic without AD was taken after every few exercises. Good glo with first few laps and no pain. By end of session, was having slight increased low back pain with walking without AD. Good amount of fatigue noted at end of session. Will continue to benefit from further strengthening for improved functional mobility and balance work for return to independent ambulation.  Evaluation/Treatment Tolerance: Patient tolerated treatment well  Patient will continue to benefit from skilled outpatient physical therapy to address the deficits listed in the problem list box on initial evaluation, provide pt/family education and to maximize pt's level of independence in the home and community environment.     Patient's spiritual, cultural, and educational needs considered and patient agreeable to plan of care and goals.     Plan:      Goals:   Active       Long Term Goals       Patient will demonstrate improved function as indicated by a functional score of 65 on the FOTO.  (Progressing)       Start:  02/05/25    Expected End:  04/17/25            Pt will demonstrate improved pain by reports of less than or equal to 5/10 worst pain on the verbal rating scale in order to progress toward maximal functional ability and improve QOL.   (Progressing)       Start:  02/05/25    Expected End:  04/17/25            patient will improve bilateral lower extremity strength to greater than or equal to 4+/5 MMT for improved functional strength.  (Progressing)       Start:  02/05/25    Expected End:  04/17/25            Patient will improve 5 time sit to stand to less than 15 seconds for improved functional strength and decreased fall risk.  (Progressing)       Start:  02/05/25    Expected End:  04/17/25            Patient will ambulate with normalized gait mechanics for return to prior level of function.  (Progressing)       Start:  02/05/25     Expected End:  04/17/25               Short Term Goals       Patient will demonstrate independence with HEP in order to progress toward functional independence  (Met)       Start:  02/05/25    Expected End:  04/17/25    Resolved:  03/11/25         Pt will demonstrate improved pain by reports of less than or equal to 7/10 worst pain on the verbal rating scale in order to progress toward maximal functional ability and improve QOL.   (Met)       Start:  02/05/25    Expected End:  04/17/25    Resolved:  03/11/25         Patient will improve bilateral lower extremity strength by 1/2 MMT for improved strength needed for increased ambulation distance.  (Progressing)       Start:  02/05/25    Expected End:  04/17/25            Patient will improve 5 time sit to stand to less than 20 seconds for improved functional strength and decreased fall risk.  (Progressing)       Start:  02/05/25    Expected End:  04/17/25                Maria Luisa Castillo, PT,DPT  04/17/2025

## 2025-04-21 ENCOUNTER — CLINICAL SUPPORT (OUTPATIENT)
Dept: REHABILITATION | Facility: HOSPITAL | Age: 75
End: 2025-04-21
Payer: MEDICARE

## 2025-04-21 DIAGNOSIS — M25.552 BILATERAL HIP PAIN: ICD-10-CM

## 2025-04-21 DIAGNOSIS — R29.898 WEAKNESS OF BOTH LOWER EXTREMITIES: ICD-10-CM

## 2025-04-21 DIAGNOSIS — M54.50 CHRONIC BILATERAL LOW BACK PAIN WITHOUT SCIATICA: Primary | ICD-10-CM

## 2025-04-21 DIAGNOSIS — M25.551 BILATERAL HIP PAIN: ICD-10-CM

## 2025-04-21 DIAGNOSIS — G89.29 CHRONIC BILATERAL LOW BACK PAIN WITHOUT SCIATICA: Primary | ICD-10-CM

## 2025-04-21 PROCEDURE — 97112 NEUROMUSCULAR REEDUCATION: CPT | Mod: PN

## 2025-04-21 PROCEDURE — 97110 THERAPEUTIC EXERCISES: CPT | Mod: PN

## 2025-04-21 PROCEDURE — 97140 MANUAL THERAPY 1/> REGIONS: CPT | Mod: PN

## 2025-04-21 NOTE — PROGRESS NOTES
"Outpatient Rehab  Physical Therapy Visit    Patient Name: Mita Max  MRN: [id number]  YOB: 1950  Today's Date: 4/21/2025    Therapy Diagnosis:   Encounter Diagnoses   Name Primary?    Chronic bilateral low back pain without sciatica Yes    Weakness of both lower extremities     Bilateral hip pain        Physician: Vlad Dickerson NP    Physician Orders: Eval and Treat  Medical Diagnosis: M54.42,M54.41 (ICD-10-CM) - Acute bilateral low back pain with bilateral sciatic    Visit # / Visits Authorized:  22/30 (+eval)   Date of Evaluation:  2/5/2025  Insurance Authorization Period: 2/5/2025 to 12/3/2026  Plan of Care Certification:  2/5/2025 to 4/17/2025      Time In: 1000   Time Out: 1100  Total Time: 60   Total Billable Time: 60 minutes     Subjective   She is having some low back pain today, less severity but still more than she has been having. She vacuumed yesterday and thinks that is why she is having more pain..       Objective   No test and measures assessed today.      Treatment: (exercises performed today are bolded)  Therapeutic Exercise: 40 minutes  Nustep x 10 minutes (lvl 4)  Calf stretch (lvl 2) 3x 30"  Seated alternating DF 2x10  Standing heel raises 25x  Standing pulldowns 7# 2x10  Seated LAQ 2x10, 5#  Seated IR 20x, RTB  Seated ER 20x, RTB  Seated hip abduction, blue TB 30x  Seated hip adduction 30x  Seated HS curls, Blue TB 20x    Manual Therapy: 10 minutes  IASTM/cupping to (R) anterior tib  Manual resistance & overpressure DF/PF  STM to lumbar paraspinals     Balance/Neuromuscular Re-Education: 10 minutes  Filipino x 10 minutes with active DF  ABC's x 2 trials  Sit to stand 2x10  Cone walking x 3laps  Lateral stepping, YTB x3laps  Hip 3-way, 2x10  Standing alternating marches on foam 1x10  Standing cone taps 3x10  Ball toss on foam x20 tosses  Ball toss in tandem x20 tosses  Lateral step ups 1x10  Hip abduction 2x10 ea  Tandem stance 2x20" ea  Walking in // bars without UE x 4 " laps  YTB ambulation x1lap    Therapeutic Activity: 0 minutes  Walking around clinic without AD  Sit to stand 2x10    Assessment & Plan   Assessment: Patient presents with increased mild low back pain. most exercsies performed in sitting with progressed resistance and weights. No provocation of low back pain noted. increased step length and glo noted with cane today. paln to progress to standing exercises at next session.     Patient will continue to benefit from skilled outpatient physical therapy to address the deficits listed in the problem list box on initial evaluation, provide pt/family education and to maximize pt's level of independence in the home and community environment.     Patient's spiritual, cultural, and educational needs considered and patient agreeable to plan of care and goals.     Plan: Continue with POC and progress as tolerated    Goals:   Active       Long Term Goals       Patient will demonstrate improved function as indicated by a functional score of 65 on the FOTO.  (Progressing)       Start:  02/05/25    Expected End:  04/17/25            Pt will demonstrate improved pain by reports of less than or equal to 5/10 worst pain on the verbal rating scale in order to progress toward maximal functional ability and improve QOL.   (Progressing)       Start:  02/05/25    Expected End:  04/17/25            patient will improve bilateral lower extremity strength to greater than or equal to 4+/5 MMT for improved functional strength.  (Progressing)       Start:  02/05/25    Expected End:  04/17/25            Patient will improve 5 time sit to stand to less than 15 seconds for improved functional strength and decreased fall risk.  (Progressing)       Start:  02/05/25    Expected End:  04/17/25            Patient will ambulate with normalized gait mechanics for return to prior level of function.  (Progressing)       Start:  02/05/25    Expected End:  04/17/25               Short Term Goals        Patient will demonstrate independence with HEP in order to progress toward functional independence  (Met)       Start:  02/05/25    Expected End:  04/17/25    Resolved:  03/11/25         Pt will demonstrate improved pain by reports of less than or equal to 7/10 worst pain on the verbal rating scale in order to progress toward maximal functional ability and improve QOL.   (Met)       Start:  02/05/25    Expected End:  04/17/25    Resolved:  03/11/25         Patient will improve bilateral lower extremity strength by 1/2 MMT for improved strength needed for increased ambulation distance.  (Progressing)       Start:  02/05/25    Expected End:  04/17/25            Patient will improve 5 time sit to stand to less than 20 seconds for improved functional strength and decreased fall risk.  (Progressing)       Start:  02/05/25    Expected End:  04/17/25                Maria Luisa Castillo, PT,DPT  04/21/2025

## 2025-04-23 ENCOUNTER — CLINICAL SUPPORT (OUTPATIENT)
Dept: REHABILITATION | Facility: HOSPITAL | Age: 75
End: 2025-04-23
Payer: MEDICARE

## 2025-04-23 DIAGNOSIS — M25.552 BILATERAL HIP PAIN: ICD-10-CM

## 2025-04-23 DIAGNOSIS — R29.898 WEAKNESS OF BOTH LOWER EXTREMITIES: ICD-10-CM

## 2025-04-23 DIAGNOSIS — G89.29 CHRONIC BILATERAL LOW BACK PAIN WITHOUT SCIATICA: Primary | ICD-10-CM

## 2025-04-23 DIAGNOSIS — M54.50 CHRONIC BILATERAL LOW BACK PAIN WITHOUT SCIATICA: Primary | ICD-10-CM

## 2025-04-23 DIAGNOSIS — M25.551 BILATERAL HIP PAIN: ICD-10-CM

## 2025-04-23 PROCEDURE — 97112 NEUROMUSCULAR REEDUCATION: CPT | Mod: PN

## 2025-04-23 PROCEDURE — 97530 THERAPEUTIC ACTIVITIES: CPT | Mod: PN

## 2025-04-23 PROCEDURE — 97110 THERAPEUTIC EXERCISES: CPT | Mod: PN

## 2025-04-24 ENCOUNTER — PATIENT MESSAGE (OUTPATIENT)
Dept: PAIN MEDICINE | Facility: CLINIC | Age: 75
End: 2025-04-24
Payer: MEDICARE

## 2025-04-24 DIAGNOSIS — M43.16 SPONDYLOLISTHESIS OF LUMBAR REGION: ICD-10-CM

## 2025-04-24 DIAGNOSIS — M54.16 LUMBAR RADICULOPATHY: ICD-10-CM

## 2025-04-24 DIAGNOSIS — M43.16 ANTEROLISTHESIS OF LUMBAR SPINE: ICD-10-CM

## 2025-04-24 DIAGNOSIS — M51.9 LUMBAR DISC DISEASE: ICD-10-CM

## 2025-04-24 DIAGNOSIS — M54.9 DORSALGIA, UNSPECIFIED: ICD-10-CM

## 2025-04-24 DIAGNOSIS — M21.371 RIGHT FOOT DROP: ICD-10-CM

## 2025-04-24 RX ORDER — PREGABALIN 50 MG/1
50 CAPSULE ORAL 2 TIMES DAILY
Qty: 60 CAPSULE | Refills: 1 | Status: SHIPPED | OUTPATIENT
Start: 2025-04-24

## 2025-04-24 NOTE — TELEPHONE ENCOUNTER
Pt is requesting for a refill of: pregabalin (LYRICA) 50 MG capsule   Last filled:3/18/25  Last encounter: 3/06/25  Up coming apt: n/a   Pharmacy:[id number] - [place], [place] - [address] AT [facility] [address]

## 2025-04-24 NOTE — PROGRESS NOTES
"Outpatient Rehab  Physical Therapy Visit    Patient Name: Mita Max  MRN: [id number]  YOB: 1950  Today's Date: 4/24/2025    Therapy Diagnosis:   Encounter Diagnoses   Name Primary?    Chronic bilateral low back pain without sciatica Yes    Weakness of both lower extremities     Bilateral hip pain        Physician: Vlad Dickerson NP    Physician Orders: Eval and Treat  Medical Diagnosis: M54.42,M54.41 (ICD-10-CM) - Acute bilateral low back pain with bilateral sciatic    Visit # / Visits Authorized:  22/30 (+eval)   Date of Evaluation:  2/5/2025  Insurance Authorization Period: 2/5/2025 to 12/3/2026  Plan of Care Certification:  2/5/2025 to 4/17/2025      Time In: 1520   Time Out: 1620  Total Time: 60   Total Billable Time: 60 minutes (billed 45 minutes 1:1)     Subjective   Feeling good overall..       Objective   No test and measures assessed today.      Treatment: (exercises performed today are bolded)  Therapeutic Exercise: 20 minutes  Nustep x 10 minutes (lvl 4)  Calf stretch (lvl 2) 3x 30"  Seated alternating DF 2x10  Standing heel raises 25x  Standing pulldowns 7# 2x10  Seated LAQ 2x10, 5#  Seated marches 5# 2x10  Seated IR 20x, RTB  Seated ER 20x, RTB  Seated hip abduction, blue TB 30x  Seated hip adduction 30x  Seated HS curls, Blue TB 20x    Manual Therapy: 10 minutes  IASTM/cupping to (R) anterior tib  Manual resistance & overpressure DF/PF  STM to lumbar paraspinals     Balance/Neuromuscular Re-Education: 20 minutes  Prydeinig x 10 minutes with active DF  ABC's x 2 trials  Cone walking x 3laps  Lateral cone walking x3 laps  Lateral stepping, YTB x3laps  Hip 3-way, 2x10  Standing alternating marches on foam 1x10  Standing cone taps 3x10  Ball toss on foam x20 tosses  Ball toss in tandem x20 tosses  Lateral step ups 1x10  Hip abduction 2x10 ea  Tandem stance 2x20" ea  Walking in // bars without UE x 4 laps  YTB ambulation x1lap    Therapeutic Activity: 10 minutes  Walking around clinic " without AD  Sit to stand 2x10  Obstacle course with step ups, lateral stepping, cone weaving, marching on uneven surfaces x 2 trials    Assessment & Plan   Assessment: Continued to focus on standing hip, core and balance exercises. Added in obstacle course to challenge balance and ambulation without cane. Still with much difficulty and apprehension with curbs and step ups. Able to control LOB but still reaches veruses stepping out. Good amount of fatigue noted at end of session. Will continue to benefit from further strengthening for improved functional mobility and balance work for return to independent ambulation.  Evaluation/Treatment Tolerance: Patient tolerated treatment well  Patient will continue to benefit from skilled outpatient physical therapy to address the deficits listed in the problem list box on initial evaluation, provide pt/family education and to maximize pt's level of independence in the home and community environment.     Patient's spiritual, cultural, and educational needs considered and patient agreeable to plan of care and goals.     Plan: Continue with POC and progress as tolerated    Goals:   Active       Long Term Goals       Patient will demonstrate improved function as indicated by a functional score of 65 on the FOTO.  (Progressing)       Start:  02/05/25    Expected End:  04/17/25            Pt will demonstrate improved pain by reports of less than or equal to 5/10 worst pain on the verbal rating scale in order to progress toward maximal functional ability and improve QOL.   (Progressing)       Start:  02/05/25    Expected End:  04/17/25            patient will improve bilateral lower extremity strength to greater than or equal to 4+/5 MMT for improved functional strength.  (Progressing)       Start:  02/05/25    Expected End:  04/17/25            Patient will improve 5 time sit to stand to less than 15 seconds for improved functional strength and decreased fall risk.  (Progressing)        Start:  02/05/25    Expected End:  04/17/25            Patient will ambulate with normalized gait mechanics for return to prior level of function.  (Progressing)       Start:  02/05/25    Expected End:  04/17/25               Short Term Goals       Patient will demonstrate independence with HEP in order to progress toward functional independence  (Met)       Start:  02/05/25    Expected End:  04/17/25    Resolved:  03/11/25         Pt will demonstrate improved pain by reports of less than or equal to 7/10 worst pain on the verbal rating scale in order to progress toward maximal functional ability and improve QOL.   (Met)       Start:  02/05/25    Expected End:  04/17/25    Resolved:  03/11/25         Patient will improve bilateral lower extremity strength by 1/2 MMT for improved strength needed for increased ambulation distance.  (Progressing)       Start:  02/05/25    Expected End:  04/17/25            Patient will improve 5 time sit to stand to less than 20 seconds for improved functional strength and decreased fall risk.  (Progressing)       Start:  02/05/25    Expected End:  04/17/25                Maria Luisa Castillo, PT,DPT  04/24/2025

## 2025-04-25 ENCOUNTER — CLINICAL SUPPORT (OUTPATIENT)
Dept: REHABILITATION | Facility: HOSPITAL | Age: 75
End: 2025-04-25
Payer: MEDICARE

## 2025-04-25 DIAGNOSIS — G89.29 CHRONIC BILATERAL LOW BACK PAIN WITHOUT SCIATICA: Primary | ICD-10-CM

## 2025-04-25 DIAGNOSIS — R29.898 WEAKNESS OF BOTH LOWER EXTREMITIES: ICD-10-CM

## 2025-04-25 DIAGNOSIS — M54.50 CHRONIC BILATERAL LOW BACK PAIN WITHOUT SCIATICA: Primary | ICD-10-CM

## 2025-04-25 DIAGNOSIS — M25.552 BILATERAL HIP PAIN: ICD-10-CM

## 2025-04-25 DIAGNOSIS — M25.551 BILATERAL HIP PAIN: ICD-10-CM

## 2025-04-25 PROCEDURE — 97110 THERAPEUTIC EXERCISES: CPT | Mod: PN

## 2025-04-25 PROCEDURE — 97112 NEUROMUSCULAR REEDUCATION: CPT | Mod: PN

## 2025-04-25 NOTE — PROGRESS NOTES
"Outpatient Rehab  Physical Therapy Visit    Patient Name: Mita Max  MRN: [id number]  YOB: 1950  Today's Date: 4/25/2025    Therapy Diagnosis:   Encounter Diagnoses   Name Primary?    Chronic bilateral low back pain without sciatica Yes    Weakness of both lower extremities     Bilateral hip pain        Physician: Vlad Dickerson NP    Physician Orders: Eval and Treat  Medical Diagnosis: M54.42,M54.41 (ICD-10-CM) - Acute bilateral low back pain with bilateral sciatic    Visit # / Visits Authorized:  22/30 (+eval)   Date of Evaluation:  2/5/2025  Insurance Authorization Period: 2/5/2025 to 12/3/2026  Plan of Care Certification:  2/5/2025 to 4/17/2025      Time In: 0850   Time Out: 0950  Total Time: 60   Total Billable Time: 60 minutes (billed 30 minutes 1:1)     Subjective   Feeling okay today.       Objective   No test and measures assessed today.      Treatment: (exercises performed today are bolded)  Therapeutic Exercise: 30 minutes  Nustep x 10 minutes (lvl 4)  Calf stretch (lvl 2) 3x 30"  Seated alternating DF 2x10  Standing heel raises 25x  Standing pulldowns 7# 2x10  Seated LAQ 2x10, 5#  Seated marches 5# 2x10  Seated IR 20x, RTB  Seated ER 20x, RTB  Seated hip abduction, blue TB 30x  Seated hip adduction 30x  Seated HS curls, Blue TB 20x    Manual Therapy: 0 minutes  IASTM/cupping to (R) anterior tib  Manual resistance & overpressure DF/PF  STM to lumbar paraspinals     Balance/Neuromuscular Re-Education: 30 minutes  Trinidadian x 10 minutes with active DF  ABC's x 2 trials  Cone walking x 3laps  Lateral cone walking x3 laps  Lateral stepping, YTB x3laps  Hip 3-way, 2x10  Standing alternating marches on foam 1x10  Standing cone taps 3x10  Ball toss on foam x20 tosses  Ball toss in tandem x20 tosses  Lateral step ups 1x10  Hip abduction 2x10 ea  Tandem stance 3x30" ea  SL stance 3x30"  Walking in // bars without UE x 4 laps  YTB ambulation x1lap    Therapeutic Activity: 0 minutes  Walking " around clinic without AD  Sit to stand 2x10  Obstacle course with step ups, lateral stepping, cone weaving, marching on uneven surfaces x 2 trials    Assessment & Plan   Assessment: Continued to focus on standing hip, core and balance exercises. Added in obstacle course to challenge balance and ambulation without cane. Good amount of fatigue noted at end of session. Will continue to benefit from further strengthening for improved functional mobility and balance work for return to independent ambulation.  Evaluation/Treatment Tolerance: Patient tolerated treatment well  Patient will continue to benefit from skilled outpatient physical therapy to address the deficits listed in the problem list box on initial evaluation, provide pt/family education and to maximize pt's level of independence in the home and community environment.     Patient's spiritual, cultural, and educational needs considered and patient agreeable to plan of care and goals.     Plan: Continue with POC and progress as tolerated    Goals:   Active       Long Term Goals       Patient will demonstrate improved function as indicated by a functional score of 65 on the FOTO.  (Progressing)       Start:  02/05/25    Expected End:  04/17/25            Pt will demonstrate improved pain by reports of less than or equal to 5/10 worst pain on the verbal rating scale in order to progress toward maximal functional ability and improve QOL.   (Progressing)       Start:  02/05/25    Expected End:  04/17/25            patient will improve bilateral lower extremity strength to greater than or equal to 4+/5 MMT for improved functional strength.  (Progressing)       Start:  02/05/25    Expected End:  04/17/25            Patient will improve 5 time sit to stand to less than 15 seconds for improved functional strength and decreased fall risk.  (Progressing)       Start:  02/05/25    Expected End:  04/17/25            Patient will ambulate with normalized gait mechanics for  return to prior level of function.  (Progressing)       Start:  02/05/25    Expected End:  04/17/25              Resolved       Short Term Goals       Patient will demonstrate independence with HEP in order to progress toward functional independence  (Met)       Start:  02/05/25    Expected End:  04/17/25    Resolved:  03/11/25         Pt will demonstrate improved pain by reports of less than or equal to 7/10 worst pain on the verbal rating scale in order to progress toward maximal functional ability and improve QOL.   (Met)       Start:  02/05/25    Expected End:  04/17/25    Resolved:  03/11/25         Patient will improve bilateral lower extremity strength by 1/2 MMT for improved strength needed for increased ambulation distance.  (Met)       Start:  02/05/25    Expected End:  04/17/25    Resolved:  04/25/25         Patient will improve 5 time sit to stand to less than 20 seconds for improved functional strength and decreased fall risk.  (Met)       Start:  02/05/25    Expected End:  04/17/25    Resolved:  04/25/25             Maria Luisa Castillo, PT,DPT  04/25/2025

## 2025-04-28 ENCOUNTER — CLINICAL SUPPORT (OUTPATIENT)
Dept: REHABILITATION | Facility: HOSPITAL | Age: 75
End: 2025-04-28
Payer: MEDICARE

## 2025-04-28 DIAGNOSIS — M25.551 BILATERAL HIP PAIN: ICD-10-CM

## 2025-04-28 DIAGNOSIS — M25.552 BILATERAL HIP PAIN: ICD-10-CM

## 2025-04-28 DIAGNOSIS — M54.50 CHRONIC BILATERAL LOW BACK PAIN WITHOUT SCIATICA: Primary | ICD-10-CM

## 2025-04-28 DIAGNOSIS — G89.29 CHRONIC BILATERAL LOW BACK PAIN WITHOUT SCIATICA: Primary | ICD-10-CM

## 2025-04-28 DIAGNOSIS — R29.898 WEAKNESS OF BOTH LOWER EXTREMITIES: ICD-10-CM

## 2025-04-28 PROCEDURE — 97110 THERAPEUTIC EXERCISES: CPT | Mod: PN

## 2025-04-28 PROCEDURE — 97112 NEUROMUSCULAR REEDUCATION: CPT | Mod: PN

## 2025-04-29 NOTE — PROGRESS NOTES
Outpatient Rehab  Physical Therapy Progress Note : Updated Plan of Care    Patient Name: Mita Max  MRN: [id number]  YOB: 1950  Encounter Date: 4/28/2025    Therapy Diagnosis:   Encounter Diagnoses   Name Primary?    Chronic bilateral low back pain without sciatica Yes    Weakness of both lower extremities     Bilateral hip pain      Physician: Vlad Dickerson NP    Physician Orders: Eval and Treat  Medical Diagnosis: Acute bilateral low back pain with bilateral sciatica    Visit # / Visits Authorized:  26 / 30  Insurance Authorization Period: 2/4/2025 to 12/31/2025  Date of Evaluation: 2/5/2025  Plan of Care Certification: 4/28/2025 to 6/27/2025     PT/PTA: PT   Number of PTA visits since last PT visit:0    Time In: 1400   Time Out: 1500  Total Time: 60   Total Billable Time:  60 minutes (billed 30 min 1:1)     FOTO:  Intake Score:  47%  Survey Score 1: 59%  Survey Score 2:  %     Subjective   She had some pain over the weekend in her low back. Overall she is in less pain in her back and has increased ease wtih daily tasks. She still is not independent with ambulation and relies on her cane to walk. She reports still feeling overall much weaker in her RLE as compared to her left..  Pain reported as 0/10.      Objective      Lumbar Range of Motion   Active (deg) Pain   Flexion 100     Extension 50 Yes   Right Lateral Flexion 100     Right Rotation 100     Left Lateral Flexion 100     Left Rotation 100       Hip Strength - Planes of Motion   Right Strength Right Pain Left Strength Left  Pain   Flexion (L2) 4-   4+     Extension 4+   4+     ABduction 4   4+     ADduction 4   4+     Internal Rotation 4-   4+     External Rotation 4-   4+         Knee Strength   Right Strength Right Pain Left Strength Left  Pain   Flexion (S2) 4   4+     Prone Flexion           Extension (L3) 4   4+       Ankle/Foot Strength - Planes of Motion   Right Strength Right Pain Left Strength Left  Pain   Dorsiflexion  "(L4) 3+   5     Plantar Flexion (S1) 5   5     Inversion 3+   4+     Eversion 3+   4+     Great Toe Flexion 3+   5     Great Toe Extension (L5) 3+   5     Lesser Toes Flexion           Lesser Toes Extension              Transfers Assessment  Sit to Stand Assistance: Independent  Chair to Bed Assistance: Independent  Toilet/Commode Transfer Assistance: Independent  Car Transfer Assistance: Independent    Bed Mobility Assessment  Rolling Assistance: Independent  Sidelying to Sit Assistance: Independent  Sit to Sidelying Assistance: Independent  Scooting to Edge of Bed Assistance: Independent    Sit to Stand Testing  The patient completed 5 sit to stand transfers in 14 sec.              Gait Analysis  Patient ambulates with cane and the following impairments: decreased right foot clearance, right trendelenburg, slow glo, narrow base of support. Improved glo and no foot drag noted since initial evaluation.     Treatment:   Treatment: (exercises performed today are bolded)  Therapeutic Exercise: 30 minutes  Nustep x 10 minutes (lvl 4)  Calf stretch (lvl 2) 3x 30"  Seated alternating DF 2x10  Standing heel raises 25x  Standing pulldowns 7# 3x10  Seated LAQ 2x10, 5#  Seated marches 5# 2x10  Seated IR 20x, RTB  Seated ER 20x, RTB  Seated hip abduction, blue TB 30x  Seated hip adduction 30x  Seated HS curls, Blue TB 20x     Manual Therapy: 0 minutes  IASTM/cupping to (R) anterior tib  Manual resistance & overpressure DF/PF  STM to lumbar paraspinals      Balance/Neuromuscular Re-Education: 30 minutes  Canadian x 10 minutes with active DF  ABC's x 2 trials  Cone walking x 3laps  Lateral cone walking x3 laps  Lateral stepping, YTB x3laps  Hip 3-way, 2x10  Standing alternating marches on foam 1x10  Standing cone taps 3x10  Ball toss on foam x20 tosses  Ball toss in tandem x20 tosses  Lateral step ups 1x10  Hip abduction 2x10 ea  Tandem stance 3x30" ea  SL stance 3x30"  Walking in // bars without UE x 4 laps  YTB " ambulation x1lap     Therapeutic Activity: 0 minutes  Walking around clinic without AD  Sit to stand 2x10  Obstacle course with step ups, lateral stepping, cone weaving, marching on uneven surfaces x 2 trials    Assessment & Plan   Plan  From a physical therapy perspective, the patient would benefit from: Skilled Rehab Services  Planned therapy interventions include: Therapeutic exercise, Therapeutic activities, Neuromuscular re-education, Manual therapy, ADLs/IADLs, and Gait training.    Visit Frequency: 2 times Per Week for 8 Weeks.     This plan was discussed with Patient.   Discussion participants: Agreed Upon Plan of Care     Patient will continue to benefit from skilled outpatient physical therapy to address the deficits listed in the problem list box on initial evaluation, provide pt/family education and to maximize pt's level of independence in the home and community environment.     Patient's spiritual, cultural, and educational needs considered and patient agreeable to plan of care and goals.       Goals:   Active       Long Term Goals       Patient will demonstrate improved function as indicated by a functional score of 65 on the FOTO.  (Progressing)       Start:  02/05/25    Expected End:  06/27/25            Pt will demonstrate improved pain by reports of less than or equal to 5/10 worst pain on the verbal rating scale in order to progress toward maximal functional ability and improve QOL.   (Progressing)       Start:  02/05/25    Expected End:  06/27/25            patient will improve bilateral lower extremity strength to greater than or equal to 4+/5 MMT for improved functional strength.  (Progressing)       Start:  02/05/25    Expected End:  06/27/25            Patient will improve 5 time sit to stand to less than 15 seconds for improved functional strength and decreased fall risk.  (Met)       Start:  02/05/25    Expected End:  04/17/25    Resolved:  04/29/25         Patient will ambulate with  normalized gait mechanics for return to prior level of function.  (Progressing)       Start:  02/05/25    Expected End:  06/27/25              Resolved       Short Term Goals       Patient will demonstrate independence with HEP in order to progress toward functional independence  (Met)       Start:  02/05/25    Expected End:  04/17/25    Resolved:  03/11/25         Pt will demonstrate improved pain by reports of less than or equal to 7/10 worst pain on the verbal rating scale in order to progress toward maximal functional ability and improve QOL.   (Met)       Start:  02/05/25    Expected End:  04/17/25    Resolved:  03/11/25         Patient will improve bilateral lower extremity strength by 1/2 MMT for improved strength needed for increased ambulation distance.  (Met)       Start:  02/05/25    Expected End:  04/17/25    Resolved:  04/25/25         Patient will improve 5 time sit to stand to less than 20 seconds for improved functional strength and decreased fall risk.  (Met)       Start:  02/05/25    Expected End:  04/17/25    Resolved:  04/25/25             Maria Luisa Castillo, PT

## 2025-04-30 ENCOUNTER — CLINICAL SUPPORT (OUTPATIENT)
Dept: REHABILITATION | Facility: HOSPITAL | Age: 75
End: 2025-04-30
Payer: MEDICARE

## 2025-04-30 DIAGNOSIS — M25.552 BILATERAL HIP PAIN: ICD-10-CM

## 2025-04-30 DIAGNOSIS — M25.551 BILATERAL HIP PAIN: ICD-10-CM

## 2025-04-30 DIAGNOSIS — G89.29 CHRONIC BILATERAL LOW BACK PAIN WITHOUT SCIATICA: Primary | ICD-10-CM

## 2025-04-30 DIAGNOSIS — R29.898 WEAKNESS OF BOTH LOWER EXTREMITIES: ICD-10-CM

## 2025-04-30 DIAGNOSIS — M54.50 CHRONIC BILATERAL LOW BACK PAIN WITHOUT SCIATICA: Primary | ICD-10-CM

## 2025-04-30 PROCEDURE — 97112 NEUROMUSCULAR REEDUCATION: CPT | Mod: PN

## 2025-04-30 PROCEDURE — 97110 THERAPEUTIC EXERCISES: CPT | Mod: PN

## 2025-04-30 PROCEDURE — 97140 MANUAL THERAPY 1/> REGIONS: CPT | Mod: PN

## 2025-04-30 NOTE — PROGRESS NOTES
"Outpatient Rehab  Physical Therapy Visit    Patient Name: Mita Max  MRN: [id number]  YOB: 1950  Today's Date: 4/30/2025    Therapy Diagnosis:   Encounter Diagnoses   Name Primary?    Chronic bilateral low back pain without sciatica Yes    Weakness of both lower extremities     Bilateral hip pain      Physician: Vlad Dickerson NP    Physician Orders: Eval and Treat  Medical Diagnosis: M54.42,M54.41 (ICD-10-CM) - Acute bilateral low back pain with bilateral sciatic    Visit # / Visits Authorized:  27/30 (+eval)   Date of Evaluation:  2/5/2025  Insurance Authorization Period: 2/5/2025 to 12/3/2026  Plan of Care Certification:  2/5/2025 to 4/17/2025      Time In: 1400   Time Out: 1500  Total Time: 60   Total Billable Time: 60 minutes     Subjective   Feeling good overall today. Still with some mild low back pain and continued fatigue in her left foot..       Objective   No test and measures assessed today.      Treatment: (exercises performed today are bolded)  Therapeutic Exercise: 10 minutes  Nustep x 5 minutes (lvl 4)  Calf stretch (lvl 2) 3x 30"  Seated alternating DF 25x  Standing heel raises 25x  Standing pulldowns 7# 2x10  Seated LAQ 2x10, 5#  Seated marches 5# 2x10  Seated IR 20x, RTB  Seated ER 20x, RTB  Seated hip abduction, blue TB 30x  Seated hip adduction 30x  Seated HS curls, Blue TB 20x    Manual Therapy: 10 minutes  IASTM/cupping to (R) anterior tib  Manual resistance & overpressure DF/PF  STM to lumbar paraspinals     Balance/Neuromuscular Re-Education: 40 minutes  Turkish x 10 minutes with active DF  ABC's x 2 trials  Cone walking x 5 laps  Lateral cone walking x5 laps  Lateral stepping, YTB x5 laps  Hip 3-way, 2x10  Standing alternating marches on foam 1x10  Standing cone taps 3x10  Ball toss on foam x20 tosses  Ball toss in tandem x20 tosses  Lateral step ups 1x10  Tandem stance 3x30" ea  SL stance 3x30"  Walking in // bars without UE x 4 laps  YTB ambulation " x1lap    Therapeutic Activity: 0 minutes  Walking around clinic without AD  Sit to stand 2x10  Obstacle course with step ups, lateral stepping, cone weaving, marching on uneven surfaces x 2 trials    Assessment & Plan   Assessment: Patient tolerated therapy session good today. She continues to ambulate with cane but shows improved gait mechanics and glo. She will still benefit from further RLE strengthening and balance for return to prior level of function, perform all work duties required of her without increased pain, and ambulate independently.  Evaluation/Treatment Tolerance: Patient tolerated treatment well  Patient will continue to benefit from skilled outpatient physical therapy to address the deficits listed in the problem list box on initial evaluation, provide pt/family education and to maximize pt's level of independence in the home and community environment.     Patient's spiritual, cultural, and educational needs considered and patient agreeable to plan of care and goals.     Plan: Continue with POC and progress as tolerated    Goals:   Active       Long Term Goals       Patient will demonstrate improved function as indicated by a functional score of 65 on the FOTO.  (Progressing)       Start:  02/05/25    Expected End:  06/27/25            Pt will demonstrate improved pain by reports of less than or equal to 5/10 worst pain on the verbal rating scale in order to progress toward maximal functional ability and improve QOL.   (Progressing)       Start:  02/05/25    Expected End:  06/27/25            patient will improve bilateral lower extremity strength to greater than or equal to 4+/5 MMT for improved functional strength.  (Progressing)       Start:  02/05/25    Expected End:  06/27/25            Patient will improve 5 time sit to stand to less than 15 seconds for improved functional strength and decreased fall risk.  (Met)       Start:  02/05/25    Expected End:  04/17/25    Resolved:  04/29/25          Patient will ambulate with normalized gait mechanics for return to prior level of function.  (Progressing)       Start:  02/05/25    Expected End:  06/27/25              Resolved       Short Term Goals       Patient will demonstrate independence with HEP in order to progress toward functional independence  (Met)       Start:  02/05/25    Expected End:  04/17/25    Resolved:  03/11/25         Pt will demonstrate improved pain by reports of less than or equal to 7/10 worst pain on the verbal rating scale in order to progress toward maximal functional ability and improve QOL.   (Met)       Start:  02/05/25    Expected End:  04/17/25    Resolved:  03/11/25         Patient will improve bilateral lower extremity strength by 1/2 MMT for improved strength needed for increased ambulation distance.  (Met)       Start:  02/05/25    Expected End:  04/17/25    Resolved:  04/25/25         Patient will improve 5 time sit to stand to less than 20 seconds for improved functional strength and decreased fall risk.  (Met)       Start:  02/05/25    Expected End:  04/17/25    Resolved:  04/25/25             Maria Luisa Castillo, PT,DPT  04/30/2025

## 2025-05-02 ENCOUNTER — CLINICAL SUPPORT (OUTPATIENT)
Dept: REHABILITATION | Facility: HOSPITAL | Age: 75
End: 2025-05-02
Payer: MEDICARE

## 2025-05-02 DIAGNOSIS — M25.552 BILATERAL HIP PAIN: ICD-10-CM

## 2025-05-02 DIAGNOSIS — G89.29 CHRONIC BILATERAL LOW BACK PAIN WITHOUT SCIATICA: Primary | ICD-10-CM

## 2025-05-02 DIAGNOSIS — M25.551 BILATERAL HIP PAIN: ICD-10-CM

## 2025-05-02 DIAGNOSIS — R29.898 WEAKNESS OF BOTH LOWER EXTREMITIES: ICD-10-CM

## 2025-05-02 DIAGNOSIS — M54.50 CHRONIC BILATERAL LOW BACK PAIN WITHOUT SCIATICA: Primary | ICD-10-CM

## 2025-05-02 PROCEDURE — 97110 THERAPEUTIC EXERCISES: CPT | Mod: PN

## 2025-05-02 PROCEDURE — 97112 NEUROMUSCULAR REEDUCATION: CPT | Mod: PN

## 2025-05-02 NOTE — PROGRESS NOTES
"Outpatient Rehab  Physical Therapy Visit    Patient Name: Mita Max  MRN: [id number]  YOB: 1950  Today's Date: 5/2/2025    Therapy Diagnosis:   Encounter Diagnoses   Name Primary?    Chronic bilateral low back pain without sciatica Yes    Weakness of both lower extremities     Bilateral hip pain      Physician: Vlad Dickerson NP    Physician Orders: Eval and Treat  Medical Diagnosis: M54.42,M54.41 (ICD-10-CM) - Acute bilateral low back pain with bilateral sciatic    Visit # / Visits Authorized:  27/30 (+eval)   Date of Evaluation:  2/5/2025  Insurance Authorization Period: 2/5/2025 to 12/3/2026  Plan of Care Certification:  2/5/2025 to 4/17/2025      Time In: 0945   Time Out: 1100  Total Time: 75   Total Billable Time: 60 minutes (billed 30 min 1:1)    Subjective   She is having some mild low back pain. She doesnt know if it is from standing longer on her feet at work without a break or if she slept funny..  Pain reported as 3/10. she has 3/10 low back pain with initial sit to stand transfer.    Objective   No test and measures assessed today.      Treatment: (exercises performed today are bolded)  Therapeutic Exercise: 10 minutes  Nustep x 5 minutes (lvl 4)  Calf stretch (lvl 2) 3x 30"  Seated alternating DF 25x  Standing heel raises 25x  Standing pulldowns 7# 2x10  Seated LAQ 2x10, 5#  Seated marches 5# 2x10  Seated IR 20x, RTB  Seated ER 20x, RTB  Seated hip abduction, blue TB 30x  Seated hip adduction 30x  Seated HS curls, Blue TB 20x    Manual Therapy: 10 minutes  IASTM/cupping to (R) anterior tib  Manual resistance & overpressure DF/PF  STM to lumbar paraspinals     Balance/Neuromuscular Re-Education: 50 minutes  Cypriot x 10 minutes with active DF  ABC's x 2 trials  Cone walking x 5 laps  Lateral cone walking x5 laps  Lateral stepping, YTB x5 laps  Hip 3-way, 2x10  Standing alternating marches on foam 1x10  Standing cone taps 3x10  Ball toss on foam x20 tosses  Ball toss in tandem x20 " "tosses  Lateral step ups 1x10  Tandem stance 3x30" ea  SL stance 3x30"  Walking in // bars without UE x 4 laps  YTB ambulation x1lap    Therapeutic Activity: 0 minutes  Walking around clinic without AD  Sit to stand 2x10  Obstacle course with step ups, lateral stepping, cone weaving, marching on uneven surfaces x 2 trials    Assessment & Plan   Assessment: Patient tolerated therapy session good today. She presents with mild low back pain, 3/10. Able to tolerate standing exercises today without needing a seated rest break. Felt pain during sit to stand exercises and required a slight pause at initial stand before walking due to pain. Will benefit from further RLE strengthening and balance for return to prior level of function, perform all work duties required of her without increased pain, and ambulate independently.  Evaluation/Treatment Tolerance: Patient tolerated treatment well  Patient will continue to benefit from skilled outpatient physical therapy to address the deficits listed in the problem list box on initial evaluation, provide pt/family education and to maximize pt's level of independence in the home and community environment.     Patient's spiritual, cultural, and educational needs considered and patient agreeable to plan of care and goals.     Plan: Continue with POC and progress as tolerated    Goals:   Active       Long Term Goals       Patient will demonstrate improved function as indicated by a functional score of 65 on the FOTO.  (Progressing)       Start:  02/05/25    Expected End:  06/27/25            Pt will demonstrate improved pain by reports of less than or equal to 5/10 worst pain on the verbal rating scale in order to progress toward maximal functional ability and improve QOL.   (Progressing)       Start:  02/05/25    Expected End:  06/27/25            patient will improve bilateral lower extremity strength to greater than or equal to 4+/5 MMT for improved functional strength.  " (Progressing)       Start:  02/05/25    Expected End:  06/27/25            Patient will improve 5 time sit to stand to less than 15 seconds for improved functional strength and decreased fall risk.  (Met)       Start:  02/05/25    Expected End:  04/17/25    Resolved:  04/29/25         Patient will ambulate with normalized gait mechanics for return to prior level of function.  (Progressing)       Start:  02/05/25    Expected End:  06/27/25              Resolved       Short Term Goals       Patient will demonstrate independence with HEP in order to progress toward functional independence  (Met)       Start:  02/05/25    Expected End:  04/17/25    Resolved:  03/11/25         Pt will demonstrate improved pain by reports of less than or equal to 7/10 worst pain on the verbal rating scale in order to progress toward maximal functional ability and improve QOL.   (Met)       Start:  02/05/25    Expected End:  04/17/25    Resolved:  03/11/25         Patient will improve bilateral lower extremity strength by 1/2 MMT for improved strength needed for increased ambulation distance.  (Met)       Start:  02/05/25    Expected End:  04/17/25    Resolved:  04/25/25         Patient will improve 5 time sit to stand to less than 20 seconds for improved functional strength and decreased fall risk.  (Met)       Start:  02/05/25    Expected End:  04/17/25    Resolved:  04/25/25             Maria Luisa Castillo, PT,DPT  05/02/2025

## 2025-05-05 ENCOUNTER — CLINICAL SUPPORT (OUTPATIENT)
Dept: REHABILITATION | Facility: HOSPITAL | Age: 75
End: 2025-05-05
Payer: MEDICARE

## 2025-05-05 DIAGNOSIS — G89.29 CHRONIC BILATERAL LOW BACK PAIN WITHOUT SCIATICA: Primary | ICD-10-CM

## 2025-05-05 DIAGNOSIS — M25.552 BILATERAL HIP PAIN: ICD-10-CM

## 2025-05-05 DIAGNOSIS — R29.898 WEAKNESS OF BOTH LOWER EXTREMITIES: ICD-10-CM

## 2025-05-05 DIAGNOSIS — M25.551 BILATERAL HIP PAIN: ICD-10-CM

## 2025-05-05 DIAGNOSIS — M54.50 CHRONIC BILATERAL LOW BACK PAIN WITHOUT SCIATICA: Primary | ICD-10-CM

## 2025-05-05 PROCEDURE — 97110 THERAPEUTIC EXERCISES: CPT | Mod: PN

## 2025-05-05 PROCEDURE — 97112 NEUROMUSCULAR REEDUCATION: CPT | Mod: PN

## 2025-05-05 NOTE — PROGRESS NOTES
"Outpatient Rehab  Physical Therapy Visit    Patient Name: Mita Max  MRN: [id number]  YOB: 1950  Today's Date: 5/5/2025    Therapy Diagnosis:   Encounter Diagnoses   Name Primary?    Chronic bilateral low back pain without sciatica Yes    Weakness of both lower extremities     Bilateral hip pain      Physician: Vlad Dickerson NP    Physician Orders: Eval and Treat  Medical Diagnosis: M54.42,M54.41 (ICD-10-CM) - Acute bilateral low back pain with bilateral sciatic    Visit # / Visits Authorized:  27/30 (+eval)   Date of Evaluation:  2/5/2025  Insurance Authorization Period: 2/5/2025 to 12/3/2026  Plan of Care Certification:  2/5/2025 to 4/17/2025      Time In: 0950   Time Out: 1100  Total Time: 70   Total Billable Time: 70 minutes (billed 45 min 1:1)    Subjective   Feeling good today..         Objective   No test and measures assessed today.      Treatment: (exercises performed today are bolded)  Therapeutic Exercise: 20 minutes  Nustep x 5 minutes (lvl 4)  Calf stretch (lvl 2) 3x 30"  Seated alternating DF 25x  Standing heel raises 30x  Standing pulldowns 7# 2x10  Seated LAQ 2x10, 5#  Seated marches 5# 2x10  Seated IR 20x, RTB  Seated ER 20x, RTB  Seated hip abduction, blue TB 30x  Seated hip adduction 30x  Seated HS curls, Blue TB 20x    Manual Therapy: 10 minutes  IASTM/cupping to (R) anterior tib  Manual resistance & overpressure DF/PF  STM to lumbar paraspinals     Balance/Neuromuscular Re-Education: 40 minutes  Stateless x 10 minutes with active DF  ABC's x 2 trials  Sit to stand 2x15  Cone walking x 5 laps  Lateral cone walking x5 laps  Lateral stepping, YTB x5 laps  Hip 3-way, 2x10  Standing alternating marches on foam 1x10  Standing cone taps 3x10  Ball toss on foam x20 tosses  Ball toss in tandem x20 tosses  Lateral step ups 1x10  Tandem stance 3x30" ea  SL stance 3x30"  Walking in // bars without UE x 4 laps  YTB ambulation x1lap    Therapeutic Activity: 0 minutes  Walking around " clinic without AD  Sit to stand 2x10  Obstacle course with step ups, lateral stepping, cone weaving, marching on uneven surfaces x 2 trials    Assessment & Plan   Assessment: Patient tolerated therapy session good today. Able to tolerate standing exercises today without needing a seated rest break. Will benefit from further RLE strengthening and balance for return to prior level of function, perform all work duties required of her without increased pain, and ambulate independently.  Evaluation/Treatment Tolerance: Patient tolerated treatment well  Patient will continue to benefit from skilled outpatient physical therapy to address the deficits listed in the problem list box on initial evaluation, provide pt/family education and to maximize pt's level of independence in the home and community environment.     Patient's spiritual, cultural, and educational needs considered and patient agreeable to plan of care and goals.     Plan: Continue with POC and progress as tolerated    Goals:   Active       Long Term Goals       Patient will demonstrate improved function as indicated by a functional score of 65 on the FOTO.  (Progressing)       Start:  02/05/25    Expected End:  06/27/25            Pt will demonstrate improved pain by reports of less than or equal to 5/10 worst pain on the verbal rating scale in order to progress toward maximal functional ability and improve QOL.   (Progressing)       Start:  02/05/25    Expected End:  06/27/25            patient will improve bilateral lower extremity strength to greater than or equal to 4+/5 MMT for improved functional strength.  (Progressing)       Start:  02/05/25    Expected End:  06/27/25            Patient will improve 5 time sit to stand to less than 15 seconds for improved functional strength and decreased fall risk.  (Met)       Start:  02/05/25    Expected End:  04/17/25    Resolved:  04/29/25         Patient will ambulate with normalized gait mechanics for return to  prior level of function.  (Progressing)       Start:  02/05/25    Expected End:  06/27/25              Resolved       Short Term Goals       Patient will demonstrate independence with HEP in order to progress toward functional independence  (Met)       Start:  02/05/25    Expected End:  04/17/25    Resolved:  03/11/25         Pt will demonstrate improved pain by reports of less than or equal to 7/10 worst pain on the verbal rating scale in order to progress toward maximal functional ability and improve QOL.   (Met)       Start:  02/05/25    Expected End:  04/17/25    Resolved:  03/11/25         Patient will improve bilateral lower extremity strength by 1/2 MMT for improved strength needed for increased ambulation distance.  (Met)       Start:  02/05/25    Expected End:  04/17/25    Resolved:  04/25/25         Patient will improve 5 time sit to stand to less than 20 seconds for improved functional strength and decreased fall risk.  (Met)       Start:  02/05/25    Expected End:  04/17/25    Resolved:  04/25/25             Maria Luisa Castillo, PT,DPT  05/05/2025

## 2025-05-07 ENCOUNTER — CLINICAL SUPPORT (OUTPATIENT)
Dept: REHABILITATION | Facility: HOSPITAL | Age: 75
End: 2025-05-07
Payer: MEDICARE

## 2025-05-07 DIAGNOSIS — R29.898 WEAKNESS OF BOTH LOWER EXTREMITIES: ICD-10-CM

## 2025-05-07 DIAGNOSIS — M25.552 BILATERAL HIP PAIN: ICD-10-CM

## 2025-05-07 DIAGNOSIS — M54.50 CHRONIC BILATERAL LOW BACK PAIN WITHOUT SCIATICA: Primary | ICD-10-CM

## 2025-05-07 DIAGNOSIS — M25.551 BILATERAL HIP PAIN: ICD-10-CM

## 2025-05-07 DIAGNOSIS — G89.29 CHRONIC BILATERAL LOW BACK PAIN WITHOUT SCIATICA: Primary | ICD-10-CM

## 2025-05-07 PROCEDURE — 97112 NEUROMUSCULAR REEDUCATION: CPT | Mod: PN

## 2025-05-07 PROCEDURE — 97110 THERAPEUTIC EXERCISES: CPT | Mod: PN

## 2025-05-07 PROCEDURE — 97140 MANUAL THERAPY 1/> REGIONS: CPT | Mod: PN

## 2025-05-07 NOTE — PROGRESS NOTES
"Outpatient Rehab  Physical Therapy Visit    Patient Name: Mita Max  MRN: [id number]  YOB: 1950  Today's Date: 5/7/2025    Therapy Diagnosis:   Encounter Diagnoses   Name Primary?    Chronic bilateral low back pain without sciatica Yes    Weakness of both lower extremities     Bilateral hip pain      Physician: Vlad Dickerson NP    Physician Orders: Eval and Treat  Medical Diagnosis: M54.42,M54.41 (ICD-10-CM) - Acute bilateral low back pain with bilateral sciatic    Visit # / Visits Authorized:  30/40 (+eval)   Date of Evaluation:  2/5/2025  Insurance Authorization Period: 2/5/2025 to 12/3/2026  Plan of Care Certification:  2/5/2025 to 4/17/2025      Time In: 0900   Time Out: 1000  Total Time: 60   Total Billable Time: 60 minutes    Subjective   Patient presents with some increased upper right back pain..       Objective   No test and measures assessed today.      Treatment: (exercises performed today are bolded)  Therapeutic Exercise: 10 minutes  Nustep x 8 minutes (lvl 4)  Calf stretch (lvl 2) 3x 30"  Seated alternating DF 25x  Standing heel raises 30x  Standing pulldowns 7# 2x10  Seated LAQ 2x10, 5#  Seated marches 5# 2x10  Seated IR 20x, RTB  Seated ER 20x, RTB  Seated hip abduction, blue TB 30x  Seated hip adduction 30x  Seated HS curls, Blue TB 20x    Manual Therapy: 10 minutes  IASTM/cupping to (R) anterior tib  Manual resistance & overpressure DF/PF  STM to lumbar paraspinals     Balance/Neuromuscular Re-Education: 40 minutes  Algerian x 10 minutes with active DF  ABC's x 2 trials  Sit to stand 2x15  Cone walking x 5 laps  Lateral cone walking x5 laps  Lateral stepping, YTB x5 laps  Hip 3-way, 2x10  Standing alternating marches on foam 1x10  Standing cone taps 3x10  Lateral stepping on foam beam x3laps  Lateral stepping with cone taps on foam beam x3laps  Ball toss on foam x20 tosses  Ball toss in tandem x20 tosses  Lateral step ups 1x10  Tandem stance 3x30" ea  SL stance " "3x30"  Walking in // bars without UE x 4 laps  YTB ambulation x1lap    Therapeutic Activity: 0 minutes  Walking around clinic without AD  Sit to stand 2x10  Obstacle course with step ups, lateral stepping, cone weaving, marching on uneven surfaces x 2 trials    Assessment & Plan   Assessment: Patient presents with some back pain today. Still with balance challenges, more apprehension overall. Able to tolerate standing exercises today without needing a seated rest break. Will benefit from further RLE strengthening and balance for return to prior level of function, perform all work duties required of her without increased pain, and ambulate independently.  Evaluation/Treatment Tolerance: Patient tolerated treatment well  Patient will continue to benefit from skilled outpatient physical therapy to address the deficits listed in the problem list box on initial evaluation, provide pt/family education and to maximize pt's level of independence in the home and community environment.     Patient's spiritual, cultural, and educational needs considered and patient agreeable to plan of care and goals.     Plan: Continue with POC and progress as tolerated    Goals:   Active       Long Term Goals       Patient will demonstrate improved function as indicated by a functional score of 65 on the FOTO.  (Progressing)       Start:  02/05/25    Expected End:  06/27/25            Pt will demonstrate improved pain by reports of less than or equal to 5/10 worst pain on the verbal rating scale in order to progress toward maximal functional ability and improve QOL.   (Progressing)       Start:  02/05/25    Expected End:  06/27/25            patient will improve bilateral lower extremity strength to greater than or equal to 4+/5 MMT for improved functional strength.  (Progressing)       Start:  02/05/25    Expected End:  06/27/25            Patient will improve 5 time sit to stand to less than 15 seconds for improved functional strength and " decreased fall risk.  (Met)       Start:  02/05/25    Expected End:  04/17/25    Resolved:  04/29/25         Patient will ambulate with normalized gait mechanics for return to prior level of function.  (Progressing)       Start:  02/05/25    Expected End:  06/27/25              Resolved       Short Term Goals       Patient will demonstrate independence with HEP in order to progress toward functional independence  (Met)       Start:  02/05/25    Expected End:  04/17/25    Resolved:  03/11/25         Pt will demonstrate improved pain by reports of less than or equal to 7/10 worst pain on the verbal rating scale in order to progress toward maximal functional ability and improve QOL.   (Met)       Start:  02/05/25    Expected End:  04/17/25    Resolved:  03/11/25         Patient will improve bilateral lower extremity strength by 1/2 MMT for improved strength needed for increased ambulation distance.  (Met)       Start:  02/05/25    Expected End:  04/17/25    Resolved:  04/25/25         Patient will improve 5 time sit to stand to less than 20 seconds for improved functional strength and decreased fall risk.  (Met)       Start:  02/05/25    Expected End:  04/17/25    Resolved:  04/25/25             Maria Luisa Castillo, PT,DPT  05/07/2025

## 2025-05-09 ENCOUNTER — CLINICAL SUPPORT (OUTPATIENT)
Dept: REHABILITATION | Facility: HOSPITAL | Age: 75
End: 2025-05-09
Payer: MEDICARE

## 2025-05-09 DIAGNOSIS — R29.898 WEAKNESS OF BOTH LOWER EXTREMITIES: ICD-10-CM

## 2025-05-09 DIAGNOSIS — M25.551 BILATERAL HIP PAIN: ICD-10-CM

## 2025-05-09 DIAGNOSIS — M25.552 BILATERAL HIP PAIN: ICD-10-CM

## 2025-05-09 DIAGNOSIS — G89.29 CHRONIC BILATERAL LOW BACK PAIN WITHOUT SCIATICA: Primary | ICD-10-CM

## 2025-05-09 DIAGNOSIS — M54.50 CHRONIC BILATERAL LOW BACK PAIN WITHOUT SCIATICA: Primary | ICD-10-CM

## 2025-05-09 PROCEDURE — 97112 NEUROMUSCULAR REEDUCATION: CPT | Mod: PN

## 2025-05-09 PROCEDURE — 97110 THERAPEUTIC EXERCISES: CPT | Mod: PN

## 2025-05-09 NOTE — PROGRESS NOTES
"Outpatient Rehab  Physical Therapy Visit    Patient Name: Mita Max  MRN: [id number]  YOB: 1950  Today's Date: 5/9/2025    Therapy Diagnosis:   Encounter Diagnoses   Name Primary?    Chronic bilateral low back pain without sciatica Yes    Weakness of both lower extremities     Bilateral hip pain      Physician: Vlad Dickerson NP    Physician Orders: Eval and Treat  Medical Diagnosis: M54.42,M54.41 (ICD-10-CM) - Acute bilateral low back pain with bilateral sciatic    Visit # / Visits Authorized:  31/40 (+eval)   Date of Evaluation:  2/5/2025  Insurance Authorization Period: 2/5/2025 to 12/3/2026  Plan of Care Certification:  2/5/2025 to 4/17/2025      Time In: 0900   Time Out: 1000  Total Time: 60   Total Billable Time: 60 minutes (billed 30 minutes 1:1)     Subjective   feeling okay today..       Objective   No test and measures assessed today.      Treatment: (exercises performed today are bolded)  Therapeutic Exercise: 30 minutes  Nustep x 8 minutes (lvl 4)  Calf stretch (lvl 2) 3x 30"  Seated alternating DF 25x  Standing heel raises 30x  Standing pulldowns 7# 2x10  Seated LAQ 2x10, 5#  Seated marches 5# 2x10  Seated IR 20x, RTB  Seated ER 20x, RTB  Seated hip abduction, blue TB 30x  Seated hip adduction 30x  Seated HS curls, Blue TB 20x    Manual Therapy: 0 minutes  IASTM/cupping to (R) anterior tib  Manual resistance & overpressure DF/PF  STM to lumbar paraspinals     Balance/Neuromuscular Re-Education: 15 minutes  Chadian x 10 minutes with active DF  ABC's x 2 trials  Sit to stand 2x15  Cone walking x 5 laps  Lateral cone walking x5 laps  Lateral stepping, YTB x5 laps  Hip 3-way, 2x10  Standing alternating marches on foam 1x10  Standing cone taps 3x10  Lateral stepping on foam beam x3laps  Lateral stepping with cone taps on foam beam x3laps  Ball toss on foam x20 tosses  Ball toss in tandem x20 tosses  Lateral step ups 1x10  Tandem stance 3x30" ea  SL stance 3x30"  Walking in // bars " without UE x 4 laps  YTB ambulation x1lap    Therapeutic Activity: 15 minutes  Walking around clinic without AD  Sit to stand 2x15  Obstacle course with step ups, lateral stepping, cone weaving, marching on uneven surfaces x 2 trials    Assessment & Plan   Assessment: Still with balance challenges, more apprehension overall mostly noted during obstacle course today. Able to tolerate standing exercises today without needing a seated rest break. Will benefit from further RLE strengthening and balance for return to prior level of function, perform all work duties required of her without increased pain, and ambulate independently.  Evaluation/Treatment Tolerance: Patient tolerated treatment well  Patient will continue to benefit from skilled outpatient physical therapy to address the deficits listed in the problem list box on initial evaluation, provide pt/family education and to maximize pt's level of independence in the home and community environment.     Patient's spiritual, cultural, and educational needs considered and patient agreeable to plan of care and goals.     Plan: Continue with POC and progress as tolerated    Goals:   Active       Long Term Goals       Patient will demonstrate improved function as indicated by a functional score of 65 on the FOTO.  (Progressing)       Start:  02/05/25    Expected End:  06/27/25            Pt will demonstrate improved pain by reports of less than or equal to 5/10 worst pain on the verbal rating scale in order to progress toward maximal functional ability and improve QOL.   (Progressing)       Start:  02/05/25    Expected End:  06/27/25            patient will improve bilateral lower extremity strength to greater than or equal to 4+/5 MMT for improved functional strength.  (Progressing)       Start:  02/05/25    Expected End:  06/27/25            Patient will improve 5 time sit to stand to less than 15 seconds for improved functional strength and decreased fall risk.  (Met)        Start:  02/05/25    Expected End:  04/17/25    Resolved:  04/29/25         Patient will ambulate with normalized gait mechanics for return to prior level of function.  (Progressing)       Start:  02/05/25    Expected End:  06/27/25              Resolved       Short Term Goals       Patient will demonstrate independence with HEP in order to progress toward functional independence  (Met)       Start:  02/05/25    Expected End:  04/17/25    Resolved:  03/11/25         Pt will demonstrate improved pain by reports of less than or equal to 7/10 worst pain on the verbal rating scale in order to progress toward maximal functional ability and improve QOL.   (Met)       Start:  02/05/25    Expected End:  04/17/25    Resolved:  03/11/25         Patient will improve bilateral lower extremity strength by 1/2 MMT for improved strength needed for increased ambulation distance.  (Met)       Start:  02/05/25    Expected End:  04/17/25    Resolved:  04/25/25         Patient will improve 5 time sit to stand to less than 20 seconds for improved functional strength and decreased fall risk.  (Met)       Start:  02/05/25    Expected End:  04/17/25    Resolved:  04/25/25             Maria Luisa Castillo, PT,DPT  05/09/2025

## 2025-05-14 ENCOUNTER — CLINICAL SUPPORT (OUTPATIENT)
Dept: REHABILITATION | Facility: HOSPITAL | Age: 75
End: 2025-05-14
Payer: MEDICARE

## 2025-05-14 DIAGNOSIS — M54.50 CHRONIC BILATERAL LOW BACK PAIN WITHOUT SCIATICA: Primary | ICD-10-CM

## 2025-05-14 DIAGNOSIS — M25.552 BILATERAL HIP PAIN: ICD-10-CM

## 2025-05-14 DIAGNOSIS — M25.551 BILATERAL HIP PAIN: ICD-10-CM

## 2025-05-14 DIAGNOSIS — G89.29 CHRONIC BILATERAL LOW BACK PAIN WITHOUT SCIATICA: Primary | ICD-10-CM

## 2025-05-14 DIAGNOSIS — R29.898 WEAKNESS OF BOTH LOWER EXTREMITIES: ICD-10-CM

## 2025-05-14 PROCEDURE — 97112 NEUROMUSCULAR REEDUCATION: CPT | Mod: PN

## 2025-05-14 PROCEDURE — 97530 THERAPEUTIC ACTIVITIES: CPT | Mod: PN

## 2025-05-14 NOTE — PROGRESS NOTES
"Outpatient Rehab  Physical Therapy Visit    Patient Name: Mita Max  MRN: [id number]  YOB: 1950  Today's Date: 5/14/2025    Therapy Diagnosis:   Encounter Diagnoses   Name Primary?    Chronic bilateral low back pain without sciatica Yes    Weakness of both lower extremities     Bilateral hip pain      Physician: Vlad Dickerson NP    Physician Orders: Eval and Treat  Medical Diagnosis: M54.42,M54.41 (ICD-10-CM) - Acute bilateral low back pain with bilateral sciatic    Visit # / Visits Authorized:  32/40 (+eval)   Date of Evaluation:  2/5/2025  Insurance Authorization Period: 2/5/2025 to 12/3/2026  Plan of Care Certification:  2/5/2025 to 4/17/2025      Time In: 1335   Time Out: 1435  Total Time: 60   Total Billable Time: 60 minutes (billed 30 minutes 1:1)     Subjective   Feeling good today..       Objective   No test and measures assessed today.      Treatment: (exercises performed today are bolded)  Therapeutic Exercise: 20 minutes  Nustep x 8 minutes (lvl 4)  Calf stretch (lvl 2) 3x 30"  Seated alternating DF 25x  Standing heel raises 30x  Standing pulldowns 7# 2x10  Seated LAQ 2x10, 5#  Seated marches 5# 2x10  Seated IR 20x, RTB  Seated ER 20x, RTB  Seated hip abduction, blue TB 30x  Seated hip adduction 30x  Seated HS curls, Blue TB 20x    Manual Therapy: 0 minutes  IASTM/cupping to (R) anterior tib  Manual resistance & overpressure DF/PF  STM to lumbar paraspinals     Balance/Neuromuscular Re-Education: 20 minutes  Greek x 10 minutes with active DF  ABC's x 2 trials  Sit to stand 2x15  Cone walking x 5 laps  Lateral cone walking x5 laps  Lateral stepping, YTB x5 laps  Hip 3-way, 2x10  Standing alternating marches on foam 1x10  Standing cone taps 3x10  Lateral stepping on foam beam x3laps  Lateral stepping with cone taps on foam beam x3laps  Ball toss on foam x20 tosses  Ball toss in tandem x20 tosses  Lateral step ups 1x10  Tandem stance 3x30" ea  SL stance 3x30"  Walking in // bars " without UE x 4 laps  YTB ambulation x1lap      Therapeutic Activity: 20 minutes  Walking around clinic without AD  Sit to stand 2x15  Curb walking x 10 minutes   Stair climbing x 3 trials (12 steps total with reciprocal gait pattern)  Obstacle course with step ups, lateral stepping, cone weaving, marching on uneven surfaces x 2 trials    Assessment & Plan   Assessment: Continues with decreased pain overall. Still with balance challenges, more apprehension and fear. Able to tolerate standing exercises today without needing a seated rest break, bit does note slight increased low back pain. Added in curb walking with cane for increased ease and decreased apprehension wtih execution of transfer. Will benefit from further RLE strengthening and balance for return to prior level of function, perform all work duties required of her without increased pain, and ambulate independently.  Evaluation/Treatment Tolerance: Patient tolerated treatment well  Patient will continue to benefit from skilled outpatient physical therapy to address the deficits listed in the problem list box on initial evaluation, provide pt/family education and to maximize pt's level of independence in the home and community environment.     Patient's spiritual, cultural, and educational needs considered and patient agreeable to plan of care and goals.     Plan: Continue with POC and progress as tolerated    Goals:   Active       Long Term Goals       Patient will demonstrate improved function as indicated by a functional score of 65 on the FOTO.  (Progressing)       Start:  02/05/25    Expected End:  06/27/25            Pt will demonstrate improved pain by reports of less than or equal to 5/10 worst pain on the verbal rating scale in order to progress toward maximal functional ability and improve QOL.   (Progressing)       Start:  02/05/25    Expected End:  06/27/25            patient will improve bilateral lower extremity strength to greater than or  equal to 4+/5 MMT for improved functional strength.  (Progressing)       Start:  02/05/25    Expected End:  06/27/25            Patient will improve 5 time sit to stand to less than 15 seconds for improved functional strength and decreased fall risk.  (Met)       Start:  02/05/25    Expected End:  04/17/25    Resolved:  04/29/25         Patient will ambulate with normalized gait mechanics for return to prior level of function.  (Progressing)       Start:  02/05/25    Expected End:  06/27/25              Resolved       Short Term Goals       Patient will demonstrate independence with HEP in order to progress toward functional independence  (Met)       Start:  02/05/25    Expected End:  04/17/25    Resolved:  03/11/25         Pt will demonstrate improved pain by reports of less than or equal to 7/10 worst pain on the verbal rating scale in order to progress toward maximal functional ability and improve QOL.   (Met)       Start:  02/05/25    Expected End:  04/17/25    Resolved:  03/11/25         Patient will improve bilateral lower extremity strength by 1/2 MMT for improved strength needed for increased ambulation distance.  (Met)       Start:  02/05/25    Expected End:  04/17/25    Resolved:  04/25/25         Patient will improve 5 time sit to stand to less than 20 seconds for improved functional strength and decreased fall risk.  (Met)       Start:  02/05/25    Expected End:  04/17/25    Resolved:  04/25/25             Maria Luisa Castillo, PT,DPT  05/14/2025

## 2025-05-16 ENCOUNTER — CLINICAL SUPPORT (OUTPATIENT)
Dept: REHABILITATION | Facility: HOSPITAL | Age: 75
End: 2025-05-16
Payer: MEDICARE

## 2025-05-16 DIAGNOSIS — R29.898 WEAKNESS OF BOTH LOWER EXTREMITIES: ICD-10-CM

## 2025-05-16 DIAGNOSIS — G89.29 CHRONIC BILATERAL LOW BACK PAIN WITHOUT SCIATICA: Primary | ICD-10-CM

## 2025-05-16 DIAGNOSIS — M54.50 CHRONIC BILATERAL LOW BACK PAIN WITHOUT SCIATICA: Primary | ICD-10-CM

## 2025-05-16 DIAGNOSIS — M25.551 BILATERAL HIP PAIN: ICD-10-CM

## 2025-05-16 DIAGNOSIS — M25.552 BILATERAL HIP PAIN: ICD-10-CM

## 2025-05-16 PROCEDURE — 97112 NEUROMUSCULAR REEDUCATION: CPT | Mod: PN

## 2025-05-16 PROCEDURE — 97530 THERAPEUTIC ACTIVITIES: CPT | Mod: PN

## 2025-05-16 PROCEDURE — 97110 THERAPEUTIC EXERCISES: CPT | Mod: PN

## 2025-05-19 ENCOUNTER — CLINICAL SUPPORT (OUTPATIENT)
Dept: REHABILITATION | Facility: HOSPITAL | Age: 75
End: 2025-05-19
Payer: MEDICARE

## 2025-05-19 DIAGNOSIS — M54.50 CHRONIC BILATERAL LOW BACK PAIN WITHOUT SCIATICA: Primary | ICD-10-CM

## 2025-05-19 DIAGNOSIS — R29.898 WEAKNESS OF BOTH LOWER EXTREMITIES: ICD-10-CM

## 2025-05-19 DIAGNOSIS — M25.551 BILATERAL HIP PAIN: ICD-10-CM

## 2025-05-19 DIAGNOSIS — G89.29 CHRONIC BILATERAL LOW BACK PAIN WITHOUT SCIATICA: Primary | ICD-10-CM

## 2025-05-19 DIAGNOSIS — M25.552 BILATERAL HIP PAIN: ICD-10-CM

## 2025-05-19 PROCEDURE — 97530 THERAPEUTIC ACTIVITIES: CPT | Mod: PN

## 2025-05-19 PROCEDURE — 97110 THERAPEUTIC EXERCISES: CPT | Mod: PN

## 2025-05-19 PROCEDURE — 97112 NEUROMUSCULAR REEDUCATION: CPT | Mod: PN

## 2025-05-19 NOTE — PROGRESS NOTES
"Outpatient Rehab  Physical Therapy Visit    Patient Name: Mita Max  MRN: [id number]  YOB: 1950  Today's Date: 5/19/2025    Therapy Diagnosis:   Encounter Diagnoses   Name Primary?    Chronic bilateral low back pain without sciatica Yes    Weakness of both lower extremities     Bilateral hip pain      Physician: Vlad Dickerson NP    Physician Orders: Eval and Treat  Medical Diagnosis: M54.42,M54.41 (ICD-10-CM) - Acute bilateral low back pain with bilateral sciatic    Visit # / Visits Authorized:  34/40 (+eval)   Date of Evaluation:  2/5/2025  Insurance Authorization Period: 2/5/2025 to 12/3/2026  Plan of Care Certification:  2/5/2025 to 4/17/2025      Time In: 0945   Time Out: 1045  Total Time: 60   Total Billable Time: 60 minutes     Subjective   Feeling okay today..       Objective   No test and measures assessed today.      Treatment: (exercises performed today are bolded)  Therapeutic Exercise: 15 minutes  Nustep x 8 minutes (lvl 4)  Calf stretch (lvl 2) 3x 30"  Seated alternating DF 25x  Standing heel raises 30x  Standing pulldowns 7# 2x10  Seated LAQ 2x10, 5#  Seated marches 5# 2x10  Seated IR 20x, RTB  Seated ER 20x, RTB  Seated hip abduction, blue TB 30x  Seated hip adduction 30x  Seated HS curls, Blue TB 20x    Manual Therapy: 0 minutes  IASTM/cupping to (R) anterior tib  Manual resistance & overpressure DF/PF  STM to lumbar paraspinals     Balance/Neuromuscular Re-Education: 25 minutes  [place] x 10 minutes with active DF  ABC's x 2 trials  Sit to stand 2x15  Cone walking x 5 laps  Lateral cone walking x5 laps  Lateral stepping, YTB x5 laps  Hip 3-way, 2x10  Standing alternating marches on foam 1x10  Standing cone taps 3x10  Lateral stepping on foam beam x3laps  Lateral stepping with cone taps on foam beam x3laps  Ball toss on foam x20 tosses  Ball toss in tandem x20 tosses  Lateral step ups 2x10  Tandem stance 3x30" ea  SL stance 3x30"  Walking in // bars without UE x 4 laps  YTB " ambulation x1lap      Therapeutic Activity: 20 minutes  Walking around clinic without AD  Sit to stand 2x15  Curb walking x 10 minutes   Stair climbing x 3 trials (12 steps total with reciprocal gait pattern)  Step ups (stairs) 2x10  Lateral step ups (stairs) 2x10  Obstacle course with step ups, lateral stepping, cone weaving, marching on uneven surfaces x 2 trials    Assessment & Plan   Assessment: Progressed session with more standing exercises focused on hip and quad strengthening. Good amounts of fatigue noted. improved functional mobility noted with FOTO outcome score today. Still with balance challenges, more apprehension and fear. . Will benefit from further RLE strengthening and balance for return to prior level of function, perform all work duties required of her without increased pain, and ambulate independently.  Evaluation/Treatment Tolerance: Patient tolerated treatment well  Patient will continue to benefit from skilled outpatient physical therapy to address the deficits listed in the problem list box on initial evaluation, provide pt/family education and to maximize pt's level of independence in the home and community environment.     Patient's spiritual, cultural, and educational needs considered and patient agreeable to plan of care and goals.     Plan: Continue with POC and progress as tolerated    Goals:   Active       Long Term Goals       Patient will demonstrate improved function as indicated by a functional score of 65 on the FOTO.  (Progressing)       Start:  02/05/25    Expected End:  06/27/25            Pt will demonstrate improved pain by reports of less than or equal to 5/10 worst pain on the verbal rating scale in order to progress toward maximal functional ability and improve QOL.   (Progressing)       Start:  02/05/25    Expected End:  06/27/25            patient will improve bilateral lower extremity strength to greater than or equal to 4+/5 MMT for improved functional strength.   (Progressing)       Start:  02/05/25    Expected End:  06/27/25            Patient will improve 5 time sit to stand to less than 15 seconds for improved functional strength and decreased fall risk.  (Met)       Start:  02/05/25    Expected End:  04/17/25    Resolved:  04/29/25         Patient will ambulate with normalized gait mechanics for return to prior level of function.  (Progressing)       Start:  02/05/25    Expected End:  06/27/25              Resolved       Short Term Goals       Patient will demonstrate independence with HEP in order to progress toward functional independence  (Met)       Start:  02/05/25    Expected End:  04/17/25    Resolved:  03/11/25         Pt will demonstrate improved pain by reports of less than or equal to 7/10 worst pain on the verbal rating scale in order to progress toward maximal functional ability and improve QOL.   (Met)       Start:  02/05/25    Expected End:  04/17/25    Resolved:  03/11/25         Patient will improve bilateral lower extremity strength by 1/2 MMT for improved strength needed for increased ambulation distance.  (Met)       Start:  02/05/25    Expected End:  04/17/25    Resolved:  04/25/25         Patient will improve 5 time sit to stand to less than 20 seconds for improved functional strength and decreased fall risk.  (Met)       Start:  02/05/25    Expected End:  04/17/25    Resolved:  04/25/25             Maria Luisa Castillo, PT,DPT  05/19/2025

## 2025-05-21 ENCOUNTER — CLINICAL SUPPORT (OUTPATIENT)
Dept: REHABILITATION | Facility: HOSPITAL | Age: 75
End: 2025-05-21
Payer: MEDICARE

## 2025-05-21 DIAGNOSIS — M54.50 CHRONIC BILATERAL LOW BACK PAIN WITHOUT SCIATICA: Primary | ICD-10-CM

## 2025-05-21 DIAGNOSIS — M25.551 BILATERAL HIP PAIN: ICD-10-CM

## 2025-05-21 DIAGNOSIS — R29.898 WEAKNESS OF BOTH LOWER EXTREMITIES: ICD-10-CM

## 2025-05-21 DIAGNOSIS — G89.29 CHRONIC BILATERAL LOW BACK PAIN WITHOUT SCIATICA: Primary | ICD-10-CM

## 2025-05-21 DIAGNOSIS — M25.552 BILATERAL HIP PAIN: ICD-10-CM

## 2025-05-21 PROCEDURE — 97110 THERAPEUTIC EXERCISES: CPT | Mod: PN

## 2025-05-21 PROCEDURE — 97530 THERAPEUTIC ACTIVITIES: CPT | Mod: PN

## 2025-05-21 PROCEDURE — 97112 NEUROMUSCULAR REEDUCATION: CPT | Mod: PN

## 2025-05-21 NOTE — PROGRESS NOTES
"Outpatient Rehab  Physical Therapy Visit    Patient Name: Mita Max  MRN: [id number]  YOB: 1950  Today's Date: 5/21/2025    Therapy Diagnosis:   Encounter Diagnoses   Name Primary?    Chronic bilateral low back pain without sciatica Yes    Weakness of both lower extremities     Bilateral hip pain      Physician: Vlad Dickerson NP    Physician Orders: Eval and Treat  Medical Diagnosis: M54.42,M54.41 (ICD-10-CM) - Acute bilateral low back pain with bilateral sciatic    Visit # / Visits Authorized:  34/40 (+eval)   Date of Evaluation:  2/5/2025  Insurance Authorization Period: 2/5/2025 to 12/3/2026  Plan of Care Certification:  2/5/2025 to 4/17/2025      Time In: 1000   Time Out: 1100  Total Time: 60   Total Billable Time: 60 minutes     Subjective   She vaccumed yesteday and is having some soreness/pain today..       Objective   No test and measures assessed today.      Treatment: (exercises performed today are bolded)  Therapeutic Exercise: 15 minutes  Nustep x 8 minutes (lvl 4)  Calf stretch (lvl 2) 3x 30"  Seated alternating DF 25x  Standing heel raises 30x  Standing pulldowns 7# 2x10  Seated LAQ 2x10, 5#  Seated marches 5# 2x10  Seated IR 20x, RTB  Seated ER 20x, RTB  Seated hip abduction, blue TB 30x  Seated hip adduction 30x  Seated HS curls, Blue TB 20x    Manual Therapy: 0 minutes  IASTM/cupping to (R) anterior tib  Manual resistance & overpressure DF/PF  STM to lumbar paraspinals     Balance/Neuromuscular Re-Education: 25 minutes  Belgian x 10 minutes with active DF  ABC's x 2 trials  Sit to stand 2x15  Cone walking x 5 laps  Lateral cone walking x5 laps  Lateral stepping, YTB x5 laps  Hip 3-way, 2x10  Standing alternating marches on foam 1x10  Standing cone taps 3x10  Lateral stepping on foam beam x3laps  Lateral stepping with cone taps on foam beam x3laps  Ball toss on foam x20 tosses  Ball toss in tandem x20 tosses  Lateral step ups 2x10  Tandem stance 3x30" ea  SL stance " "3x30"  Walking in // bars without UE x 4 laps  YTB ambulation x1lap      Therapeutic Activity: 20 minutes  Walking around clinic without AD  Sit to stand 2x15  Curb walking x 10 minutes   Stair climbing x 3 trials (12 steps total with reciprocal gait pattern)  Step ups (stairs) 2x10  Lateral step ups (stairs) 2x10  Obstacle course with step ups, lateral stepping, cone weaving, marching on uneven surfaces x 2 trials    Assessment & Plan   Assessment: Good amounts of fatigue noted throughout therapy session todqy, no seated rest breaks required. Will benefit from further RLE strengthening and balance for return to prior level of function, perform all work duties required of her without increased pain, and ambulate independently.  Evaluation/Treatment Tolerance: Patient tolerated treatment well  Patient will continue to benefit from skilled outpatient physical therapy to address the deficits listed in the problem list box on initial evaluation, provide pt/family education and to maximize pt's level of independence in the home and community environment.     Patient's spiritual, cultural, and educational needs considered and patient agreeable to plan of care and goals.     Plan: Continue with POC and progress as tolerated. Plan to move to once a week starting next week. plan to do leg press machine at next session.    Goals:   Active       Long Term Goals       Patient will demonstrate improved function as indicated by a functional score of 65 on the FOTO.  (Progressing)       Start:  02/05/25    Expected End:  06/27/25            Pt will demonstrate improved pain by reports of less than or equal to 5/10 worst pain on the verbal rating scale in order to progress toward maximal functional ability and improve QOL.   (Progressing)       Start:  02/05/25    Expected End:  06/27/25            patient will improve bilateral lower extremity strength to greater than or equal to 4+/5 MMT for improved functional strength.  " (Progressing)       Start:  02/05/25    Expected End:  06/27/25            Patient will improve 5 time sit to stand to less than 15 seconds for improved functional strength and decreased fall risk.  (Met)       Start:  02/05/25    Expected End:  04/17/25    Resolved:  04/29/25         Patient will ambulate with normalized gait mechanics for return to prior level of function.  (Progressing)       Start:  02/05/25    Expected End:  06/27/25              Resolved       Short Term Goals       Patient will demonstrate independence with HEP in order to progress toward functional independence  (Met)       Start:  02/05/25    Expected End:  04/17/25    Resolved:  03/11/25         Pt will demonstrate improved pain by reports of less than or equal to 7/10 worst pain on the verbal rating scale in order to progress toward maximal functional ability and improve QOL.   (Met)       Start:  02/05/25    Expected End:  04/17/25    Resolved:  03/11/25         Patient will improve bilateral lower extremity strength by 1/2 MMT for improved strength needed for increased ambulation distance.  (Met)       Start:  02/05/25    Expected End:  04/17/25    Resolved:  04/25/25         Patient will improve 5 time sit to stand to less than 20 seconds for improved functional strength and decreased fall risk.  (Met)       Start:  02/05/25    Expected End:  04/17/25    Resolved:  04/25/25             Maria Luisa Castillo, PT,DPT  05/21/2025

## 2025-05-26 DIAGNOSIS — M51.9 LUMBAR DISC DISEASE: ICD-10-CM

## 2025-05-26 DIAGNOSIS — M54.16 LUMBAR RADICULOPATHY: ICD-10-CM

## 2025-05-26 DIAGNOSIS — M21.371 RIGHT FOOT DROP: ICD-10-CM

## 2025-05-26 DIAGNOSIS — M43.16 ANTEROLISTHESIS OF LUMBAR SPINE: ICD-10-CM

## 2025-05-26 DIAGNOSIS — M43.16 SPONDYLOLISTHESIS OF LUMBAR REGION: ICD-10-CM

## 2025-05-26 DIAGNOSIS — M54.9 DORSALGIA, UNSPECIFIED: ICD-10-CM

## 2025-05-26 RX ORDER — PREGABALIN 50 MG/1
50 CAPSULE ORAL 2 TIMES DAILY
Qty: 60 CAPSULE | Refills: 1 | Status: CANCELLED | OUTPATIENT
Start: 2025-05-26

## 2025-05-28 ENCOUNTER — CLINICAL SUPPORT (OUTPATIENT)
Dept: REHABILITATION | Facility: HOSPITAL | Age: 75
End: 2025-05-28
Payer: MEDICARE

## 2025-05-28 DIAGNOSIS — M25.551 BILATERAL HIP PAIN: ICD-10-CM

## 2025-05-28 DIAGNOSIS — R29.898 WEAKNESS OF BOTH LOWER EXTREMITIES: ICD-10-CM

## 2025-05-28 DIAGNOSIS — M54.50 CHRONIC BILATERAL LOW BACK PAIN WITHOUT SCIATICA: Primary | ICD-10-CM

## 2025-05-28 DIAGNOSIS — M25.552 BILATERAL HIP PAIN: ICD-10-CM

## 2025-05-28 DIAGNOSIS — G89.29 CHRONIC BILATERAL LOW BACK PAIN WITHOUT SCIATICA: Primary | ICD-10-CM

## 2025-05-28 PROCEDURE — 97112 NEUROMUSCULAR REEDUCATION: CPT | Mod: PN

## 2025-05-28 PROCEDURE — 97530 THERAPEUTIC ACTIVITIES: CPT | Mod: PN

## 2025-05-28 NOTE — PROGRESS NOTES
"Outpatient Rehab  Physical Therapy Visit    Patient Name: Mita Max  MRN: [id number]  YOB: 1950  Today's Date: 5/28/2025    Therapy Diagnosis:   Encounter Diagnoses   Name Primary?    Chronic bilateral low back pain without sciatica Yes    Weakness of both lower extremities     Bilateral hip pain      Physician: Vlad Dickerson NP    Physician Orders: Eval and Treat  Medical Diagnosis: M54.42,M54.41 (ICD-10-CM) - Acute bilateral low back pain with bilateral sciatic    Visit # / Visits Authorized:  36/40 (+eval)   Date of Evaluation:  2/5/2025  Insurance Authorization Period: 2/5/2025 to 12/3/2026  Plan of Care Certification:  2/5/2025 to 4/17/2025      Time In: 0900   Time Out: 1000  Total Time: 60   Total Billable Time: 60 minutes (billed 30 min 1:1)    Subjective   feeling good today.       Objective   No test and measures assessed today.      Treatment: (exercises performed today are bolded)  Therapeutic Exercise: 15 minutes  Nustep x 8 minutes (lvl 4)  Calf stretch (lvl 2) 3x 30"  Seated alternating DF 25x  Standing heel raises 30x  Standing pulldowns 7# 2x10  Seated LAQ 2x10, 5#  Seated marches 5# 2x10  Seated IR 20x, RTB  Seated ER 20x, RTB  Seated hip abduction, blue TB 30x  Seated hip adduction 30x  Seated HS curls, Blue TB 20x    Manual Therapy: 0 minutes  IASTM/cupping to (R) anterior tib  Manual resistance & overpressure DF/PF  STM to lumbar paraspinals     Balance/Neuromuscular Re-Education: 20 minutes  Bangladeshi x 10 minutes with active DF  ABC's x 2 trials  Sit to stand 2x15  Cone walking x 5 laps  Lateral cone walking x5 laps  Lateral stepping, YTB x5 laps  Hip 3-way, 2x10  Standing alternating marches on foam 1x10  Standing cone taps 3x10  Lateral stepping on foam beam x3laps  Lateral stepping with cone taps on foam beam x3laps  Ball toss on foam x20 tosses  Ball toss in tandem x20 tosses  Tandem stance 3x30" ea  SL stance 3x30"  Walking in // bars without UE x 4 laps  YTB " ambulation x1 lap    Therapeutic Activity: 25 minutes  Walking around clinic without AD  Sit to stand 2x15  Curb walking x 10 minutes   Stair climbing x 3 trials (12 steps total with reciprocal gait pattern)  Step ups (stairs) 2x10  Lateral step ups (stairs) 2x10  Obstacle course with step ups, lateral stepping, cone weaving, marching on uneven surfaces x 2 trials    Assessment & Plan   Assessment: Good amounts of fatigue noted throughout therapy session todqy, no seated rest breaks required. Will benefit from further RLE strengthening and balance for return to prior level of function, perform all work duties required of her without increased pain, and ambulate independently.  Evaluation/Treatment Tolerance: Patient tolerated treatment well  Patient will continue to benefit from skilled outpatient physical therapy to address the deficits listed in the problem list box on initial evaluation, provide pt/family education and to maximize pt's level of independence in the home and community environment.     Patient's spiritual, cultural, and educational needs considered and patient agreeable to plan of care and goals.     Plan: Continue with POC and progress as tolerated. Plan to move to once a week starting next week. plan to do leg press machine at next session.    Goals:   Active       Long Term Goals       Patient will demonstrate improved function as indicated by a functional score of 65 on the FOTO.  (Progressing)       Start:  02/05/25    Expected End:  06/27/25            Pt will demonstrate improved pain by reports of less than or equal to 5/10 worst pain on the verbal rating scale in order to progress toward maximal functional ability and improve QOL.   (Progressing)       Start:  02/05/25    Expected End:  06/27/25            patient will improve bilateral lower extremity strength to greater than or equal to 4+/5 MMT for improved functional strength.  (Progressing)       Start:  02/05/25    Expected End:   06/27/25            Patient will improve 5 time sit to stand to less than 15 seconds for improved functional strength and decreased fall risk.  (Met)       Start:  02/05/25    Expected End:  04/17/25    Resolved:  04/29/25         Patient will ambulate with normalized gait mechanics for return to prior level of function.  (Progressing)       Start:  02/05/25    Expected End:  06/27/25              Resolved       Short Term Goals       Patient will demonstrate independence with HEP in order to progress toward functional independence  (Met)       Start:  02/05/25    Expected End:  04/17/25    Resolved:  03/11/25         Pt will demonstrate improved pain by reports of less than or equal to 7/10 worst pain on the verbal rating scale in order to progress toward maximal functional ability and improve QOL.   (Met)       Start:  02/05/25    Expected End:  04/17/25    Resolved:  03/11/25         Patient will improve bilateral lower extremity strength by 1/2 MMT for improved strength needed for increased ambulation distance.  (Met)       Start:  02/05/25    Expected End:  04/17/25    Resolved:  04/25/25         Patient will improve 5 time sit to stand to less than 20 seconds for improved functional strength and decreased fall risk.  (Met)       Start:  02/05/25    Expected End:  04/17/25    Resolved:  04/25/25             Maria Luisa Castillo, PT,DPT  05/28/2025

## 2025-06-04 ENCOUNTER — CLINICAL SUPPORT (OUTPATIENT)
Dept: REHABILITATION | Facility: HOSPITAL | Age: 75
End: 2025-06-04
Payer: MEDICARE

## 2025-06-04 DIAGNOSIS — M54.50 CHRONIC BILATERAL LOW BACK PAIN WITHOUT SCIATICA: Primary | ICD-10-CM

## 2025-06-04 DIAGNOSIS — M25.552 BILATERAL HIP PAIN: ICD-10-CM

## 2025-06-04 DIAGNOSIS — G89.29 CHRONIC BILATERAL LOW BACK PAIN WITHOUT SCIATICA: Primary | ICD-10-CM

## 2025-06-04 DIAGNOSIS — M25.551 BILATERAL HIP PAIN: ICD-10-CM

## 2025-06-04 DIAGNOSIS — R29.898 WEAKNESS OF BOTH LOWER EXTREMITIES: ICD-10-CM

## 2025-06-04 PROCEDURE — 97110 THERAPEUTIC EXERCISES: CPT | Mod: PN

## 2025-06-04 PROCEDURE — 97530 THERAPEUTIC ACTIVITIES: CPT | Mod: PN

## 2025-06-11 ENCOUNTER — CLINICAL SUPPORT (OUTPATIENT)
Dept: REHABILITATION | Facility: HOSPITAL | Age: 75
End: 2025-06-11
Payer: MEDICARE

## 2025-06-11 DIAGNOSIS — M25.551 BILATERAL HIP PAIN: ICD-10-CM

## 2025-06-11 DIAGNOSIS — M25.552 BILATERAL HIP PAIN: ICD-10-CM

## 2025-06-11 DIAGNOSIS — R29.898 WEAKNESS OF BOTH LOWER EXTREMITIES: ICD-10-CM

## 2025-06-11 DIAGNOSIS — G89.29 CHRONIC BILATERAL LOW BACK PAIN WITHOUT SCIATICA: Primary | ICD-10-CM

## 2025-06-11 DIAGNOSIS — M54.50 CHRONIC BILATERAL LOW BACK PAIN WITHOUT SCIATICA: Primary | ICD-10-CM

## 2025-06-11 PROCEDURE — 97110 THERAPEUTIC EXERCISES: CPT | Mod: PN

## 2025-06-11 PROCEDURE — 97112 NEUROMUSCULAR REEDUCATION: CPT | Mod: PN

## 2025-06-11 PROCEDURE — 97530 THERAPEUTIC ACTIVITIES: CPT | Mod: PN

## 2025-06-11 NOTE — PROGRESS NOTES
"Outpatient Rehab  Physical Therapy Visit    Patient Name: Mita Max  MRN: [id number]  YOB: 1950  Encounter Date: 6/11/2025    Therapy Diagnosis:   Encounter Diagnoses   Name Primary?    Chronic bilateral low back pain without sciatica Yes    Weakness of both lower extremities     Bilateral hip pain      Physician: Vlad Dickerson NP    Physician Orders: Eval and Treat  Medical Diagnosis: Acute bilateral low back pain with bilateral sciatica  Surgical Diagnosis: Not applicable for this Episode   Surgical Date: Not applicable for this Episode    Visit # / Visits Authorized:  38 / 40  Insurance Authorization Period: 2/4/2025 to 12/31/2025  Date of Evaluation: 2/5/2025  Plan of Care Certification: 4/29/2025 to 6/27/2025      PT/PTA: PT   Number of PTA visits since last PT visit:0    Time In: 1345   Time Out: 1455  Total Time (in minutes): 70   Total Billable Time (in minutes):  60    Precautions: standard, footdrop        Subjective   Feeling okay today, notes increased low back pain with decreased cane usage. She still has some fear of falling but has decreased..         Objective            Treatment:  Treatment: (exercises performed today are bolded)  Therapeutic Exercise: 15 minutes  Nustep x 8 minutes (lvl 4)  Calf stretch (lvl 2) 3x 30"  Seated alternating DF 25x  Standing heel raises 30x  Standing pulldowns 7# 2x10  Seated LAQ 2x10, 5#  Seated marches 5# 2x10  Seated IR 20x, RTB  Seated ER 20x, RTB  Seated hip abduction, blue TB 30x  Seated hip adduction 30x  Seated HS curls, Blue TB 20x  Seated Soleus raises (4" box) 15# KB 30x     Manual Therapy: 0 minutes  IASTM/cupping to (R) anterior tib  Manual resistance & overpressure DF/PF  STM to lumbar paraspinals      Balance/Neuromuscular Re-Education: 30 minutes  Lao x 10 minutes with active DF  ABC's x 2 trials  Cone walking x 5 laps  Lateral cone walking x5 laps  Lateral stepping, YTB x5 laps  Monster walks, YTB x 3laps  Hip 3-way, " "2x10, YTB  Standing alternating marches on foam 1x10  Standing cone taps 3x10  Standing marches, YTB 1x10  Lateral stepping on foam beam x3laps  Lateral stepping with cone taps on foam beam x3laps  Ball toss on foam x20 tosses  Ball toss in tandem x20 tosses  Tandem stance 3x30" ea  SL stance 3x30"  Walking in // bars without UE x 4 laps  YTB ambulation x5 lap     Therapeutic Activity: 15 minutes  Sit to stand 2x15; 7.5#  Stair climbing x 3 trials (12 steps total with reciprocal gait pattern)  Step ups (stairs) 2x10  Lateral step ups (stairs) 2x10  Lateral step downs (4" step) 2x6  Obstacle course with step ups, lateral stepping, cone weaving, marching on uneven surfaces x 2 trials        Assessment & Plan   Assessment: Still will continue to benefit from further RLE strengthening and balance for return to prior level of function, perform all work duties required of her without increased pain, and ambulate independently.  Evaluation/Treatment Tolerance: Patient tolerated treatment well    The patient will continue to benefit from skilled outpatient physical therapy in order to address the deficits listed in the problem list on the initial evaluation, provide patient and family education, and maximize the patients level of independence in the home and community environments.     The patient's spiritual, cultural, and educational needs were considered, and the patient is agreeable to the plan of care and goals.       Plan: Continue with POC and progress as tolerated. Plan to move to once a week starting next week. plan to do leg press machine at next session.    Goals:   Active       Long Term Goals       Patient will demonstrate improved function as indicated by a functional score of 65 on the FOTO.  (Progressing)       Start:  02/05/25    Expected End:  06/27/25            Pt will demonstrate improved pain by reports of less than or equal to 5/10 worst pain on the verbal rating scale in order to progress toward " maximal functional ability and improve QOL.   (Progressing)       Start:  02/05/25    Expected End:  06/27/25            patient will improve bilateral lower extremity strength to greater than or equal to 4+/5 MMT for improved functional strength.  (Progressing)       Start:  02/05/25    Expected End:  06/27/25            Patient will improve 5 time sit to stand to less than 15 seconds for improved functional strength and decreased fall risk.  (Met)       Start:  02/05/25    Expected End:  04/17/25    Resolved:  04/29/25         Patient will ambulate with normalized gait mechanics for return to prior level of function.  (Progressing)       Start:  02/05/25    Expected End:  06/27/25              Resolved       Short Term Goals       Patient will demonstrate independence with HEP in order to progress toward functional independence  (Met)       Start:  02/05/25    Expected End:  04/17/25    Resolved:  03/11/25         Pt will demonstrate improved pain by reports of less than or equal to 7/10 worst pain on the verbal rating scale in order to progress toward maximal functional ability and improve QOL.   (Met)       Start:  02/05/25    Expected End:  04/17/25    Resolved:  03/11/25         Patient will improve bilateral lower extremity strength by 1/2 MMT for improved strength needed for increased ambulation distance.  (Met)       Start:  02/05/25    Expected End:  04/17/25    Resolved:  04/25/25         Patient will improve 5 time sit to stand to less than 20 seconds for improved functional strength and decreased fall risk.  (Met)       Start:  02/05/25    Expected End:  04/17/25    Resolved:  04/25/25             Maria Luisa Castillo, PT

## 2025-06-16 ENCOUNTER — OFFICE VISIT (OUTPATIENT)
Dept: PAIN MEDICINE | Facility: CLINIC | Age: 75
End: 2025-06-16
Payer: MEDICARE

## 2025-06-16 ENCOUNTER — PATIENT MESSAGE (OUTPATIENT)
Dept: PAIN MEDICINE | Facility: CLINIC | Age: 75
End: 2025-06-16

## 2025-06-16 VITALS — BODY MASS INDEX: 27.51 KG/M2 | HEIGHT: 64 IN

## 2025-06-16 DIAGNOSIS — M54.16 RIGHT LUMBAR RADICULOPATHY: ICD-10-CM

## 2025-06-16 DIAGNOSIS — Z79.01 ANTICOAGULATED: Primary | ICD-10-CM

## 2025-06-16 DIAGNOSIS — M43.16 ANTEROLISTHESIS OF LUMBAR SPINE: ICD-10-CM

## 2025-06-16 DIAGNOSIS — M51.9 LUMBAR DISC DISEASE: ICD-10-CM

## 2025-06-16 DIAGNOSIS — M54.16 LUMBAR RADICULOPATHY: ICD-10-CM

## 2025-06-16 DIAGNOSIS — M62.830 MUSCLE SPASM OF BACK: Primary | ICD-10-CM

## 2025-06-16 DIAGNOSIS — M43.16 SPONDYLOLISTHESIS OF LUMBAR REGION: ICD-10-CM

## 2025-06-16 DIAGNOSIS — M21.371 RIGHT FOOT DROP: ICD-10-CM

## 2025-06-16 DIAGNOSIS — M48.061 DEGENERATIVE LUMBAR SPINAL STENOSIS: ICD-10-CM

## 2025-06-16 DIAGNOSIS — M54.9 DORSALGIA, UNSPECIFIED: ICD-10-CM

## 2025-06-16 PROCEDURE — 1160F RVW MEDS BY RX/DR IN RCRD: CPT | Mod: CPTII,95,, | Performed by: PHYSICIAN ASSISTANT

## 2025-06-16 PROCEDURE — G2211 COMPLEX E/M VISIT ADD ON: HCPCS | Mod: 95,,, | Performed by: PHYSICIAN ASSISTANT

## 2025-06-16 PROCEDURE — 1159F MED LIST DOCD IN RCRD: CPT | Mod: CPTII,95,, | Performed by: PHYSICIAN ASSISTANT

## 2025-06-16 PROCEDURE — 98006 SYNCH AUDIO-VIDEO EST MOD 30: CPT | Mod: 95,,, | Performed by: PHYSICIAN ASSISTANT

## 2025-06-16 PROCEDURE — 4010F ACE/ARB THERAPY RXD/TAKEN: CPT | Mod: CPTII,95,, | Performed by: PHYSICIAN ASSISTANT

## 2025-06-16 PROCEDURE — 3044F HG A1C LEVEL LT 7.0%: CPT | Mod: CPTII,95,, | Performed by: PHYSICIAN ASSISTANT

## 2025-06-16 RX ORDER — PREGABALIN 50 MG/1
50 CAPSULE ORAL 2 TIMES DAILY
Qty: 60 CAPSULE | Refills: 1 | Status: SHIPPED | OUTPATIENT
Start: 2025-06-16

## 2025-06-16 RX ORDER — CYCLOBENZAPRINE HCL 5 MG
5 TABLET ORAL 2 TIMES DAILY PRN
Qty: 60 TABLET | Refills: 1 | Status: SHIPPED | OUTPATIENT
Start: 2025-06-16

## 2025-06-16 NOTE — PROGRESS NOTES
Established Patient - TeleHealth Visit    The patient location is: [place]  The chief complaint leading to consultation is: chronic pain     Visit type: Audiovisual telemedicine visit     Total time spent on the encounter includes Face-to-face time and non-face to face time preparing to see the patient (eg, review of tests), Obtaining and/or reviewing separately obtained history, Documenting clinical information in the electronic or other health record, Independently interpreting results (not separately reported) and communicating results to the patient/family/caregiver, and/or Care coordination (not separately reported).     Each patient to whom he or she provides medical services by telemedicine is:  (1) informed of the relationship between the physician and patient and the respective role of any other health care provider with respect to management of the patient; and (2) notified that he or she may decline to receive medical services by telemedicine and may withdraw from such care at any time.      Chronic Pain -- Established Patient (Follow-up visit)    Referring Physician: Vlad Dickerson NP    PCP: Janiya Linton MD    Chief complaint:  Follow-up     Lower back pain with RLE radicular pain, + shopping cart sign       SUBJECTIVE:    Interval History (6/16/2025):  Patient presents for follow-up regarding a previously planned lumbar epidural steroid injection procedure, which was delayed due to clearance issues. She reports nausea and feeling ill this morning, which improved after drinking water. She woke up with pain yesterday, rating it at 5-7/10 at its worst. Pain interfered with her sleep, and she was unable to lie down. She had previously improved to the point where she experienced pain infrequently and at low intensity. She had stopped taking the prescribed pregabalin (Lyrica) due to this improvement, expressing caution about medication use but acknowledging the benefits of pain relief. She expresses  disappointment at not being able to discontinue her muscle relaxer as she had hoped. She inquires about the cost and specifics of the proposed lumbar transforaminal epidural steroid injection for treating sciatica and disc issues in her back.    Interval History (6/16/2025):  Mita Max presents for follow-up regarding a previously planned lumbar epidural steroid injection procedure, which was delayed due to clearance issues. She reports nausea and feeling ill this morning, which improved after drinking water. She woke up with pain yesterday, rating it at 5/10 at its worst. Pain interfered with her sleep, and she was unable to lie down. She had previously improved to the point where she experienced pain infrequently and at low intensity. She had stopped taking the prescribed pregabalin (Lyrica) due to this improvement, expressing caution about medication use but acknowledging the benefits of pain relief. She expresses disappointment at not being able to discontinue her muscle relaxer as she had hoped. She inquires about the cost and specifics of the proposed lumbar transforaminal epidural steroid injection for treating sciatica and disc issues in her back.    Interval History (3/6/2025):  Patient presents today for follow-up visit.  Patient was last seen about a month ago. At that visit, the plan was to start physical therapy, which she does feels is slowly helping.  She is making progress, albeit slow.  She also does at home exercises that are recommended by the therapist.  She also feels that walking with drop foot has improved.  She is considering getting an insert because physical therapy told her she had a leg length discrepancy.  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  She does feel that the Lyrica is helping though.  She has an old prescription of Flexeril, but she has not started to take it because she was not sure if she can mix any of these  medications.  She is also inquiring about taking Mucinex or ibuprofen with her current medications.  She reports that she also has swelling in her legs, which she has not yet discuss with primary care.  She feels this started about a week ago and is wondering if it is coming from her back.  She reports she is very careful with walking so she does not fall.  Pain is worse with lying down, so she often sleeps in a chair.  She has list of questions today.  She is wondering about taking a job with [employer], although it would require a lot of traveling (about 30 we can is a year).  She is wondering if she will ever get to a level of pain relief that she could consider doing this.  She is inquiring about an inversion table, although she has not used 1 before.  Patient reports pain as 6/10 today.    Initial HPI (02/18/2025):  Mita Max is a 74 y.o. female who presents to the clinic for the evaluation of lower back pain.   Patient reports three-month history of lower back pain.  Patient denies any previous surgeries in her lumbar spine or bilateral lower extremities.  Patient denies any inciting traumas to her lower back pain.  Lower back pain described as a throbbing aching pain that starts in the midline and radiates out in a band across the lower back, right-greater-than-left.  This pain then radiates primarily down the right lower extremity along the lateral aspect to the ankle.  Patient reports occasional left lower extremity pain, but reports that her right lower extremity pain is primarily pain.  Patient reports 3-4 week history of decreased strength in her right foot with this pain.  Pain is worse with lying supine and walking, better with sitting down.  Pain is currently rated a 2/10, but can increase to a 10/10 with exacerbating activity.  Patient denies any fevers, chills, saddle anesthesia, or bowel and bladder incontinence.      Non-Pharmacologic Treatments:  Physical Therapy/Home Exercise:  yes  Ice/Heat:yes  TENS: no  Acupuncture: no  Massage: yes  Chiropractic: yes        Previous Pain Medications:  NSAIDs, Tylenol, muscle relaxers, neuropathics, opioids, topicals        Pain Procedures:   None       Pain Disability Index (PDI) Score Review:      2/18/2025     9:24 AM   Last 3 PDI Scores   Pain Disability Index (PDI) 12         Imaging/ Diagnostic Studies/ Labs (Reviewed on 6/16/2025):    02/26/2025 MRI Lumbar Spine Without Contrast  COMPARISON:  Lumbar radiograph 01/29/2025    FINDINGS:  Alignment: L4-L5 grade 1 degenerative appearing anterolisthesis.    Vertebrae: Lumbar vertebral body heights are maintained.  Minor multilevel endplate degenerative changes throughout the lumbar spine.  Minimal L2-L3 dorsal endplate edema.  There is edema of the left L5 pedicle and left greater than right L5-S1 facet edema.  No evidence of an acute fracture.  Marrow signal is otherwise within normal limits.    Discs: Disc desiccation throughout the lumbar spine with disc height loss most pronounced at T11-T12 and L2-L3.    Cord: Prominent cord deformity at T11-T12. Conus terminates at L1.    Degenerative findings:    T11-T12: Appearance of a large left paracentral disc extrusion, facet arthropathy and ligamentum flavum hypertrophy contributing to at least moderate spinal canal stenosis.    T12-L1: No significant posterior disc bulge, spinal canal stenosis or neural foraminal stenosis.    L1-L2: Minimal broad-based posterior disc bulge and mild facet arthropathy.  No significant spinal canal stenosis or neural foraminal stenosis.    L2-L3: Mild broad-based posterior disc bulge, facet arthropathy and ligamentum flavum hypertrophy contribute to mild spinal canal stenosis, mild to moderate right L2-3 neural foraminal stenosis and mild left-sided neural foraminal stenosis.    L3-L4: Broad-based posterior disc bulge, facet arthropathy and ligamentum flavum hypertrophy contributes to moderate spinal canal stenosis at L3-4  and moderate Right > Left L3-4 neural foraminal stenosis.    L4-L5: Grade 1 anterolisthesis.  Broad-based posterior disc bulge, facet arthropathy and ligamentum flavum hypertrophy contributes to severe spinal canal stenosis at L4-5. Moderate right and mild-to-moderate left-sided L4-5 neural foraminal stenosis.    L5-S1: Mild broad-based posterior disc bulge is asymmetric to the right.  Bilateral facet arthropathy and ligamentum flavum hypertrophy contribute to severe right L5-S1 neuroforaminal narrowing and mild-to-moderate left-sided L5-S1 neural foraminal stenosis.  No significant spinal canal stenosis.    Paraspinal muscles & soft tissues: Within normal limits.  Impression:   1. Multilevel degenerative changes of the lumbar spine are most pronounced at L4-L5 with grade 1 anterolisthesis contributing to severe spinal canal stenosis, moderate right and mild-to-moderate left-sided neural foraminal stenosis.  2. L3-L4 moderate spinal canal stenosis with moderate bilateral neural foraminal stenosis.  3. T11-T12 partially visualized large disc extrusion contributes to at least moderate spinal canal stenosis.  MRI thoracic spine could be utilized for further evaluation.  4. L2-L3 mild spinal canal stenosis with mild-to-moderate right and mild left-sided neural foraminal stenosis.  5. L5-S1 severe right and mild-to-moderate left-sided neural foraminal stenosis.  Left L5 pedicle stress reaction and left L5-S1 facet joint edema likely contribute to back pain.       01/29/2025 X-Ray Lumbar Spine 5 View  COMPARISON: None  FINDINGS: This examination consists of 5 views of the lumbar spine. There are 5 lumbar type vertebral bodies.  There is grade 1 anterolisthesis of L4 on L5.  There is no fracture or scoliosis. There is normal lumbar lordosis.  Impression  There is grade 1 anterolisthesis of L4 on L5.                Review of Systems:   Constitutional:  Negative for chills, diaphoresis, fatigue and fever.   HENT:  Negative  "for ear discharge, ear pain, rhinorrhea, trouble swallowing and voice change.    Respiratory:  Negative for chest tightness, shortness of breath, wheezing and stridor.    Cardiovascular:  Negative for chest pain and leg swelling.   Gastrointestinal:  Negative for blood in stool, diarrhea, nausea and vomiting.   Endocrine: Negative for cold intolerance and heat intolerance.   Genitourinary:  Negative for dysuria, hematuria and urgency.   Musculoskeletal:  Positive for arthralgias, back pain, gait problem and myalgias. Negative for joint swelling, neck pain and neck stiffness.   Skin:  Negative for rash.   Neurological:  Positive for weakness and numbness. Negative for tremors, seizures, speech difficulty and headaches.   Hematological:  Does not bruise/bleed easily.   Psychiatric/Behavioral:  Negative for agitation, confusion and suicidal ideas.           OBJECTIVE:    Telemedicine Physical Exam:   Vitals:    06/16/25 0724   Height: 5' 4" (1.626 m)     Body mass index is 27.51 kg/m².   (reviewed on 6/16/2025)     (Physical exam performed virtually with patient guided on specific actions and diagnostic maneuvers)  GENERAL: Well appearing, in no acute distress, alert and oriented x3.  Cooperative.  PSYCH:  Mood and affect appropriate.  SKIN: Skin color & texture with no obvious abnormalities.    HEAD/FACE:  Normocephalic, atraumatic.    PULM:  No difficulty breathing. No nasal flaring. No obvious wheezing.  EXTREMITIES: No obvious deformities. Moving all extremities well, appears to have symmetric strength throughout.  MUSCULOSKELETAL: No obvious atrophy abnormalities are noted.   NEURO: No obvious neurologic deficit.   GAIT: sitting.     Physical Exam: last in clinic visit:  Constitutional:       Appearance: Normal appearance.   HENT:      Head: Normocephalic and atraumatic.   Eyes:      Extraocular Movements: Extraocular movements intact.   Cardiovascular:      Pulses: Normal pulses.   Pulmonary:      Effort: " Pulmonary effort is normal.   Skin:     General: Skin is warm.      Capillary Refill: Capillary refill takes less than 2 seconds.   Neurological:      Mental Status: She is alert and oriented to person, place, and time.      Sensory: No sensory deficit.      Motor: Weakness present. No abnormal muscle tone.      Gait: Gait abnormal.      Deep Tendon Reflexes:      Reflex Scores:       Patellar reflexes are 2+ on the right side and 2+ on the left side.       Achilles reflexes are 0 on the right side and 2+ on the left side.     Comments: 3/5 strength in right dorsiflexion   Psychiatric:         Mood and Affect: Mood normal.         Behavior: Behavior normal.         Thought Content: Thought content normal.       Musculoskeletal:  Lumbar Exam  Incision: no  Pain with Flexion: yes  Pain with Extension: yes, extension worse than flexion  ROM:  Decreased  Paraspinous TTP:  Right-greater-than-left  Facet TTP:  L4-5  Facet Loading:  Right-greater-than-left  SLR:  Positive on the right at 75°  SIJ TTP:  Negative bilaterally  LUCAS:  Negative bilaterally              ASSESSMENT:     74 y.o. year old female with lower back pain, consistent with     1. Muscle spasm of back  cyclobenzaprine (FLEXERIL) 5 MG tablet      2. Anterolisthesis of lumbar spine  pregabalin (LYRICA) 50 MG capsule      3. Spondylolisthesis of lumbar region  pregabalin (LYRICA) 50 MG capsule      4. Lumbar disc disease  pregabalin (LYRICA) 50 MG capsule      5. Right foot drop  pregabalin (LYRICA) 50 MG capsule      6. Right lumbar radiculopathy        7. Degenerative lumbar spinal stenosis        8. Lumbar radiculopathy  pregabalin (LYRICA) 50 MG capsule      9. Dorsalgia, unspecified  pregabalin (LYRICA) 50 MG capsule          Muscle spasm of back  -     cyclobenzaprine (FLEXERIL) 5 MG tablet; Take 1 tablet (5 mg total) by mouth 2 (two) times daily as needed for Muscle spasms.  Dispense: 60 tablet; Refill: 1    Anterolisthesis of lumbar spine  -      pregabalin (LYRICA) 50 MG capsule; Take 1 capsule (50 mg total) by mouth 2 (two) times daily.  Dispense: 60 capsule; Refill: 1    Spondylolisthesis of lumbar region  -     pregabalin (LYRICA) 50 MG capsule; Take 1 capsule (50 mg total) by mouth 2 (two) times daily.  Dispense: 60 capsule; Refill: 1    Lumbar disc disease  -     pregabalin (LYRICA) 50 MG capsule; Take 1 capsule (50 mg total) by mouth 2 (two) times daily.  Dispense: 60 capsule; Refill: 1    Right foot drop  -     pregabalin (LYRICA) 50 MG capsule; Take 1 capsule (50 mg total) by mouth 2 (two) times daily.  Dispense: 60 capsule; Refill: 1    Right lumbar radiculopathy    Degenerative lumbar spinal stenosis    Lumbar radiculopathy  -     pregabalin (LYRICA) 50 MG capsule; Take 1 capsule (50 mg total) by mouth 2 (two) times daily.  Dispense: 60 capsule; Refill: 1    Dorsalgia, unspecified  -     pregabalin (LYRICA) 50 MG capsule; Take 1 capsule (50 mg total) by mouth 2 (two) times daily.  Dispense: 60 capsule; Refill: 1           PLAN:   - Interventions:   - Schedule right L4/5 + L5/S1 TF YUNIOR. Patient is taking ASA as 1° prevention; she will have to stop 5 days prior to procedure.  Will get clearance from Dr. Linton (PCP).      - Anticoagulation use:   Yes, ASA - 1° prevention - Dr. Linton (PCP)    - Medications:  - Refill Lyrica (pregabalin) 50mg BID - which is helping.  - Refill Flexeril (cyclobenzaprine) 5mg at night, which may help with sleeping.  - Continue ibuprofen 800mg PRN pain.     - [place]  reviewed and appropriate.      - Therapy:   - Continue physical therapy, which is helping.    - Imaging/Diagnostic:  - Lumbar MRI (2025) reviewed with patient.  - Consider thoracic MRI.     - Consults:   - None at this time.    - Follow up visit: 4 weeks post-procedure - virtual visit       Future Appointments   Date Time Provider Department Center   6/18/2025 10:00 AM [facility]isa [facility]illo [facility]   6/18/2025  2:20 PM Janiya Linton MD [facility]  [place]       Visit today included increased complexity associated with the care of the episodic problem of chronic pain which was addressed and continue to manage the longitudinal care of the patient due to the serious and/or complex managed problem(s) listed above.      - Patient Questions: Answered all of the patient's questions regarding diagnosis, therapy, and treatment.    - This condition does not require this patient to take time off of work, and the primary goal of our Pain Management services is to improve the patient's functional capacity.   - I discussed the risks, benefits, and alternatives to potential treatment options. All questions and concerns were fully addressed today in clinic.         Batool Starks PA-C  Interventional Pain Management - [facility]    Disclaimer:  This note was prepared using voice recognition system and is likely to have sound alike errors that may have been overlooked even after proof reading.  Please call me with any questions.     This note was generated with the assistance of ambient listening technology. Recording of patient appointment was utlized to facilitate this note. I attest to having reviewed and edited the generated note for accuracy, though some syntax or spelling errors may persist. Please contact the author of this note for any clarification.

## 2025-06-17 ENCOUNTER — E-CONSULT (OUTPATIENT)
Dept: CARDIOLOGY | Facility: CLINIC | Age: 75
End: 2025-06-17
Payer: MEDICARE

## 2025-06-17 DIAGNOSIS — Z01.810 PREOP CARDIOVASCULAR EXAM: Primary | ICD-10-CM

## 2025-06-17 NOTE — CONSULTS
[facility]  Response for E-Consult     Patient Name: Mita Max  MRN: [id number]  Primary Care Provider: Janiya Linton MD   Requesting Provider: Batool Starks PA*  E-Consult to General Cardiology  Consult performed by: Patrick Huber MD  Consult ordered by: Batool Starks PA-C          E consult for preop clearance of lumbar YUNIOR  The chart reviewed.  PMH CAD DM HTN HLD  EKG NSR    Plan  Elevated periop risk of CV events for non-high risk procedure.  Ok to proceed the scheduled procedure without further cardiac study.  OK to hold ASA 5 days before the procedure and resume postop once hemodynamically stable      Total time of Consultation: 10 minute    I did not speak to the requesting provider verbally about this.     *This eConsult is based on the clinical data available to me and is furnished without benefit of a physical examination. The eConsult will need to be interpreted in light of any clinical issues or changes in patient status not available to me at the time of filing this eConsults. Significant changes in patient condition or level of acuity should result in immediate formal consultation and reevaluation. Please alert me if you have further questions.    Thank you for this eConsult referral.     Patrick Huber MD  [facility]

## 2025-06-18 ENCOUNTER — OFFICE VISIT (OUTPATIENT)
Dept: INTERNAL MEDICINE | Facility: CLINIC | Age: 75
End: 2025-06-18
Payer: MEDICARE

## 2025-06-18 ENCOUNTER — CLINICAL SUPPORT (OUTPATIENT)
Dept: REHABILITATION | Facility: HOSPITAL | Age: 75
End: 2025-06-18
Payer: MEDICARE

## 2025-06-18 VITALS
SYSTOLIC BLOOD PRESSURE: 132 MMHG | BODY MASS INDEX: 28.15 KG/M2 | HEART RATE: 86 BPM | TEMPERATURE: 98 F | DIASTOLIC BLOOD PRESSURE: 70 MMHG | OXYGEN SATURATION: 96 % | RESPIRATION RATE: 20 BRPM | WEIGHT: 164 LBS

## 2025-06-18 DIAGNOSIS — B35.1 ONYCHOMYCOSIS: ICD-10-CM

## 2025-06-18 DIAGNOSIS — I15.2 HYPERTENSION ASSOCIATED WITH DIABETES: ICD-10-CM

## 2025-06-18 DIAGNOSIS — E11.59 HYPERTENSION ASSOCIATED WITH DIABETES: ICD-10-CM

## 2025-06-18 DIAGNOSIS — E11.69 HYPERLIPIDEMIA ASSOCIATED WITH TYPE 2 DIABETES MELLITUS: ICD-10-CM

## 2025-06-18 DIAGNOSIS — M17.0 PRIMARY OSTEOARTHRITIS OF BOTH KNEES: ICD-10-CM

## 2025-06-18 DIAGNOSIS — R91.8 PULMONARY NODULES: ICD-10-CM

## 2025-06-18 DIAGNOSIS — Z86.0100 HISTORY OF COLON POLYPS: ICD-10-CM

## 2025-06-18 DIAGNOSIS — M79.89 LEG SWELLING: ICD-10-CM

## 2025-06-18 DIAGNOSIS — M48.061 SPINAL STENOSIS OF LUMBAR REGION WITHOUT NEUROGENIC CLAUDICATION: ICD-10-CM

## 2025-06-18 DIAGNOSIS — J43.9 PULMONARY EMPHYSEMA, UNSPECIFIED EMPHYSEMA TYPE: ICD-10-CM

## 2025-06-18 DIAGNOSIS — Z00.00 ROUTINE GENERAL MEDICAL EXAMINATION AT A HEALTH CARE FACILITY: Primary | ICD-10-CM

## 2025-06-18 DIAGNOSIS — E11.9 TYPE 2 DIABETES MELLITUS WITHOUT COMPLICATION, WITHOUT LONG-TERM CURRENT USE OF INSULIN: ICD-10-CM

## 2025-06-18 DIAGNOSIS — M25.551 BILATERAL HIP PAIN: ICD-10-CM

## 2025-06-18 DIAGNOSIS — I70.0 AORTIC ATHEROSCLEROSIS: ICD-10-CM

## 2025-06-18 DIAGNOSIS — E78.5 HYPERLIPIDEMIA ASSOCIATED WITH TYPE 2 DIABETES MELLITUS: ICD-10-CM

## 2025-06-18 DIAGNOSIS — R29.898 WEAKNESS OF BOTH LOWER EXTREMITIES: ICD-10-CM

## 2025-06-18 DIAGNOSIS — G89.29 CHRONIC BILATERAL LOW BACK PAIN WITHOUT SCIATICA: Primary | ICD-10-CM

## 2025-06-18 DIAGNOSIS — M25.552 BILATERAL HIP PAIN: ICD-10-CM

## 2025-06-18 DIAGNOSIS — I25.10 CORONARY ARTERY DISEASE INVOLVING NATIVE CORONARY ARTERY OF NATIVE HEART WITHOUT ANGINA PECTORIS: ICD-10-CM

## 2025-06-18 DIAGNOSIS — M54.50 CHRONIC BILATERAL LOW BACK PAIN WITHOUT SCIATICA: Primary | ICD-10-CM

## 2025-06-18 DIAGNOSIS — F17.219 CIGARETTE NICOTINE DEPENDENCE WITH NICOTINE-INDUCED DISORDER: ICD-10-CM

## 2025-06-18 PROBLEM — Z01.810 PREOP CARDIOVASCULAR EXAM: Status: RESOLVED | Noted: 2025-06-17 | Resolved: 2025-06-18

## 2025-06-18 LAB
ABSOLUTE EOSINOPHIL (OHS): 0.43 K/UL
ABSOLUTE MONOCYTE (OHS): 0.77 K/UL (ref 0.3–1)
ABSOLUTE NEUTROPHIL COUNT (OHS): 2.84 K/UL (ref 1.8–7.7)
ALBUMIN SERPL BCP-MCNC: 4.3 G/DL (ref 3.5–5.2)
ALBUMIN/CREAT UR: 15.4 UG/MG
ALP SERPL-CCNC: 71 UNIT/L (ref 40–150)
ALT SERPL W/O P-5'-P-CCNC: 19 UNIT/L (ref 10–44)
ANION GAP (OHS): 13 MMOL/L (ref 8–16)
AST SERPL-CCNC: 26 UNIT/L (ref 11–45)
BACTERIA #/AREA URNS HPF: ABNORMAL /HPF
BASOPHILS # BLD AUTO: 0.06 K/UL
BASOPHILS NFR BLD AUTO: 0.9 %
BILIRUB SERPL-MCNC: 0.8 MG/DL (ref 0.1–1)
BILIRUB UR QL STRIP.AUTO: NEGATIVE
BNP SERPL-MCNC: 35 PG/ML (ref 0–99)
BUN SERPL-MCNC: 15 MG/DL (ref 8–23)
CALCIUM SERPL-MCNC: 10.1 MG/DL (ref 8.7–10.5)
CHLORIDE SERPL-SCNC: 105 MMOL/L (ref 95–110)
CHOLEST SERPL-MCNC: 161 MG/DL (ref 120–199)
CHOLEST/HDLC SERPL: 2.4 {RATIO} (ref 2–5)
CLARITY UR: CLEAR
CO2 SERPL-SCNC: 23 MMOL/L (ref 23–29)
COLOR UR AUTO: YELLOW
CREAT SERPL-MCNC: 0.6 MG/DL (ref 0.5–1.4)
CREAT UR-MCNC: 52 MG/DL (ref 15–325)
EAG (OHS): 123 MG/DL (ref 68–131)
ERYTHROCYTE [DISTWIDTH] IN BLOOD BY AUTOMATED COUNT: 12.8 % (ref 11.5–14.5)
GFR SERPLBLD CREATININE-BSD FMLA CKD-EPI: >60 ML/MIN/1.73/M2
GLUCOSE SERPL-MCNC: 93 MG/DL (ref 70–110)
GLUCOSE UR QL STRIP: NEGATIVE
HBA1C MFR BLD: 5.9 % (ref 4–5.6)
HCT VFR BLD AUTO: 40.2 % (ref 37–48.5)
HDLC SERPL-MCNC: 67 MG/DL (ref 40–75)
HDLC SERPL: 41.6 % (ref 20–50)
HGB BLD-MCNC: 12.9 GM/DL (ref 12–16)
HGB UR QL STRIP: NEGATIVE
HOLD SPECIMEN: NORMAL
IMM GRANULOCYTES # BLD AUTO: 0.01 K/UL (ref 0–0.04)
IMM GRANULOCYTES NFR BLD AUTO: 0.2 % (ref 0–0.5)
KETONES UR QL STRIP: NEGATIVE
LDLC SERPL CALC-MCNC: 72.2 MG/DL (ref 63–159)
LEUKOCYTE ESTERASE UR QL STRIP: ABNORMAL
LYMPHOCYTES # BLD AUTO: 2.23 K/UL (ref 1–4.8)
MCH RBC QN AUTO: 31.5 PG (ref 27–31)
MCHC RBC AUTO-ENTMCNC: 32.1 G/DL (ref 32–36)
MCV RBC AUTO: 98 FL (ref 82–98)
MICROALBUMIN UR-MCNC: 8 UG/ML (ref ?–5000)
MICROSCOPIC COMMENT: ABNORMAL
NITRITE UR QL STRIP: NEGATIVE
NONHDLC SERPL-MCNC: 94 MG/DL
NUCLEATED RBC (/100WBC) (OHS): 0 /100 WBC
PH UR STRIP: 6 [PH]
PLATELET # BLD AUTO: 303 K/UL (ref 150–450)
PMV BLD AUTO: 9.7 FL (ref 9.2–12.9)
POTASSIUM SERPL-SCNC: 4 MMOL/L (ref 3.5–5.1)
PROT SERPL-MCNC: 7.6 GM/DL (ref 6–8.4)
PROT UR QL STRIP: NEGATIVE
RBC # BLD AUTO: 4.1 M/UL (ref 4–5.4)
RBC #/AREA URNS HPF: 4 /HPF (ref 0–4)
RELATIVE EOSINOPHIL (OHS): 6.8 %
RELATIVE LYMPHOCYTE (OHS): 35.2 % (ref 18–48)
RELATIVE MONOCYTE (OHS): 12.1 % (ref 4–15)
RELATIVE NEUTROPHIL (OHS): 44.8 % (ref 38–73)
SODIUM SERPL-SCNC: 141 MMOL/L (ref 136–145)
SP GR UR STRIP: 1.01
SQUAMOUS #/AREA URNS HPF: 1 /HPF
TRIGL SERPL-MCNC: 109 MG/DL (ref 30–150)
TSH SERPL-ACNC: 1.57 UIU/ML (ref 0.4–4)
WBC # BLD AUTO: 6.34 K/UL (ref 3.9–12.7)
WBC #/AREA URNS HPF: 12 /HPF (ref 0–5)

## 2025-06-18 PROCEDURE — 84443 ASSAY THYROID STIM HORMONE: CPT | Performed by: FAMILY MEDICINE

## 2025-06-18 PROCEDURE — 99999 PR PBB SHADOW E&M-EST. PATIENT-LVL V: CPT | Mod: PBBFAC,,, | Performed by: FAMILY MEDICINE

## 2025-06-18 PROCEDURE — 82043 UR ALBUMIN QUANTITATIVE: CPT | Performed by: FAMILY MEDICINE

## 2025-06-18 PROCEDURE — 97110 THERAPEUTIC EXERCISES: CPT | Mod: PN

## 2025-06-18 PROCEDURE — 85025 COMPLETE CBC W/AUTO DIFF WBC: CPT | Performed by: FAMILY MEDICINE

## 2025-06-18 PROCEDURE — 81003 URINALYSIS AUTO W/O SCOPE: CPT | Performed by: FAMILY MEDICINE

## 2025-06-18 PROCEDURE — 97530 THERAPEUTIC ACTIVITIES: CPT | Mod: PN

## 2025-06-18 PROCEDURE — 80053 COMPREHEN METABOLIC PANEL: CPT | Performed by: FAMILY MEDICINE

## 2025-06-18 PROCEDURE — 87086 URINE CULTURE/COLONY COUNT: CPT | Performed by: FAMILY MEDICINE

## 2025-06-18 PROCEDURE — 83880 ASSAY OF NATRIURETIC PEPTIDE: CPT | Performed by: FAMILY MEDICINE

## 2025-06-18 PROCEDURE — 83036 HEMOGLOBIN GLYCOSYLATED A1C: CPT | Performed by: FAMILY MEDICINE

## 2025-06-18 PROCEDURE — 80061 LIPID PANEL: CPT | Performed by: FAMILY MEDICINE

## 2025-06-18 PROCEDURE — 97112 NEUROMUSCULAR REEDUCATION: CPT | Mod: PN

## 2025-06-18 RX ORDER — CICLOPIROX 80 MG/ML
SOLUTION TOPICAL NIGHTLY
Qty: 6.6 ML | Refills: 11 | Status: SHIPPED | OUTPATIENT
Start: 2025-06-18

## 2025-06-18 RX ORDER — IRBESARTAN 300 MG/1
300 TABLET ORAL NIGHTLY
Qty: 90 TABLET | Refills: 1 | Status: SHIPPED | OUTPATIENT
Start: 2025-06-18

## 2025-06-18 RX ORDER — PRAVASTATIN SODIUM 40 MG/1
40 TABLET ORAL NIGHTLY
Qty: 90 TABLET | Refills: 1 | Status: SHIPPED | OUTPATIENT
Start: 2025-06-18

## 2025-06-18 RX ORDER — AMLODIPINE BESYLATE 10 MG/1
10 TABLET ORAL DAILY
Qty: 90 TABLET | Refills: 1 | Status: SHIPPED | OUTPATIENT
Start: 2025-06-18

## 2025-06-18 NOTE — PROGRESS NOTES
Subjective:       Patient ID: Mita Max is a 75 y.o. female presents with Problem List[1]     Chief Complaint: Follow-up    History of Present Illness    Mita presents today for a complete physical exam. She reports worsening back pain due to being out of muscle relaxers. She is currently undergoing physical therapy which has been very helpful in managing her symptoms. Her final physical therapy session is scheduled for next Wednesday. She experiences a seasonal spring cough that worsens with pollen exposure and may be exacerbated by smoking. The cough becomes more severe during periods of high pollen count. She has a smoking history spanning more than 30 years, including an 8-year break during that period. She continues to actively smoke. She takes Amlodipine, Irbesartan, a statin medication, and Lyrica twice daily.  Patient continues to have ankle swelling more in the right leg, denies of any chest tightness or difficulty breathing.   Does have fungal infection to the toenails for which she has not been using Penlac as prescribed      ROS:  General: -fever, -chills, -fatigue, -weight gain, -weight loss, -loss of appetite  Eyes: -vision changes, -blurry vision, -eye pain, -eye discharge  ENT: -ear pain, -hearing loss, -tinnitus, -nasal congestion, -sore throat  Cardiovascular: -chest pain, -palpitations, -lower extremity edema  Respiratory: +cough, -shortness of breath, -wheezing, -sputum production  Endocrine: -polyuria, -polydipsia, -heat intolerance, -cold intolerance  Gastrointestinal: -abdominal pain, -heartburn, -nausea, -vomiting, -diarrhea, -constipation, -blood in stool  Genitourinary: -dysuria, -urgency, -frequency, -hematuria, -nocturia, -incontinence  Heme & Lymphatic: -easy or excessive bleeding, -easy bruising, -swollen lymph nodes  Musculoskeletal: -muscle pain, +back pain, -joint pain, -joint swelling, + ankle swelling  Skin: -rash, -lesion, -itching, -skin texture changes, -skin color  changes  Neurological: -headache, -dizziness, -numbness, -tingling, -seizure activity, -speech difficulty, -memory loss, -confusion  Psychiatric: -anxiety, -depression, -sleep difficulty                /70 (BP Location: Right arm, Patient Position: Sitting)   Pulse 86   Temp 97.8 °F (36.6 °C) (Temporal)   Resp 20   Wt 74.4 kg (164 lb 0.4 oz)   SpO2 96%   BMI 28.15 kg/m²   Objective:      Physical Exam  Constitutional:       Appearance: She is well-developed.   HENT:      Head: Normocephalic and atraumatic.   Cardiovascular:      Rate and Rhythm: Normal rate and regular rhythm.      Pulses:           Dorsalis pedis pulses are 1+ on the right side and 1+ on the left side.      Heart sounds: Normal heart sounds. No murmur heard.  Pulmonary:      Effort: Pulmonary effort is normal.      Breath sounds: Normal breath sounds. No wheezing.   Abdominal:      General: Bowel sounds are normal.      Palpations: Abdomen is soft.      Tenderness: There is no abdominal tenderness.   Musculoskeletal:      Right lower leg: Edema present.      Left lower leg: Edema present.      Comments: Noted 1+ edema to bilateral ankles   Feet:      Right foot:      Protective Sensation: 10 sites tested.  10 sites sensed.      Toenail Condition: Fungal disease present.     Left foot:      Protective Sensation: 10 sites tested.  10 sites sensed.      Toenail Condition: Fungal disease present.  Skin:     General: Skin is warm and dry.      Findings: No rash.   Neurological:      Mental Status: She is alert and oriented to person, place, and time.   Psychiatric:         Mood and Affect: Mood normal.           Assessment/Plan:   1. Routine general medical examination at a health care facility  -     Urinalysis, Reflex to Urine Culture Urine, Clean Catch  -     Hemoglobin A1C  -     TSH  -     Lipid Panel  -     Comprehensive Metabolic Panel  -     CBC Auto Differential  -     GREY TOP URINE HOLD  -     Urinalysis Microscopic  -     Urine  culture  Vital signs stable today.  Clinical exam stable  Continue lifestyle modifications with low-fat and low-cholesterol diet and exercise 30 minutes daily  Encouraged to consider getting shingles tetanus and RSV vaccination if interested outside pharmacy    2. Hypertension associated with diabetes  -     Urinalysis, Reflex to Urine Culture Urine, Clean Catch  -     TSH  -     Lipid Panel  -     Comprehensive Metabolic Panel  -     amLODIPine (NORVASC) 10 MG tablet; Take 1 tablet (10 mg total) by mouth once daily.  Dispense: 90 tablet; Refill: 1  -     irbesartan (AVAPRO) 300 MG tablet; Take 1 tablet (300 mg total) by mouth every evening.  Dispense: 90 tablet; Refill: 1  -     GREY TOP URINE HOLD  -     Urinalysis Microscopic  -     Urine culture  Blood pressure is stable currently on amlodipine 10 mg and Avapro 300 mg, refill given today    3. Type 2 diabetes mellitus without complication, without long-term current use of insulin  -     Hemoglobin A1C  -     Microalbumin/Creatinine Ratio, Urine  Currently diet controlled  Strict lifestyle changes recommended with 1800 ADA low-fat and low-cholesterol diet and exercise 30 minutes daily  Diabetic foot exam stable today    4. Coronary artery disease involving native coronary artery of native heart without angina pectoris  Overview:  3/01/2023 CT scan- Aorta and vasculature: Atherosclerosis including coronary arteries.  Stable and asymptomatic currently on aspirin    5. Primary osteoarthritis of both knees  Graded exercise regimen recommended    6. Pulmonary nodules  Overview:  3/01/2023 CT scan- Lungs: No significant change in multiple bilateral both calcified and noncalcified pulmonary nodules.  New left apical focal opacity, likely scarring.  The lungs show findings consistent with emphysema.  7. Pulmonary emphysema, unspecified emphysema type  Overview:  3/01/2023 CT scan- Lungs: No significant change in multiple bilateral both calcified and noncalcified  pulmonary nodules.  New left apical focal opacity, likely scarring.  The lungs show findings consistent with emphysema.     Stable and asymptomatic  Currently using albuterol inhaler as needed       8. Aortic atherosclerosis  Overview:  3/01/2023 CT scan- Aorta and vasculature: Atherosclerosis including coronary arteries.  Continue aspirin and statins      9. Spinal stenosis of lumbar region without neurogenic claudication  Stable on Lyrica and Flexeril    10. Cigarette nicotine dependence with nicotine-induced disorder  -     CT Chest Lung Screening Low Dose; Future; Expected date: 06/18/2025  Will get low-dose CT scan for further evaluation  Encouraged to quit smoking    11. Hyperlipidemia associated with type 2 diabetes mellitus  -     pravastatin (PRAVACHOL) 40 MG tablet; Take 1 tablet (40 mg total) by mouth every evening.  Dispense: 90 tablet; Refill: 1  Stable on pravastatin 40 mg daily, refill given today    12. Onychomycosis  -     ciclopirox (PENLAC) 8 % Soln; Apply topically nightly. To great toenails bilaterraly  Dispense: 6.6 mL; Refill: 11  Penlac prescribed today for symptomatic relief    13. History of colon polyps    14. Leg swelling  -     BNP  Patient was advised to elevate lower extremities and restrict salt intake and try compression stockings  Will check further labs         This note was generated with the assistance of ambient listening technology. Verbal consent was obtained by the patient and accompanying visitor(s) for the recording of patient appointment to facilitate this note. I attest to having reviewed and edited the generated note for accuracy, though some syntax or spelling errors may persist. Please contact the author of this note for any clarification.            [1]   Patient Active Problem List  Diagnosis    Type 2 diabetes mellitus without complication, without long-term current use of insulin    Alcohol dependence in remission    Hypertension associated with diabetes    Chronic  allergic rhinitis    History of colon polyps    Hyperlipidemia associated with type 2 diabetes mellitus    Primary osteoarthritis of both knees    Cigarette nicotine dependence with nicotine-induced disorder    Emphysema lung    Pulmonary nodules    Aortic atherosclerosis    Coronary artery disease involving native coronary artery of native heart without angina pectoris    Smokers' cough    Diabetic cataract    Weakness of both lower extremities    Bilateral hip pain    Spinal stenosis of lumbar region without neurogenic claudication

## 2025-06-18 NOTE — PROGRESS NOTES
"Outpatient Rehab  Physical Therapy Visit    Patient Name: Mita Max  MRN: [id number]  YOB: 1950  Encounter Date: 6/18/2025    Therapy Diagnosis:   Encounter Diagnoses   Name Primary?    Chronic bilateral low back pain without sciatica Yes    Weakness of both lower extremities     Bilateral hip pain      Physician: Vlad Dickerson NP    Physician Orders: Eval and Treat  Medical Diagnosis: Acute bilateral low back pain with bilateral sciatica  Surgical Diagnosis: Not applicable for this Episode   Surgical Date: Not applicable for this Episode    Visit # / Visits Authorized:  39 / 40  Insurance Authorization Period: 2/4/2025 to 12/31/2025  Date of Evaluation: 2/5/2025  Plan of Care Certification: 4/29/2025 to 6/27/2025      PT/PTA: PT   Number of PTA visits since last PT visit:0    Time In: 1000   Time Out: 1100  Total Time (in minutes): 60   Total Billable Time (in minutes):  45    Precautions: standard, footdrop      Subjective   Overall doing better. Still having some mild low back pain, especially with no muscle spasm medications..         Objective          Treatment:  Treatment: (exercises performed today are bolded)  Therapeutic Exercise: 15 minutes  Nustep x 8 minutes (lvl 4)  Calf stretch (lvl 2) 3x 30"  Seated alternating DF 25x  Standing heel raises 30x  Standing pulldowns 7# 3x10  Seated LAQ 2x10, 5#  Seated marches 5# 2x10  Seated IR 20x, RTB  Seated ER 20x, RTB  Seated hip abduction, blue TB 30x  Seated hip adduction 30x  Seated HS curls, Blue TB 20x  Seated Soleus raises (4" box) 15# KB 30x     Manual Therapy: 0 minutes  IASTM/cupping to (R) anterior tib  Manual resistance & overpressure DF/PF  STM to lumbar paraspinals      Balance/Neuromuscular Re-Education: 30 minutes  Italian x 10 minutes with active DF  ABC's x 2 trials  Cone walking x 5 laps  Lateral cone walking x5 laps  Lateral stepping, YTB x5 laps  Monster walks, YTB x 3laps  Hip 3-way, 2x10, YTB  Standing alternating " "marches on foam 1x10  Standing cone taps 3x10  Standing marches, YTB 1x10  Lateral stepping on foam beam x3laps  Lateral stepping with cone taps on foam beam x3laps  Ball toss on foam x20 tosses  Ball toss in tandem x20 tosses  Tandem stance 3x30" ea  SL stance 3x30"  Walking in // bars without UE x 4 laps  YTB ambulation x5 lap     Therapeutic Activity: 15 minutes  Sit to stand 2x15; 7.5#  Stair climbing x 3 trials (12 steps total with reciprocal gait pattern)  Step ups (stairs) 2x10  Lateral step ups (stairs) 2x10  Lateral step downs (4" step) 2x6  Obstacle course with step ups, lateral stepping, cone weaving, marching on uneven surfaces x 2 trials        Assessment & Plan   Assessment: She continues to show good progress with strength, pain , and gait pattern. She has some increased low back pain with single leg activities. Will continue to benefit from further RLE strengthening and balance for return to prior level of function, perform all work duties required of her without increased pain, and ambulate independently.       The patient will continue to benefit from skilled outpatient physical therapy in order to address the deficits listed in the problem list on the initial evaluation, provide patient and family education, and maximize the patients level of independence in the home and community environments.     The patient's spiritual, cultural, and educational needs were considered, and the patient is agreeable to the plan of care and goals.       Plan: Continue with POC and progress as tolerated. Plan to move to once a week starting next week. plan to do leg press machine at next session.    Goals:   Active       Long Term Goals       Patient will demonstrate improved function as indicated by a functional score of 65 on the FOTO.  (Progressing)       Start:  02/05/25    Expected End:  06/27/25            Pt will demonstrate improved pain by reports of less than or equal to 5/10 worst pain on the verbal " rating scale in order to progress toward maximal functional ability and improve QOL.   (Met)       Start:  02/05/25    Expected End:  06/27/25    Resolved:  06/18/25         patient will improve bilateral lower extremity strength to greater than or equal to 4+/5 MMT for improved functional strength.  (Progressing)       Start:  02/05/25    Expected End:  06/27/25            Patient will improve 5 time sit to stand to less than 15 seconds for improved functional strength and decreased fall risk.  (Met)       Start:  02/05/25    Expected End:  04/17/25    Resolved:  04/29/25         Patient will ambulate with normalized gait mechanics for return to prior level of function.  (Progressing)       Start:  02/05/25    Expected End:  06/27/25              Resolved       Short Term Goals       Patient will demonstrate independence with HEP in order to progress toward functional independence  (Met)       Start:  02/05/25    Expected End:  04/17/25    Resolved:  03/11/25         Pt will demonstrate improved pain by reports of less than or equal to 7/10 worst pain on the verbal rating scale in order to progress toward maximal functional ability and improve QOL.   (Met)       Start:  02/05/25    Expected End:  04/17/25    Resolved:  03/11/25         Patient will improve bilateral lower extremity strength by 1/2 MMT for improved strength needed for increased ambulation distance.  (Met)       Start:  02/05/25    Expected End:  04/17/25    Resolved:  04/25/25         Patient will improve 5 time sit to stand to less than 20 seconds for improved functional strength and decreased fall risk.  (Met)       Start:  02/05/25    Expected End:  04/17/25    Resolved:  04/25/25               Maria Luisa Castillo, PT

## 2025-06-20 ENCOUNTER — RESULTS FOLLOW-UP (OUTPATIENT)
Dept: INTERNAL MEDICINE | Facility: CLINIC | Age: 75
End: 2025-06-20

## 2025-06-20 LAB — BACTERIA UR CULT: NORMAL

## 2025-06-24 DIAGNOSIS — M54.9 DORSALGIA, UNSPECIFIED: ICD-10-CM

## 2025-06-24 DIAGNOSIS — M43.16 ANTEROLISTHESIS OF LUMBAR SPINE: ICD-10-CM

## 2025-06-24 DIAGNOSIS — M54.16 LUMBAR RADICULOPATHY: ICD-10-CM

## 2025-06-24 DIAGNOSIS — M21.371 RIGHT FOOT DROP: ICD-10-CM

## 2025-06-24 DIAGNOSIS — M51.9 LUMBAR DISC DISEASE: ICD-10-CM

## 2025-06-24 DIAGNOSIS — M43.16 SPONDYLOLISTHESIS OF LUMBAR REGION: ICD-10-CM

## 2025-06-25 ENCOUNTER — CLINICAL SUPPORT (OUTPATIENT)
Dept: REHABILITATION | Facility: HOSPITAL | Age: 75
End: 2025-06-25
Payer: MEDICARE

## 2025-06-25 DIAGNOSIS — R29.898 WEAKNESS OF BOTH LOWER EXTREMITIES: Primary | ICD-10-CM

## 2025-06-25 DIAGNOSIS — M25.552 BILATERAL HIP PAIN: ICD-10-CM

## 2025-06-25 DIAGNOSIS — M25.551 BILATERAL HIP PAIN: ICD-10-CM

## 2025-06-25 PROCEDURE — 97112 NEUROMUSCULAR REEDUCATION: CPT | Mod: PN

## 2025-06-25 PROCEDURE — 97110 THERAPEUTIC EXERCISES: CPT | Mod: PN

## 2025-06-25 RX ORDER — PREGABALIN 50 MG/1
50 CAPSULE ORAL 2 TIMES DAILY
Qty: 60 CAPSULE | Refills: 1 | OUTPATIENT
Start: 2025-06-25

## 2025-06-25 NOTE — TELEPHONE ENCOUNTER
Spoke with patient she francisco to reschedule procedure from 7/3/25 to 7/24/25 due to work schedule.  Procedure rescheduled.

## 2025-06-26 NOTE — PROGRESS NOTES
"Outpatient Rehab  Physical Therapy Discharge    Patient Name: Mita Max  MRN: [id number]  YOB: 1950  Encounter Date: 6/25/2025    Therapy Diagnosis:   Encounter Diagnoses   Name Primary?    Weakness of both lower extremities Yes    Bilateral hip pain      Physician: Vlad Dickerson NP    Physician Orders: Eval and Treat  Medical Diagnosis: Acute bilateral low back pain with bilateral sciatica  Surgical Diagnosis: Not applicable for this Episode   Surgical Date: Not applicable for this Episode  Days Since Last Surgery: Not applicable for this Episode    Visit # / Visits Authorized:  40 / 40  Insurance Authorization Period: 2/4/2025 to 12/31/2025  Date of Evaluation: 2/5/2025  Plan of Care Certification: 4/29/2025 to 6/27/2025      PT/PTA: PT   Number of PTA visits since last PT visit:0    Time In: 1400   Time Out: 1500  Total Time (in minutes): 60   Total Billable Time (in minutes):  45    Subjective   Doing good overall, still feels like fear of falling is her biggest problem..         Objective            Treatment:  Treatment: (exercises performed today are bolded)  Therapeutic Exercise: 25 minutes  Nustep x 8 minutes (lvl 4)  Calf stretch (lvl 2) 3x 30"  Seated alternating DF 25x  Standing heel raises 30x  Standing pulldowns 7# 3x10  Seated LAQ 2x10, 5#  Seated marches 5# 2x10  Seated IR 20x, RTB  Seated ER 20x, RTB  Seated hip abduction, blue TB 30x  Seated hip adduction 30x  Seated HS curls, Blue TB 20x  Seated Soleus raises (4" box) 15# KB 30x     Manual Therapy: 0 minutes  IASTM/cupping to (R) anterior tib  Manual resistance & overpressure DF/PF  STM to lumbar paraspinals      Balance/Neuromuscular Re-Education: 35 minutes  Polish x 10 minutes with active DF  ABC's x 2 trials  Cone walking x 5 laps  Lateral cone walking x5 laps  Lateral stepping, YTB x5 laps  Monster walks, YTB x 3laps  Hip 3-way, 2x10, YTB  Standing cone taps 3x10  Standing marches, YTB 1x10  Lateral stepping on foam " "beam x3laps  Lateral stepping with cone taps on foam beam x3laps  Ball toss on foam x20 tosses  Ball toss in tandem x20 tosses  Tandem stance 3x30" ea  SL stance 3x30"  Walking in // bars without UE x 4 laps  YTB ambulation x5 lap  Repeated sit to stands 3x10     Therapeutic Activity: 15 minutes  Sit to stand 2x15; 7.5#  Stair climbing x 3 trials (12 steps total with reciprocal gait pattern)  Step ups (stairs) 2x10  Lateral step ups (stairs) 2x10  Lateral step downs (4" step) 2x6  Obstacle course with step ups, lateral stepping, cone weaving, marching on uneven surfaces x 2 trials        Assessment & Plan   Assessment: She continues to show good progress with strength, pain, and gait pattern. She is independent in her HEP and plans to continue with exercises at home to continue progressing. She was discharged from physical therapy today.      The patient's spiritual, cultural, and educational needs were considered, and the patient is agreeable to the plan of care and goals.       Plan: She was discharged from physical therapy on 6/26/2025    Goals:   Active       Long Term Goals       Patient will demonstrate improved function as indicated by a functional score of 65 on the FOTO.  (Met)       Start:  02/05/25    Expected End:  06/27/25    Resolved:  06/26/25         Pt will demonstrate improved pain by reports of less than or equal to 5/10 worst pain on the verbal rating scale in order to progress toward maximal functional ability and improve QOL.   (Met)       Start:  02/05/25    Expected End:  06/27/25    Resolved:  06/18/25         patient will improve bilateral lower extremity strength to greater than or equal to 4+/5 MMT for improved functional strength.  (Met)       Start:  02/05/25    Expected End:  06/27/25    Resolved:  06/26/25         Patient will improve 5 time sit to stand to less than 15 seconds for improved functional strength and decreased fall risk.  (Met)       Start:  02/05/25    Expected End:  " 04/17/25    Resolved:  04/29/25         Patient will ambulate with normalized gait mechanics for return to prior level of function.  (Progressing)       Start:  02/05/25    Expected End:  06/27/25              Resolved       Short Term Goals       Patient will demonstrate independence with HEP in order to progress toward functional independence  (Met)       Start:  02/05/25    Expected End:  04/17/25    Resolved:  03/11/25         Pt will demonstrate improved pain by reports of less than or equal to 7/10 worst pain on the verbal rating scale in order to progress toward maximal functional ability and improve QOL.   (Met)       Start:  02/05/25    Expected End:  04/17/25    Resolved:  03/11/25         Patient will improve bilateral lower extremity strength by 1/2 MMT for improved strength needed for increased ambulation distance.  (Met)       Start:  02/05/25    Expected End:  04/17/25    Resolved:  04/25/25         Patient will improve 5 time sit to stand to less than 20 seconds for improved functional strength and decreased fall risk.  (Met)       Start:  02/05/25    Expected End:  04/17/25    Resolved:  04/25/25             Maria Luisa Castillo, PT

## 2025-07-18 ENCOUNTER — PATIENT MESSAGE (OUTPATIENT)
Dept: PAIN MEDICINE | Facility: HOSPITAL | Age: 75
End: 2025-07-18
Payer: MEDICARE

## 2025-07-18 ENCOUNTER — TELEPHONE (OUTPATIENT)
Dept: PAIN MEDICINE | Facility: CLINIC | Age: 75
End: 2025-07-18
Payer: MEDICARE

## 2025-07-18 NOTE — PRE-PROCEDURE INSTRUCTIONS
Spoke with patient regarding procedure scheduled on 7.24. pat instructions and arrival time sent via [employer]     Arrival time 1215     Has patient been sick with fever or on antibiotics within the last 7 days? No     Does the patient have any open wounds, sores or rashes? no     Does the patient have any recent fractures? no     Has patient received a vaccination within the last 7 days? No     Received the COVID vaccination?     Has the patient stopped all medications as directed? hold asa 5 days prior to procedure     Does patient have a pacemaker, defibrillator, or implantable stimulator? No     Does the patient have a ride to and from procedure and someone reliable to remain with patient?  unsure, educated     Is the patient diabetic? yes      Does the patient have sleep apnea? Or use O2 at home? no     Is the patient receiving sedation?      Is the patient instructed to remain NPO beginning at midnight the night before their procedure? yes     Procedure location confirmed with patient? Yes     Covid- Denies signs/symptoms. Instructed to notify PAT/MD if any changes.

## 2025-07-18 NOTE — TELEPHONE ENCOUNTER
Reached out to pt in regards to upcoming procedure on the 24th of this month.  Pt stated that she is taking Flexeril and Lyrica and isn't experiencing as much pain as she did in the past.   Wants to get a little more information on the procedure as she is skeptical about having it because she doesn't fully understand the risks.    Would like for a provider to explain procedure to her more in depth to feel more comfortable with having procedure preformed.    Routed pts concerns to provider.    Benito HARE MA

## 2025-07-24 ENCOUNTER — HOSPITAL ENCOUNTER (OUTPATIENT)
Facility: HOSPITAL | Age: 75
Discharge: HOME | End: 2025-07-24
Attending: ANESTHESIOLOGY | Admitting: ANESTHESIOLOGY
Payer: MEDICARE

## 2025-07-24 VITALS
OXYGEN SATURATION: 96 % | WEIGHT: 165.38 LBS | BODY MASS INDEX: 28.24 KG/M2 | HEIGHT: 64 IN | SYSTOLIC BLOOD PRESSURE: 126 MMHG | HEART RATE: 74 BPM | DIASTOLIC BLOOD PRESSURE: 64 MMHG | RESPIRATION RATE: 16 BRPM | TEMPERATURE: 98 F

## 2025-07-24 DIAGNOSIS — M54.16 LUMBAR RADICULOPATHY: Primary | ICD-10-CM

## 2025-07-24 LAB — POCT GLUCOSE: 155 MG/DL (ref 70–110)

## 2025-07-24 PROCEDURE — 63600175 PHARM REV CODE 636 W HCPCS: Performed by: ANESTHESIOLOGY

## 2025-07-24 PROCEDURE — 82962 GLUCOSE BLOOD TEST: CPT | Performed by: ANESTHESIOLOGY

## 2025-07-24 PROCEDURE — 64484 NJX AA&/STRD TFRM EPI L/S EA: CPT | Mod: RT,,, | Performed by: ANESTHESIOLOGY

## 2025-07-24 PROCEDURE — 25500020 PHARM REV CODE 255: Performed by: ANESTHESIOLOGY

## 2025-07-24 PROCEDURE — 64484 NJX AA&/STRD TFRM EPI L/S EA: CPT | Mod: RT | Performed by: ANESTHESIOLOGY

## 2025-07-24 PROCEDURE — 64483 NJX AA&/STRD TFRM EPI L/S 1: CPT | Mod: RT,,, | Performed by: ANESTHESIOLOGY

## 2025-07-24 PROCEDURE — 64483 NJX AA&/STRD TFRM EPI L/S 1: CPT | Mod: RT | Performed by: ANESTHESIOLOGY

## 2025-07-24 RX ORDER — ONDANSETRON HYDROCHLORIDE 2 MG/ML
4 INJECTION, SOLUTION INTRAVENOUS ONCE AS NEEDED
Status: DISCONTINUED | OUTPATIENT
Start: 2025-07-24 | End: 2025-07-24 | Stop reason: HOSPADM

## 2025-07-24 RX ORDER — FENTANYL CITRATE 50 UG/ML
INJECTION, SOLUTION INTRAMUSCULAR; INTRAVENOUS
Status: DISCONTINUED | OUTPATIENT
Start: 2025-07-24 | End: 2025-07-24 | Stop reason: HOSPADM

## 2025-07-24 RX ORDER — BETAMETHASONE SODIUM PHOSPHATE AND BETAMETHASONE ACETATE 3; 3 MG/ML; MG/ML
INJECTION, SUSPENSION INTRA-ARTICULAR; INTRALESIONAL; INTRAMUSCULAR; SOFT TISSUE
Status: DISCONTINUED | OUTPATIENT
Start: 2025-07-24 | End: 2025-07-24 | Stop reason: HOSPADM

## 2025-07-24 RX ORDER — MIDAZOLAM HYDROCHLORIDE 1 MG/ML
INJECTION INTRAMUSCULAR; INTRAVENOUS
Status: DISCONTINUED | OUTPATIENT
Start: 2025-07-24 | End: 2025-07-24 | Stop reason: HOSPADM

## 2025-07-24 RX ORDER — LIDOCAINE HYDROCHLORIDE 10 MG/ML
INJECTION, SOLUTION EPIDURAL; INFILTRATION; INTRACAUDAL; PERINEURAL
Status: DISCONTINUED | OUTPATIENT
Start: 2025-07-24 | End: 2025-07-24 | Stop reason: HOSPADM

## 2025-07-24 NOTE — DISCHARGE SUMMARY
Discharge Note  Short Stay      SUMMARY     Admit Date: 7/24/2025    Attending Physician: Santy Marshall MD        Discharge Physician: Santy Marshall MD        Discharge Date: 7/24/2025 1:19 PM    Procedure(s) (LRB):  right L4/5 + L5/S1 TF YUNIOR // light sedation // stop ASA 5 days (Right)    Final Diagnosis: Right lumbar radiculopathy [M54.16]    Disposition: Home or self care    Patient Instructions:   Current Discharge Medication List        CONTINUE these medications which have NOT CHANGED    Details   amLODIPine (NORVASC) 10 MG tablet Take 1 tablet (10 mg total) by mouth once daily.  Qty: 90 tablet, Refills: 1    Comments: .  Associated Diagnoses: Hypertension associated with diabetes      aspirin (ECOTRIN) 81 MG EC tablet Take 1 tablet (81 mg total) by mouth once daily.  Refills: 0    Associated Diagnoses: Type 2 diabetes mellitus without complication, without long-term current use of insulin      irbesartan (AVAPRO) 300 MG tablet Take 1 tablet (300 mg total) by mouth every evening.  Qty: 90 tablet, Refills: 1    Comments: .  Associated Diagnoses: Hypertension associated with diabetes      pregabalin (LYRICA) 50 MG capsule Take 1 capsule (50 mg total) by mouth 2 (two) times daily.  Qty: 60 capsule, Refills: 1    Associated Diagnoses: Anterolisthesis of lumbar spine; Spondylolisthesis of lumbar region; Lumbar disc disease; Right foot drop; Lumbar radiculopathy; Dorsalgia, unspecified      albuterol (VENTOLIN HFA) 90 mcg/actuation inhaler Inhale 2 puffs into the lungs every 6 (six) hours as needed for Wheezing or Shortness of Breath. Rescue  Qty: 18 g, Refills: 1    Associated Diagnoses: Pulmonary emphysema, unspecified emphysema type      alcohol swabs (BD ALCOHOL SWABS) PadM Apply 1 each topically once daily.  Qty: 100 each, Refills: 11      ascorbic acid, vitamin C, (VITAMIN C) 500 MG tablet Take 500 mg by mouth 2 (two) times daily.      blood glucose control, low (TRUE METRIX LEVEL 1) Soln To check  blood glucose levels once daily  Qty: 1 each, Refills: 11    Associated Diagnoses: Diet-controlled diabetes mellitus      blood sugar diagnostic (TRUE METRIX GLUCOSE TEST STRIP) Strp To check blood glucose levels once daily  Qty: 100 strip, Refills: 11      blood-glucose meter (TRUE METRIX GLUCOSE METER) Misc To check blood glucose levels once daily  Qty: 1 each, Refills: 0      ciclopirox (PENLAC) 8 % Soln Apply topically nightly. To great toenails bilaterraly  Qty: 6.6 mL, Refills: 11    Associated Diagnoses: Onychomycosis      cyclobenzaprine (FLEXERIL) 5 MG tablet Take 1 tablet (5 mg total) by mouth 2 (two) times daily as needed for Muscle spasms.  Qty: 60 tablet, Refills: 1    Associated Diagnoses: Muscle spasm of back      diclofenac sodium (VOLTAREN) 1 % Gel Apply 2 g topically 4 (four) times daily as needed.      lancets (TRUEPLUS LANCETS) 33 gauge Misc To check blood glucose levels once daily  Qty: 100 each, Refills: 11    Comments: NEW RX REQUEST FOR PATIENT DUE TO USING NEW MAIL ORDER PHARMACY-[facility]      multivitamin (THERAGRAN) per tablet Take 1 tablet by mouth once daily.      pravastatin (PRAVACHOL) 40 MG tablet Take 1 tablet (40 mg total) by mouth every evening.  Qty: 90 tablet, Refills: 1    Associated Diagnoses: Hyperlipidemia associated with type 2 diabetes mellitus      vitamin E 400 UNIT capsule Take 400 Units by mouth once daily.                 Discharge Diagnosis: Right lumbar radiculopathy [M54.16]  Condition on Discharge: Stable with no complications to procedure   Diet on Discharge: Same as before.  Activity: as per instruction sheet.  Discharge to: Home with a responsible adult. Restart Aspirin tomorrow  Follow up: 2-4 weeks       Please call the office at [phone] if you experience any weakness or loss of sensation, fever > 101.5, pain uncontrolled with oral medications, persistent nausea/vomiting/or diarrhea, redness or drainage from the incisions, or any other worrisome  concerns. If physician on call was not reached or could not communicate with our office for any reason please go to the nearest emergency department       Complex Repair And Graft Additional Text (Will Appearing After The Standard Complex Repair Text): The complex repair was not sufficient to completely close the primary defect. The remaining additional defect was repaired with the graft mentioned below.

## 2025-07-24 NOTE — H&P
"HPI  Patient presenting for Procedure(s) (LRB):  right L4/5 + L5/S1 TF YUNIOR // light sedation // stop ASA 5 days (Right)     Patient on Anti-coagulation Yes, ASA, held five days before procedure    No health changes since previous encounter    Past Medical History:   Diagnosis Date    Alcohol dependence         Anxiety     Arthritis     Asthma     Back pain     Chronic bronchitis     Coronary artery disease involving native coronary artery of native heart without angina pectoris 2024    Depression     DM2 (diabetes mellitus, type 2)     Emphysema lung 2024    HTN (hypertension)     Obesity     Pneumonia due to other staphylococcus     Tobacco dependence     Type 2 diabetes mellitus     Urinary incontinence      Past Surgical History:   Procedure Laterality Date     SECTION      COLONOSCOPY N/A 2019    Procedure: COLONOSCOPY;  Surgeon: Dick Sena MD;  Location: [facility] ENDO;  Service: Endoscopy;  Laterality: N/A;    COLONOSCOPY N/A 2023    Procedure: COLONOSCOPY;  Surgeon: Linn Hampton MD;  Location: [facility] ENDO;  Service: Endoscopy;  Laterality: N/A;    COLONOSCOPY W/ POLYPECTOMY  2019    polyps x 8 resected, multiple rectal polyps, tics; repeat 3 yrs. Dr Sena    SALIVARY GLAND SURGERY       Review of patient's allergies indicates:   Allergen Reactions    Corticosteroids (glucocorticoids)      Aggitation  Aggitation    Adhesive Rash     Band-aids  Band-aids  Band-aids    Pollen extracts Rash        Medications Ordered Prior to Encounter[1]     PMHx, PSHx, Allergies, Medications reviewed in epic    ROS negative except pain complaints in HPI    OBJECTIVE:    BP (!) 145/67 (BP Location: Right arm, Patient Position: Sitting)   Pulse 80   Temp 97.4 °F (36.3 °C) (Temporal)   Resp 18   Ht 5' 4" (1.626 m)   Wt 75 kg (165 lb 5.5 oz)   SpO2 (!) 94%   Breastfeeding No   BMI 28.38 kg/m²     PHYSICAL EXAMINATION:    GENERAL: Well appearing, in no acute distress, alert " and oriented x3.  PSYCH:  Mood and affect appropriate.  SKIN: Skin color, texture, turgor normal, no rashes or lesions which will impact the procedure.  CV: RRR with palpation of the radial artery.  PULM: No evidence of respiratory difficulty, symmetric chest rise. Clear to auscultation.  NEURO: Cranial nerves grossly intact.    Plan:    Proceed with procedure as planned Procedure(s) (LRB):  right L4/5 + L5/S1 TF YUNIOR // light sedation // stop ASA 5 days (Right)    Santy Marshall MD  07/24/2025                 [1]   No current facility-administered medications on file prior to encounter.     Current Outpatient Medications on File Prior to Encounter   Medication Sig Dispense Refill    aspirin (ECOTRIN) 81 MG EC tablet Take 1 tablet (81 mg total) by mouth once daily.  0    albuterol (VENTOLIN HFA) 90 mcg/actuation inhaler Inhale 2 puffs into the lungs every 6 (six) hours as needed for Wheezing or Shortness of Breath. Rescue 18 g 1    alcohol swabs (BD ALCOHOL SWABS) PadM Apply 1 each topically once daily. 100 each 11    ascorbic acid, vitamin C, (VITAMIN C) 500 MG tablet Take 500 mg by mouth 2 (two) times daily.      blood glucose control, low (TRUE METRIX LEVEL 1) Soln To check blood glucose levels once daily 1 each 11    blood sugar diagnostic (TRUE METRIX GLUCOSE TEST STRIP) Strp To check blood glucose levels once daily 100 strip 11    blood-glucose meter (TRUE METRIX GLUCOSE METER) Misc To check blood glucose levels once daily 1 each 0    diclofenac sodium (VOLTAREN) 1 % Gel Apply 2 g topically 4 (four) times daily as needed.      lancets (TRUEPLUS LANCETS) 33 gauge Misc To check blood glucose levels once daily 100 each 11    multivitamin (THERAGRAN) per tablet Take 1 tablet by mouth once daily.      vitamin E 400 UNIT capsule Take 400 Units by mouth once daily.

## 2025-07-24 NOTE — DISCHARGE INSTRUCTIONS

## 2025-07-24 NOTE — PLAN OF CARE
Pt and spouse verbalized understanding of discharge instructions. Band aids x 1 to right lower back c/d/i. Patient voiced no complaints, with no further questions at this time. VSS on RA. Recovery care complete.

## 2025-07-24 NOTE — OP NOTE
Transforaminal Lumbar Epidural Steroid Injection    INFORMED CONSENT: The procedure, risks, benefits and options were discussed with patient. There are no contraindications to the procedure. The patient expressed understanding and agreed to proceed. The personnel performing the procedure was discussed.    Date of procedure 07/24/2025    Time-out taken to identify patient and procedure side prior to starting the procedure.                     PROCEDURE:    1)  Right  L4/L5 TRANSFORAMINAL EPIDURAL STEROID INJECTION    2)  Right  L5/S1 TRANSFORAMINAL EPIDURAL STEROID INJECTION    Pre Procedure diagnosis:    Right lumbar radiculopathy [M54.16]  1. Lumbar radiculopathy        Post-Procedure diagnosis:   same    Surgeon: Santy Marshall MD    Assistants: None    Medication: 2ml Betamethasone PF 6mg/ml and 2ml Lidocaine PF 1%    Local: 3ml Lidocaine PF 1%    Sedation: Conscious sedation provided by M.D    SEDATION MEDICATIONS: local/IV sedation: Versed 1 mg and fentanyl 50 mcg IV.  Conscious sedation ordered by MD.  Patient reevaluated and sedation administered by MD and monitored by RN.  Total sedation time was less than 10 min.    Total sedation time was <10 min    Complications: None    Specimens: None        TECHNIQUE: The patient was brought to the procedure room. IV access was obtained prior to the procedure. The patient was positioned prone on the fluoroscopy table. Continuous hemodynamic monitoring was initiated including blood pressure, EKG, and pulse oximetry. . The skin was prepped with chlorhexidine and draped in a sterile fashion.   Local Xylocaine was injected by raising a wheel and going down to the periosteum using a 27-gauge hypodermic needle.      The Right  L4/L5 and Right L5/S1 transforaminal spaces were identified with fluoroscopy in the  AP, oblique, and lateral views.  A 22 gauge spinal quinke needle was then advanced into the area of the trans foraminal spaces at the above levels with confirmation  of proper needle position using AP, oblique, and lateral fluoroscopic views. Once the needle tip was in the area of the transforaminal space, and there was no blood, CSF or paraesthesias,  2 mL of Omnipaque 300mg/ml was injected at each level for a total of 4 mL.  Fluoroscopic imaging in the AP and lateral views revealed a clear outline of the spinal nerve with proximal spread of agent through the neural foramen into the epidural space. A total combination of 1 mL of Lidocaine PF 1% and 1ml of Betamethasone PF 6mg/ml was injected into each level for a total of 4mL of injected medications with displacement of the contrast dye confirming that the medication went into the area of the transforaminal spaces at each level. A sterile dressing was applied. Patient tolerated procedure well.        The patient was monitored for approximately 30 minutes after the procedure.  Patient was given post procedure and discharge instructions to follow at home.  The patient was discharged in a stable condition